# Patient Record
Sex: FEMALE | Race: WHITE | NOT HISPANIC OR LATINO | Employment: FULL TIME | URBAN - METROPOLITAN AREA
[De-identification: names, ages, dates, MRNs, and addresses within clinical notes are randomized per-mention and may not be internally consistent; named-entity substitution may affect disease eponyms.]

---

## 2018-02-01 ENCOUNTER — OFFICE VISIT (OUTPATIENT)
Dept: URGENT CARE | Facility: CLINIC | Age: 33
End: 2018-02-01
Payer: MEDICAID

## 2018-02-01 VITALS
TEMPERATURE: 99 F | HEIGHT: 61 IN | BODY MASS INDEX: 36.82 KG/M2 | WEIGHT: 195 LBS | HEART RATE: 91 BPM | DIASTOLIC BLOOD PRESSURE: 90 MMHG | SYSTOLIC BLOOD PRESSURE: 125 MMHG | OXYGEN SATURATION: 96 % | RESPIRATION RATE: 18 BRPM

## 2018-02-01 DIAGNOSIS — J32.9 RHINOSINUSITIS: Primary | ICD-10-CM

## 2018-02-01 DIAGNOSIS — R05.9 COUGH: ICD-10-CM

## 2018-02-01 LAB
CTP QC/QA: YES
FLUAV AG NPH QL: NEGATIVE
FLUBV AG NPH QL: NEGATIVE

## 2018-02-01 PROCEDURE — 3008F BODY MASS INDEX DOCD: CPT | Mod: S$GLB,,, | Performed by: PHYSICIAN ASSISTANT

## 2018-02-01 PROCEDURE — 99203 OFFICE O/P NEW LOW 30 MIN: CPT | Mod: S$GLB,,, | Performed by: PHYSICIAN ASSISTANT

## 2018-02-01 PROCEDURE — 87804 INFLUENZA ASSAY W/OPTIC: CPT | Mod: 59,QW,S$GLB, | Performed by: PHYSICIAN ASSISTANT

## 2018-02-01 RX ORDER — QUETIAPINE FUMARATE 100 MG/1
100 TABLET, FILM COATED ORAL DAILY
COMMUNITY
End: 2022-12-16 | Stop reason: ALTCHOICE

## 2018-02-01 RX ORDER — BUSPIRONE HYDROCHLORIDE 10 MG/1
10 TABLET ORAL 2 TIMES DAILY
COMMUNITY
End: 2021-05-08

## 2018-02-01 RX ORDER — DULOXETIN HYDROCHLORIDE 60 MG/1
60 CAPSULE, DELAYED RELEASE ORAL DAILY
COMMUNITY
End: 2021-05-08

## 2018-02-01 RX ORDER — PROMETHAZINE HYDROCHLORIDE AND DEXTROMETHORPHAN HYDROBROMIDE 6.25; 15 MG/5ML; MG/5ML
5 SYRUP ORAL EVERY 6 HOURS PRN
Qty: 120 ML | Refills: 0 | Status: SHIPPED | OUTPATIENT
Start: 2018-02-01 | End: 2018-02-11

## 2018-02-01 RX ORDER — FLUTICASONE PROPIONATE 50 MCG
1 SPRAY, SUSPENSION (ML) NASAL DAILY
Qty: 15 G | Refills: 0 | Status: SHIPPED | OUTPATIENT
Start: 2018-02-01 | End: 2018-02-08

## 2018-02-01 NOTE — PATIENT INSTRUCTIONS
- Rest.    - Drink plenty of fluids.    - Tylenol or Ibuprofen as directed as needed for fever/pain.   - Take over-the-counter claritin, zyrtec, allegra, or xyzal as directed.  - Use over the counter Flonase as directed for sinus congestion and postnasal drip.  - use nasal saline prior to Flonase.  - Antibiotics are not needed at this time.  - Usual course of cold symptom is 10-14 days, but longer if patient is a smoker.   - Use salt water gargle for sore throat.   - Follow up with your PCP or specialty clinic as directed in the next 1-2 weeks if not improved or as needed.  You can call [phone] to schedule an appointment with the appropriate provider.    - Go to the ED if your symptoms worsen.    Sinusitis (No Antibiotics)    The sinuses are air-filled spaces within the bones of the face. They connect to the inside of the nose. Sinusitis is an inflammation of the tissue lining the sinus cavity. Sinus inflammation can occur during a cold. It can also be due to allergies to pollens and other particles in the air. It can cause symptoms such as sinus congestion, headache, sore throat, facial swelling and fullness. It may also cause a low-grade fever. No infection is present, and no antibiotic treatment is needed.  Home care  · Drink plenty of water, hot tea, and other liquids. This may help thin mucus. It also may promote sinus drainage.  · Heat may help soothe painful areas of the face. Use a towel soaked in hot water. Or,  the shower and direct the hot spray onto your face. Using a vaporizer along with a menthol rub at night may also help.   · An expectorant containing guaifenesin may help thin the mucus and promote drainage from the sinuses.  · Over-the-counter decongestants may be used unless a similar medicine was prescribed. Nasal sprays work the fastest. Use one that contains phenylephrine or oxymetazoline. First blow the nose gently. Then use the spray. Do not use these medicines more often than  directed on the label or symptoms may get worse. You may also use tablets containing pseudoephedrine. Avoid products that combine ingredients, because side effects may be increased. Read labels. You can also ask the pharmacist for help. (NOTE: Persons with high blood pressure should not use decongestants. They can raise blood pressure.)  · Over-the-counter antihistamines may help if allergies contributed to your sinusitis.    · Use acetaminophen or ibuprofen to control pain, unless another pain medicine was prescribed. (If you have chronic liver or kidney disease or ever had a stomach ulcer, talk with your doctor before using these medicines. Aspirin should never be used in anyone under 18 years of age who is ill with a fever. It may cause severe liver damage.)  · Use nasal rinses or irrigation as instructed by your health care provider.  · Don't smoke. This can worsen symptoms.  Follow-up care  Follow up with your healthcare provider or our staff if you are not improving within the next week.  When to seek medical advice  Call your healthcare provider if any of these occur:  · Green or yellow discharge from the nose or into the throat  · Facial pain or headache becoming more severe  · Stiff neck  · Unusual drowsiness or confusion  · Swelling of the forehead or eyelids  · Vision problems, including blurred or double vision  · Fever of 100.4ºF (38ºC) or higher, or as directed by your healthcare provider  · Seizure  · Breathing problems  · Symptoms not resolving within 10 days  Date Last Reviewed: 4/13/2015  © 2679-8319 [employer]. [address] [place] [address], [place] [address]. All rights reserved. This information is not intended as a substitute for professional medical care. Always follow your healthcare professional's instructions.

## 2018-02-01 NOTE — PROGRESS NOTES
"Subjective:       Patient ID: Jerry Coronado is a 32 y.o. female.    Vitals:  height is 5' 1" (1.549 m) and weight is 88.5 kg (195 lb). Her oral temperature is 99.3 °F (37.4 °C). Her blood pressure is 125/90 (abnormal) and her pulse is 91. Her respiration is 18 and oxygen saturation is 96%.     Chief Complaint: URI    URI    This is a new problem. The current episode started in the past 7 days. The problem has been unchanged. There has been no fever. Associated symptoms include congestion, coughing, ear pain, headaches and a sore throat. Pertinent negatives include no abdominal pain, chest pain, nausea or wheezing. The treatment provided no relief.     Review of Systems   Constitution: Positive for chills and fever. Negative for malaise/fatigue.   HENT: Positive for congestion, ear pain and sore throat. Negative for hoarse voice.    Eyes: Negative for discharge and redness.   Cardiovascular: Negative for chest pain, dyspnea on exertion and leg swelling.   Respiratory: Positive for cough and sputum production. Negative for shortness of breath and wheezing.    Musculoskeletal: Positive for myalgias.   Gastrointestinal: Negative for abdominal pain and nausea.   Neurological: Positive for headaches.       Objective:      Physical Exam   Constitutional: She is oriented to person, place, and time. She appears well-developed and well-nourished.   HENT:   Head: Normocephalic and atraumatic.   Right Ear: Hearing, tympanic membrane, external ear and ear canal normal.   Left Ear: Hearing, tympanic membrane, external ear and ear canal normal.   Nose: Right sinus exhibits frontal sinus tenderness. Right sinus exhibits no maxillary sinus tenderness. Left sinus exhibits frontal sinus tenderness. Left sinus exhibits no maxillary sinus tenderness.   Mouth/Throat: Uvula is midline and oropharynx is clear and moist.   Eyes: Conjunctivae are normal.   Neck: Normal range of motion. Neck supple. No thyromegaly present. "   Cardiovascular: Normal rate and regular rhythm.  Exam reveals no gallop and no friction rub.    No murmur heard.  Pulmonary/Chest: Effort normal and breath sounds normal. She has no wheezes. She has no rales.   Musculoskeletal: Normal range of motion.   Lymphadenopathy:     She has no cervical adenopathy.   Neurological: She is alert and oriented to person, place, and time.   Skin: Skin is warm and dry. No rash noted. No erythema.   Psychiatric: She has a normal mood and affect. Her behavior is normal. Judgment and thought content normal.   Nursing note and vitals reviewed.      Assessment:       1. Rhinosinusitis    2. Cough        Plan:         Rhinosinusitis  -     fluticasone (FLONASE) 50 mcg/actuation nasal spray; 1 spray (50 mcg total) by Each Nare route once daily.  Dispense: 15 g; Refill: 0    Cough  -     POCT Influenza A/B  -     promethazine-dextromethorphan (PROMETHAZINE-DM) 6.25-15 mg/5 mL Syrp; Take 5 mLs by mouth every 6 (six) hours as needed (cough).  Dispense: 120 mL; Refill: 0      Jerry was seen today for uri.    Diagnoses and all orders for this visit:    Rhinosinusitis  -     fluticasone (FLONASE) 50 mcg/actuation nasal spray; 1 spray (50 mcg total) by Each Nare route once daily.    Cough  -     POCT Influenza A/B  -     promethazine-dextromethorphan (PROMETHAZINE-DM) 6.25-15 mg/5 mL Syrp; Take 5 mLs by mouth every 6 (six) hours as needed (cough).      Patient Instructions   - Rest.    - Drink plenty of fluids.    - Tylenol or Ibuprofen as directed as needed for fever/pain.   - Take over-the-counter claritin, zyrtec, allegra, or xyzal as directed.  - Use over the counter Flonase as directed for sinus congestion and postnasal drip.  - use nasal saline prior to Flonase.  - Antibiotics are not needed at this time.  - Usual course of cold symptom is 10-14 days, but longer if patient is a smoker.   - Use salt water gargle for sore throat.   - Follow up with your PCP or specialty clinic as directed  in the next 1-2 weeks if not improved or as needed.  You can call [phone] to schedule an appointment with the appropriate provider.    - Go to the ED if your symptoms worsen.    Sinusitis (No Antibiotics)    The sinuses are air-filled spaces within the bones of the face. They connect to the inside of the nose. Sinusitis is an inflammation of the tissue lining the sinus cavity. Sinus inflammation can occur during a cold. It can also be due to allergies to pollens and other particles in the air. It can cause symptoms such as sinus congestion, headache, sore throat, facial swelling and fullness. It may also cause a low-grade fever. No infection is present, and no antibiotic treatment is needed.  Home care  · Drink plenty of water, hot tea, and other liquids. This may help thin mucus. It also may promote sinus drainage.  · Heat may help soothe painful areas of the face. Use a towel soaked in hot water. Or,  the shower and direct the hot spray onto your face. Using a vaporizer along with a menthol rub at night may also help.   · An expectorant containing guaifenesin may help thin the mucus and promote drainage from the sinuses.  · Over-the-counter decongestants may be used unless a similar medicine was prescribed. Nasal sprays work the fastest. Use one that contains phenylephrine or oxymetazoline. First blow the nose gently. Then use the spray. Do not use these medicines more often than directed on the label or symptoms may get worse. You may also use tablets containing pseudoephedrine. Avoid products that combine ingredients, because side effects may be increased. Read labels. You can also ask the pharmacist for help. (NOTE: Persons with high blood pressure should not use decongestants. They can raise blood pressure.)  · Over-the-counter antihistamines may help if allergies contributed to your sinusitis.    · Use acetaminophen or ibuprofen to control pain, unless another pain medicine was prescribed. (If  you have chronic liver or kidney disease or ever had a stomach ulcer, talk with your doctor before using these medicines. Aspirin should never be used in anyone under 18 years of age who is ill with a fever. It may cause severe liver damage.)  · Use nasal rinses or irrigation as instructed by your health care provider.  · Don't smoke. This can worsen symptoms.  Follow-up care  Follow up with your healthcare provider or our staff if you are not improving within the next week.  When to seek medical advice  Call your healthcare provider if any of these occur:  · Green or yellow discharge from the nose or into the throat  · Facial pain or headache becoming more severe  · Stiff neck  · Unusual drowsiness or confusion  · Swelling of the forehead or eyelids  · Vision problems, including blurred or double vision  · Fever of 100.4ºF (38ºC) or higher, or as directed by your healthcare provider  · Seizure  · Breathing problems  · Symptoms not resolving within 10 days  Date Last Reviewed: 4/13/2015  © 9265-2438 [employer]. [address] [place] [address], [place] [address]. All rights reserved. This information is not intended as a substitute for professional medical care. Always follow your healthcare professional's instructions.

## 2018-12-12 ENCOUNTER — OFFICE VISIT (OUTPATIENT)
Dept: URGENT CARE | Facility: CLINIC | Age: 33
End: 2018-12-12
Payer: MEDICAID

## 2018-12-12 VITALS
TEMPERATURE: 98 F | DIASTOLIC BLOOD PRESSURE: 73 MMHG | WEIGHT: 195 LBS | SYSTOLIC BLOOD PRESSURE: 118 MMHG | OXYGEN SATURATION: 97 % | HEART RATE: 99 BPM | BODY MASS INDEX: 36.82 KG/M2 | HEIGHT: 61 IN | RESPIRATION RATE: 18 BRPM

## 2018-12-12 DIAGNOSIS — J06.9 UPPER RESPIRATORY TRACT INFECTION, UNSPECIFIED TYPE: Primary | ICD-10-CM

## 2018-12-12 PROCEDURE — 99214 OFFICE O/P EST MOD 30 MIN: CPT | Mod: S$GLB,,, | Performed by: PHYSICIAN ASSISTANT

## 2018-12-12 RX ORDER — PREDNISONE 20 MG/1
20 TABLET ORAL DAILY
Qty: 3 TABLET | Refills: 0 | Status: SHIPPED | OUTPATIENT
Start: 2018-12-12 | End: 2018-12-15

## 2018-12-12 RX ORDER — HYDROXYZINE PAMOATE 50 MG/1
CAPSULE ORAL
COMMUNITY
End: 2021-07-20 | Stop reason: ALTCHOICE

## 2018-12-12 RX ORDER — PROMETHAZINE HYDROCHLORIDE AND DEXTROMETHORPHAN HYDROBROMIDE 6.25; 15 MG/5ML; MG/5ML
5 SYRUP ORAL NIGHTLY PRN
Qty: 60 ML | Refills: 0 | Status: SHIPPED | OUTPATIENT
Start: 2018-12-12 | End: 2021-05-08

## 2018-12-12 RX ORDER — BENZONATATE 100 MG/1
100 CAPSULE ORAL 3 TIMES DAILY PRN
Qty: 30 CAPSULE | Refills: 0 | Status: SHIPPED | OUTPATIENT
Start: 2018-12-12 | End: 2019-12-12

## 2018-12-12 NOTE — PATIENT INSTRUCTIONS
-Take oral steroid once daily as prescribed.  -Take tessalon perles during the day and cough syrup at night as needed. Be aware the cough syrup may cause drowsiness.  -Rest and stay hydrated.    Below are suggestions for symptomatic relief:   -Tylenol every 4 hours OR ibuprofen every 6 hours as needed for pain/fever.   -Salt water gargles to soothe throat pain.   -Chloroseptic spray also helps to numb throat pain.   -Nasal saline spray reduces inflammation and dryness.   -Warm face compresses to help with facial sinus pain/pressure.   -Vicks vapor rub at night.   -Flonase OTC or Nasacort OTC for nasal congestion.   -Simple foods like chicken noodle soup.   -Delsym helps with coughing at night   -Zyrtec/Claritin during the day & Benadryl at night may help with allergies.     If you DO NOT have Hypertension or any history of palpitations, it is ok to take over the counter Sudafed or Mucinex D or Allegra-D or Claritin-D or Zyrtec-D.  If you do take one of the above, it is ok to combine that with plain over the counter Mucinex or Allegra or Claritin or Zyrtec. If, for example, you are taking Zyrtec -D, you can combine that with Mucinex, but not Mucinex-D.  If you are taking Mucinex-D, you can combine that with plain Allegra or Claritin or Zyrtec.   If you DO have Hypertension or palpitations, it is safe to take Coricidin HBP for relief of sinus symptoms.    Please follow up with your primary care provider within 2-5 days if your signs and symptoms have not resolved or worsen.     If your condition worsens or fails to improve we recommend that you receive another evaluation at the emergency room immediately or contact your primary medical clinic to discuss your concerns.   You must understand that you have received an Urgent Care treatment only and that you may be released before all of your medical problems are known or treated. You, the patient, will arrange for follow up care as instructed.

## 2018-12-12 NOTE — PROGRESS NOTES
"Subjective:       Patient ID: Jerry Coronado is a 33 y.o. female.    Vitals:  height is 5' 1" (1.549 m) and weight is 88.5 kg (195 lb). Her temperature is 97.5 °F (36.4 °C). Her blood pressure is 118/73 and her pulse is 99. Her respiration is 18 and oxygen saturation is 97%.     Chief Complaint: URI    URI    This is a new problem. Episode onset: Sunday  The problem has been gradually improving. There has been no fever. Associated symptoms include congestion, coughing, headaches, sinus pain and a sore throat. Pertinent negatives include no ear pain, nausea, rash, vomiting or wheezing. She has tried nothing for the symptoms. The treatment provided no relief.       Constitution: Positive for fatigue. Negative for chills, sweating and fever.   HENT: Positive for congestion, sinus pain, sinus pressure and sore throat. Negative for ear pain and voice change.    Neck: Negative for painful lymph nodes.   Eyes: Negative for eye redness.   Respiratory: Positive for cough and sputum production. Negative for chest tightness, bloody sputum, COPD, shortness of breath, stridor, wheezing and asthma.    Gastrointestinal: Negative for nausea and vomiting.   Musculoskeletal: Positive for muscle ache.   Skin: Negative for rash and erythema.   Allergic/Immunologic: Negative for seasonal allergies and asthma.   Neurological: Positive for headaches.   Hematologic/Lymphatic: Negative for swollen lymph nodes.       Objective:      Physical Exam   Constitutional: She is oriented to person, place, and time. Vital signs are normal. She appears well-developed and well-nourished. She does not appear ill. No distress.   HENT:   Head: Normocephalic and atraumatic.   Right Ear: Hearing, tympanic membrane, external ear and ear canal normal.   Left Ear: Hearing, tympanic membrane, external ear and ear canal normal.   Nose: Mucosal edema present. Right sinus exhibits no maxillary sinus tenderness and no frontal sinus tenderness. Left sinus " exhibits no maxillary sinus tenderness and no frontal sinus tenderness.   Mouth/Throat: Uvula is midline and oropharynx is clear and moist. No oropharyngeal exudate, posterior oropharyngeal edema or posterior oropharyngeal erythema.   Eyes: Conjunctivae, EOM and lids are normal. Right eye exhibits no discharge. Left eye exhibits no discharge.   Neck: Normal range of motion. Neck supple.   Cardiovascular: Normal rate, regular rhythm and normal heart sounds. Exam reveals no gallop and no friction rub.   No murmur heard.  Pulmonary/Chest: Effort normal and breath sounds normal. No stridor. No respiratory distress. She has no decreased breath sounds. She has no wheezes. She has no rhonchi. She has no rales.   Musculoskeletal: Normal range of motion.   Lymphadenopathy:        Head (right side): No submandibular and no tonsillar adenopathy present.        Head (left side): No submandibular and no tonsillar adenopathy present.   Neurological: She is alert and oriented to person, place, and time.   Skin: Skin is warm and dry. No rash noted. She is not diaphoretic. No erythema. No pallor.   Psychiatric: She has a normal mood and affect. Her behavior is normal.   Nursing note and vitals reviewed.      Assessment:       1. Upper respiratory tract infection, unspecified type        Plan:       Patient has not tried any OTC medication. Advised pt try OTC medication first.  Upper respiratory tract infection, unspecified type  -     benzonatate (TESSALON PERLES) 100 MG capsule; Take 1 capsule (100 mg total) by mouth 3 (three) times daily as needed for Cough.  Dispense: 30 capsule; Refill: 0  -     promethazine-dextromethorphan (PROMETHAZINE-DM) 6.25-15 mg/5 mL Syrp; Take 5 mLs by mouth nightly as needed.  Dispense: 60 mL; Refill: 0  -     predniSONE (DELTASONE) 20 MG tablet; Take 1 tablet (20 mg total) by mouth once daily. for 3 days  Dispense: 3 tablet; Refill: 0      Patient Instructions   -Take oral steroid once daily as  prescribed.  -Take tessalon perles during the day and cough syrup at night as needed. Be aware the cough syrup may cause drowsiness.  -Rest and stay hydrated.    Below are suggestions for symptomatic relief:   -Tylenol every 4 hours OR ibuprofen every 6 hours as needed for pain/fever.   -Salt water gargles to soothe throat pain.   -Chloroseptic spray also helps to numb throat pain.   -Nasal saline spray reduces inflammation and dryness.   -Warm face compresses to help with facial sinus pain/pressure.   -Vicks vapor rub at night.   -Flonase OTC or Nasacort OTC for nasal congestion.   -Simple foods like chicken noodle soup.   -Delsym helps with coughing at night   -Zyrtec/Claritin during the day & Benadryl at night may help with allergies.     If you DO NOT have Hypertension or any history of palpitations, it is ok to take over the counter Sudafed or Mucinex D or Allegra-D or Claritin-D or Zyrtec-D.  If you do take one of the above, it is ok to combine that with plain over the counter Mucinex or Allegra or Claritin or Zyrtec. If, for example, you are taking Zyrtec -D, you can combine that with Mucinex, but not Mucinex-D.  If you are taking Mucinex-D, you can combine that with plain Allegra or Claritin or Zyrtec.   If you DO have Hypertension or palpitations, it is safe to take Coricidin HBP for relief of sinus symptoms.    Please follow up with your primary care provider within 2-5 days if your signs and symptoms have not resolved or worsen.     If your condition worsens or fails to improve we recommend that you receive another evaluation at the emergency room immediately or contact your primary medical clinic to discuss your concerns.   You must understand that you have received an Urgent Care treatment only and that you may be released before all of your medical problems are known or treated. You, the patient, will arrange for follow up care as instructed.

## 2019-05-24 ENCOUNTER — CLINICAL SUPPORT (OUTPATIENT)
Dept: URGENT CARE | Facility: CLINIC | Age: 34
End: 2019-05-24

## 2019-05-24 DIAGNOSIS — Z11.1 PPD SCREENING TEST: Primary | ICD-10-CM

## 2019-05-24 PROCEDURE — 86580 TB INTRADERMAL TEST: CPT | Mod: S$GLB,,, | Performed by: NURSE PRACTITIONER

## 2019-05-24 PROCEDURE — 86580 POCT TB SKIN TEST: ICD-10-PCS | Mod: S$GLB,,, | Performed by: NURSE PRACTITIONER

## 2019-05-27 LAB
TB INDURATION - 48 HR READ: NORMAL MM
TB INDURATION - 72 HR READ: 0 MM
TB SKIN TEST - 48 HR READ: NORMAL
TB SKIN TEST - 72 HR READ: NEGATIVE

## 2019-10-14 PROBLEM — J34.3 HYPERTROPHY OF NASAL TURBINATES: Status: ACTIVE | Noted: 2019-10-14

## 2019-10-14 PROBLEM — H69.92 EUSTACHIAN TUBE DISORDER, LEFT: Status: ACTIVE | Noted: 2019-10-14

## 2019-10-14 PROBLEM — J34.2 DEVIATED NASAL SEPTUM: Status: ACTIVE | Noted: 2019-10-14

## 2019-10-14 PROBLEM — J32.9 CHRONIC INFECTION OF SINUS: Status: ACTIVE | Noted: 2019-10-14

## 2020-12-04 DIAGNOSIS — Z01.84 ANTIBODY RESPONSE EXAMINATION: ICD-10-CM

## 2020-12-14 ENCOUNTER — IMMUNIZATION (OUTPATIENT)
Dept: INTERNAL MEDICINE | Facility: CLINIC | Age: 35
End: 2020-12-14
Payer: MEDICAID

## 2020-12-14 DIAGNOSIS — Z23 NEED FOR VACCINATION: ICD-10-CM

## 2020-12-14 PROCEDURE — 0001A COVID-19, MRNA, LNP-S, PF, 30 MCG/0.3 ML DOSE VACCINE: ICD-10-PCS | Mod: CV19,,, | Performed by: INTERNAL MEDICINE

## 2020-12-14 PROCEDURE — 91300 COVID-19, MRNA, LNP-S, PF, 30 MCG/0.3 ML DOSE VACCINE: ICD-10-PCS | Mod: ,,, | Performed by: INTERNAL MEDICINE

## 2020-12-14 PROCEDURE — 91300 COVID-19, MRNA, LNP-S, PF, 30 MCG/0.3 ML DOSE VACCINE: CPT | Mod: ,,, | Performed by: INTERNAL MEDICINE

## 2020-12-14 PROCEDURE — 0001A COVID-19, MRNA, LNP-S, PF, 30 MCG/0.3 ML DOSE VACCINE: CPT | Mod: CV19,,, | Performed by: INTERNAL MEDICINE

## 2021-01-05 ENCOUNTER — IMMUNIZATION (OUTPATIENT)
Dept: INTERNAL MEDICINE | Facility: CLINIC | Age: 36
End: 2021-01-05
Payer: MEDICAID

## 2021-01-05 DIAGNOSIS — Z23 NEED FOR VACCINATION: ICD-10-CM

## 2021-01-05 PROCEDURE — 91300 COVID-19, MRNA, LNP-S, PF, 30 MCG/0.3 ML DOSE VACCINE: CPT | Mod: PBBFAC

## 2021-01-05 PROCEDURE — 0002A COVID-19, MRNA, LNP-S, PF, 30 MCG/0.3 ML DOSE VACCINE: CPT | Mod: PBBFAC

## 2021-04-23 ENCOUNTER — OFFICE VISIT (OUTPATIENT)
Dept: OPTOMETRY | Facility: CLINIC | Age: 36
End: 2021-04-23
Payer: COMMERCIAL

## 2021-04-23 ENCOUNTER — OFFICE VISIT (OUTPATIENT)
Dept: OTOLARYNGOLOGY | Facility: CLINIC | Age: 36
End: 2021-04-23
Payer: COMMERCIAL

## 2021-04-23 VITALS — SYSTOLIC BLOOD PRESSURE: 140 MMHG | DIASTOLIC BLOOD PRESSURE: 96 MMHG | HEART RATE: 99 BPM

## 2021-04-23 DIAGNOSIS — H92.02 OTALGIA OF LEFT EAR: ICD-10-CM

## 2021-04-23 DIAGNOSIS — H52.4 PRESBYOPIA: ICD-10-CM

## 2021-04-23 DIAGNOSIS — Z46.0 FITTING AND ADJUSTMENT OF SPECTACLES AND CONTACT LENSES: Primary | ICD-10-CM

## 2021-04-23 DIAGNOSIS — Z83.511 FAMILY HISTORY OF GLAUCOMA: ICD-10-CM

## 2021-04-23 DIAGNOSIS — H52.03 HYPEROPIA, BILATERAL: Primary | ICD-10-CM

## 2021-04-23 DIAGNOSIS — Z98.890 HISTORY OF ENDOSCOPIC SINUS SURGERY: ICD-10-CM

## 2021-04-23 DIAGNOSIS — R04.0 EPISTAXIS: Primary | ICD-10-CM

## 2021-04-23 DIAGNOSIS — Z96.22 HISTORY OF TYMPANOSTOMY TUBE PLACEMENT: ICD-10-CM

## 2021-04-23 DIAGNOSIS — Z87.09 HISTORY OF CHRONIC SINUSITIS: ICD-10-CM

## 2021-04-23 DIAGNOSIS — Z04.9 DISEASE RULED OUT AFTER EXAMINATION: ICD-10-CM

## 2021-04-23 DIAGNOSIS — Z98.890 HISTORY OF NASAL SEPTOPLASTY: ICD-10-CM

## 2021-04-23 PROCEDURE — 92310 PR CONTACT LENS FITTING (NO CHANGE): ICD-10-PCS | Mod: S$GLB,,, | Performed by: OPTOMETRIST

## 2021-04-23 PROCEDURE — 92310 CONTACT LENS FITTING OU: CPT | Mod: S$GLB,,, | Performed by: OPTOMETRIST

## 2021-04-23 PROCEDURE — 99204 PR OFFICE/OUTPT VISIT, NEW, LEVL IV, 45-59 MIN: ICD-10-PCS | Mod: 25,S$GLB,, | Performed by: OTOLARYNGOLOGY

## 2021-04-23 PROCEDURE — 99999 PR PBB SHADOW E&M-EST. PATIENT-LVL I: CPT | Mod: PBBFAC,,, | Performed by: OPTOMETRIST

## 2021-04-23 PROCEDURE — 99999 PR PBB SHADOW E&M-EST. PATIENT-LVL I: ICD-10-PCS | Mod: PBBFAC,,, | Performed by: OPTOMETRIST

## 2021-04-23 PROCEDURE — 99204 OFFICE O/P NEW MOD 45 MIN: CPT | Mod: 25,S$GLB,, | Performed by: OTOLARYNGOLOGY

## 2021-04-23 PROCEDURE — 30901 CONTROL OF NOSEBLEED: CPT | Mod: LT,S$GLB,, | Performed by: OTOLARYNGOLOGY

## 2021-04-23 PROCEDURE — 92004 PR EYE EXAM, NEW PATIENT,COMPREHESV: ICD-10-PCS | Mod: S$GLB,,, | Performed by: OPTOMETRIST

## 2021-04-23 PROCEDURE — 99999 PR PBB SHADOW E&M-EST. PATIENT-LVL III: CPT | Mod: PBBFAC,,, | Performed by: OTOLARYNGOLOGY

## 2021-04-23 PROCEDURE — 30901 PR CTRL 2SEBLEED,ANTER,SIMPLE: ICD-10-PCS | Mod: LT,S$GLB,, | Performed by: OTOLARYNGOLOGY

## 2021-04-23 PROCEDURE — 92004 COMPRE OPH EXAM NEW PT 1/>: CPT | Mod: S$GLB,,, | Performed by: OPTOMETRIST

## 2021-04-23 PROCEDURE — 1125F PR PAIN SEVERITY QUANTIFIED, PAIN PRESENT: ICD-10-PCS | Mod: S$GLB,,, | Performed by: OTOLARYNGOLOGY

## 2021-04-23 PROCEDURE — 1125F AMNT PAIN NOTED PAIN PRSNT: CPT | Mod: S$GLB,,, | Performed by: OTOLARYNGOLOGY

## 2021-04-23 PROCEDURE — 99999 PR PBB SHADOW E&M-EST. PATIENT-LVL III: ICD-10-PCS | Mod: PBBFAC,,, | Performed by: OTOLARYNGOLOGY

## 2021-04-23 PROCEDURE — 92015 DETERMINE REFRACTIVE STATE: CPT | Mod: S$GLB,,, | Performed by: OPTOMETRIST

## 2021-04-23 PROCEDURE — 92015 PR REFRACTION: ICD-10-PCS | Mod: S$GLB,,, | Performed by: OPTOMETRIST

## 2021-04-24 ENCOUNTER — PATIENT MESSAGE (OUTPATIENT)
Dept: OPTOMETRY | Facility: CLINIC | Age: 36
End: 2021-04-24

## 2021-05-04 ENCOUNTER — PATIENT MESSAGE (OUTPATIENT)
Dept: OTOLARYNGOLOGY | Facility: CLINIC | Age: 36
End: 2021-05-04

## 2021-05-17 ENCOUNTER — PATIENT MESSAGE (OUTPATIENT)
Dept: PHARMACY | Facility: CLINIC | Age: 36
End: 2021-05-17

## 2021-05-17 ENCOUNTER — OFFICE VISIT (OUTPATIENT)
Dept: OBSTETRICS AND GYNECOLOGY | Facility: CLINIC | Age: 36
End: 2021-05-17
Payer: COMMERCIAL

## 2021-05-17 VITALS
WEIGHT: 229.81 LBS | SYSTOLIC BLOOD PRESSURE: 118 MMHG | HEIGHT: 61 IN | BODY MASS INDEX: 43.39 KG/M2 | DIASTOLIC BLOOD PRESSURE: 82 MMHG

## 2021-05-17 DIAGNOSIS — Z30.41 ENCOUNTER FOR SURVEILLANCE OF CONTRACEPTIVE PILLS: ICD-10-CM

## 2021-05-17 DIAGNOSIS — Z01.419 ENCOUNTER FOR ANNUAL ROUTINE GYNECOLOGICAL EXAMINATION: Primary | ICD-10-CM

## 2021-05-17 DIAGNOSIS — Z12.39 BREAST CANCER SCREENING OTHER THAN MAMMOGRAM: ICD-10-CM

## 2021-05-17 DIAGNOSIS — Z87.410 PERSONAL HISTORY OF CERVICAL DYSPLASIA: ICD-10-CM

## 2021-05-17 DIAGNOSIS — L70.9 ACNE, UNSPECIFIED ACNE TYPE: ICD-10-CM

## 2021-05-17 PROBLEM — R87.610 ATYPICAL SQUAMOUS CELLS OF UNDETERMINED SIGNIFICANCE (ASCUS) ON PAPANICOLAOU SMEAR OF CERVIX: Status: ACTIVE | Noted: 2017-09-08

## 2021-05-17 PROBLEM — T50.901A OVERDOSE: Status: ACTIVE | Noted: 2020-01-18

## 2021-05-17 PROBLEM — R57.9 SHOCK: Status: ACTIVE | Noted: 2020-01-18

## 2021-05-17 PROBLEM — F33.1 MODERATE RECURRENT MAJOR DEPRESSION: Status: ACTIVE | Noted: 2017-11-08

## 2021-05-17 PROBLEM — T50.902A INTENTIONAL DRUG OVERDOSE: Status: ACTIVE | Noted: 2020-01-18

## 2021-05-17 PROBLEM — Z86.19 PERSONAL HISTORY OF OTHER INFECTIOUS AND PARASITIC DISEASES: Status: ACTIVE | Noted: 2021-05-17

## 2021-05-17 PROBLEM — R87.811 VAGINAL HIGH RISK HUMAN PAPILLOMAVIRUS (HPV) DNA TEST POSITIVE: Status: ACTIVE | Noted: 2017-09-08

## 2021-05-17 PROBLEM — Z72.0 TOBACCO USER: Status: ACTIVE | Noted: 2017-09-08

## 2021-05-17 PROBLEM — F10.10 ALCOHOL ABUSE: Status: ACTIVE | Noted: 2017-11-08

## 2021-05-17 PROBLEM — H69.90 EUSTACHIAN TUBE DYSFUNCTION: Status: ACTIVE | Noted: 2019-10-14

## 2021-05-17 PROBLEM — F10.94: Status: ACTIVE | Noted: 2021-05-17

## 2021-05-17 PROBLEM — B02.29 POSTHERPETIC NEURALGIA: Status: ACTIVE | Noted: 2021-05-17

## 2021-05-17 PROBLEM — G47.33 OBSTRUCTIVE SLEEP APNEA SYNDROME: Status: ACTIVE | Noted: 2021-05-17

## 2021-05-17 LAB
B-HCG UR QL: NEGATIVE
CTP QC/QA: YES

## 2021-05-17 PROCEDURE — 87624 HPV HI-RISK TYP POOLED RSLT: CPT | Performed by: OBSTETRICS & GYNECOLOGY

## 2021-05-17 PROCEDURE — 1126F AMNT PAIN NOTED NONE PRSNT: CPT | Mod: S$GLB,,, | Performed by: OBSTETRICS & GYNECOLOGY

## 2021-05-17 PROCEDURE — 88141 CYTOPATH C/V INTERPRET: CPT | Mod: ,,, | Performed by: PATHOLOGY

## 2021-05-17 PROCEDURE — 88141 PR  CYTOPATH CERV/VAG INTERPRET: ICD-10-PCS | Mod: ,,, | Performed by: PATHOLOGY

## 2021-05-17 PROCEDURE — 99385 PR PREVENTIVE VISIT,NEW,18-39: ICD-10-PCS | Mod: S$GLB,,, | Performed by: OBSTETRICS & GYNECOLOGY

## 2021-05-17 PROCEDURE — 81025 URINE PREGNANCY TEST: CPT | Mod: S$GLB,,, | Performed by: OBSTETRICS & GYNECOLOGY

## 2021-05-17 PROCEDURE — 99385 PREV VISIT NEW AGE 18-39: CPT | Mod: S$GLB,,, | Performed by: OBSTETRICS & GYNECOLOGY

## 2021-05-17 PROCEDURE — 81025 POCT URINE PREGNANCY: ICD-10-PCS | Mod: S$GLB,,, | Performed by: OBSTETRICS & GYNECOLOGY

## 2021-05-17 PROCEDURE — 3008F BODY MASS INDEX DOCD: CPT | Mod: CPTII,S$GLB,, | Performed by: OBSTETRICS & GYNECOLOGY

## 2021-05-17 PROCEDURE — 88175 CYTOPATH C/V AUTO FLUID REDO: CPT | Performed by: PATHOLOGY

## 2021-05-17 PROCEDURE — 3008F PR BODY MASS INDEX (BMI) DOCUMENTED: ICD-10-PCS | Mod: CPTII,S$GLB,, | Performed by: OBSTETRICS & GYNECOLOGY

## 2021-05-17 PROCEDURE — 99999 PR PBB SHADOW E&M-EST. PATIENT-LVL IV: ICD-10-PCS | Mod: PBBFAC,,, | Performed by: OBSTETRICS & GYNECOLOGY

## 2021-05-17 PROCEDURE — 99999 PR PBB SHADOW E&M-EST. PATIENT-LVL IV: CPT | Mod: PBBFAC,,, | Performed by: OBSTETRICS & GYNECOLOGY

## 2021-05-17 PROCEDURE — 1126F PR PAIN SEVERITY QUANTIFIED, NO PAIN PRESENT: ICD-10-PCS | Mod: S$GLB,,, | Performed by: OBSTETRICS & GYNECOLOGY

## 2021-05-17 RX ORDER — CYCLOBENZAPRINE HCL 10 MG
TABLET ORAL
COMMUNITY
End: 2021-07-20 | Stop reason: ALTCHOICE

## 2021-05-17 RX ORDER — BENZOYL PEROXIDE 2.5 G/100G
GEL TOPICAL
Qty: 60 G | Refills: 1 | Status: ON HOLD | OUTPATIENT
Start: 2021-05-17 | End: 2022-06-20 | Stop reason: HOSPADM

## 2021-05-17 RX ORDER — MIRTAZAPINE 7.5 MG/1
TABLET, FILM COATED ORAL
COMMUNITY
End: 2021-07-20 | Stop reason: ALTCHOICE

## 2021-05-17 RX ORDER — OXYCODONE AND ACETAMINOPHEN 5; 325 MG/1; MG/1
TABLET ORAL
COMMUNITY
End: 2021-05-17

## 2021-05-17 RX ORDER — HYDROXYZINE PAMOATE 50 MG/1
CAPSULE ORAL
COMMUNITY
End: 2021-05-17 | Stop reason: SDUPTHER

## 2021-05-17 RX ORDER — BENZOYL PEROXIDE 2.5 G/100G
GEL TOPICAL
COMMUNITY
End: 2021-05-17 | Stop reason: SDUPTHER

## 2021-05-17 RX ORDER — LEVOTHYROXINE SODIUM 50 UG/1
TABLET ORAL
COMMUNITY
End: 2021-05-17 | Stop reason: SDUPTHER

## 2021-05-17 RX ORDER — DROSPIRENONE AND ETHINYL ESTRADIOL 0.03MG-3MG
KIT ORAL
Qty: 84 TABLET | Refills: 4 | Status: SHIPPED | OUTPATIENT
Start: 2021-05-17 | End: 2022-06-20

## 2021-05-17 RX ORDER — GUAIFENESIN 100 MG/5ML
SOLUTION ORAL
COMMUNITY
End: 2021-05-17

## 2021-05-17 RX ORDER — SERTRALINE HYDROCHLORIDE 100 MG/1
TABLET, FILM COATED ORAL
COMMUNITY
End: 2021-05-17 | Stop reason: SDUPTHER

## 2021-05-17 RX ORDER — DOXYCYCLINE 100 MG/1
CAPSULE ORAL
COMMUNITY
End: 2022-09-09 | Stop reason: SDUPTHER

## 2021-05-17 RX ORDER — INFLUENZA A VIRUS A/NEBRASKA/14/2019 (H1N1) ANTIGEN (MDCK CELL DERIVED, PROPIOLACTONE INACTIVATED), INFLUENZA A VIRUS A/DELAWARE/39/2019 (H3N2) ANTIGEN (MDCK CELL DERIVED, PROPIOLACTONE INACTIVATED), INFLUENZA B VIRUS B/SINGAPORE/INFTT-16-0610/2016 ANTIGEN (MDCK CELL DERIVED, PROPIOLACTONE INACTIVATED), INFLUENZA B VIRUS B/DARWIN/7/2019 ANTIGEN (MDCK CELL DERIVED, PROPIOLACTONE INACTIVATED) 15; 15; 15; 15 UG/.5ML; UG/.5ML; UG/.5ML; UG/.5ML
INJECTION, SUSPENSION INTRAMUSCULAR
COMMUNITY
End: 2021-05-17 | Stop reason: ALTCHOICE

## 2021-05-17 RX ORDER — GABAPENTIN 300 MG/1
CAPSULE ORAL
COMMUNITY
Start: 2021-05-11 | End: 2021-07-20 | Stop reason: ALTCHOICE

## 2021-05-17 RX ORDER — DROSPIRENONE AND ETHINYL ESTRADIOL 0.03MG-3MG
KIT ORAL
COMMUNITY
End: 2021-05-17 | Stop reason: SDUPTHER

## 2021-05-17 RX ORDER — FLUTICASONE PROPIONATE 50 MCG
SPRAY, SUSPENSION (ML) NASAL
COMMUNITY
End: 2021-05-17 | Stop reason: SDUPTHER

## 2021-05-17 RX ORDER — DROSPIRENONE AND ETHINYL ESTRADIOL 0.03MG-3MG
KIT ORAL
COMMUNITY
End: 2021-05-17

## 2021-05-17 RX ORDER — PRAZOSIN HYDROCHLORIDE 2 MG/1
CAPSULE ORAL
COMMUNITY
End: 2022-12-16 | Stop reason: ALTCHOICE

## 2021-05-18 ENCOUNTER — IMMUNIZATION (OUTPATIENT)
Dept: PHARMACY | Facility: CLINIC | Age: 36
End: 2021-05-18
Payer: COMMERCIAL

## 2021-05-24 ENCOUNTER — PATIENT MESSAGE (OUTPATIENT)
Dept: OBSTETRICS AND GYNECOLOGY | Facility: CLINIC | Age: 36
End: 2021-05-24

## 2021-05-24 LAB
HPV HR 12 DNA SPEC QL NAA+PROBE: NEGATIVE
HPV16 AG SPEC QL: NEGATIVE
HPV18 DNA SPEC QL NAA+PROBE: NEGATIVE

## 2021-05-26 LAB
COMMENT: ABNORMAL
FINAL PATHOLOGIC DIAGNOSIS: ABNORMAL
Lab: ABNORMAL

## 2021-05-27 ENCOUNTER — PATIENT MESSAGE (OUTPATIENT)
Dept: OBSTETRICS AND GYNECOLOGY | Facility: CLINIC | Age: 36
End: 2021-05-27

## 2021-05-27 ENCOUNTER — TELEPHONE (OUTPATIENT)
Dept: OBSTETRICS AND GYNECOLOGY | Facility: CLINIC | Age: 36
End: 2021-05-27

## 2021-06-01 ENCOUNTER — TELEPHONE (OUTPATIENT)
Dept: OBSTETRICS AND GYNECOLOGY | Facility: CLINIC | Age: 36
End: 2021-06-01

## 2021-06-03 ENCOUNTER — TELEPHONE (OUTPATIENT)
Dept: OBSTETRICS AND GYNECOLOGY | Facility: CLINIC | Age: 36
End: 2021-06-03

## 2021-06-07 ENCOUNTER — PATIENT MESSAGE (OUTPATIENT)
Dept: OPTOMETRY | Facility: CLINIC | Age: 36
End: 2021-06-07

## 2021-06-07 ENCOUNTER — PATIENT MESSAGE (OUTPATIENT)
Dept: OBSTETRICS AND GYNECOLOGY | Facility: CLINIC | Age: 36
End: 2021-06-07

## 2021-06-09 ENCOUNTER — OFFICE VISIT (OUTPATIENT)
Dept: OBSTETRICS AND GYNECOLOGY | Facility: CLINIC | Age: 36
End: 2021-06-09
Payer: COMMERCIAL

## 2021-06-09 VITALS
SYSTOLIC BLOOD PRESSURE: 132 MMHG | DIASTOLIC BLOOD PRESSURE: 88 MMHG | BODY MASS INDEX: 43.73 KG/M2 | WEIGHT: 231.63 LBS | HEIGHT: 61 IN

## 2021-06-09 DIAGNOSIS — Z86.19 HISTORY OF SHINGLES: ICD-10-CM

## 2021-06-09 DIAGNOSIS — R87.619 ATYPICAL GLANDULAR CELLS OF UNDETERMINED SIGNIFICANCE (AGUS) ON CERVICAL PAP SMEAR: Primary | ICD-10-CM

## 2021-06-09 PROCEDURE — 1126F AMNT PAIN NOTED NONE PRSNT: CPT | Mod: S$GLB,,, | Performed by: OBSTETRICS & GYNECOLOGY

## 2021-06-09 PROCEDURE — 3008F BODY MASS INDEX DOCD: CPT | Mod: CPTII,S$GLB,, | Performed by: OBSTETRICS & GYNECOLOGY

## 2021-06-09 PROCEDURE — 1126F PR PAIN SEVERITY QUANTIFIED, NO PAIN PRESENT: ICD-10-PCS | Mod: S$GLB,,, | Performed by: OBSTETRICS & GYNECOLOGY

## 2021-06-09 PROCEDURE — 99999 PR PBB SHADOW E&M-EST. PATIENT-LVL IV: ICD-10-PCS | Mod: PBBFAC,,, | Performed by: OBSTETRICS & GYNECOLOGY

## 2021-06-09 PROCEDURE — 99213 OFFICE O/P EST LOW 20 MIN: CPT | Mod: S$GLB,,, | Performed by: OBSTETRICS & GYNECOLOGY

## 2021-06-09 PROCEDURE — 3008F PR BODY MASS INDEX (BMI) DOCUMENTED: ICD-10-PCS | Mod: CPTII,S$GLB,, | Performed by: OBSTETRICS & GYNECOLOGY

## 2021-06-09 PROCEDURE — 99999 PR PBB SHADOW E&M-EST. PATIENT-LVL IV: CPT | Mod: PBBFAC,,, | Performed by: OBSTETRICS & GYNECOLOGY

## 2021-06-09 PROCEDURE — 99213 PR OFFICE/OUTPT VISIT, EST, LEVL III, 20-29 MIN: ICD-10-PCS | Mod: S$GLB,,, | Performed by: OBSTETRICS & GYNECOLOGY

## 2021-06-09 RX ORDER — NALTREXONE HYDROCHLORIDE 50 MG/1
50 TABLET, FILM COATED ORAL DAILY
COMMUNITY
Start: 2021-01-24 | End: 2021-07-20 | Stop reason: ALTCHOICE

## 2021-06-09 RX ORDER — BUPROPION HYDROCHLORIDE 300 MG/1
TABLET ORAL
COMMUNITY
End: 2021-07-20 | Stop reason: ALTCHOICE

## 2021-06-09 RX ORDER — BUSPIRONE HYDROCHLORIDE 15 MG/1
15 TABLET ORAL 2 TIMES DAILY
COMMUNITY
Start: 2020-12-27 | End: 2021-07-20 | Stop reason: ALTCHOICE

## 2021-06-09 RX ORDER — GABAPENTIN 300 MG/1
CAPSULE ORAL
COMMUNITY
End: 2021-07-20 | Stop reason: ALTCHOICE

## 2021-06-09 RX ORDER — GABAPENTIN 600 MG/1
600 TABLET ORAL 3 TIMES DAILY PRN
COMMUNITY
Start: 2021-05-20

## 2021-06-09 RX ORDER — LIDOCAINE 40 MG/G
CREAM TOPICAL
COMMUNITY
Start: 2021-05-20 | End: 2022-12-16 | Stop reason: ALTCHOICE

## 2021-06-09 RX ORDER — HYDROXYZINE PAMOATE 50 MG/1
CAPSULE ORAL
COMMUNITY
End: 2021-07-20 | Stop reason: ALTCHOICE

## 2021-06-09 RX ORDER — GUAIFENESIN 100 MG/5ML
SOLUTION ORAL
COMMUNITY
End: 2021-07-20 | Stop reason: ALTCHOICE

## 2021-06-10 ENCOUNTER — TELEPHONE (OUTPATIENT)
Dept: PHARMACY | Facility: CLINIC | Age: 36
End: 2021-06-10

## 2021-06-22 ENCOUNTER — PROCEDURE VISIT (OUTPATIENT)
Dept: OBSTETRICS AND GYNECOLOGY | Facility: CLINIC | Age: 36
End: 2021-06-22
Payer: COMMERCIAL

## 2021-06-22 VITALS
WEIGHT: 231 LBS | BODY MASS INDEX: 43.61 KG/M2 | SYSTOLIC BLOOD PRESSURE: 134 MMHG | HEIGHT: 61 IN | DIASTOLIC BLOOD PRESSURE: 63 MMHG | TEMPERATURE: 98 F

## 2021-06-22 DIAGNOSIS — R87.619 ATYPICAL GLANDULAR CELLS OF UNDETERMINED SIGNIFICANCE (AGUS) ON CERVICAL PAP SMEAR: Primary | ICD-10-CM

## 2021-06-22 PROCEDURE — 99499 UNLISTED E&M SERVICE: CPT | Mod: S$GLB,,, | Performed by: OBSTETRICS & GYNECOLOGY

## 2021-06-22 PROCEDURE — 88305 TISSUE EXAM BY PATHOLOGIST: CPT | Mod: 26,,, | Performed by: PATHOLOGY

## 2021-06-22 PROCEDURE — 88305 TISSUE EXAM BY PATHOLOGIST: CPT | Mod: 59 | Performed by: PATHOLOGY

## 2021-06-22 PROCEDURE — 88305 TISSUE EXAM BY PATHOLOGIST: ICD-10-PCS | Mod: 26,,, | Performed by: PATHOLOGY

## 2021-06-22 PROCEDURE — 99499 NO LOS: ICD-10-PCS | Mod: S$GLB,,, | Performed by: OBSTETRICS & GYNECOLOGY

## 2021-06-29 ENCOUNTER — TELEPHONE (OUTPATIENT)
Dept: OBSTETRICS AND GYNECOLOGY | Facility: CLINIC | Age: 36
End: 2021-06-29

## 2021-06-29 DIAGNOSIS — N87.9 CERVICAL DYSPLASIA: Primary | ICD-10-CM

## 2021-06-29 LAB
FINAL PATHOLOGIC DIAGNOSIS: NORMAL
GROSS: NORMAL
Lab: NORMAL

## 2021-06-29 RX ORDER — SODIUM CHLORIDE 9 MG/ML
INJECTION, SOLUTION INTRAVENOUS CONTINUOUS
Status: CANCELLED | OUTPATIENT
Start: 2021-06-29

## 2021-06-30 ENCOUNTER — PATIENT MESSAGE (OUTPATIENT)
Dept: OBSTETRICS AND GYNECOLOGY | Facility: CLINIC | Age: 36
End: 2021-06-30

## 2021-06-30 ENCOUNTER — TELEPHONE (OUTPATIENT)
Dept: OBSTETRICS AND GYNECOLOGY | Facility: CLINIC | Age: 36
End: 2021-06-30

## 2021-07-10 ENCOUNTER — PATIENT MESSAGE (OUTPATIENT)
Dept: OBSTETRICS AND GYNECOLOGY | Facility: CLINIC | Age: 36
End: 2021-07-10

## 2021-07-14 ENCOUNTER — OFFICE VISIT (OUTPATIENT)
Dept: OBSTETRICS AND GYNECOLOGY | Facility: CLINIC | Age: 36
End: 2021-07-14
Payer: COMMERCIAL

## 2021-07-14 VITALS
HEIGHT: 61 IN | DIASTOLIC BLOOD PRESSURE: 82 MMHG | TEMPERATURE: 98 F | BODY MASS INDEX: 42.17 KG/M2 | SYSTOLIC BLOOD PRESSURE: 124 MMHG | WEIGHT: 223.38 LBS

## 2021-07-14 DIAGNOSIS — Z86.19 HISTORY OF SHINGLES: ICD-10-CM

## 2021-07-14 DIAGNOSIS — N93.9 ABNORMAL UTERINE BLEEDING (AUB): Primary | ICD-10-CM

## 2021-07-14 DIAGNOSIS — F41.9 ANXIETY: ICD-10-CM

## 2021-07-14 PROCEDURE — 3008F BODY MASS INDEX DOCD: CPT | Mod: CPTII,S$GLB,, | Performed by: OBSTETRICS & GYNECOLOGY

## 2021-07-14 PROCEDURE — 99999 PR PBB SHADOW E&M-EST. PATIENT-LVL V: CPT | Mod: PBBFAC,,, | Performed by: OBSTETRICS & GYNECOLOGY

## 2021-07-14 PROCEDURE — 1125F AMNT PAIN NOTED PAIN PRSNT: CPT | Mod: CPTII,S$GLB,, | Performed by: OBSTETRICS & GYNECOLOGY

## 2021-07-14 PROCEDURE — 1125F PR PAIN SEVERITY QUANTIFIED, PAIN PRESENT: ICD-10-PCS | Mod: CPTII,S$GLB,, | Performed by: OBSTETRICS & GYNECOLOGY

## 2021-07-14 PROCEDURE — 99214 PR OFFICE/OUTPT VISIT, EST, LEVL IV, 30-39 MIN: ICD-10-PCS | Mod: S$GLB,,, | Performed by: OBSTETRICS & GYNECOLOGY

## 2021-07-14 PROCEDURE — 99214 OFFICE O/P EST MOD 30 MIN: CPT | Mod: S$GLB,,, | Performed by: OBSTETRICS & GYNECOLOGY

## 2021-07-14 PROCEDURE — 3008F PR BODY MASS INDEX (BMI) DOCUMENTED: ICD-10-PCS | Mod: CPTII,S$GLB,, | Performed by: OBSTETRICS & GYNECOLOGY

## 2021-07-14 PROCEDURE — 99999 PR PBB SHADOW E&M-EST. PATIENT-LVL V: ICD-10-PCS | Mod: PBBFAC,,, | Performed by: OBSTETRICS & GYNECOLOGY

## 2021-07-14 RX ORDER — ALPRAZOLAM 1 MG/1
1 TABLET ORAL 3 TIMES DAILY PRN
Qty: 30 TABLET | Refills: 0 | Status: ON HOLD | OUTPATIENT
Start: 2021-07-14 | End: 2021-07-21 | Stop reason: HOSPADM

## 2021-07-14 RX ORDER — DROSPIRENONE AND ETHINYL ESTRADIOL 0.03MG-3MG
KIT ORAL
COMMUNITY
End: 2021-07-20 | Stop reason: ALTCHOICE

## 2021-07-14 RX ORDER — HYDROCODONE BITARTRATE AND ACETAMINOPHEN 7.5; 325 MG/1; MG/1
1 TABLET ORAL EVERY 8 HOURS PRN
COMMUNITY
Start: 2021-05-03 | End: 2021-08-10

## 2021-07-14 RX ORDER — ADAPALENE AND BENZOYL PEROXIDE GEL, 0.1%/2.5% 1; 25 MG/G; MG/G
GEL TOPICAL
COMMUNITY
End: 2021-07-20 | Stop reason: ALTCHOICE

## 2021-07-14 RX ORDER — GABAPENTIN 100 MG/1
100 CAPSULE ORAL 2 TIMES DAILY
COMMUNITY
Start: 2021-05-08 | End: 2021-07-20 | Stop reason: ALTCHOICE

## 2021-07-14 RX ORDER — VALACYCLOVIR HYDROCHLORIDE 1 G/1
1000 TABLET, FILM COATED ORAL 3 TIMES DAILY
COMMUNITY
Start: 2021-05-03 | End: 2021-11-02 | Stop reason: SDUPTHER

## 2021-07-21 PROBLEM — N93.9 ABNORMAL UTERINE BLEEDING (AUB): Status: ACTIVE | Noted: 2021-07-21

## 2021-07-23 ENCOUNTER — PATIENT MESSAGE (OUTPATIENT)
Dept: OBSTETRICS AND GYNECOLOGY | Facility: CLINIC | Age: 36
End: 2021-07-23

## 2021-07-27 ENCOUNTER — PATIENT MESSAGE (OUTPATIENT)
Dept: OBSTETRICS AND GYNECOLOGY | Facility: CLINIC | Age: 36
End: 2021-07-27

## 2021-08-03 ENCOUNTER — OFFICE VISIT (OUTPATIENT)
Dept: OBSTETRICS AND GYNECOLOGY | Facility: CLINIC | Age: 36
End: 2021-08-03
Payer: COMMERCIAL

## 2021-08-03 VITALS
SYSTOLIC BLOOD PRESSURE: 126 MMHG | HEIGHT: 61 IN | DIASTOLIC BLOOD PRESSURE: 88 MMHG | WEIGHT: 228.31 LBS | BODY MASS INDEX: 43.11 KG/M2

## 2021-08-03 DIAGNOSIS — Z09 POSTOPERATIVE EXAMINATION: Primary | ICD-10-CM

## 2021-08-03 DIAGNOSIS — Q51.810 ARCUATE UTERUS: ICD-10-CM

## 2021-08-03 PROCEDURE — 3079F DIAST BP 80-89 MM HG: CPT | Mod: CPTII,S$GLB,, | Performed by: OBSTETRICS & GYNECOLOGY

## 2021-08-03 PROCEDURE — 3074F PR MOST RECENT SYSTOLIC BLOOD PRESSURE < 130 MM HG: ICD-10-PCS | Mod: CPTII,S$GLB,, | Performed by: OBSTETRICS & GYNECOLOGY

## 2021-08-03 PROCEDURE — 3008F PR BODY MASS INDEX (BMI) DOCUMENTED: ICD-10-PCS | Mod: CPTII,S$GLB,, | Performed by: OBSTETRICS & GYNECOLOGY

## 2021-08-03 PROCEDURE — 1160F RVW MEDS BY RX/DR IN RCRD: CPT | Mod: CPTII,S$GLB,, | Performed by: OBSTETRICS & GYNECOLOGY

## 2021-08-03 PROCEDURE — 99024 POSTOP FOLLOW-UP VISIT: CPT | Mod: S$GLB,,, | Performed by: OBSTETRICS & GYNECOLOGY

## 2021-08-03 PROCEDURE — 99024 PR POST-OP FOLLOW-UP VISIT: ICD-10-PCS | Mod: S$GLB,,, | Performed by: OBSTETRICS & GYNECOLOGY

## 2021-08-03 PROCEDURE — 3008F BODY MASS INDEX DOCD: CPT | Mod: CPTII,S$GLB,, | Performed by: OBSTETRICS & GYNECOLOGY

## 2021-08-03 PROCEDURE — 1126F AMNT PAIN NOTED NONE PRSNT: CPT | Mod: CPTII,S$GLB,, | Performed by: OBSTETRICS & GYNECOLOGY

## 2021-08-03 PROCEDURE — 1126F PR PAIN SEVERITY QUANTIFIED, NO PAIN PRESENT: ICD-10-PCS | Mod: CPTII,S$GLB,, | Performed by: OBSTETRICS & GYNECOLOGY

## 2021-08-03 PROCEDURE — 3074F SYST BP LT 130 MM HG: CPT | Mod: CPTII,S$GLB,, | Performed by: OBSTETRICS & GYNECOLOGY

## 2021-08-03 PROCEDURE — 1160F PR REVIEW ALL MEDS BY PRESCRIBER/CLIN PHARMACIST DOCUMENTED: ICD-10-PCS | Mod: CPTII,S$GLB,, | Performed by: OBSTETRICS & GYNECOLOGY

## 2021-08-03 PROCEDURE — 1159F PR MEDICATION LIST DOCUMENTED IN MEDICAL RECORD: ICD-10-PCS | Mod: CPTII,S$GLB,, | Performed by: OBSTETRICS & GYNECOLOGY

## 2021-08-03 PROCEDURE — 3079F PR MOST RECENT DIASTOLIC BLOOD PRESSURE 80-89 MM HG: ICD-10-PCS | Mod: CPTII,S$GLB,, | Performed by: OBSTETRICS & GYNECOLOGY

## 2021-08-03 PROCEDURE — 1159F MED LIST DOCD IN RCRD: CPT | Mod: CPTII,S$GLB,, | Performed by: OBSTETRICS & GYNECOLOGY

## 2021-08-05 ENCOUNTER — IMMUNIZATION (OUTPATIENT)
Dept: PHARMACY | Facility: CLINIC | Age: 36
End: 2021-08-05
Payer: COMMERCIAL

## 2021-08-10 ENCOUNTER — OFFICE VISIT (OUTPATIENT)
Dept: FAMILY MEDICINE | Facility: CLINIC | Age: 36
End: 2021-08-10
Payer: COMMERCIAL

## 2021-08-10 VITALS
BODY MASS INDEX: 43.14 KG/M2 | HEIGHT: 61 IN | SYSTOLIC BLOOD PRESSURE: 124 MMHG | OXYGEN SATURATION: 98 % | WEIGHT: 228.5 LBS | RESPIRATION RATE: 18 BRPM | TEMPERATURE: 98 F | DIASTOLIC BLOOD PRESSURE: 78 MMHG | HEART RATE: 89 BPM

## 2021-08-10 DIAGNOSIS — F33.1 MODERATE RECURRENT MAJOR DEPRESSION: ICD-10-CM

## 2021-08-10 DIAGNOSIS — G47.33 OBSTRUCTIVE SLEEP APNEA SYNDROME: Primary | ICD-10-CM

## 2021-08-10 DIAGNOSIS — F43.9 SITUATIONAL STRESS: ICD-10-CM

## 2021-08-10 PROCEDURE — 3008F BODY MASS INDEX DOCD: CPT | Mod: CPTII,S$GLB,, | Performed by: INTERNAL MEDICINE

## 2021-08-10 PROCEDURE — 3074F SYST BP LT 130 MM HG: CPT | Mod: CPTII,S$GLB,, | Performed by: INTERNAL MEDICINE

## 2021-08-10 PROCEDURE — 1160F PR REVIEW ALL MEDS BY PRESCRIBER/CLIN PHARMACIST DOCUMENTED: ICD-10-PCS | Mod: CPTII,S$GLB,, | Performed by: INTERNAL MEDICINE

## 2021-08-10 PROCEDURE — 3078F PR MOST RECENT DIASTOLIC BLOOD PRESSURE < 80 MM HG: ICD-10-PCS | Mod: CPTII,S$GLB,, | Performed by: INTERNAL MEDICINE

## 2021-08-10 PROCEDURE — 99999 PR PBB SHADOW E&M-EST. PATIENT-LVL V: ICD-10-PCS | Mod: PBBFAC,,, | Performed by: INTERNAL MEDICINE

## 2021-08-10 PROCEDURE — 1126F AMNT PAIN NOTED NONE PRSNT: CPT | Mod: CPTII,S$GLB,, | Performed by: INTERNAL MEDICINE

## 2021-08-10 PROCEDURE — 99999 PR PBB SHADOW E&M-EST. PATIENT-LVL V: CPT | Mod: PBBFAC,,, | Performed by: INTERNAL MEDICINE

## 2021-08-10 PROCEDURE — 99204 PR OFFICE/OUTPT VISIT, NEW, LEVL IV, 45-59 MIN: ICD-10-PCS | Mod: S$GLB,,, | Performed by: INTERNAL MEDICINE

## 2021-08-10 PROCEDURE — 3008F PR BODY MASS INDEX (BMI) DOCUMENTED: ICD-10-PCS | Mod: CPTII,S$GLB,, | Performed by: INTERNAL MEDICINE

## 2021-08-10 PROCEDURE — 3078F DIAST BP <80 MM HG: CPT | Mod: CPTII,S$GLB,, | Performed by: INTERNAL MEDICINE

## 2021-08-10 PROCEDURE — 1159F PR MEDICATION LIST DOCUMENTED IN MEDICAL RECORD: ICD-10-PCS | Mod: CPTII,S$GLB,, | Performed by: INTERNAL MEDICINE

## 2021-08-10 PROCEDURE — 1159F MED LIST DOCD IN RCRD: CPT | Mod: CPTII,S$GLB,, | Performed by: INTERNAL MEDICINE

## 2021-08-10 PROCEDURE — 3074F PR MOST RECENT SYSTOLIC BLOOD PRESSURE < 130 MM HG: ICD-10-PCS | Mod: CPTII,S$GLB,, | Performed by: INTERNAL MEDICINE

## 2021-08-10 PROCEDURE — 1126F PR PAIN SEVERITY QUANTIFIED, NO PAIN PRESENT: ICD-10-PCS | Mod: CPTII,S$GLB,, | Performed by: INTERNAL MEDICINE

## 2021-08-10 PROCEDURE — 99204 OFFICE O/P NEW MOD 45 MIN: CPT | Mod: S$GLB,,, | Performed by: INTERNAL MEDICINE

## 2021-08-10 PROCEDURE — 1160F RVW MEDS BY RX/DR IN RCRD: CPT | Mod: CPTII,S$GLB,, | Performed by: INTERNAL MEDICINE

## 2021-08-10 RX ORDER — TIZANIDINE 4 MG/1
4 TABLET ORAL 3 TIMES DAILY PRN
COMMUNITY
Start: 2021-07-30

## 2021-08-10 RX ORDER — ALPRAZOLAM 1 MG/1
1 TABLET ORAL 2 TIMES DAILY PRN
Qty: 30 TABLET | Refills: 0 | Status: SHIPPED | OUTPATIENT
Start: 2021-08-10 | End: 2021-12-10 | Stop reason: SDUPTHER

## 2021-08-12 ENCOUNTER — TELEPHONE (OUTPATIENT)
Dept: FAMILY MEDICINE | Facility: CLINIC | Age: 36
End: 2021-08-12

## 2021-08-12 ENCOUNTER — PATIENT MESSAGE (OUTPATIENT)
Dept: FAMILY MEDICINE | Facility: CLINIC | Age: 36
End: 2021-08-12

## 2021-08-18 ENCOUNTER — PATIENT MESSAGE (OUTPATIENT)
Dept: FAMILY MEDICINE | Facility: CLINIC | Age: 36
End: 2021-08-18

## 2021-08-26 ENCOUNTER — PATIENT MESSAGE (OUTPATIENT)
Dept: FAMILY MEDICINE | Facility: CLINIC | Age: 36
End: 2021-08-26

## 2021-08-26 DIAGNOSIS — G47.00 INSOMNIA, UNSPECIFIED TYPE: Primary | ICD-10-CM

## 2021-08-26 RX ORDER — MIRTAZAPINE 7.5 MG/1
15 TABLET, FILM COATED ORAL NIGHTLY
Qty: 60 TABLET | Refills: 1 | Status: SHIPPED | OUTPATIENT
Start: 2021-08-26 | End: 2022-05-18 | Stop reason: SDUPTHER

## 2021-08-27 ENCOUNTER — PATIENT MESSAGE (OUTPATIENT)
Dept: FAMILY MEDICINE | Facility: CLINIC | Age: 36
End: 2021-08-27

## 2021-08-30 ENCOUNTER — TELEPHONE (OUTPATIENT)
Dept: SLEEP MEDICINE | Facility: CLINIC | Age: 36
End: 2021-08-30

## 2021-09-15 ENCOUNTER — PATIENT MESSAGE (OUTPATIENT)
Dept: FAMILY MEDICINE | Facility: CLINIC | Age: 36
End: 2021-09-15

## 2021-09-29 ENCOUNTER — PATIENT MESSAGE (OUTPATIENT)
Dept: FAMILY MEDICINE | Facility: CLINIC | Age: 36
End: 2021-09-29

## 2021-09-29 DIAGNOSIS — F43.9 SITUATIONAL STRESS: ICD-10-CM

## 2021-09-30 RX ORDER — ALPRAZOLAM 1 MG/1
1 TABLET ORAL 2 TIMES DAILY PRN
Qty: 30 TABLET | Refills: 0 | Status: CANCELLED | OUTPATIENT
Start: 2021-09-30 | End: 2021-10-30

## 2021-10-08 ENCOUNTER — OFFICE VISIT (OUTPATIENT)
Dept: PSYCHIATRY | Facility: CLINIC | Age: 36
End: 2021-10-08
Payer: MEDICAID

## 2021-10-08 DIAGNOSIS — F43.9 SITUATIONAL STRESS: ICD-10-CM

## 2021-10-08 DIAGNOSIS — F33.1 MODERATE RECURRENT MAJOR DEPRESSION: ICD-10-CM

## 2021-10-08 PROCEDURE — 99499 UNLISTED E&M SERVICE: CPT | Mod: SA,HB,95, | Performed by: NURSE PRACTITIONER

## 2021-10-08 PROCEDURE — 99499 NO LOS: ICD-10-PCS | Mod: SA,HB,95, | Performed by: NURSE PRACTITIONER

## 2021-11-02 ENCOUNTER — PATIENT MESSAGE (OUTPATIENT)
Dept: OBSTETRICS AND GYNECOLOGY | Facility: CLINIC | Age: 36
End: 2021-11-02
Payer: COMMERCIAL

## 2021-11-02 DIAGNOSIS — B00.1 COLD SORE: Primary | ICD-10-CM

## 2021-11-02 RX ORDER — VALACYCLOVIR HYDROCHLORIDE 1 G/1
2000 TABLET, FILM COATED ORAL EVERY 12 HOURS
Qty: 4 TABLET | Refills: 2 | Status: SHIPPED | OUTPATIENT
Start: 2021-11-02 | End: 2022-06-20

## 2021-11-19 ENCOUNTER — TELEPHONE (OUTPATIENT)
Dept: OBSTETRICS AND GYNECOLOGY | Facility: CLINIC | Age: 36
End: 2021-11-19
Payer: COMMERCIAL

## 2021-11-21 ENCOUNTER — PATIENT MESSAGE (OUTPATIENT)
Dept: FAMILY MEDICINE | Facility: CLINIC | Age: 36
End: 2021-11-21
Payer: COMMERCIAL

## 2021-11-22 ENCOUNTER — OFFICE VISIT (OUTPATIENT)
Dept: FAMILY MEDICINE | Facility: CLINIC | Age: 36
End: 2021-11-22
Payer: MEDICAID

## 2021-11-22 VITALS
HEART RATE: 88 BPM | WEIGHT: 228.31 LBS | DIASTOLIC BLOOD PRESSURE: 88 MMHG | TEMPERATURE: 98 F | RESPIRATION RATE: 18 BRPM | BODY MASS INDEX: 43.11 KG/M2 | SYSTOLIC BLOOD PRESSURE: 138 MMHG | OXYGEN SATURATION: 97 % | HEIGHT: 61 IN

## 2021-11-22 DIAGNOSIS — R59.0 SUPRACLAVICULAR LYMPHADENOPATHY: Primary | ICD-10-CM

## 2021-11-22 DIAGNOSIS — R59.0 AXILLARY LYMPHADENOPATHY: ICD-10-CM

## 2021-11-22 PROCEDURE — 99214 PR OFFICE/OUTPT VISIT, EST, LEVL IV, 30-39 MIN: ICD-10-PCS | Mod: S$PBB,,, | Performed by: INTERNAL MEDICINE

## 2021-11-22 PROCEDURE — 99999 PR PBB SHADOW E&M-EST. PATIENT-LVL V: CPT | Mod: PBBFAC,,, | Performed by: INTERNAL MEDICINE

## 2021-11-22 PROCEDURE — 99215 OFFICE O/P EST HI 40 MIN: CPT | Mod: PBBFAC,PN | Performed by: INTERNAL MEDICINE

## 2021-11-22 PROCEDURE — 99999 PR PBB SHADOW E&M-EST. PATIENT-LVL V: ICD-10-PCS | Mod: PBBFAC,,, | Performed by: INTERNAL MEDICINE

## 2021-11-22 PROCEDURE — 99214 OFFICE O/P EST MOD 30 MIN: CPT | Mod: S$PBB,,, | Performed by: INTERNAL MEDICINE

## 2021-11-24 ENCOUNTER — OFFICE VISIT (OUTPATIENT)
Dept: SURGERY | Facility: CLINIC | Age: 36
End: 2021-11-24
Payer: MEDICAID

## 2021-11-24 VITALS
DIASTOLIC BLOOD PRESSURE: 86 MMHG | BODY MASS INDEX: 43.14 KG/M2 | HEIGHT: 61 IN | HEART RATE: 83 BPM | SYSTOLIC BLOOD PRESSURE: 136 MMHG | WEIGHT: 228.5 LBS

## 2021-11-24 DIAGNOSIS — R59.0 AXILLARY LYMPHADENOPATHY: ICD-10-CM

## 2021-11-24 DIAGNOSIS — R59.0 SUPRACLAVICULAR LYMPHADENOPATHY: Primary | ICD-10-CM

## 2021-11-24 PROCEDURE — 99999 PR PBB SHADOW E&M-EST. PATIENT-LVL V: CPT | Mod: PBBFAC,,, | Performed by: SURGERY

## 2021-11-24 PROCEDURE — 99999 PR PBB SHADOW E&M-EST. PATIENT-LVL V: ICD-10-PCS | Mod: PBBFAC,,, | Performed by: SURGERY

## 2021-11-24 PROCEDURE — 99203 OFFICE O/P NEW LOW 30 MIN: CPT | Mod: S$PBB,,, | Performed by: SURGERY

## 2021-11-24 PROCEDURE — 99215 OFFICE O/P EST HI 40 MIN: CPT | Mod: PBBFAC,PO | Performed by: SURGERY

## 2021-11-24 PROCEDURE — 99203 PR OFFICE/OUTPT VISIT, NEW, LEVL III, 30-44 MIN: ICD-10-PCS | Mod: S$PBB,,, | Performed by: SURGERY

## 2021-11-24 RX ORDER — AMOXICILLIN AND CLAVULANATE POTASSIUM 875; 125 MG/1; MG/1
1 TABLET, FILM COATED ORAL 2 TIMES DAILY
COMMUNITY
Start: 2021-11-17 | End: 2022-09-09

## 2021-11-24 RX ORDER — TRAMADOL HYDROCHLORIDE 50 MG/1
50 TABLET ORAL EVERY 6 HOURS PRN
COMMUNITY
Start: 2021-11-17 | End: 2021-11-30

## 2021-11-24 RX ORDER — SODIUM CHLORIDE 9 MG/ML
INJECTION, SOLUTION INTRAVENOUS CONTINUOUS
Status: CANCELLED | OUTPATIENT
Start: 2021-11-24

## 2021-11-28 ENCOUNTER — ANESTHESIA EVENT (OUTPATIENT)
Dept: SURGERY | Facility: HOSPITAL | Age: 36
End: 2021-11-28
Payer: MEDICAID

## 2021-11-29 ENCOUNTER — ANESTHESIA (OUTPATIENT)
Dept: SURGERY | Facility: HOSPITAL | Age: 36
End: 2021-11-29
Payer: MEDICAID

## 2021-11-29 ENCOUNTER — HOSPITAL ENCOUNTER (OUTPATIENT)
Facility: HOSPITAL | Age: 36
Discharge: HOME | End: 2021-11-29
Attending: SURGERY | Admitting: SURGERY
Payer: MEDICAID

## 2021-11-29 VITALS
SYSTOLIC BLOOD PRESSURE: 120 MMHG | HEIGHT: 61 IN | WEIGHT: 220 LBS | HEART RATE: 88 BPM | DIASTOLIC BLOOD PRESSURE: 78 MMHG | OXYGEN SATURATION: 96 % | TEMPERATURE: 98 F | BODY MASS INDEX: 41.54 KG/M2 | RESPIRATION RATE: 20 BRPM

## 2021-11-29 DIAGNOSIS — R59.0 SUPRACLAVICULAR LYMPHADENOPATHY: Primary | ICD-10-CM

## 2021-11-29 LAB
B-HCG UR QL: NEGATIVE
CTP QC/QA: YES

## 2021-11-29 PROCEDURE — 88189 FLOWCYTOMETRY/READ 16 & >: CPT | Mod: ,,, | Performed by: PATHOLOGY

## 2021-11-29 PROCEDURE — 63600175 PHARM REV CODE 636 W HCPCS: Performed by: SURGERY

## 2021-11-29 PROCEDURE — 25000003 PHARM REV CODE 250: Performed by: NURSE ANESTHETIST, CERTIFIED REGISTERED

## 2021-11-29 PROCEDURE — 37000009 HC ANESTHESIA EA ADD 15 MINS: Performed by: SURGERY

## 2021-11-29 PROCEDURE — 38510 PR BIOPSY/REM LYMPH NODES, CERVICAL: ICD-10-PCS | Mod: LT,,, | Performed by: SURGERY

## 2021-11-29 PROCEDURE — 71000015 HC POSTOP RECOV 1ST HR: Performed by: SURGERY

## 2021-11-29 PROCEDURE — 88184 FLOWCYTOMETRY/ TC 1 MARKER: CPT | Performed by: PATHOLOGY

## 2021-11-29 PROCEDURE — 88305 TISSUE EXAM BY PATHOLOGIST: ICD-10-PCS | Mod: 26,,, | Performed by: PATHOLOGY

## 2021-11-29 PROCEDURE — 88185 FLOWCYTOMETRY/TC ADD-ON: CPT | Mod: 59 | Performed by: PATHOLOGY

## 2021-11-29 PROCEDURE — 38510 BIOPSY/REMOVAL LYMPH NODES: CPT | Mod: LT,,, | Performed by: SURGERY

## 2021-11-29 PROCEDURE — 88189 PR  FLOWCYTOMETRY/READ, 16 & > MARKERS: ICD-10-PCS | Mod: ,,, | Performed by: PATHOLOGY

## 2021-11-29 PROCEDURE — 36000707: Performed by: SURGERY

## 2021-11-29 PROCEDURE — 88341 IMHCHEM/IMCYTCHM EA ADD ANTB: CPT | Mod: 26,,, | Performed by: PATHOLOGY

## 2021-11-29 PROCEDURE — 36000706: Performed by: SURGERY

## 2021-11-29 PROCEDURE — 37000008 HC ANESTHESIA 1ST 15 MINUTES: Performed by: SURGERY

## 2021-11-29 PROCEDURE — 71000016 HC POSTOP RECOV ADDL HR: Performed by: SURGERY

## 2021-11-29 PROCEDURE — 00320 ANES ALL PX NECK NOS 1YR/>: CPT | Performed by: SURGERY

## 2021-11-29 PROCEDURE — 27201423 OPTIME MED/SURG SUP & DEVICES STERILE SUPPLY: Performed by: SURGERY

## 2021-11-29 PROCEDURE — 25000003 PHARM REV CODE 250: Performed by: SURGERY

## 2021-11-29 PROCEDURE — 63600175 PHARM REV CODE 636 W HCPCS: Performed by: NURSE ANESTHETIST, CERTIFIED REGISTERED

## 2021-11-29 PROCEDURE — 88342 IMHCHEM/IMCYTCHM 1ST ANTB: CPT | Mod: 26,59,, | Performed by: PATHOLOGY

## 2021-11-29 PROCEDURE — 88341 PR IHC OR ICC EACH ADD'L SINGLE ANTIBODY  STAINPR: ICD-10-PCS | Mod: 26,,, | Performed by: PATHOLOGY

## 2021-11-29 PROCEDURE — 88305 TISSUE EXAM BY PATHOLOGIST: CPT | Performed by: PATHOLOGY

## 2021-11-29 PROCEDURE — 88341 IMHCHEM/IMCYTCHM EA ADD ANTB: CPT | Mod: 59 | Performed by: PATHOLOGY

## 2021-11-29 PROCEDURE — 88342 CHG IMMUNOCYTOCHEMISTRY: ICD-10-PCS | Mod: 26,59,, | Performed by: PATHOLOGY

## 2021-11-29 PROCEDURE — 88305 TISSUE EXAM BY PATHOLOGIST: CPT | Mod: 26,,, | Performed by: PATHOLOGY

## 2021-11-29 PROCEDURE — 88342 IMHCHEM/IMCYTCHM 1ST ANTB: CPT | Mod: 59 | Performed by: PATHOLOGY

## 2021-11-29 RX ORDER — LIDOCAINE HYDROCHLORIDE 20 MG/ML
INJECTION, SOLUTION EPIDURAL; INFILTRATION; INTRACAUDAL; PERINEURAL
Status: DISCONTINUED | OUTPATIENT
Start: 2021-11-29 | End: 2021-11-29

## 2021-11-29 RX ORDER — CEFAZOLIN SODIUM 2 G/50ML
2 SOLUTION INTRAVENOUS
Status: COMPLETED | OUTPATIENT
Start: 2021-11-29 | End: 2021-11-29

## 2021-11-29 RX ORDER — KETOROLAC TROMETHAMINE 10 MG/1
10 TABLET, FILM COATED ORAL EVERY 6 HOURS PRN
Qty: 12 TABLET | Refills: 0 | Status: SHIPPED | OUTPATIENT
Start: 2021-11-29 | End: 2021-12-02

## 2021-11-29 RX ORDER — FENTANYL CITRATE 50 UG/ML
INJECTION, SOLUTION INTRAMUSCULAR; INTRAVENOUS
Status: DISCONTINUED | OUTPATIENT
Start: 2021-11-29 | End: 2021-11-29

## 2021-11-29 RX ORDER — OXYCODONE AND ACETAMINOPHEN 5; 325 MG/1; MG/1
1 TABLET ORAL EVERY 4 HOURS PRN
Status: COMPLETED | OUTPATIENT
Start: 2021-11-29 | End: 2021-11-29

## 2021-11-29 RX ORDER — LIDOCAINE HYDROCHLORIDE 10 MG/ML
INJECTION, SOLUTION EPIDURAL; INFILTRATION; INTRACAUDAL; PERINEURAL
Status: DISCONTINUED | OUTPATIENT
Start: 2021-11-29 | End: 2021-11-29 | Stop reason: HOSPADM

## 2021-11-29 RX ORDER — BUPIVACAINE HYDROCHLORIDE 2.5 MG/ML
INJECTION, SOLUTION EPIDURAL; INFILTRATION; INTRACAUDAL
Status: DISCONTINUED | OUTPATIENT
Start: 2021-11-29 | End: 2021-11-29 | Stop reason: HOSPADM

## 2021-11-29 RX ORDER — PROPOFOL 10 MG/ML
VIAL (ML) INTRAVENOUS
Status: DISCONTINUED | OUTPATIENT
Start: 2021-11-29 | End: 2021-11-29

## 2021-11-29 RX ORDER — SODIUM CHLORIDE 9 MG/ML
INJECTION, SOLUTION INTRAVENOUS CONTINUOUS
Status: DISCONTINUED | OUTPATIENT
Start: 2021-11-29 | End: 2021-11-29 | Stop reason: HOSPADM

## 2021-11-29 RX ORDER — MIDAZOLAM HYDROCHLORIDE 1 MG/ML
INJECTION, SOLUTION INTRAMUSCULAR; INTRAVENOUS
Status: DISCONTINUED | OUTPATIENT
Start: 2021-11-29 | End: 2021-11-29

## 2021-11-29 RX ADMIN — FENTANYL CITRATE 25 MCG: 50 INJECTION, SOLUTION INTRAMUSCULAR; INTRAVENOUS at 02:11

## 2021-11-29 RX ADMIN — LIDOCAINE HYDROCHLORIDE 80 MG: 20 INJECTION, SOLUTION EPIDURAL; INFILTRATION; INTRACAUDAL; PERINEURAL at 01:11

## 2021-11-29 RX ADMIN — PROPOFOL 100 MCG/KG/MIN: 10 INJECTION, EMULSION INTRAVENOUS at 01:11

## 2021-11-29 RX ADMIN — SODIUM CHLORIDE, SODIUM LACTATE, POTASSIUM CHLORIDE, AND CALCIUM CHLORIDE: .6; .31; .03; .02 INJECTION, SOLUTION INTRAVENOUS at 01:11

## 2021-11-29 RX ADMIN — MIDAZOLAM 2 MG: 1 INJECTION INTRAMUSCULAR; INTRAVENOUS at 01:11

## 2021-11-29 RX ADMIN — OXYCODONE HYDROCHLORIDE AND ACETAMINOPHEN 1 TABLET: 5; 325 TABLET ORAL at 03:11

## 2021-11-29 RX ADMIN — CEFAZOLIN SODIUM 2 G: 2 SOLUTION INTRAVENOUS at 01:11

## 2021-11-30 ENCOUNTER — PATIENT MESSAGE (OUTPATIENT)
Dept: FAMILY MEDICINE | Facility: CLINIC | Age: 36
End: 2021-11-30
Payer: COMMERCIAL

## 2021-11-30 ENCOUNTER — PATIENT MESSAGE (OUTPATIENT)
Dept: SURGERY | Facility: CLINIC | Age: 36
End: 2021-11-30
Payer: COMMERCIAL

## 2021-11-30 DIAGNOSIS — R59.0 AXILLARY LYMPHADENOPATHY: Primary | ICD-10-CM

## 2021-11-30 LAB
FLOW CYTOMETRY ANTIBODIES ANALYZED - LYMPH NODE: NORMAL
FLOW CYTOMETRY COMMENT - LYMPH NODE: NORMAL
FLOW CYTOMETRY INTERPRETATION - LYMPH NODE: NORMAL

## 2021-11-30 RX ORDER — HYDROCODONE BITARTRATE AND ACETAMINOPHEN 5; 325 MG/1; MG/1
1 TABLET ORAL EVERY 6 HOURS PRN
Qty: 6 TABLET | Refills: 0 | Status: SHIPPED | OUTPATIENT
Start: 2021-11-30 | End: 2021-12-10 | Stop reason: SDUPTHER

## 2021-12-01 ENCOUNTER — PATIENT MESSAGE (OUTPATIENT)
Dept: SURGERY | Facility: CLINIC | Age: 36
End: 2021-12-01
Payer: COMMERCIAL

## 2021-12-01 ENCOUNTER — PATIENT MESSAGE (OUTPATIENT)
Dept: SURGERY | Facility: HOSPITAL | Age: 36
End: 2021-12-01
Payer: COMMERCIAL

## 2021-12-01 DIAGNOSIS — C83.31 DIFFUSE LARGE B-CELL LYMPHOMA OF LYMPH NODES OF NECK: Primary | ICD-10-CM

## 2021-12-02 ENCOUNTER — PATIENT MESSAGE (OUTPATIENT)
Dept: FAMILY MEDICINE | Facility: CLINIC | Age: 36
End: 2021-12-02
Payer: COMMERCIAL

## 2021-12-06 ENCOUNTER — TELEPHONE (OUTPATIENT)
Dept: HEMATOLOGY/ONCOLOGY | Facility: CLINIC | Age: 36
End: 2021-12-06
Payer: COMMERCIAL

## 2021-12-06 DIAGNOSIS — C82.91 FOLLICULAR LYMPHOMA OF LYMPH NODES OF NECK, UNSPECIFIED FOLLICULAR LYMPHOMA TYPE: Primary | ICD-10-CM

## 2021-12-07 ENCOUNTER — PATIENT MESSAGE (OUTPATIENT)
Dept: SURGERY | Facility: CLINIC | Age: 36
End: 2021-12-07
Payer: COMMERCIAL

## 2021-12-10 ENCOUNTER — OFFICE VISIT (OUTPATIENT)
Dept: HEMATOLOGY/ONCOLOGY | Facility: CLINIC | Age: 36
End: 2021-12-10
Payer: COMMERCIAL

## 2021-12-10 ENCOUNTER — LAB VISIT (OUTPATIENT)
Dept: LAB | Facility: HOSPITAL | Age: 36
End: 2021-12-10
Attending: INTERNAL MEDICINE
Payer: MEDICAID

## 2021-12-10 VITALS
RESPIRATION RATE: 20 BRPM | HEART RATE: 94 BPM | HEIGHT: 61 IN | DIASTOLIC BLOOD PRESSURE: 78 MMHG | SYSTOLIC BLOOD PRESSURE: 112 MMHG | BODY MASS INDEX: 43.54 KG/M2 | WEIGHT: 230.63 LBS | OXYGEN SATURATION: 96 % | TEMPERATURE: 98 F

## 2021-12-10 DIAGNOSIS — C82.91 FOLLICULAR LYMPHOMA OF LYMPH NODES OF NECK, UNSPECIFIED FOLLICULAR LYMPHOMA TYPE: Primary | ICD-10-CM

## 2021-12-10 DIAGNOSIS — G89.3 CHRONIC NEOPLASM-RELATED PAIN: ICD-10-CM

## 2021-12-10 DIAGNOSIS — E66.01 MORBID OBESITY: ICD-10-CM

## 2021-12-10 DIAGNOSIS — R59.0 AXILLARY LYMPHADENOPATHY: ICD-10-CM

## 2021-12-10 DIAGNOSIS — C82.91 FOLLICULAR LYMPHOMA OF LYMPH NODES OF NECK, UNSPECIFIED FOLLICULAR LYMPHOMA TYPE: ICD-10-CM

## 2021-12-10 DIAGNOSIS — Z71.89 ADVANCE CARE PLANNING: ICD-10-CM

## 2021-12-10 DIAGNOSIS — F43.9 SITUATIONAL STRESS: ICD-10-CM

## 2021-12-10 LAB
ALBUMIN SERPL BCP-MCNC: 4 G/DL (ref 3.5–5.2)
ALP SERPL-CCNC: 85 U/L (ref 55–135)
ALT SERPL W/O P-5'-P-CCNC: 22 U/L (ref 10–44)
ANION GAP SERPL CALC-SCNC: 7 MMOL/L (ref 8–16)
AST SERPL-CCNC: 14 U/L (ref 10–40)
B2 MICROGLOB SERPL-MCNC: 1.8 UG/ML (ref 0–2.5)
BASOPHILS # BLD AUTO: 0.11 K/UL (ref 0–0.2)
BASOPHILS NFR BLD: 0.9 % (ref 0–1.9)
BILIRUB SERPL-MCNC: 0.3 MG/DL (ref 0.1–1)
BUN SERPL-MCNC: 18 MG/DL (ref 6–20)
CALCIUM SERPL-MCNC: 8.9 MG/DL (ref 8.7–10.5)
CHLORIDE SERPL-SCNC: 106 MMOL/L (ref 95–110)
CO2 SERPL-SCNC: 26 MMOL/L (ref 23–29)
CREAT SERPL-MCNC: 0.8 MG/DL (ref 0.5–1.4)
DIFFERENTIAL METHOD: ABNORMAL
EOSINOPHIL # BLD AUTO: 0.1 K/UL (ref 0–0.5)
EOSINOPHIL NFR BLD: 1.1 % (ref 0–8)
ERYTHROCYTE [DISTWIDTH] IN BLOOD BY AUTOMATED COUNT: 13.5 % (ref 11.5–14.5)
EST. GFR  (AFRICAN AMERICAN): >60 ML/MIN/1.73 M^2
EST. GFR  (NON AFRICAN AMERICAN): >60 ML/MIN/1.73 M^2
GLUCOSE SERPL-MCNC: 98 MG/DL (ref 70–110)
HCT VFR BLD AUTO: 39.1 % (ref 37–48.5)
HGB BLD-MCNC: 13 G/DL (ref 12–16)
IGA SERPL-MCNC: 86 MG/DL (ref 40–350)
IGG SERPL-MCNC: 471 MG/DL (ref 650–1600)
IGM SERPL-MCNC: 153 MG/DL (ref 50–300)
IMM GRANULOCYTES # BLD AUTO: 0.09 K/UL (ref 0–0.04)
IMM GRANULOCYTES NFR BLD AUTO: 0.7 % (ref 0–0.5)
LDH SERPL L TO P-CCNC: 159 U/L (ref 110–260)
LYMPHOCYTES # BLD AUTO: 2.1 K/UL (ref 1–4.8)
LYMPHOCYTES NFR BLD: 16.8 % (ref 18–48)
MAGNESIUM SERPL-MCNC: 2 MG/DL (ref 1.6–2.6)
MCH RBC QN AUTO: 27.6 PG (ref 27–31)
MCHC RBC AUTO-ENTMCNC: 33.2 G/DL (ref 32–36)
MCV RBC AUTO: 83 FL (ref 82–98)
MONOCYTES # BLD AUTO: 0.8 K/UL (ref 0.3–1)
MONOCYTES NFR BLD: 6.6 % (ref 4–15)
NEUTROPHILS # BLD AUTO: 9.2 K/UL (ref 1.8–7.7)
NEUTROPHILS NFR BLD: 73.9 % (ref 38–73)
NRBC BLD-RTO: 0 /100 WBC
PHOSPHATE SERPL-MCNC: 3 MG/DL (ref 2.7–4.5)
PLATELET # BLD AUTO: 303 K/UL (ref 150–450)
PMV BLD AUTO: 8.8 FL (ref 9.2–12.9)
POTASSIUM SERPL-SCNC: 4.5 MMOL/L (ref 3.5–5.1)
PROT SERPL-MCNC: 6.7 G/DL (ref 6–8.4)
RBC # BLD AUTO: 4.71 M/UL (ref 4–5.4)
SODIUM SERPL-SCNC: 139 MMOL/L (ref 136–145)
URATE SERPL-MCNC: 5.1 MG/DL (ref 2.4–5.7)
WBC # BLD AUTO: 12.49 K/UL (ref 3.9–12.7)

## 2021-12-10 PROCEDURE — 82232 ASSAY OF BETA-2 PROTEIN: CPT | Performed by: INTERNAL MEDICINE

## 2021-12-10 PROCEDURE — 99999 PR PBB SHADOW E&M-EST. PATIENT-LVL IV: CPT | Mod: PBBFAC,,, | Performed by: INTERNAL MEDICINE

## 2021-12-10 PROCEDURE — 85025 COMPLETE CBC W/AUTO DIFF WBC: CPT | Performed by: INTERNAL MEDICINE

## 2021-12-10 PROCEDURE — 80053 COMPREHEN METABOLIC PANEL: CPT | Performed by: INTERNAL MEDICINE

## 2021-12-10 PROCEDURE — 83735 ASSAY OF MAGNESIUM: CPT | Performed by: INTERNAL MEDICINE

## 2021-12-10 PROCEDURE — 99497 PR ADVNCD CARE PLAN 30 MIN: ICD-10-PCS | Mod: S$GLB,,, | Performed by: INTERNAL MEDICINE

## 2021-12-10 PROCEDURE — 84550 ASSAY OF BLOOD/URIC ACID: CPT | Performed by: INTERNAL MEDICINE

## 2021-12-10 PROCEDURE — 84100 ASSAY OF PHOSPHORUS: CPT | Performed by: INTERNAL MEDICINE

## 2021-12-10 PROCEDURE — 99204 PR OFFICE/OUTPT VISIT, NEW, LEVL IV, 45-59 MIN: ICD-10-PCS | Mod: S$PBB,,, | Performed by: INTERNAL MEDICINE

## 2021-12-10 PROCEDURE — 99497 ADVNCD CARE PLAN 30 MIN: CPT | Mod: S$GLB,,, | Performed by: INTERNAL MEDICINE

## 2021-12-10 PROCEDURE — 99204 OFFICE O/P NEW MOD 45 MIN: CPT | Mod: S$PBB,,, | Performed by: INTERNAL MEDICINE

## 2021-12-10 PROCEDURE — 82784 ASSAY IGA/IGD/IGG/IGM EACH: CPT | Performed by: INTERNAL MEDICINE

## 2021-12-10 PROCEDURE — 83615 LACTATE (LD) (LDH) ENZYME: CPT | Performed by: INTERNAL MEDICINE

## 2021-12-10 PROCEDURE — 36415 COLL VENOUS BLD VENIPUNCTURE: CPT | Performed by: INTERNAL MEDICINE

## 2021-12-10 PROCEDURE — 99999 PR PBB SHADOW E&M-EST. PATIENT-LVL IV: ICD-10-PCS | Mod: PBBFAC,,, | Performed by: INTERNAL MEDICINE

## 2021-12-10 RX ORDER — OXYCODONE AND ACETAMINOPHEN 5; 325 MG/1; MG/1
1 TABLET ORAL EVERY 6 HOURS PRN
COMMUNITY
Start: 2021-07-14 | End: 2022-02-04

## 2021-12-10 RX ORDER — PROMETHAZINE HYDROCHLORIDE 6.25 MG/5ML
SYRUP ORAL
COMMUNITY
End: 2022-12-16 | Stop reason: ALTCHOICE

## 2021-12-10 RX ORDER — HYDROCODONE BITARTRATE AND ACETAMINOPHEN 5; 325 MG/1; MG/1
1 TABLET ORAL EVERY 6 HOURS PRN
Qty: 40 TABLET | Refills: 0 | Status: SHIPPED | OUTPATIENT
Start: 2021-12-10 | End: 2022-02-04

## 2021-12-10 RX ORDER — GUAIFENESIN 100 MG/5ML
SOLUTION ORAL
COMMUNITY
End: 2022-12-16

## 2021-12-10 RX ORDER — ALPRAZOLAM 1 MG/1
1 TABLET ORAL 2 TIMES DAILY PRN
Qty: 30 TABLET | Refills: 0 | Status: SHIPPED | OUTPATIENT
Start: 2021-12-10 | End: 2022-02-04 | Stop reason: SDUPTHER

## 2021-12-10 RX ORDER — TRAMADOL HYDROCHLORIDE 50 MG/1
50 TABLET ORAL 2 TIMES DAILY PRN
COMMUNITY
Start: 2021-11-30 | End: 2022-09-26 | Stop reason: SDUPTHER

## 2021-12-11 ENCOUNTER — PATIENT MESSAGE (OUTPATIENT)
Dept: HEMATOLOGY/ONCOLOGY | Facility: CLINIC | Age: 36
End: 2021-12-11
Payer: COMMERCIAL

## 2021-12-12 DIAGNOSIS — J06.9 UPPER RESPIRATORY TRACT INFECTION, UNSPECIFIED TYPE: Primary | ICD-10-CM

## 2021-12-12 RX ORDER — AZITHROMYCIN 250 MG/1
TABLET, FILM COATED ORAL
Qty: 6 TABLET | Refills: 0 | Status: SHIPPED | OUTPATIENT
Start: 2021-12-12 | End: 2021-12-17

## 2021-12-14 ENCOUNTER — PATIENT MESSAGE (OUTPATIENT)
Dept: HEMATOLOGY/ONCOLOGY | Facility: CLINIC | Age: 36
End: 2021-12-14
Payer: COMMERCIAL

## 2021-12-17 ENCOUNTER — PATIENT MESSAGE (OUTPATIENT)
Dept: HEMATOLOGY/ONCOLOGY | Facility: CLINIC | Age: 36
End: 2021-12-17

## 2021-12-17 ENCOUNTER — HOSPITAL ENCOUNTER (OUTPATIENT)
Dept: RADIOLOGY | Facility: HOSPITAL | Age: 36
Discharge: HOME | End: 2021-12-17
Attending: INTERNAL MEDICINE
Payer: MEDICAID

## 2021-12-17 ENCOUNTER — OFFICE VISIT (OUTPATIENT)
Dept: HEMATOLOGY/ONCOLOGY | Facility: CLINIC | Age: 36
End: 2021-12-17
Payer: COMMERCIAL

## 2021-12-17 VITALS
HEIGHT: 61 IN | HEART RATE: 104 BPM | TEMPERATURE: 99 F | WEIGHT: 237.19 LBS | SYSTOLIC BLOOD PRESSURE: 138 MMHG | DIASTOLIC BLOOD PRESSURE: 96 MMHG | BODY MASS INDEX: 44.78 KG/M2

## 2021-12-17 DIAGNOSIS — F06.30 MOOD DISORDER DUE TO A GENERAL MEDICAL CONDITION: ICD-10-CM

## 2021-12-17 DIAGNOSIS — G89.3 CHRONIC NEOPLASM-RELATED PAIN: ICD-10-CM

## 2021-12-17 DIAGNOSIS — E88.3 TUMOR LYSIS SYNDROME: ICD-10-CM

## 2021-12-17 DIAGNOSIS — C83.31 DIFFUSE LARGE B-CELL LYMPHOMA OF LYMPH NODES OF NECK: ICD-10-CM

## 2021-12-17 DIAGNOSIS — C82.91 FOLLICULAR LYMPHOMA OF LYMPH NODES OF NECK, UNSPECIFIED FOLLICULAR LYMPHOMA TYPE: Primary | ICD-10-CM

## 2021-12-17 DIAGNOSIS — C82.91 FOLLICULAR LYMPHOMA OF LYMPH NODES OF NECK, UNSPECIFIED FOLLICULAR LYMPHOMA TYPE: ICD-10-CM

## 2021-12-17 DIAGNOSIS — E66.01 MORBID OBESITY: ICD-10-CM

## 2021-12-17 PROCEDURE — 99214 OFFICE O/P EST MOD 30 MIN: CPT | Mod: S$PBB,,, | Performed by: INTERNAL MEDICINE

## 2021-12-17 PROCEDURE — 78815 PET IMAGE W/CT SKULL-THIGH: CPT | Mod: TC

## 2021-12-17 PROCEDURE — 78815 NM PET CT ROUTINE: ICD-10-PCS | Mod: 26,PI,, | Performed by: RADIOLOGY

## 2021-12-17 PROCEDURE — 99214 PR OFFICE/OUTPT VISIT, EST, LEVL IV, 30-39 MIN: ICD-10-PCS | Mod: S$PBB,,, | Performed by: INTERNAL MEDICINE

## 2021-12-17 PROCEDURE — 99999 PR PBB SHADOW E&M-EST. PATIENT-LVL V: CPT | Mod: PBBFAC,,, | Performed by: INTERNAL MEDICINE

## 2021-12-17 PROCEDURE — 99999 PR PBB SHADOW E&M-EST. PATIENT-LVL V: ICD-10-PCS | Mod: PBBFAC,,, | Performed by: INTERNAL MEDICINE

## 2021-12-17 PROCEDURE — 99215 OFFICE O/P EST HI 40 MIN: CPT | Mod: PBBFAC,25,PO | Performed by: INTERNAL MEDICINE

## 2021-12-17 PROCEDURE — 78815 PET IMAGE W/CT SKULL-THIGH: CPT | Mod: 26,PI,, | Performed by: RADIOLOGY

## 2021-12-17 RX ORDER — ALLOPURINOL 300 MG/1
300 TABLET ORAL DAILY
Qty: 30 TABLET | Refills: 11 | Status: ON HOLD | OUTPATIENT
Start: 2021-12-17 | End: 2022-06-20 | Stop reason: HOSPADM

## 2021-12-17 RX ORDER — PREDNISONE 50 MG/1
50 TABLET ORAL DAILY
Qty: 5 TABLET | Refills: 0 | Status: ON HOLD | OUTPATIENT
Start: 2021-12-17 | End: 2022-06-20 | Stop reason: HOSPADM

## 2021-12-19 ENCOUNTER — PATIENT MESSAGE (OUTPATIENT)
Dept: HEMATOLOGY/ONCOLOGY | Facility: CLINIC | Age: 36
End: 2021-12-19
Payer: COMMERCIAL

## 2021-12-27 ENCOUNTER — PATIENT MESSAGE (OUTPATIENT)
Dept: HEMATOLOGY/ONCOLOGY | Facility: CLINIC | Age: 36
End: 2021-12-27
Payer: COMMERCIAL

## 2021-12-28 ENCOUNTER — PATIENT MESSAGE (OUTPATIENT)
Dept: HEMATOLOGY/ONCOLOGY | Facility: CLINIC | Age: 36
End: 2021-12-28
Payer: COMMERCIAL

## 2022-01-01 ENCOUNTER — PATIENT MESSAGE (OUTPATIENT)
Dept: HEMATOLOGY/ONCOLOGY | Facility: CLINIC | Age: 37
End: 2022-01-01
Payer: COMMERCIAL

## 2022-01-06 ENCOUNTER — PATIENT MESSAGE (OUTPATIENT)
Dept: HEMATOLOGY/ONCOLOGY | Facility: CLINIC | Age: 37
End: 2022-01-06
Payer: COMMERCIAL

## 2022-01-07 ENCOUNTER — PATIENT MESSAGE (OUTPATIENT)
Dept: HEMATOLOGY/ONCOLOGY | Facility: CLINIC | Age: 37
End: 2022-01-07
Payer: COMMERCIAL

## 2022-01-14 DIAGNOSIS — R11.2 CHEMOTHERAPY-INDUCED NAUSEA AND VOMITING: Primary | ICD-10-CM

## 2022-01-14 DIAGNOSIS — T45.1X5A CHEMOTHERAPY-INDUCED NAUSEA AND VOMITING: Primary | ICD-10-CM

## 2022-01-14 RX ORDER — ONDANSETRON 8 MG/1
8 TABLET, ORALLY DISINTEGRATING ORAL EVERY 8 HOURS PRN
Qty: 40 TABLET | Refills: 5 | Status: SHIPPED | OUTPATIENT
Start: 2022-01-14 | End: 2022-04-25 | Stop reason: SDUPTHER

## 2022-01-22 ENCOUNTER — PATIENT MESSAGE (OUTPATIENT)
Dept: HEMATOLOGY/ONCOLOGY | Facility: CLINIC | Age: 37
End: 2022-01-22
Payer: COMMERCIAL

## 2022-01-23 ENCOUNTER — PATIENT MESSAGE (OUTPATIENT)
Dept: HEMATOLOGY/ONCOLOGY | Facility: CLINIC | Age: 37
End: 2022-01-23
Payer: COMMERCIAL

## 2022-01-24 ENCOUNTER — PATIENT MESSAGE (OUTPATIENT)
Dept: OBSTETRICS AND GYNECOLOGY | Facility: CLINIC | Age: 37
End: 2022-01-24
Payer: COMMERCIAL

## 2022-01-25 ENCOUNTER — PATIENT MESSAGE (OUTPATIENT)
Dept: HEMATOLOGY/ONCOLOGY | Facility: CLINIC | Age: 37
End: 2022-01-25
Payer: COMMERCIAL

## 2022-01-25 DIAGNOSIS — C82.91 FOLLICULAR LYMPHOMA OF LYMPH NODES OF NECK, UNSPECIFIED FOLLICULAR LYMPHOMA TYPE: Primary | ICD-10-CM

## 2022-01-25 NOTE — PLAN OF CARE
START ON PATHWAY REGIMEN - Lymphoma and CLL    [id number]        Rituximab-xxxx       Bendamustine (Bendeka)           Additional Orders: Per committee, hepatitis B and C screening   recommended prior to initiation of rituximab.    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

## 2022-01-25 NOTE — Clinical Note
1. Schedule appt with oncologist at [facility] in 1-2 weeks. Let them know she started chemo today in [place], her second cycle should be on 2/22/22 - 2/23/22, so she'll need to establish care with someone in [place] before then.  Thanks!

## 2022-01-27 DIAGNOSIS — C82.91 FOLLICULAR LYMPHOMA OF LYMPH NODES OF NECK, UNSPECIFIED FOLLICULAR LYMPHOMA TYPE: Primary | ICD-10-CM

## 2022-01-28 ENCOUNTER — PATIENT MESSAGE (OUTPATIENT)
Dept: HEMATOLOGY/ONCOLOGY | Facility: CLINIC | Age: 37
End: 2022-01-28
Payer: COMMERCIAL

## 2022-01-30 ENCOUNTER — PATIENT MESSAGE (OUTPATIENT)
Dept: INTERNAL MEDICINE | Facility: CLINIC | Age: 37
End: 2022-01-30
Payer: COMMERCIAL

## 2022-02-01 ENCOUNTER — PATIENT MESSAGE (OUTPATIENT)
Dept: HEMATOLOGY/ONCOLOGY | Facility: CLINIC | Age: 37
End: 2022-02-01
Payer: COMMERCIAL

## 2022-02-01 ENCOUNTER — PATIENT MESSAGE (OUTPATIENT)
Dept: INTERNAL MEDICINE | Facility: CLINIC | Age: 37
End: 2022-02-01
Payer: COMMERCIAL

## 2022-02-01 DIAGNOSIS — U07.1 COVID: Primary | ICD-10-CM

## 2022-02-01 NOTE — TELEPHONE ENCOUNTER
Due to the ongoing demand for Anti-SARS-CoV-2 Monoclonal Antibodies, [facility] is prioritizing use of this medication for those patients at highest risk for complications from the disease.  You have been identified as a low risk individual and therefore do not meet the required criteria at this time.     Please fill out our COVID-19 Self Care Assessment via the COVID-19 activity in the [facility] website or mobile carolyne and our care team will be there to guide you on next steps and treatment options.  If your symptoms worsen please contact [facility], our free 24/7 nurse care line at [phone].

## 2022-02-03 NOTE — PROGRESS NOTES
PATIENT: Jerry Coronado  MRN: [id number]  DATE: 2/4/2022    Diagnosis:   1. 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 Immunodeficiency due to drug therapy    3. COVID-19    4. Chronic neoplasm-related pain    5. Mood disorder due to a general medical condition    6. Morbid obesity    7. Tumor lysis syndrome    8. Chemotherapy-induced nausea and vomiting      Chief Complaint: Lymphoma    Oncologic History:      Oncologic History 1. Follicular lymphoma      Oncologic Treatment 1. Bendamustine/rituximab - start date 1/25/22 - 1/26/22      Pathology 11/29/21:  Lymph node, left supraclavicular region, excision:  --Follicular lymphoma with high Ki-67 proliferation index, see comment  Comment: Concomitantly submitted flow cytometric analysis detects B-cell lymphoma of germinal center  origin expressing CD19, bright CD20, and CD10 with kappa light chain restriction. CD5 is negative in this  population.  Although the majority of the lymph node appears to represent as grade 2 follicular lymphoma, the Ki-67  proliferation index is much higher than expected for typical low-grade follicular lymphoma. Additionally there  is a very small area suggestive of grade 3A follicular lymphoma as well, representing less than 5% of the overall cellularity. This sampling leaves concern for a higher grade process elsewhere and correlation with  clinical and radiologic findings including PET scan findings is highly recommended with additional biopsy of  areas of high SUV if indicated.      Telemedicine visit:  The patient location is: home  The chief complaint leading to consultation is: follicular lymphoma    Visit type: audiovisual    Face to Face time with patient: 10 minutes  15 minutes of total time spent on the encounter, which includes face to face time and non-face to face time preparing to see the patient (eg, review of tests), Obtaining and/or reviewing separately obtained history,  Documenting clinical information in the electronic or other health record, Independently interpreting results (not separately reported) and communicating results to the patient/family/caregiver, or Care coordination (not separately reported).     Each patient to whom he or she provides medical services by telemedicine is:  (1) informed of the relationship between the physician and patient and the respective role of any other health care provider with respect to management of the patient; and (2) notified that he or she may decline to receive medical services by telemedicine and may withdraw from such care at any time.    Notes: see below      Subjective:    History of Present Illness: Ms. Coronado is a 36 y.o. female who presented in December 2021 for evaluation and management of B cell lymphoma. She was referred by Dr. Kerns.    - presenting symptom was enlarged supraclavicular lymph nodes  - she underwent excisional lymph node biopsy on 11/29/21. Pathology revealed a follicular lymphoma with high ki-67 proliferation index.  - she reports having a CT scan in [place] in early November with enlarged lymph nodes noted throughout.  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 This may make treatment decisions challenging logistically.    Interval history:  - she presents for a follow-up appointment for her follicular lymphoma  - she received cycle #1 of bendamustine/rituximab on 1/25/22 - 1/26/22 in [place], [place].  - she was diagnosed with Covid 19 earlier this week. She states she is asymptomatic.  - she tolerated cycle #1 well. She notes lots of fatigue.  - she is leaving [facility] on 2/6/22 for a travel respiratory therapist assignment. The assignment is through May 2022. She states she will establish care with a local oncologist in [place].   - She denies shortness of breath, chest pain, nausea, vomiting, constipation.  - she denies fever, night sweats, unintentional weight  loss.    Past medical, surgical, family, and social histories have been reviewed and updated below.    Past Medical History:   Past Medical History:   Diagnosis Date    Allergic rhinitis     Anxiety     Depression     Hypothyroidism     SVT (supraventricular tachycardia) 2011       Past Surgical History:   Past Surgical History:   Procedure Laterality Date    ABLATION OF DYSRHYTHMIC FOCUS      x2    BIOPSY OF CERVICAL LYMPH NODE Left 11/29/2021    Procedure: BIOPSY, LYMPH NODE, CERVICAL;  Surgeon: Blanco Kerns MD;  Location: [facility] OR;  Service: General;  Laterality: Left;    CERVIX LESION DESTRUCTION      cryo in 2011 (?)    CONIZATION OF CERVIX USING LOOP ELECTROSURGICAL EXCISION PROCEDURE (LEEP) N/A 7/21/2021    Procedure: LEEP CONIZATION, CERVIX;  Surgeon: Donna Castillo MD;  Location: [facility] OR;  Service: OB/GYN;  Laterality: N/A;    EXCISION OF LESION OF NOSE N/A 10/14/2019    Procedure: EXCISION, LESION, NOSE;  Surgeon: Umesh Morales MD;  Location: [facility] OR;  Service: ENT;  Laterality: N/A;    FUNCTIONAL ENDOSCOPIC SINUS SURGERY (FESS) N/A 10/14/2019    Procedure: FESS (FUNCTIONAL ENDOSCOPIC SINUS SURGERY);  Surgeon: Umesh Morales MD;  Location: [facility] OR;  Service: ENT;  Laterality: N/A;    HYSTEROSCOPY WITH DILATION AND CURETTAGE OF UTERUS N/A 7/21/2021    Procedure: HYSTEROSCOPY, WITH DILATION AND CURETTAGE OF UTERUS;  Surgeon: Donna Castillo MD;  Location: [facility] OR;  Service: OB/GYN;  Laterality: N/A;    NASAL SEPTOPLASTY N/A 10/14/2019    Procedure: SEPTOPLASTY, NOSE;  Surgeon: Umesh Morales MD;  Location: [facility] OR;  Service: ENT;  Laterality: N/A;    NASAL SINUS SURGERY  10/14/2019    PILONIDAL CYST DRAINAGE      TONSILLECTOMY      TONSILLECTOMY      TYMPANOPLASTY N/A 10/14/2019    Procedure: TYMPANOPLASTY;  Surgeon: Umesh Morales MD;  Location: [facility] OR;  Service: ENT;  Laterality: N/A;    TYMPANOTOMY Left 10/14/2019    left       Family History:   Family History   Problem  Relation Age of Onset    Hypertension Mother     Heart disease Father 59        CABG    Heart failure Father     Breast cancer Maternal Aunt 55    Colon cancer Neg Hx     Ovarian cancer Neg Hx        Social History:  reports that she quit smoking about 12 years ago. Her smoking use included cigarettes. She started smoking about 21 years ago. She has never used smokeless tobacco. She reports current alcohol use of about 5.0 standard drinks of alcohol per week. She reports that she does not use drugs.    Allergies:  Review of patient's allergies indicates:  No Known Allergies    Medications:  Current Outpatient Medications   Medication Sig Dispense Refill    allopurinoL (ZYLOPRIM) 300 MG tablet Take 1 tablet (300 mg total) by mouth once daily. 30 tablet 11    ALPRAZolam (XANAX) 1 MG tablet Take 1 tablet (1 mg total) by mouth 2 (two) times daily as needed for Anxiety. 30 tablet 0    amoxicillin-clavulanate 875-125mg (AUGMENTIN) 875-125 mg per tablet Take 1 tablet by mouth 2 (two) times daily.      benzoyl peroxide 2.5 % gel 1 application to affected area 60 g 1    buPROPion (WELLBUTRIN XL) 300 MG 24 hr tablet Take 1 tablet (300 mg total) by mouth every morning. (Patient taking differently: Take 300 mg by mouth as needed.) 90 tablet 3    doxycycline (MONODOX) 100 MG capsule 1 capsule      drospirenone-ethinyl estradioL (EMELINA) 3-0.03 mg per tablet Emelina 3 mg-0.03 mg tablet  TAKE 1 TABLET BY MOUTH EVERY DAY SKIP PLACEBO (Patient taking differently: Emelina 3 mg-0.03 mg tablet  TAKE 1 TABLET BY MOUTH EVERY DAY SKIP PLACEBO) 84 tablet 4    fluticasone (FLONASE) 50 mcg/actuation nasal spray INSTILL 2 SPRAYS IN EACH NOSTRIL ONCE DAILY 1 Bottle 3    gabapentin (NEURONTIN) 600 MG tablet Take 600 mg by mouth 3 (three) times daily.       guaiFENesin 100 mg/5 ml (ROBITUSSIN) 100 mg/5 mL syrup as needed.      HYDROcodone-acetaminophen (NORCO) 5-325 mg per tablet Take 1 tablet by mouth every 6 (six) hours as needed  for Pain (chronic neoplasm-related pain). 40 tablet 0    hydrOXYzine pamoate (VISTARIL) 50 MG Cap Take 1 capsule (50 mg total) by mouth 2 (two) times daily as needed. 60 capsule 2    levothyroxine (SYNTHROID) 50 MCG tablet Take 1 tablet by mouth once daily 30 tablet 4    LIDOcaine (LMX) 4 % cream 1 application as needed      mirtazapine (REMERON) 7.5 MG Tab Take 2 tablets (15 mg total) by mouth nightly. 60 tablet 1    multivitamin capsule Take 1 capsule by mouth.      ondansetron (ZOFRAN-ODT) 8 MG TbDL Take 1 tablet (8 mg total) by mouth every 8 (eight) hours as needed (chemotherapy-induced nausea and vomiting). 40 tablet 5    oxyCODONE-acetaminophen (PERCOCET) 5-325 mg per tablet Take 1 tablet by mouth every 6 (six) hours as needed.      prazosin (MINIPRESS) 2 MG Cap 1 capsule at bedtime      predniSONE (DELTASONE) 50 MG Tab Take 1 tablet (50 mg total) by mouth once daily. 5 tablet 0    promethazine (PHENERGAN) 6.25 mg/5 mL syrup as needed.      QUEtiapine (SEROQUEL) 100 MG Tab Take 100 mg by mouth once daily.      sertraline (ZOLOFT) 100 MG tablet Take 1 tablet (100 mg total) by mouth once daily. 30 tablet 1    tiZANidine (ZANAFLEX) 4 MG tablet Take 4 mg by mouth 3 (three) times daily as needed.      traMADoL (ULTRAM) 50 mg tablet Take 50 mg by mouth 2 (two) times daily as needed.      valACYclovir (VALTREX) 1000 MG tablet Take 2 tablets (2,000 mg total) by mouth every 12 (twelve) hours. for 1 day (Patient taking differently: Take 2,000 mg by mouth as needed.) 4 tablet 2     No current facility-administered medications for this visit.       Review of Systems   Constitutional: Positive for fatigue.   HENT: Negative for sore throat.    Eyes: Negative for visual disturbance.   Respiratory: Negative for cough and shortness of breath.    Cardiovascular: Negative for chest pain.   Gastrointestinal: Negative for abdominal pain, constipation, diarrhea, nausea and vomiting.   Genitourinary: Negative for  dysuria.   Musculoskeletal: Positive for neck pain (improved). Negative for back pain.   Skin: Negative for rash.   Neurological: Negative for headaches.   Hematological: Negative for adenopathy.   Psychiatric/Behavioral: The patient is not nervous/anxious.        ECOG Performance Status:   ECOG SCORE    1 - Restricted in strenuous activity-ambulatory and able to carry out work of a light nature         Objective:      Vitals:   There were no vitals filed for this visit.  BMI: There is no height or weight on file to calculate BMI.  Deferred due to telemedicine visit.    Physical Exam  Deferred due to telemedicine visit.    Laboratory Data:  Labs have been reviewed.    Lab Results   Component Value Date    WBC 12.49 12/10/2021    HGB 13.0 12/10/2021    HCT 39.1 12/10/2021    MCV 83 12/10/2021     12/10/2021       Imaging:     PET/CT scan (12/17/21): I have personally reviewed the images    IN THE HEAD AND NECK:     Multiple left-sided cervical lymph nodes, left and right supraclavicular lymph nodes and left axillary lymph nodes.  The index lesions, as follow:     Hypermetabolic lymph node at the left level Va region, measures 1.2 cm (axial series 2, image 68) with SUV max of 14.0.     Hypermetabolic left supraclavicular lymph node, measures 1.9 cm (axial series 2, image 82) with SUV max of 15.0.     Hypermetabolic left axillary lymph node, measures 3.5 cm (axial series 2, image 116) with SUV max of 12.0.     Hypermetabolic right supraclavicular lymph node, measures 1.4 cm (axial series 2, image 79) with SUV max of 13.0.     IN THE CHEST:     Focal radiotracer uptake in the left hilum with no corresponding lesion on CT images (axial fused image 116) with SUV max of 4.4.  No abnormal lesions throughout the pulmonary parenchyma.     IN THE ABDOMEN AND PELVIS:     There is a subcentimeter hypermetabolic lesion in the subcutaneous tissue of the right back at the level of L4, measures 0.9 cm (axial series 2, image  "198) with SUV max of 6.7.  There is physiologic tracer distribution within the abdominal organs and excretion into the genitourinary system.     The spleen has normal uptake and is normal at 11.0 cm in craniocaudal dimension.     IN THE BONES:     There are no hypermetabolic lesions worrisome for malignancy.     In the extremities, there are no hypermetabolic lesions worrisome for malignancy.     Impression:     Hypermetabolic cervical, left axillary, and left hilar lymph nodes consistent with biopsy-proven follicular lymphoma (performed on 11/29/2021), as above.     Hypermetabolic subcutaneous tissue of the right nodule at the level of L4, concerning for additional lymphomatous deposit as described above.      Assessment:       1. 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 Immunodeficiency due to drug therapy    3. COVID-19    4. Chronic neoplasm-related pain    5. Mood disorder due to a general medical condition    6. Morbid obesity    7. Tumor lysis syndrome    8. Chemotherapy-induced nausea and vomiting           Plan:     1. Follicular lymphoma  - I have reviewed her chart  - supraclavicular lymph node biopsy (11/29/21) revealed follicular lymphoma. Importantly, in the pathology report is the following: "Although the majority of the lymph node appears to represent as grade 2 follicular lymphoma, the Ki-67  proliferation index is much higher than expected for typical low-grade follicular lymphoma. Additionally there  is a very small area suggestive of grade 3A follicular lymphoma as well, representing less than 5% of the  overall cellularity."  - she reports having a CT scan in [place] in early November with enlarged lymph nodes noted throughout.  - PET/CT scan (12/17/21) revealed "hypermetabolic cervical, left axillary, and left hilar lymph nodes" as well as "hypermetabolic subcutaneous tissue of the right nodule at the level of L4, concerning for additional " "lymphomatous deposit."  - she received cycle #1 of bendamustine/rituximab on 1/25/22 - 1/26/22 in [place], [place].  - she was diagnosed with Covid 19 earlier this week. She states she is asymptomatic.  - she tolerated cycle #1 well. She notes lots of fatigue.  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 The assignment is through May 2022. She states she will establish care with a local oncologist in [place].   - I asked her to stay in contact with me over the upcoming months.   - follow-up appointment will be based off when she returns to [place].    2. Morbid obesity  - no weight since telemedicine visit.  - continue to monitor    3. Mood disorder due to medical condition  - continue xanax as needed. I sent a refill.    4. Chronic neoplasm-related pain  - stable. I sent a prescription for oxycodone to her pharmacy.    5. Advance Care Planning     Power of   After our discussion (at previous visit), the patient decided to complete a HCPOA and appointed her brother Velasquez Coronado ([phone]) and dad Dirk Coronado ([phone]).        - follow-up appointment will be based off when she returns to [place].    Ki Nova M.D.  Hematology/Oncology  [facility] - [address], [address]  [place] [address]  Phone: [phone]  Fax: [phone]  "

## 2022-02-04 ENCOUNTER — OFFICE VISIT (OUTPATIENT)
Dept: HEMATOLOGY/ONCOLOGY | Facility: CLINIC | Age: 37
End: 2022-02-04
Payer: COMMERCIAL

## 2022-02-04 DIAGNOSIS — F43.9 SITUATIONAL STRESS: ICD-10-CM

## 2022-02-04 DIAGNOSIS — D84.821 IMMUNODEFICIENCY DUE TO DRUG THERAPY: ICD-10-CM

## 2022-02-04 DIAGNOSIS — E66.01 MORBID OBESITY: ICD-10-CM

## 2022-02-04 DIAGNOSIS — E88.3 TUMOR LYSIS SYNDROME: ICD-10-CM

## 2022-02-04 DIAGNOSIS — U07.1 COVID-19: ICD-10-CM

## 2022-02-04 DIAGNOSIS — C82.91 FOLLICULAR LYMPHOMA OF LYMPH NODES OF NECK, UNSPECIFIED FOLLICULAR LYMPHOMA TYPE: Primary | ICD-10-CM

## 2022-02-04 DIAGNOSIS — Z79.899 IMMUNODEFICIENCY DUE TO DRUG THERAPY: ICD-10-CM

## 2022-02-04 DIAGNOSIS — T45.1X5A CHEMOTHERAPY-INDUCED NAUSEA AND VOMITING: ICD-10-CM

## 2022-02-04 DIAGNOSIS — G89.3 CHRONIC NEOPLASM-RELATED PAIN: ICD-10-CM

## 2022-02-04 DIAGNOSIS — R11.2 CHEMOTHERAPY-INDUCED NAUSEA AND VOMITING: ICD-10-CM

## 2022-02-04 DIAGNOSIS — F06.30 MOOD DISORDER DUE TO A GENERAL MEDICAL CONDITION: ICD-10-CM

## 2022-02-04 PROCEDURE — 1160F RVW MEDS BY RX/DR IN RCRD: CPT | Mod: CPTII,95,, | Performed by: INTERNAL MEDICINE

## 2022-02-04 PROCEDURE — 99215 PR OFFICE/OUTPT VISIT, EST, LEVL V, 40-54 MIN: ICD-10-PCS | Mod: 95,,, | Performed by: INTERNAL MEDICINE

## 2022-02-04 PROCEDURE — 1159F MED LIST DOCD IN RCRD: CPT | Mod: CPTII,95,, | Performed by: INTERNAL MEDICINE

## 2022-02-04 PROCEDURE — 1159F PR MEDICATION LIST DOCUMENTED IN MEDICAL RECORD: ICD-10-PCS | Mod: CPTII,95,, | Performed by: INTERNAL MEDICINE

## 2022-02-04 PROCEDURE — 1160F PR REVIEW ALL MEDS BY PRESCRIBER/CLIN PHARMACIST DOCUMENTED: ICD-10-PCS | Mod: CPTII,95,, | Performed by: INTERNAL MEDICINE

## 2022-02-04 PROCEDURE — 99215 OFFICE O/P EST HI 40 MIN: CPT | Mod: 95,,, | Performed by: INTERNAL MEDICINE

## 2022-02-04 RX ORDER — OXYCODONE HYDROCHLORIDE 5 MG/1
5 TABLET ORAL EVERY 6 HOURS PRN
Qty: 40 TABLET | Refills: 0 | Status: SHIPPED | OUTPATIENT
Start: 2022-02-04 | End: 2022-04-07 | Stop reason: SDUPTHER

## 2022-02-04 RX ORDER — ALPRAZOLAM 1 MG/1
1 TABLET ORAL 2 TIMES DAILY PRN
Qty: 30 TABLET | Refills: 0 | Status: SHIPPED | OUTPATIENT
Start: 2022-02-04 | End: 2022-04-07 | Stop reason: SDUPTHER

## 2022-02-04 NOTE — Clinical Note
[Karli Pinedo, and Dr. 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 I didn't learn of this until earlier this week (after we spent that time setting her up for chemo at [place]). So, for now we can cancel her chemo / authorization / etc. She will be in [place] for a few months, so she'll likely get through most of her chemo up there.  Thanks again for all your help. Sorry about this situation.  maris

## 2022-02-08 DIAGNOSIS — R06.02 SHORTNESS OF BREATH: Primary | ICD-10-CM

## 2022-02-08 RX ORDER — LEVALBUTEROL TARTRATE 45 UG/1
1 AEROSOL, METERED ORAL EVERY 4 HOURS PRN
Qty: 15 G | Refills: 1 | Status: SHIPPED | OUTPATIENT
Start: 2022-02-08 | End: 2022-06-20

## 2022-02-14 ENCOUNTER — PATIENT MESSAGE (OUTPATIENT)
Dept: HEMATOLOGY/ONCOLOGY | Facility: CLINIC | Age: 37
End: 2022-02-14
Payer: COMMERCIAL

## 2022-02-22 DIAGNOSIS — D84.9 IMMUNOSUPPRESSED STATUS: ICD-10-CM

## 2022-02-26 ENCOUNTER — PATIENT MESSAGE (OUTPATIENT)
Dept: HEMATOLOGY/ONCOLOGY | Facility: CLINIC | Age: 37
End: 2022-02-26
Payer: COMMERCIAL

## 2022-03-16 ENCOUNTER — PATIENT MESSAGE (OUTPATIENT)
Dept: HEMATOLOGY/ONCOLOGY | Facility: CLINIC | Age: 37
End: 2022-03-16
Payer: COMMERCIAL

## 2022-04-04 ENCOUNTER — PATIENT MESSAGE (OUTPATIENT)
Dept: HEMATOLOGY/ONCOLOGY | Facility: CLINIC | Age: 37
End: 2022-04-04
Payer: COMMERCIAL

## 2022-04-04 DIAGNOSIS — C82.91 FOLLICULAR LYMPHOMA OF LYMPH NODES OF NECK, UNSPECIFIED FOLLICULAR LYMPHOMA TYPE: Primary | ICD-10-CM

## 2022-04-07 ENCOUNTER — PATIENT MESSAGE (OUTPATIENT)
Dept: HEMATOLOGY/ONCOLOGY | Facility: CLINIC | Age: 37
End: 2022-04-07
Payer: COMMERCIAL

## 2022-04-07 DIAGNOSIS — F43.9 SITUATIONAL STRESS: ICD-10-CM

## 2022-04-07 DIAGNOSIS — G89.3 CHRONIC NEOPLASM-RELATED PAIN: ICD-10-CM

## 2022-04-07 RX ORDER — ALPRAZOLAM 1 MG/1
1 TABLET ORAL 2 TIMES DAILY PRN
Qty: 30 TABLET | Refills: 0 | Status: SHIPPED | OUTPATIENT
Start: 2022-04-07 | End: 2022-05-18 | Stop reason: SDUPTHER

## 2022-04-07 RX ORDER — OXYCODONE HYDROCHLORIDE 5 MG/1
5 TABLET ORAL EVERY 6 HOURS PRN
Qty: 40 TABLET | Refills: 0 | Status: SHIPPED | OUTPATIENT
Start: 2022-04-07 | End: 2022-05-18 | Stop reason: SDUPTHER

## 2022-04-12 ENCOUNTER — PATIENT MESSAGE (OUTPATIENT)
Dept: HEMATOLOGY/ONCOLOGY | Facility: CLINIC | Age: 37
End: 2022-04-12
Payer: COMMERCIAL

## 2022-04-13 ENCOUNTER — TELEPHONE (OUTPATIENT)
Dept: HEMATOLOGY/ONCOLOGY | Facility: CLINIC | Age: 37
End: 2022-04-13
Payer: COMMERCIAL

## 2022-04-13 DIAGNOSIS — C82.91 FOLLICULAR LYMPHOMA OF LYMPH NODES OF NECK, UNSPECIFIED FOLLICULAR LYMPHOMA TYPE: Primary | ICD-10-CM

## 2022-04-13 NOTE — TELEPHONE ENCOUNTER
----- Message from Phyllis Castanon sent at 4/13/2022  8:22 AM CDT -----  Regarding: Romero Calling from [phone]  Contact: Romero Calling from [phone]  Who Called: Romero Calling from [phone]    Calling to talk to nurse in regards to patient injectable medication Bendeka Ref code  requesting number of visits and frequency and chart notes.

## 2022-04-13 NOTE — TELEPHONE ENCOUNTER
Spoke to multiple representatives about this message regarding injectable medication for patient. Representative stated was unable to find any information about this matter and will call back at the clinic if she finds out any further information.

## 2022-04-14 ENCOUNTER — HOSPITAL ENCOUNTER (OUTPATIENT)
Dept: RADIOLOGY | Facility: HOSPITAL | Age: 37
Discharge: HOME | End: 2022-04-14
Attending: INTERNAL MEDICINE
Payer: COMMERCIAL

## 2022-04-14 ENCOUNTER — PATIENT MESSAGE (OUTPATIENT)
Dept: HEMATOLOGY/ONCOLOGY | Facility: CLINIC | Age: 37
End: 2022-04-14
Payer: COMMERCIAL

## 2022-04-14 ENCOUNTER — LAB VISIT (OUTPATIENT)
Dept: LAB | Facility: HOSPITAL | Age: 37
End: 2022-04-14
Attending: INTERNAL MEDICINE
Payer: COMMERCIAL

## 2022-04-14 VITALS — HEIGHT: 61 IN | BODY MASS INDEX: 43.43 KG/M2 | WEIGHT: 230 LBS

## 2022-04-14 DIAGNOSIS — C82.91 FOLLICULAR LYMPHOMA OF LYMPH NODES OF NECK, UNSPECIFIED FOLLICULAR LYMPHOMA TYPE: ICD-10-CM

## 2022-04-14 LAB
ALBUMIN SERPL BCP-MCNC: 4 G/DL (ref 3.5–5.2)
ALP SERPL-CCNC: 68 U/L (ref 55–135)
ALT SERPL W/O P-5'-P-CCNC: 49 U/L (ref 10–44)
ANION GAP SERPL CALC-SCNC: 9 MMOL/L (ref 8–16)
AST SERPL-CCNC: 36 U/L (ref 10–40)
BASOPHILS # BLD AUTO: 0.08 K/UL (ref 0–0.2)
BASOPHILS NFR BLD: 1.2 % (ref 0–1.9)
BILIRUB SERPL-MCNC: 0.6 MG/DL (ref 0.1–1)
BUN SERPL-MCNC: 12 MG/DL (ref 6–20)
CALCIUM SERPL-MCNC: 8.8 MG/DL (ref 8.7–10.5)
CHLORIDE SERPL-SCNC: 104 MMOL/L (ref 95–110)
CO2 SERPL-SCNC: 23 MMOL/L (ref 23–29)
CREAT SERPL-MCNC: 0.7 MG/DL (ref 0.5–1.4)
DIFFERENTIAL METHOD: ABNORMAL
EOSINOPHIL # BLD AUTO: 0.2 K/UL (ref 0–0.5)
EOSINOPHIL NFR BLD: 2.2 % (ref 0–8)
ERYTHROCYTE [DISTWIDTH] IN BLOOD BY AUTOMATED COUNT: 14.6 % (ref 11.5–14.5)
EST. GFR  (AFRICAN AMERICAN): >60 ML/MIN/1.73 M^2
EST. GFR  (NON AFRICAN AMERICAN): >60 ML/MIN/1.73 M^2
GLUCOSE SERPL-MCNC: 84 MG/DL (ref 70–110)
GLUCOSE SERPL-MCNC: 97 MG/DL (ref 70–110)
HCT VFR BLD AUTO: 40.6 % (ref 37–48.5)
HGB BLD-MCNC: 13.4 G/DL (ref 12–16)
IMM GRANULOCYTES # BLD AUTO: 0.08 K/UL (ref 0–0.04)
IMM GRANULOCYTES NFR BLD AUTO: 1.2 % (ref 0–0.5)
LDH SERPL L TO P-CCNC: 154 U/L (ref 110–260)
LYMPHOCYTES # BLD AUTO: 0.3 K/UL (ref 1–4.8)
LYMPHOCYTES NFR BLD: 4.5 % (ref 18–48)
MAGNESIUM SERPL-MCNC: 1.8 MG/DL (ref 1.6–2.6)
MCH RBC QN AUTO: 27.8 PG (ref 27–31)
MCHC RBC AUTO-ENTMCNC: 33 G/DL (ref 32–36)
MCV RBC AUTO: 84 FL (ref 82–98)
MONOCYTES # BLD AUTO: 1 K/UL (ref 0.3–1)
MONOCYTES NFR BLD: 14.4 % (ref 4–15)
NEUTROPHILS # BLD AUTO: 5.3 K/UL (ref 1.8–7.7)
NEUTROPHILS NFR BLD: 76.5 % (ref 38–73)
NRBC BLD-RTO: 0 /100 WBC
PHOSPHATE SERPL-MCNC: 3.3 MG/DL (ref 2.7–4.5)
PLATELET # BLD AUTO: 264 K/UL (ref 150–450)
PMV BLD AUTO: 9.7 FL (ref 9.2–12.9)
POTASSIUM SERPL-SCNC: 3.7 MMOL/L (ref 3.5–5.1)
PROT SERPL-MCNC: 6.6 G/DL (ref 6–8.4)
RBC # BLD AUTO: 4.82 M/UL (ref 4–5.4)
SODIUM SERPL-SCNC: 136 MMOL/L (ref 136–145)
URATE SERPL-MCNC: 5.4 MG/DL (ref 2.4–5.7)
WBC # BLD AUTO: 6.86 K/UL (ref 3.9–12.7)

## 2022-04-14 PROCEDURE — 36415 COLL VENOUS BLD VENIPUNCTURE: CPT | Performed by: INTERNAL MEDICINE

## 2022-04-14 PROCEDURE — 85025 COMPLETE CBC W/AUTO DIFF WBC: CPT | Performed by: INTERNAL MEDICINE

## 2022-04-14 PROCEDURE — 83735 ASSAY OF MAGNESIUM: CPT | Performed by: INTERNAL MEDICINE

## 2022-04-14 PROCEDURE — 78815 PET IMAGE W/CT SKULL-THIGH: CPT | Mod: TC,PO

## 2022-04-14 PROCEDURE — 80053 COMPREHEN METABOLIC PANEL: CPT | Performed by: INTERNAL MEDICINE

## 2022-04-14 PROCEDURE — 83615 LACTATE (LD) (LDH) ENZYME: CPT | Performed by: INTERNAL MEDICINE

## 2022-04-14 PROCEDURE — 84100 ASSAY OF PHOSPHORUS: CPT | Performed by: INTERNAL MEDICINE

## 2022-04-14 PROCEDURE — 84550 ASSAY OF BLOOD/URIC ACID: CPT | Performed by: INTERNAL MEDICINE

## 2022-04-14 NOTE — PLAN OF CARE
DISCONTINUE ON PATHWAY REGIMEN - Lymphoma and CLL    [id number]        Rituximab-xxxx       Bendamustine (Bendeka)           Additional Orders: Per committee, hepatitis B and C screening   recommended prior to initiation of rituximab.    **Always confirm dose/schedule in your pharmacy ordering system**    REASON: Other Reason  PRIOR TREATMENT: [id number]  TREATMENT RESPONSE: Partial Response ([place])    START ON PATHWAY REGIMEN - Lymphoma and CLL    [id number]        Rituximab-xxxx       Bendamustine (Bendeka)           Additional Orders: Per committee, hepatitis B and C screening   recommended prior to initiation of rituximab.    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

## 2022-04-17 NOTE — PROGRESS NOTES
PATIENT: Jerry Coronado  MRN: [id number]  DATE: 4/18/2022    Diagnosis:   1. 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 Immunodeficiency due to drug therapy    3. History of COVID-19    4. Chronic neoplasm-related pain    5. Morbid obesity    6. Chemotherapy-induced nausea and vomiting    7. Tumor lysis syndrome    8. Mood disorder due to a general medical condition      Chief Complaint: Lymphoma    Oncologic History:      Oncologic History 1. Follicular lymphoma      Oncologic Treatment 1. Bendamustine/rituximab - start date 1/25/22 - 1/26/22      Pathology 11/29/21:  Lymph node, left supraclavicular region, excision:  --Follicular lymphoma with high Ki-67 proliferation index, see comment  Comment: Concomitantly submitted flow cytometric analysis detects B-cell lymphoma of germinal center  origin expressing CD19, bright CD20, and CD10 with kappa light chain restriction. CD5 is negative in this  population.  Although the majority of the lymph node appears to represent as grade 2 follicular lymphoma, the Ki-67  proliferation index is much higher than expected for typical low-grade follicular lymphoma. Additionally there  is a very small area suggestive of grade 3A follicular lymphoma as well, representing less than 5% of the overall cellularity. This sampling leaves concern for a higher grade process elsewhere and correlation with  clinical and radiologic findings including PET scan findings is highly recommended with additional biopsy of  areas of high SUV if indicated.      Telemedicine visit:  The patient location is: home  The chief complaint leading to consultation is: follicular lymphoma    Visit type: audiovisual    Face to Face time with patient: 10 minutes  15 minutes of total time spent on the encounter, which includes face to face time and non-face to face time preparing to see the patient (eg, review of tests), Obtaining and/or reviewing separately obtained  history, Documenting clinical information in the electronic or other health record, Independently interpreting results (not separately reported) and communicating results to the patient/family/caregiver, or Care coordination (not separately reported).     Each patient to whom he or she provides medical services by telemedicine is:  (1) informed of the relationship between the physician and patient and the respective role of any other health care provider with respect to management of the patient; and (2) notified that he or she may decline to receive medical services by telemedicine and may withdraw from such care at any time.    Notes: see below      Subjective:    History of Present Illness: Ms. Coronado is a 36 y.o. female who presented in December 2021 for evaluation and management of B cell lymphoma. She was referred by Dr. Kerns.    - presenting symptom was enlarged supraclavicular lymph nodes  - she underwent excisional lymph node biopsy on 11/29/21. Pathology revealed a follicular lymphoma with high ki-67 proliferation index.  - she reports having a CT scan in [place] in early November with enlarged lymph nodes noted throughout.  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 This may make treatment decisions challenging logistically.  - she received cycle #1 of bendamustine/rituximab on 1/25/22 - 1/26/22 in [place], [place].    Interval history:  - she presents for a follow-up appointment for her follicular lymphoma  - she is scheduled for cycle #4 of bendamustine/rituximab on 4/21/22 - 4/22/22.  - she underwent PET/CT scan on 4/14/22.  - today, she is doing well. Her port-a-cath feels uncomfortable. She denies shortness of breath, chest pain, vomiting, constipation.  - she denies fever, night sweats, unintentional weight loss.  - with chemotherapy, she notes, fatigue, nausea    Past medical, surgical, family, and social histories have been reviewed and updated  below.    Past Medical History:   Past Medical History:   Diagnosis Date    Allergic rhinitis     Anxiety     Depression     Hypothyroidism     SVT (supraventricular tachycardia) 2011       Past Surgical History:   Past Surgical History:   Procedure Laterality Date    ABLATION OF DYSRHYTHMIC FOCUS      x2    BIOPSY OF CERVICAL LYMPH NODE Left 11/29/2021    Procedure: BIOPSY, LYMPH NODE, CERVICAL;  Surgeon: Blanco Kerns MD;  Location: [facility] OR;  Service: General;  Laterality: Left;    CERVIX LESION DESTRUCTION      cryo in 2011 (?)    CONIZATION OF CERVIX USING LOOP ELECTROSURGICAL EXCISION PROCEDURE (LEEP) N/A 7/21/2021    Procedure: LEEP CONIZATION, CERVIX;  Surgeon: Donna Castillo MD;  Location: [facility] OR;  Service: OB/GYN;  Laterality: N/A;    EXCISION OF LESION OF NOSE N/A 10/14/2019    Procedure: EXCISION, LESION, NOSE;  Surgeon: Umesh Morales MD;  Location: [facility];  Service: ENT;  Laterality: N/A;    FUNCTIONAL ENDOSCOPIC SINUS SURGERY (FESS) N/A 10/14/2019    Procedure: FESS (FUNCTIONAL ENDOSCOPIC SINUS SURGERY);  Surgeon: Umesh Morales MD;  Location: [facility] OR;  Service: ENT;  Laterality: N/A;    HYSTEROSCOPY WITH DILATION AND CURETTAGE OF UTERUS N/A 7/21/2021    Procedure: HYSTEROSCOPY, WITH DILATION AND CURETTAGE OF UTERUS;  Surgeon: Donna Castillo MD;  [facility];  Service: OB/GYN;  Laterality: N/A;    NASAL SEPTOPLASTY N/A 10/14/2019    Procedure: SEPTOPLASTY, NOSE;  Surgeon: Umesh Morales MD;  Location: [facility] OR;  Service: ENT;  Laterality: N/A;    NASAL SINUS SURGERY  10/14/2019    PILONIDAL CYST DRAINAGE      TONSILLECTOMY      TONSILLECTOMY      TYMPANOPLASTY N/A 10/14/2019    Procedure: TYMPANOPLASTY;  Surgeon: Umesh Morales MD;  Location: [facility] OR;  Service: ENT;  Laterality: N/A;    TYMPANOTOMY Left 10/14/2019    left       Family History:   Family History   Problem Relation Age of Onset    Hypertension Mother     Heart disease Father 59        CABG     Heart failure Father     Breast cancer Maternal Aunt 55    Colon cancer Neg Hx     Ovarian cancer Neg Hx        Social History:  reports that she quit smoking about 12 years ago. Her smoking use included cigarettes. She started smoking about 21 years ago. She has never used smokeless tobacco. She reports current alcohol use of about 5.0 standard drinks of alcohol per week. She reports that she does not use drugs.    Allergies:  Review of patient's allergies indicates:  No Known Allergies    Medications:  Current Outpatient Medications   Medication Sig Dispense Refill    allopurinoL (ZYLOPRIM) 300 MG tablet Take 1 tablet (300 mg total) by mouth once daily. 30 tablet 11    ALPRAZolam (XANAX) 1 MG tablet Take 1 tablet (1 mg total) by mouth 2 (two) times daily as needed for Anxiety. 30 tablet 0    amoxicillin-clavulanate 875-125mg (AUGMENTIN) 875-125 mg per tablet Take 1 tablet by mouth 2 (two) times daily.      benzoyl peroxide 2.5 % gel 1 application to affected area 60 g 1    buPROPion (WELLBUTRIN XL) 300 MG 24 hr tablet Take 1 tablet (300 mg total) by mouth every morning. (Patient taking differently: Take 300 mg by mouth as needed.) 90 tablet 3    doxycycline (MONODOX) 100 MG capsule 1 capsule      drospirenone-ethinyl estradioL (EMELINA) 3-0.03 mg per tablet Emelina 3 mg-0.03 mg tablet  TAKE 1 TABLET BY MOUTH EVERY DAY SKIP PLACEBO (Patient taking differently: Emelina 3 mg-0.03 mg tablet  TAKE 1 TABLET BY MOUTH EVERY DAY SKIP PLACEBO) 84 tablet 4    fluticasone (FLONASE) 50 mcg/actuation nasal spray INSTILL 2 SPRAYS IN EACH NOSTRIL ONCE DAILY 1 Bottle 3    gabapentin (NEURONTIN) 600 MG tablet Take 600 mg by mouth 3 (three) times daily.       guaiFENesin 100 mg/5 ml (ROBITUSSIN) 100 mg/5 mL syrup as needed.      hydrOXYzine pamoate (VISTARIL) 50 MG Cap Take 1 capsule (50 mg total) by mouth 2 (two) times daily as needed. 60 capsule 2    levalbuterol (XOPENEX HFA) 45 mcg/actuation inhaler Inhale 1 puff into  the lungs every 4 (four) hours as needed for Wheezing or Shortness of Breath. Rescue 15 g 1    levothyroxine (SYNTHROID) 50 MCG tablet Take 1 tablet by mouth once daily 30 tablet 4    LIDOcaine (LMX) 4 % cream 1 application as needed      mirtazapine (REMERON) 7.5 MG Tab Take 2 tablets (15 mg total) by mouth nightly. 60 tablet 1    multivitamin capsule Take 1 capsule by mouth.      ondansetron (ZOFRAN-ODT) 8 MG TbDL Take 1 tablet (8 mg total) by mouth every 8 (eight) hours as needed (chemotherapy-induced nausea and vomiting). 40 tablet 5    oxyCODONE (ROXICODONE) 5 MG immediate release tablet Take 1 tablet (5 mg total) by mouth every 6 (six) hours as needed for Pain (cancer-related pain). 40 tablet 0    prazosin (MINIPRESS) 2 MG Cap 1 capsule at bedtime      predniSONE (DELTASONE) 50 MG Tab Take 1 tablet (50 mg total) by mouth once daily. 5 tablet 0    promethazine (PHENERGAN) 6.25 mg/5 mL syrup as needed.      QUEtiapine (SEROQUEL) 100 MG Tab Take 100 mg by mouth once daily.      sertraline (ZOLOFT) 100 MG tablet Take 1 tablet (100 mg total) by mouth once daily. 30 tablet 1    tiZANidine (ZANAFLEX) 4 MG tablet Take 4 mg by mouth 3 (three) times daily as needed.      traMADoL (ULTRAM) 50 mg tablet Take 50 mg by mouth 2 (two) times daily as needed.      valACYclovir (VALTREX) 1000 MG tablet Take 2 tablets (2,000 mg total) by mouth every 12 (twelve) hours. for 1 day (Patient taking differently: Take 2,000 mg by mouth as needed.) 4 tablet 2     No current facility-administered medications for this visit.       Review of Systems   Constitutional: Positive for fatigue. Negative for appetite change and unexpected weight change.   HENT: Negative for mouth sores and sore throat.    Eyes: Negative for visual disturbance.   Respiratory: Negative for cough and shortness of breath.    Cardiovascular: Negative for chest pain.   Gastrointestinal: Positive for nausea (from chemotherapy). Negative for abdominal pain,  constipation, diarrhea and vomiting.   Genitourinary: Negative for dysuria and frequency.   Musculoskeletal: Positive for neck pain (improved). Negative for back pain.   Skin: Negative for rash.   Neurological: Negative for headaches.   Hematological: Negative for adenopathy.   Psychiatric/Behavioral: The patient is not nervous/anxious.        ECOG Performance Status:   ECOG SCORE    1 - Restricted in strenuous activity-ambulatory and able to carry out work of a light nature         Objective:      Vitals:   There were no vitals filed for this visit.  BMI: There is no height or weight on file to calculate BMI.  Deferred due to telemedicine visit.    Physical Exam  Deferred due to telemedicine visit.    Laboratory Data:  Labs have been reviewed.    Lab Results   Component Value Date    WBC 6.86 04/14/2022    HGB 13.4 04/14/2022    HCT 40.6 04/14/2022    MCV 84 04/14/2022     04/14/2022       Imaging:     PET/CT scan (4/14/22): I have personally reviewed the images  Differences in instrumentation and technique preclude semiquantitative comparison between the current PET/CT study and the prior.     Background:  - Liver 4.2 SUV max.  - Spleen 3.5 SUV max.  - Spleen 11.9 x 4.8 cm (AP x TR)  - Blood pool 3.3 SUV max     No FDG avid lymphadenopathy or mass. The largest cervical node is a 5 x 5 mm supraclavicular node with SUV max 1.0 (image 86). The largest left axillary node measures 18 x 7 mm with SUV max 0.8 (image 103).     Additional findings:  -Right sided IJ medical infusion port with tip at the level of the SVC.  -Relative diffuse low-attenuation liver in keeping with steatosis.  -3.6 cm simple fluid attenuation on FDG avid cyst in the right ovary favored to represent a physiologic cyst in this age group. The left ovary and uterus are within normal limits.     IMPRESSION:  No evidence of FDG avid malignancy.    PET/CT scan (12/17/21): I have personally reviewed the images    IN THE HEAD AND  NECK:     Multiple left-sided cervical lymph nodes, left and right supraclavicular lymph nodes and left axillary lymph nodes.  The index lesions, as follow:     Hypermetabolic lymph node at the left level Va region, measures 1.2 cm (axial series 2, image 68) with SUV max of 14.0.     Hypermetabolic left supraclavicular lymph node, measures 1.9 cm (axial series 2, image 82) with SUV max of 15.0.     Hypermetabolic left axillary lymph node, measures 3.5 cm (axial series 2, image 116) with SUV max of 12.0.     Hypermetabolic right supraclavicular lymph node, measures 1.4 cm (axial series 2, image 79) with SUV max of 13.0.     IN THE CHEST:     Focal radiotracer uptake in the left hilum with no corresponding lesion on CT images (axial fused image 116) with SUV max of 4.4.  No abnormal lesions throughout the pulmonary parenchyma.     IN THE ABDOMEN AND PELVIS:     There is a subcentimeter hypermetabolic lesion in the subcutaneous tissue of the right back at the level of L4, measures 0.9 cm (axial series 2, image 198) with SUV max of 6.7.  There is physiologic tracer distribution within the abdominal organs and excretion into the genitourinary system.     The spleen has normal uptake and is normal at 11.0 cm in craniocaudal dimension.     IN THE BONES:     There are no hypermetabolic lesions worrisome for malignancy.     In the extremities, there are no hypermetabolic lesions worrisome for malignancy.     Impression:     Hypermetabolic cervical, left axillary, and left hilar lymph nodes consistent with biopsy-proven follicular lymphoma (performed on 11/29/2021), as above.     Hypermetabolic subcutaneous tissue of the right nodule at the level of L4, concerning for additional lymphomatous deposit as described above.      Assessment:       1. 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 Immunodeficiency due to drug therapy    3. History of COVID-19    4. Chronic neoplasm-related  "pain    5. Morbid obesity    6. Chemotherapy-induced nausea and vomiting    7. Tumor lysis syndrome    8. Mood disorder due to a general medical condition           Plan:     1. Follicular lymphoma  - I have reviewed her chart  - supraclavicular lymph node biopsy (11/29/21) revealed follicular lymphoma. Importantly, in the pathology report is the following: "Although the majority of the lymph node appears to represent as grade 2 follicular lymphoma, the Ki-67  proliferation index is much higher than expected for typical low-grade follicular lymphoma. Additionally there  is a very small area suggestive of grade 3A follicular lymphoma as well, representing less than 5% of the  overall cellularity."  - she reports having a CT scan in [place] in early November with enlarged lymph nodes noted throughout.  - PET/CT scan (12/17/21) revealed "hypermetabolic cervical, left axillary, and left hilar lymph nodes" as well as "hypermetabolic subcutaneous tissue of the right nodule at the level of L4, concerning for additional lymphomatous deposit."  - she received cycle #1 of bendamustine/rituximab on 1/25/22 - 1/26/22 in [place], [place].  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 No evidence of hypermetabolic tumor  - The risks and benefits of chemotherapy were discussed, written information was given, and informed consent was obtained.  - Labs have been reviewed. Okay to proceed with cycle #4 of bendamustine/rituximab, scheduled for 4/21 - 4/22/22.  - return to clinic in 4 weeks in preparation for cycle #5 of bendamustine/rituximab.    2. Morbid obesity  - no weight since telemedicine visit.  - continue to monitor    3. Mood disorder due to medical condition  - stable. Continue xanax as needed.     4. Chronic neoplasm-related pain  - stable. continue oxycodone as needed.    5. Chemotherapy-induced nausea/vomiting  - controlled. Continue " anti-emetics as needed.    6. Advance Care Planning     Power of   After our discussion (at previous visit), the patient decided to complete a HCPOA and appointed her brother Velasquez Coronado ([phone]) and dad Dirk Coronado ([phone]).        - return to clinic in 4 weeks in preparation for cycle #5 of bendamustine/rituximab.    Ki Nova M.D.  Hematology/Oncology  [facility] - [address], [address]  [place] [address]  Phone: [phone]  Fax: [phone]

## 2022-04-18 ENCOUNTER — PATIENT MESSAGE (OUTPATIENT)
Dept: HEMATOLOGY/ONCOLOGY | Facility: CLINIC | Age: 37
End: 2022-04-18
Payer: COMMERCIAL

## 2022-04-18 ENCOUNTER — OFFICE VISIT (OUTPATIENT)
Dept: HEMATOLOGY/ONCOLOGY | Facility: CLINIC | Age: 37
End: 2022-04-18
Payer: COMMERCIAL

## 2022-04-18 DIAGNOSIS — F06.30 MOOD DISORDER DUE TO A GENERAL MEDICAL CONDITION: ICD-10-CM

## 2022-04-18 DIAGNOSIS — Z79.899 IMMUNODEFICIENCY DUE TO DRUG THERAPY: ICD-10-CM

## 2022-04-18 DIAGNOSIS — Z86.16 HISTORY OF COVID-19: ICD-10-CM

## 2022-04-18 DIAGNOSIS — E66.01 MORBID OBESITY: ICD-10-CM

## 2022-04-18 DIAGNOSIS — C82.91 FOLLICULAR LYMPHOMA OF LYMPH NODES OF NECK, UNSPECIFIED FOLLICULAR LYMPHOMA TYPE: Primary | ICD-10-CM

## 2022-04-18 DIAGNOSIS — D84.821 IMMUNODEFICIENCY DUE TO DRUG THERAPY: ICD-10-CM

## 2022-04-18 DIAGNOSIS — G89.3 CHRONIC NEOPLASM-RELATED PAIN: ICD-10-CM

## 2022-04-18 DIAGNOSIS — R11.2 CHEMOTHERAPY-INDUCED NAUSEA AND VOMITING: ICD-10-CM

## 2022-04-18 DIAGNOSIS — E88.3 TUMOR LYSIS SYNDROME: ICD-10-CM

## 2022-04-18 DIAGNOSIS — T45.1X5A CHEMOTHERAPY-INDUCED NAUSEA AND VOMITING: ICD-10-CM

## 2022-04-18 PROCEDURE — 1160F PR REVIEW ALL MEDS BY PRESCRIBER/CLIN PHARMACIST DOCUMENTED: ICD-10-PCS | Mod: CPTII,95,ICN, | Performed by: INTERNAL MEDICINE

## 2022-04-18 PROCEDURE — 99215 PR OFFICE/OUTPT VISIT, EST, LEVL V, 40-54 MIN: ICD-10-PCS | Mod: 95,ICN,, | Performed by: INTERNAL MEDICINE

## 2022-04-18 PROCEDURE — 1159F PR MEDICATION LIST DOCUMENTED IN MEDICAL RECORD: ICD-10-PCS | Mod: CPTII,95,ICN, | Performed by: INTERNAL MEDICINE

## 2022-04-18 PROCEDURE — 99215 OFFICE O/P EST HI 40 MIN: CPT | Mod: 95,ICN,, | Performed by: INTERNAL MEDICINE

## 2022-04-18 PROCEDURE — 1159F MED LIST DOCD IN RCRD: CPT | Mod: CPTII,95,ICN, | Performed by: INTERNAL MEDICINE

## 2022-04-18 PROCEDURE — 1160F RVW MEDS BY RX/DR IN RCRD: CPT | Mod: CPTII,95,ICN, | Performed by: INTERNAL MEDICINE

## 2022-04-18 RX ORDER — HEPARIN 100 UNIT/ML
500 SYRINGE INTRAVENOUS
Status: CANCELLED | OUTPATIENT
Start: 2022-04-22

## 2022-04-18 RX ORDER — ACETAMINOPHEN 325 MG/1
650 TABLET ORAL
Status: CANCELLED | OUTPATIENT
Start: 2022-04-21

## 2022-04-18 RX ORDER — EPINEPHRINE 0.3 MG/.3ML
0.3 INJECTION SUBCUTANEOUS ONCE AS NEEDED
Status: CANCELLED | OUTPATIENT
Start: 2022-04-22

## 2022-04-18 RX ORDER — SODIUM CHLORIDE 0.9 % (FLUSH) 0.9 %
10 SYRINGE (ML) INJECTION
Status: CANCELLED | OUTPATIENT
Start: 2022-04-21

## 2022-04-18 RX ORDER — FAMOTIDINE 10 MG/ML
20 INJECTION INTRAVENOUS
Status: CANCELLED | OUTPATIENT
Start: 2022-04-21

## 2022-04-18 RX ORDER — SODIUM CHLORIDE 0.9 % (FLUSH) 0.9 %
10 SYRINGE (ML) INJECTION
Status: CANCELLED | OUTPATIENT
Start: 2022-04-22

## 2022-04-18 RX ORDER — HEPARIN 100 UNIT/ML
500 SYRINGE INTRAVENOUS
Status: CANCELLED | OUTPATIENT
Start: 2022-04-21

## 2022-04-18 RX ORDER — EPINEPHRINE 0.3 MG/.3ML
0.3 INJECTION SUBCUTANEOUS ONCE AS NEEDED
Status: CANCELLED | OUTPATIENT
Start: 2022-04-21

## 2022-04-18 RX ORDER — DIPHENHYDRAMINE HYDROCHLORIDE 50 MG/ML
50 INJECTION INTRAMUSCULAR; INTRAVENOUS ONCE AS NEEDED
Status: CANCELLED | OUTPATIENT
Start: 2022-04-21

## 2022-04-18 RX ORDER — MEPERIDINE HYDROCHLORIDE 50 MG/ML
25 INJECTION INTRAMUSCULAR; INTRAVENOUS; SUBCUTANEOUS
Status: CANCELLED | OUTPATIENT
Start: 2022-04-21

## 2022-04-18 RX ORDER — DIPHENHYDRAMINE HYDROCHLORIDE 50 MG/ML
50 INJECTION INTRAMUSCULAR; INTRAVENOUS ONCE AS NEEDED
Status: CANCELLED | OUTPATIENT
Start: 2022-04-22

## 2022-04-18 NOTE — Clinical Note
1. Schedule labs cbc, cmp, uric acid, LDH at [place] on 5/16/22. 2. Scheduled f/u virtual visit on 5/18/22.  Thanks!

## 2022-04-18 NOTE — Clinical Note
Good morning,  She's good to go for chemotherapy whenever it gets approved. I believe she may need a new covid test before the chemo. Finally, I guess we need one of the [place] oncologists to sign the chemo orders.  Thanks so much! Ki

## 2022-04-19 ENCOUNTER — PATIENT MESSAGE (OUTPATIENT)
Dept: HEMATOLOGY/ONCOLOGY | Facility: CLINIC | Age: 37
End: 2022-04-19
Payer: COMMERCIAL

## 2022-04-20 ENCOUNTER — PATIENT MESSAGE (OUTPATIENT)
Dept: HEMATOLOGY/ONCOLOGY | Facility: CLINIC | Age: 37
End: 2022-04-20
Payer: COMMERCIAL

## 2022-04-21 ENCOUNTER — INFUSION (OUTPATIENT)
Dept: INFUSION THERAPY | Facility: HOSPITAL | Age: 37
End: 2022-04-21
Attending: INTERNAL MEDICINE
Payer: COMMERCIAL

## 2022-04-21 VITALS
RESPIRATION RATE: 16 BRPM | HEIGHT: 61 IN | SYSTOLIC BLOOD PRESSURE: 90 MMHG | TEMPERATURE: 99 F | DIASTOLIC BLOOD PRESSURE: 54 MMHG | WEIGHT: 233.38 LBS | BODY MASS INDEX: 44.06 KG/M2 | HEART RATE: 96 BPM

## 2022-04-21 DIAGNOSIS — C82.91 FOLLICULAR LYMPHOMA OF LYMPH NODES OF NECK, UNSPECIFIED FOLLICULAR LYMPHOMA TYPE: Primary | ICD-10-CM

## 2022-04-21 PROCEDURE — 96415 CHEMO IV INFUSION ADDL HR: CPT

## 2022-04-21 PROCEDURE — 96367 TX/PROPH/DG ADDL SEQ IV INF: CPT

## 2022-04-21 PROCEDURE — 96411 CHEMO IV PUSH ADDL DRUG: CPT

## 2022-04-21 PROCEDURE — 96413 CHEMO IV INFUSION 1 HR: CPT

## 2022-04-21 PROCEDURE — 96375 TX/PRO/DX INJ NEW DRUG ADDON: CPT

## 2022-04-21 PROCEDURE — 63600175 PHARM REV CODE 636 W HCPCS: Mod: TB | Performed by: INTERNAL MEDICINE

## 2022-04-21 PROCEDURE — 25000003 PHARM REV CODE 250: Performed by: INTERNAL MEDICINE

## 2022-04-21 RX ORDER — HEPARIN 100 UNIT/ML
500 SYRINGE INTRAVENOUS
Status: DISCONTINUED | OUTPATIENT
Start: 2022-04-21 | End: 2022-04-21 | Stop reason: HOSPADM

## 2022-04-21 RX ORDER — ACETAMINOPHEN 325 MG/1
650 TABLET ORAL
Status: COMPLETED | OUTPATIENT
Start: 2022-04-21 | End: 2022-04-21

## 2022-04-21 RX ORDER — FAMOTIDINE 10 MG/ML
20 INJECTION INTRAVENOUS
Status: COMPLETED | OUTPATIENT
Start: 2022-04-21 | End: 2022-04-21

## 2022-04-21 RX ADMIN — BENDAMUSTINE HYDROCHLORIDE 192.5 MG: 25 INJECTION, SOLUTION INTRAVENOUS at 02:04

## 2022-04-21 RX ADMIN — ACETAMINOPHEN 650 MG: 325 TABLET ORAL at 10:04

## 2022-04-21 RX ADMIN — DEXAMETHASONE SODIUM PHOSPHATE 0.25 MG: 4 INJECTION, SOLUTION INTRA-ARTICULAR; INTRALESIONAL; INTRAMUSCULAR; INTRAVENOUS; SOFT TISSUE at 01:04

## 2022-04-21 RX ADMIN — DIPHENHYDRAMINE HYDROCHLORIDE 50 MG: 50 INJECTION INTRAMUSCULAR; INTRAVENOUS at 10:04

## 2022-04-21 RX ADMIN — SODIUM CHLORIDE 806 MG: 0.9 INJECTION, SOLUTION INTRAVENOUS at 10:04

## 2022-04-21 RX ADMIN — Medication 500 UNITS: at 02:04

## 2022-04-21 RX ADMIN — FAMOTIDINE 20 MG: 10 INJECTION INTRAVENOUS at 10:04

## 2022-04-21 NOTE — PLAN OF CARE
Problem: Infection  Goal: Absence of Infection Signs and Symptoms  Outcome: Ongoing, Progressing  Intervention: Prevent or Manage Infection  Flowsheets (Taken 4/21/2022 1027)  Infection Management: aseptic technique maintained

## 2022-04-21 NOTE — PLAN OF CARE
Problem: Infection  Goal: Absence of Infection Signs and Symptoms  4/21/2022 1028 by Hilda Santoro RN  Outcome: Ongoing, Progressing  4/21/2022 1027 by Hilda Santoro RN  Outcome: Ongoing, Progressing  Intervention: Prevent or Manage Infection  4/21/2022 1028 by Hilda Santoro RN  Flowsheets (Taken 4/21/2022 1028)  Infection Management: aseptic technique maintained  4/21/2022 1027 by Hilda Santoro RN  Flowsheets (Taken 4/21/2022 1027)  Infection Management: aseptic technique maintained

## 2022-04-22 ENCOUNTER — PATIENT MESSAGE (OUTPATIENT)
Dept: HEMATOLOGY/ONCOLOGY | Facility: CLINIC | Age: 37
End: 2022-04-22
Payer: COMMERCIAL

## 2022-04-22 ENCOUNTER — INFUSION (OUTPATIENT)
Dept: INFUSION THERAPY | Facility: HOSPITAL | Age: 37
End: 2022-04-22
Attending: INTERNAL MEDICINE
Payer: COMMERCIAL

## 2022-04-22 VITALS
TEMPERATURE: 98 F | DIASTOLIC BLOOD PRESSURE: 73 MMHG | SYSTOLIC BLOOD PRESSURE: 107 MMHG | HEIGHT: 61 IN | RESPIRATION RATE: 18 BRPM | WEIGHT: 231.69 LBS | BODY MASS INDEX: 43.74 KG/M2 | OXYGEN SATURATION: 96 % | HEART RATE: 87 BPM

## 2022-04-22 DIAGNOSIS — C82.91 FOLLICULAR LYMPHOMA OF LYMPH NODES OF NECK, UNSPECIFIED FOLLICULAR LYMPHOMA TYPE: Primary | ICD-10-CM

## 2022-04-22 PROCEDURE — A4216 STERILE WATER/SALINE, 10 ML: HCPCS | Performed by: INTERNAL MEDICINE

## 2022-04-22 PROCEDURE — 96409 CHEMO IV PUSH SNGL DRUG: CPT

## 2022-04-22 PROCEDURE — 96367 TX/PROPH/DG ADDL SEQ IV INF: CPT

## 2022-04-22 PROCEDURE — 63600175 PHARM REV CODE 636 W HCPCS: Performed by: INTERNAL MEDICINE

## 2022-04-22 PROCEDURE — 25000003 PHARM REV CODE 250: Performed by: INTERNAL MEDICINE

## 2022-04-22 RX ORDER — EPINEPHRINE 0.3 MG/.3ML
0.3 INJECTION SUBCUTANEOUS ONCE AS NEEDED
Status: DISCONTINUED | OUTPATIENT
Start: 2022-04-22 | End: 2022-04-22 | Stop reason: HOSPADM

## 2022-04-22 RX ORDER — HEPARIN 100 UNIT/ML
500 SYRINGE INTRAVENOUS
Status: DISCONTINUED | OUTPATIENT
Start: 2022-04-22 | End: 2022-04-22 | Stop reason: HOSPADM

## 2022-04-22 RX ORDER — SODIUM CHLORIDE 0.9 % (FLUSH) 0.9 %
10 SYRINGE (ML) INJECTION
Status: DISCONTINUED | OUTPATIENT
Start: 2022-04-22 | End: 2022-04-22 | Stop reason: HOSPADM

## 2022-04-22 RX ADMIN — Medication 500 UNITS: at 10:04

## 2022-04-22 RX ADMIN — BENDAMUSTINE HYDROCHLORIDE 192.5 MG: 25 INJECTION, SOLUTION INTRAVENOUS at 10:04

## 2022-04-22 RX ADMIN — SODIUM CHLORIDE: 0.9 INJECTION, SOLUTION INTRAVENOUS at 09:04

## 2022-04-22 RX ADMIN — DEXAMETHASONE SODIUM PHOSPHATE 12 MG: 4 INJECTION, SOLUTION INTRA-ARTICULAR; INTRALESIONAL; INTRAMUSCULAR; INTRAVENOUS; SOFT TISSUE at 09:04

## 2022-04-22 RX ADMIN — SODIUM CHLORIDE, PRESERVATIVE FREE 10 ML: 5 INJECTION INTRAVENOUS at 10:04

## 2022-04-22 NOTE — PLAN OF CARE
Problem: Infection  Goal: Absence of Infection Signs and Symptoms  Outcome: Ongoing, Progressing  Intervention: Prevent or Manage Infection  Flowsheets (Taken 4/22/2022 1044)  Infection Management: aseptic technique maintained

## 2022-04-24 ENCOUNTER — PATIENT MESSAGE (OUTPATIENT)
Dept: HEMATOLOGY/ONCOLOGY | Facility: CLINIC | Age: 37
End: 2022-04-24
Payer: COMMERCIAL

## 2022-04-25 ENCOUNTER — PATIENT MESSAGE (OUTPATIENT)
Dept: HEMATOLOGY/ONCOLOGY | Facility: CLINIC | Age: 37
End: 2022-04-25
Payer: COMMERCIAL

## 2022-04-25 DIAGNOSIS — T45.1X5A CHEMOTHERAPY-INDUCED NAUSEA AND VOMITING: ICD-10-CM

## 2022-04-25 DIAGNOSIS — R11.2 CHEMOTHERAPY-INDUCED NAUSEA AND VOMITING: ICD-10-CM

## 2022-04-25 RX ORDER — ONDANSETRON 8 MG/1
8 TABLET, ORALLY DISINTEGRATING ORAL EVERY 8 HOURS PRN
Qty: 40 TABLET | Refills: 5 | Status: SHIPPED | OUTPATIENT
Start: 2022-04-25 | End: 2023-04-12

## 2022-05-02 ENCOUNTER — PATIENT MESSAGE (OUTPATIENT)
Dept: HEMATOLOGY/ONCOLOGY | Facility: CLINIC | Age: 37
End: 2022-05-02
Payer: COMMERCIAL

## 2022-05-02 ENCOUNTER — PATIENT MESSAGE (OUTPATIENT)
Dept: INTERNAL MEDICINE | Facility: CLINIC | Age: 37
End: 2022-05-02
Payer: COMMERCIAL

## 2022-05-02 ENCOUNTER — PATIENT MESSAGE (OUTPATIENT)
Dept: SURGERY | Facility: CLINIC | Age: 37
End: 2022-05-02
Payer: COMMERCIAL

## 2022-05-02 DIAGNOSIS — F10.94 ALCOHOL USE WITH ALCOHOL-INDUCED MOOD DISORDER: ICD-10-CM

## 2022-05-02 DIAGNOSIS — G47.33 OBSTRUCTIVE SLEEP APNEA SYNDROME: Primary | ICD-10-CM

## 2022-05-02 DIAGNOSIS — F33.1 MODERATE RECURRENT MAJOR DEPRESSION: ICD-10-CM

## 2022-05-06 ENCOUNTER — TELEPHONE (OUTPATIENT)
Dept: SURGERY | Facility: CLINIC | Age: 37
End: 2022-05-06
Payer: COMMERCIAL

## 2022-05-06 ENCOUNTER — PATIENT MESSAGE (OUTPATIENT)
Dept: HEMATOLOGY/ONCOLOGY | Facility: CLINIC | Age: 37
End: 2022-05-06
Payer: COMMERCIAL

## 2022-05-06 ENCOUNTER — PATIENT MESSAGE (OUTPATIENT)
Dept: SURGERY | Facility: CLINIC | Age: 37
End: 2022-05-06
Payer: COMMERCIAL

## 2022-05-06 DIAGNOSIS — C82.91 FOLLICULAR LYMPHOMA OF LYMPH NODES OF NECK, UNSPECIFIED FOLLICULAR LYMPHOMA TYPE: Primary | ICD-10-CM

## 2022-05-10 ENCOUNTER — OFFICE VISIT (OUTPATIENT)
Dept: PSYCHIATRY | Facility: CLINIC | Age: 37
End: 2022-05-10
Payer: COMMERCIAL

## 2022-05-10 DIAGNOSIS — F43.9 SITUATIONAL STRESS: ICD-10-CM

## 2022-05-10 DIAGNOSIS — F33.1 MODERATE RECURRENT MAJOR DEPRESSION: ICD-10-CM

## 2022-05-10 PROCEDURE — 99999 PR PBB SHADOW E&M-EST. PATIENT-LVL II: CPT | Mod: PBBFAC,,, | Performed by: SOCIAL WORKER

## 2022-05-10 PROCEDURE — 90791 PSYCH DIAGNOSTIC EVALUATION: CPT | Mod: S$GLB,,, | Performed by: SOCIAL WORKER

## 2022-05-10 PROCEDURE — 99999 PR PBB SHADOW E&M-EST. PATIENT-LVL II: ICD-10-PCS | Mod: PBBFAC,,, | Performed by: SOCIAL WORKER

## 2022-05-10 PROCEDURE — 90791 PR PSYCHIATRIC DIAGNOSTIC EVALUATION: ICD-10-PCS | Mod: S$GLB,,, | Performed by: SOCIAL WORKER

## 2022-05-10 RX ORDER — LEVOFLOXACIN 500 MG/1
500 TABLET, FILM COATED ORAL DAILY
COMMUNITY
Start: 2022-01-22 | End: 2022-12-16

## 2022-05-10 RX ORDER — OXYCODONE AND ACETAMINOPHEN 5; 325 MG/1; MG/1
TABLET ORAL
COMMUNITY
End: 2022-09-26

## 2022-05-10 RX ORDER — ACETAMINOPHEN AND CODEINE PHOSPHATE 300; 30 MG/1; MG/1
TABLET ORAL
COMMUNITY
End: 2022-09-26

## 2022-05-10 RX ORDER — AMLODIPINE BESYLATE 2.5 MG/1
2.5 TABLET ORAL DAILY
COMMUNITY
Start: 2022-01-25 | End: 2022-12-16

## 2022-05-10 RX ORDER — IBUPROFEN 800 MG/1
800 TABLET ORAL 4 TIMES DAILY PRN
COMMUNITY
End: 2022-12-16 | Stop reason: ALTCHOICE

## 2022-05-10 RX ORDER — ONDANSETRON HYDROCHLORIDE 8 MG/1
8 TABLET, FILM COATED ORAL 3 TIMES DAILY
COMMUNITY
Start: 2022-01-14 | End: 2022-12-16 | Stop reason: ALTCHOICE

## 2022-05-10 RX ORDER — DRONABINOL 5 MG/1
5 CAPSULE ORAL 3 TIMES DAILY PRN
COMMUNITY
Start: 2022-03-08 | End: 2022-05-25 | Stop reason: SDUPTHER

## 2022-05-10 RX ORDER — BENZONATATE 100 MG/1
CAPSULE ORAL
COMMUNITY
End: 2022-09-09

## 2022-05-10 RX ORDER — FERROUS SULFATE 325(65) MG
TABLET ORAL 2 TIMES DAILY
COMMUNITY
Start: 2022-01-12 | End: 2022-09-09

## 2022-05-10 RX ORDER — METHYLPREDNISOLONE 4 MG/1
TABLET ORAL
COMMUNITY
Start: 2022-01-12 | End: 2022-12-16 | Stop reason: ALTCHOICE

## 2022-05-10 RX ORDER — LORAZEPAM 1 MG/1
TABLET ORAL
COMMUNITY
Start: 2022-01-24 | End: 2022-12-16 | Stop reason: ALTCHOICE

## 2022-05-10 RX ORDER — LIDOCAINE AND PRILOCAINE 25; 25 MG/G; MG/G
CREAM TOPICAL
COMMUNITY
Start: 2022-02-16 | End: 2022-12-16 | Stop reason: ALTCHOICE
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
## 2022-05-11 ENCOUNTER — OFFICE VISIT (OUTPATIENT)
Dept: PULMONOLOGY | Facility: CLINIC | Age: 37
End: 2022-05-11
Payer: COMMERCIAL

## 2022-05-11 VITALS
SYSTOLIC BLOOD PRESSURE: 125 MMHG | DIASTOLIC BLOOD PRESSURE: 85 MMHG | WEIGHT: 225 LBS | BODY MASS INDEX: 42.48 KG/M2 | OXYGEN SATURATION: 98 % | HEIGHT: 61 IN | HEART RATE: 100 BPM

## 2022-05-11 DIAGNOSIS — R06.81 WITNESSED EPISODE OF APNEA: ICD-10-CM

## 2022-05-11 DIAGNOSIS — G47.10 HYPERSOMNIA: ICD-10-CM

## 2022-05-11 DIAGNOSIS — R53.82 CHRONIC FATIGUE: ICD-10-CM

## 2022-05-11 DIAGNOSIS — G47.00 INSOMNIA, UNSPECIFIED TYPE: Primary | ICD-10-CM

## 2022-05-11 PROCEDURE — 3079F PR MOST RECENT DIASTOLIC BLOOD PRESSURE 80-89 MM HG: ICD-10-PCS | Mod: CPTII,S$GLB,, | Performed by: NURSE PRACTITIONER

## 2022-05-11 PROCEDURE — 1159F PR MEDICATION LIST DOCUMENTED IN MEDICAL RECORD: ICD-10-PCS | Mod: CPTII,S$GLB,, | Performed by: NURSE PRACTITIONER

## 2022-05-11 PROCEDURE — 3074F SYST BP LT 130 MM HG: CPT | Mod: CPTII,S$GLB,, | Performed by: NURSE PRACTITIONER

## 2022-05-11 PROCEDURE — 1159F MED LIST DOCD IN RCRD: CPT | Mod: CPTII,S$GLB,, | Performed by: NURSE PRACTITIONER

## 2022-05-11 PROCEDURE — 3074F PR MOST RECENT SYSTOLIC BLOOD PRESSURE < 130 MM HG: ICD-10-PCS | Mod: CPTII,S$GLB,, | Performed by: NURSE PRACTITIONER

## 2022-05-11 PROCEDURE — 3079F DIAST BP 80-89 MM HG: CPT | Mod: CPTII,S$GLB,, | Performed by: NURSE PRACTITIONER

## 2022-05-11 PROCEDURE — 3008F PR BODY MASS INDEX (BMI) DOCUMENTED: ICD-10-PCS | Mod: CPTII,S$GLB,, | Performed by: NURSE PRACTITIONER

## 2022-05-11 PROCEDURE — 3008F BODY MASS INDEX DOCD: CPT | Mod: CPTII,S$GLB,, | Performed by: NURSE PRACTITIONER

## 2022-05-11 PROCEDURE — 99204 OFFICE O/P NEW MOD 45 MIN: CPT | Mod: S$GLB,,, | Performed by: NURSE PRACTITIONER

## 2022-05-11 PROCEDURE — 99204 PR OFFICE/OUTPT VISIT, NEW, LEVL IV, 45-59 MIN: ICD-10-PCS | Mod: S$GLB,,, | Performed by: NURSE PRACTITIONER

## 2022-05-11 NOTE — PROGRESS NOTES
SUBJECTIVE:    Patient ID: Jerry Coronado is a 36 y.o. female.    Chief Complaint: Apnea and Establish Care (Had sleep study in [place] 3 years ago)    HPI     Patient here today to be evaluated for ROBERTO CARLOS.  The patient states she lives chronically fatigued. She has been told that she snores and stops breathing when she sleeps.  She has awakened from sleep gasping for air. She does suffer from significant anxiety for which she follows with a therapist. She has been receiving chemo for lymphoma, states she has two more rounds.  She has history of SVT.  She suffers from insomnia as well. 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  She had a sleep study years ago that she was told was inconclusive, she has gained over 40 pounds since that study.    Past Medical History:   Diagnosis Date    Allergic rhinitis     Anxiety     Depression     Hypothyroidism     SVT (supraventricular tachycardia) 2011     Past Surgical History:   Procedure Laterality Date    ABLATION OF DYSRHYTHMIC FOCUS      x2    BIOPSY OF CERVICAL LYMPH NODE Left 11/29/2021    Procedure: BIOPSY, LYMPH NODE, CERVICAL;  Surgeon: Blanco Kerns MD;  Location: [facility] OR;  Service: General;  Laterality: Left;    CERVIX LESION DESTRUCTION      cryo in 2011 (?)    CONIZATION OF CERVIX USING LOOP ELECTROSURGICAL EXCISION PROCEDURE (LEEP) N/A 7/21/2021    Procedure: LEEP CONIZATION, CERVIX;  Surgeon: Donna Castillo MD;  Location: [facility] OR;  Service: OB/GYN;  Laterality: N/A;    EXCISION OF LESION OF NOSE N/A 10/14/2019    Procedure: EXCISION, LESION, NOSE;  Surgeon: Umesh Morales MD;  [facility] [place];  Service: ENT;  Laterality: N/A;    FUNCTIONAL ENDOSCOPIC SINUS SURGERY (FESS) N/A 10/14/2019    Procedure: FESS (FUNCTIONAL ENDOSCOPIC SINUS SURGERY);  Surgeon: Umesh Morales MD;  [facility] [place];  Service: ENT;  Laterality: N/A;    HYSTEROSCOPY WITH DILATION AND CURETTAGE OF UTERUS N/A 7/21/2021    Procedure: HYSTEROSCOPY, WITH  DILATION AND CURETTAGE OF UTERUS;  Surgeon: Donna Castillo MD;  Location: [facility] OR;  Service: OB/GYN;  Laterality: N/A;    NASAL SEPTOPLASTY N/A 10/14/2019    Procedure: SEPTOPLASTY, NOSE;  Surgeon: Umesh Morales MD;  Location: [facility];  Service: ENT;  Laterality: N/A;    NASAL SINUS SURGERY  10/14/2019    PILONIDAL CYST DRAINAGE      TONSILLECTOMY      TONSILLECTOMY      TYMPANOPLASTY N/A 10/14/2019    Procedure: TYMPANOPLASTY;  Surgeon: Umesh Morales MD;  Location: [facility];  Service: ENT;  Laterality: N/A;    TYMPANOTOMY Left 10/14/2019    left     Family History   Problem Relation Age of Onset    Hypertension Mother     Heart disease Father 59        CABG    Heart failure Father     Breast cancer Maternal Aunt 55    Colon cancer Neg Hx     Ovarian cancer Neg Hx         Social History:   Marital Status: Single  Occupation:Resiratory therapist   Alcohol History:  reports current alcohol use of about 5.0 standard drinks of alcohol per week.  Tobacco History:  reports that she quit smoking about 12 years ago. Her smoking use included cigarettes. She started smoking about 21 years ago. She smoked 0.50 packs per day. She has never used smokeless tobacco.  Drug History:  reports no history of drug use.    Review of patient's allergies indicates:  No Known Allergies    Current Outpatient Medications   Medication Sig Dispense Refill    ALPRAZolam (XANAX) 1 MG tablet Take 1 tablet (1 mg total) by mouth 2 (two) times daily as needed for Anxiety. 30 tablet 0    dronabinoL (MARINOL) 5 MG capsule Take 5 mg by mouth 3 (three) times daily as needed.      fluticasone (FLONASE) 50 mcg/actuation nasal spray INSTILL 2 SPRAYS IN EACH NOSTRIL ONCE DAILY 1 Bottle 3    gabapentin (NEURONTIN) 600 MG tablet Take 600 mg by mouth 3 (three) times daily.       hydrOXYzine pamoate (VISTARIL) 50 MG Cap Take 1 capsule (50 mg total) by mouth 2 (two) times daily as needed. 60 capsule 2    levothyroxine (SYNTHROID) 50 MCG  tablet Take 1 tablet by mouth once daily 30 tablet 4    LIDOcaine (LMX) 4 % cream 1 application as needed      LIDOcaine-prilocaine (EMLA) cream Apply small amount topically to port incision approximately 30 minutes prior to port access as needed      mirtazapine (REMERON) 7.5 MG Tab Take 2 tablets (15 mg total) by mouth nightly. 60 tablet 1    multivitamin capsule Take 1 capsule by mouth.      ondansetron (ZOFRAN-ODT) 8 MG TbDL Take 1 tablet (8 mg total) by mouth every 8 (eight) hours as needed (chemotherapy-induced nausea and vomiting). 40 tablet 5    oxyCODONE (ROXICODONE) 5 MG immediate release tablet Take 1 tablet (5 mg total) by mouth every 6 (six) hours as needed for Pain (cancer-related pain). 40 tablet 0    prazosin (MINIPRESS) 2 MG Cap 1 capsule at bedtime      sertraline (ZOLOFT) 100 MG tablet Take 1 tablet (100 mg total) by mouth once daily. 30 tablet 1    tiZANidine (ZANAFLEX) 4 MG tablet Take 4 mg by mouth 3 (three) times daily as needed.      acetaminophen-codeine 300-30mg (TYLENOL #3) 300-30 mg Tab acetaminophen 300 mg-codeine 30 mg tablet      allopurinoL (ZYLOPRIM) 300 MG tablet Take 1 tablet (300 mg total) by mouth once daily. (Patient not taking: Reported on 5/11/2022) 30 tablet 11    amLODIPine (NORVASC) 2.5 MG tablet Take 2.5 mg by mouth once daily.      amoxicillin-clavulanate 875-125mg (AUGMENTIN) 875-125 mg per tablet Take 1 tablet by mouth 2 (two) times daily.      benzonatate (TESSALON) 100 MG capsule benzonatate 100 mg capsule      benzoyl peroxide 2.5 % gel 1 application to affected area (Patient not taking: Reported on 5/11/2022) 60 g 1    buPROPion (WELLBUTRIN XL) 300 MG 24 hr tablet Take 1 tablet (300 mg total) by mouth every morning. (Patient not taking: Reported on 5/11/2022) 90 tablet 3    doxycycline (MONODOX) 100 MG capsule 1 capsule      drospirenone-ethinyl estradioL (EMELINA) 3-0.03 mg per tablet Emelina 3 mg-0.03 mg tablet  TAKE 1 TABLET BY MOUTH EVERY DAY SKIP  PLACEBO (Patient not taking: Reported on 5/11/2022) 84 tablet 4    ferrous sulfate (FEOSOL) 325 mg (65 mg iron) Tab tablet Take by mouth 2 (two) times daily.      guaiFENesin 100 mg/5 ml (ROBITUSSIN) 100 mg/5 mL syrup as needed.      ibuprofen (ADVIL,MOTRIN) 800 MG tablet ibuprofen 800 mg tablet      levalbuterol (XOPENEX HFA) 45 mcg/actuation inhaler Inhale 1 puff into the lungs every 4 (four) hours as needed for Wheezing or Shortness of Breath. Rescue (Patient not taking: Reported on 5/11/2022) 15 g 1    levoFLOXacin (LEVAQUIN) 500 MG tablet Take 500 mg by mouth once daily.      LORazepam (ATIVAN) 1 MG tablet Take by mouth.      methylPREDNISolone (MEDROL DOSEPACK) 4 mg tablet Take by mouth.      ondansetron (ZOFRAN) 8 MG tablet Take 8 mg by mouth 3 (three) times daily.      oxyCODONE-acetaminophen (PERCOCET) 5-325 mg per tablet oxycodone-acetaminophen 5 mg-325 mg tablet      predniSONE (DELTASONE) 50 MG Tab Take 1 tablet (50 mg total) by mouth once daily. (Patient not taking: Reported on 5/11/2022) 5 tablet 0    promethazine (PHENERGAN) 6.25 mg/5 mL syrup as needed.      QUEtiapine (SEROQUEL) 100 MG Tab Take 100 mg by mouth once daily.      traMADoL (ULTRAM) 50 mg tablet Take 50 mg by mouth 2 (two) times daily as needed.      valACYclovir (VALTREX) 1000 MG tablet Take 2 tablets (2,000 mg total) by mouth every 12 (twelve) hours. for 1 day (Patient taking differently: Take 2,000 mg by mouth as needed.) 4 tablet 2     No current facility-administered medications for this visit.        Last PET 04/2022  IMPRESSION:  No evidence of FDG avid malignancy    Review of Systems  General: Feeling Well. Snores, wakes up gasping, insomnia  Eyes: Vision is good.  ENT:  No sinusitis or pharyngitis.   Heart:: No chest pain or palpitations.  Lungs: No cough, sputum, or wheezing.  GI: occasional heart burn  : No dysuria, hesitancy, or nocturia.  Musculoskeletal: pain at port site   Skin: No lesions or  "rashes.  Neuro: head aches and brain fog, nerve pain from shingles   Lymph: No edema or adenopathy.  Psych: severe anxiety and depression   Endo: weight gain of 40 pounds since previous study     OBJECTIVE:      /85 (BP Location: Left arm, Patient Position: Sitting, BP Method: Medium (Manual))   Pulse 100   Ht 5' 1" (1.549 m)   Wt 102.1 kg (225 lb)   SpO2 98%   BMI 42.51 kg/m²     Physical Exam  GENERAL: middle aged patient in no distress.  HEENT: Pupils equal and reactive. Extraocular movements intact. Nose intact.      Pharynx moist. malampatti 4  NECK: Supple. 16 inches   HEART: Regular rate and rhythm. No murmur or gallop auscultated.  LUNGS: Clear to auscultation and percussion. Lung excursion symmetrical. No change in fremitus. No adventitial noises.  ABDOMEN: Bowel sounds present. Non-tender, no masses palpated.  EXTREMITIES: Normal muscle tone and joint movement, no cyanosis or clubbing.   LYMPHATICS: No adenopathy palpated, no edema.  SKIN: Dry, intact, no lesions.   NEURO: Cranial nerves II-XII intact. Motor strength 5/5 bilaterally, upper and lower extremities.  PSYCH: Appropriate affect.    Assessment:       1. Insomnia, unspecified type    2. Chronic fatigue    3. Witnessed episode of apnea    4. Hypersomnia        epworth score is 17  Plan:       Insomnia, unspecified type  -     Polysomnogram (CPAP will be added if patient meets diagnostic criteria.); Future; Expected date: 05/11/2022    Chronic fatigue  -     Polysomnogram (CPAP will be added if patient meets diagnostic criteria.); Future; Expected date: 05/11/2022    Witnessed episode of apnea  -     Polysomnogram (CPAP will be added if patient meets diagnostic criteria.); Future; Expected date: 05/11/2022    Hypersomnia  -     Polysomnogram (CPAP will be added if patient meets diagnostic criteria.); Future; Expected date: 05/11/2022       split night sleep study     Follow up in about 3 months (around 8/11/2022).          "

## 2022-05-16 ENCOUNTER — PATIENT MESSAGE (OUTPATIENT)
Dept: PULMONOLOGY | Facility: CLINIC | Age: 37
End: 2022-05-16

## 2022-05-16 ENCOUNTER — LAB VISIT (OUTPATIENT)
Dept: LAB | Facility: HOSPITAL | Age: 37
End: 2022-05-16
Attending: INTERNAL MEDICINE
Payer: COMMERCIAL

## 2022-05-16 DIAGNOSIS — C82.91 FOLLICULAR LYMPHOMA OF LYMPH NODES OF NECK, UNSPECIFIED FOLLICULAR LYMPHOMA TYPE: ICD-10-CM

## 2022-05-16 LAB
ALBUMIN SERPL BCP-MCNC: 4.3 G/DL (ref 3.5–5.2)
ALP SERPL-CCNC: 68 U/L (ref 55–135)
ALT SERPL W/O P-5'-P-CCNC: 25 U/L (ref 10–44)
ANION GAP SERPL CALC-SCNC: 10 MMOL/L (ref 8–16)
AST SERPL-CCNC: 18 U/L (ref 10–40)
BASOPHILS # BLD AUTO: 0.07 K/UL (ref 0–0.2)
BASOPHILS NFR BLD: 1.5 % (ref 0–1.9)
BILIRUB SERPL-MCNC: 0.5 MG/DL (ref 0.1–1)
BUN SERPL-MCNC: 18 MG/DL (ref 6–20)
CALCIUM SERPL-MCNC: 9.1 MG/DL (ref 8.7–10.5)
CHLORIDE SERPL-SCNC: 103 MMOL/L (ref 95–110)
CO2 SERPL-SCNC: 24 MMOL/L (ref 23–29)
CREAT SERPL-MCNC: 0.8 MG/DL (ref 0.5–1.4)
DIFFERENTIAL METHOD: ABNORMAL
EOSINOPHIL # BLD AUTO: 0.2 K/UL (ref 0–0.5)
EOSINOPHIL NFR BLD: 3.2 % (ref 0–8)
ERYTHROCYTE [DISTWIDTH] IN BLOOD BY AUTOMATED COUNT: 13.6 % (ref 11.5–14.5)
EST. GFR  (AFRICAN AMERICAN): >60 ML/MIN/1.73 M^2
EST. GFR  (NON AFRICAN AMERICAN): >60 ML/MIN/1.73 M^2
GLUCOSE SERPL-MCNC: 90 MG/DL (ref 70–110)
HCT VFR BLD AUTO: 39 % (ref 37–48.5)
HGB BLD-MCNC: 13.3 G/DL (ref 12–16)
IMM GRANULOCYTES # BLD AUTO: 0.03 K/UL (ref 0–0.04)
IMM GRANULOCYTES NFR BLD AUTO: 0.6 % (ref 0–0.5)
LDH SERPL L TO P-CCNC: 121 U/L (ref 110–260)
LYMPHOCYTES # BLD AUTO: 0.3 K/UL (ref 1–4.8)
LYMPHOCYTES NFR BLD: 5.6 % (ref 18–48)
MCH RBC QN AUTO: 28.4 PG (ref 27–31)
MCHC RBC AUTO-ENTMCNC: 34.1 G/DL (ref 32–36)
MCV RBC AUTO: 83 FL (ref 82–98)
MONOCYTES # BLD AUTO: 1.1 K/UL (ref 0.3–1)
MONOCYTES NFR BLD: 22.4 % (ref 4–15)
NEUTROPHILS # BLD AUTO: 3.1 K/UL (ref 1.8–7.7)
NEUTROPHILS NFR BLD: 66.7 % (ref 38–73)
NRBC BLD-RTO: 0 /100 WBC
PLATELET # BLD AUTO: 245 K/UL (ref 150–450)
PMV BLD AUTO: 8.9 FL (ref 9.2–12.9)
POTASSIUM SERPL-SCNC: 4 MMOL/L (ref 3.5–5.1)
PROT SERPL-MCNC: 6.8 G/DL (ref 6–8.4)
RBC # BLD AUTO: 4.69 M/UL (ref 4–5.4)
SODIUM SERPL-SCNC: 137 MMOL/L (ref 136–145)
URATE SERPL-MCNC: 5.4 MG/DL (ref 2.4–5.7)
WBC # BLD AUTO: 4.68 K/UL (ref 3.9–12.7)

## 2022-05-16 PROCEDURE — 36415 COLL VENOUS BLD VENIPUNCTURE: CPT | Performed by: INTERNAL MEDICINE

## 2022-05-16 PROCEDURE — 84550 ASSAY OF BLOOD/URIC ACID: CPT | Performed by: INTERNAL MEDICINE

## 2022-05-16 PROCEDURE — 83615 LACTATE (LD) (LDH) ENZYME: CPT | Performed by: INTERNAL MEDICINE

## 2022-05-16 PROCEDURE — 80053 COMPREHEN METABOLIC PANEL: CPT | Performed by: INTERNAL MEDICINE

## 2022-05-16 PROCEDURE — 85025 COMPLETE CBC W/AUTO DIFF WBC: CPT | Performed by: INTERNAL MEDICINE

## 2022-05-17 ENCOUNTER — PATIENT MESSAGE (OUTPATIENT)
Dept: SURGERY | Facility: CLINIC | Age: 37
End: 2022-05-17
Payer: COMMERCIAL

## 2022-05-17 NOTE — PROGRESS NOTES
PATIENT: Jerry Coronado  MRN: [id number]  DATE: 5/18/2022    Diagnosis:   1. 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 Immunodeficiency due to drug therapy    3. History of COVID-19    4. Chronic neoplasm-related pain    5. Morbid obesity    6. Chemotherapy-induced nausea and vomiting    7. Tumor lysis syndrome    8. Mood disorder due to a general medical condition    9. Insomnia, unspecified type    10. Situational stress      Chief Complaint: Lymphoma    Oncologic History:      Oncologic History 1. Follicular lymphoma      Oncologic Treatment 1. Bendamustine/rituximab - start date 1/25/22 - 1/26/22      Pathology 11/29/21:  Lymph node, left supraclavicular region, excision:  --Follicular lymphoma with high Ki-67 proliferation index, see comment  Comment: Concomitantly submitted flow cytometric analysis detects B-cell lymphoma of germinal center  origin expressing CD19, bright CD20, and CD10 with kappa light chain restriction. CD5 is negative in this  population.  Although the majority of the lymph node appears to represent as grade 2 follicular lymphoma, the Ki-67  proliferation index is much higher than expected for typical low-grade follicular lymphoma. Additionally there  is a very small area suggestive of grade 3A follicular lymphoma as well, representing less than 5% of the overall cellularity. This sampling leaves concern for a higher grade process elsewhere and correlation with  clinical and radiologic findings including PET scan findings is highly recommended with additional biopsy of  areas of high SUV if indicated.      Telemedicine visit:  The patient location is: home  The chief complaint leading to consultation is: follicular lymphoma    Visit type: audiovisual    Face to Face time with patient: 10 minutes  15 minutes of total time spent on the encounter, which includes face to face time and non-face to face time preparing to see the patient (eg, review  of tests), Obtaining and/or reviewing separately obtained history, Documenting clinical information in the electronic or other health record, Independently interpreting results (not separately reported) and communicating results to the patient/family/caregiver, or Care coordination (not separately reported).     Each patient to whom he or she provides medical services by telemedicine is:  (1) informed of the relationship between the physician and patient and the respective role of any other health care provider with respect to management of the patient; and (2) notified that he or she may decline to receive medical services by telemedicine and may withdraw from such care at any time.    Notes: see below      Subjective:    History of Present Illness: Ms. Coronado is a 36 y.o. female who presented in December 2021 for evaluation and management of B cell lymphoma. She was referred by Dr. Kerns.    - presenting symptom was enlarged supraclavicular lymph nodes  - she underwent excisional lymph node biopsy on 11/29/21. Pathology revealed a follicular lymphoma with high ki-67 proliferation index.  - she reports having a CT scan in [place] in early November with enlarged lymph nodes noted throughout.  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 This may make treatment decisions challenging logistically.  - she received cycle #1 of bendamustine/rituximab on 1/25/22 - 1/26/22 in [place], [place].  - she underwent PET/CT scan on 4/14/22.    Interval history:  - she presents for a follow-up appointment for her follicular lymphoma  - she is scheduled for cycle #5 of bendamustine/rituximab on 5/19/22 - 5/20/22.  - today, her main complaint is port discomfort. She reports having a keloid on the skin at the site of the port placement. She denies shortness of breath, chest pain, vomiting, constipation.  - she denies fever, night sweats, unintentional weight loss.  - she requests a refill  of xanax, mirtazapine, and oxycodone    Past medical, surgical, family, and social histories have been reviewed and updated below.    Past Medical History:   Past Medical History:   Diagnosis Date    Allergic rhinitis     Anxiety     Depression     Hypothyroidism     SVT (supraventricular tachycardia) 2011       Past Surgical History:   Past Surgical History:   Procedure Laterality Date    ABLATION OF DYSRHYTHMIC FOCUS      x2    BIOPSY OF CERVICAL LYMPH NODE Left 11/29/2021    Procedure: BIOPSY, LYMPH NODE, CERVICAL;  Surgeon: Blanco Kerns MD;  Location: [facility] OR;  Service: General;  Laterality: Left;    CERVIX LESION DESTRUCTION      cryo in 2011 (?)    CONIZATION OF CERVIX USING LOOP ELECTROSURGICAL EXCISION PROCEDURE (LEEP) N/A 7/21/2021    Procedure: LEEP CONIZATION, CERVIX;  Surgeon: Donna Castillo MD;  Location: [facility] OR;  Service: OB/GYN;  Laterality: N/A;    EXCISION OF LESION OF NOSE N/A 10/14/2019    Procedure: EXCISION, LESION, NOSE;  Surgeon: Umesh Morales MD;  Location: [facility] OR;  Service: ENT;  Laterality: N/A;    FUNCTIONAL ENDOSCOPIC SINUS SURGERY (FESS) N/A 10/14/2019    Procedure: FESS (FUNCTIONAL ENDOSCOPIC SINUS SURGERY);  Surgeon: Umesh Morales MD;  Location: [facility] OR;  Service: ENT;  Laterality: N/A;    HYSTEROSCOPY WITH DILATION AND CURETTAGE OF UTERUS N/A 7/21/2021    Procedure: HYSTEROSCOPY, WITH DILATION AND CURETTAGE OF UTERUS;  Surgeon: Donna Castillo MD;  Location: [facility] OR;  Service: OB/GYN;  Laterality: N/A;    NASAL SEPTOPLASTY N/A 10/14/2019    Procedure: SEPTOPLASTY, NOSE;  Surgeon: Umesh Morales MD;  Location: [facility] OR;  Service: ENT;  Laterality: N/A;    NASAL SINUS SURGERY  10/14/2019    PILONIDAL CYST DRAINAGE      TONSILLECTOMY      TONSILLECTOMY      TYMPANOPLASTY N/A 10/14/2019    Procedure: TYMPANOPLASTY;  Surgeon: Umesh Morales MD;  Location: [facility] OR;  Service: ENT;  Laterality: N/A;    TYMPANOTOMY Left 10/14/2019    left       Family  History:   Family History   Problem Relation Age of Onset    Hypertension Mother     Heart disease Father 59        CABG    Heart failure Father     Breast cancer Maternal Aunt 55    Colon cancer Neg Hx     Ovarian cancer Neg Hx        Social History:  reports that she quit smoking about 12 years ago. Her smoking use included cigarettes. She started smoking about 21 years ago. She smoked 0.50 packs per day. She has never used smokeless tobacco. She reports current alcohol use of about 5.0 standard drinks of alcohol per week. She reports that she does not use drugs.    Allergies:  Review of patient's allergies indicates:  No Known Allergies    Medications:  Current Outpatient Medications   Medication Sig Dispense Refill    acetaminophen-codeine 300-30mg (TYLENOL #3) 300-30 mg Tab acetaminophen 300 mg-codeine 30 mg tablet      allopurinoL (ZYLOPRIM) 300 MG tablet Take 1 tablet (300 mg total) by mouth once daily. (Patient not taking: Reported on 5/11/2022) 30 tablet 11    ALPRAZolam (XANAX) 1 MG tablet Take 1 tablet (1 mg total) by mouth 2 (two) times daily as needed for Anxiety. 30 tablet 0    amLODIPine (NORVASC) 2.5 MG tablet Take 2.5 mg by mouth once daily.      amoxicillin-clavulanate 875-125mg (AUGMENTIN) 875-125 mg per tablet Take 1 tablet by mouth 2 (two) times daily.      benzonatate (TESSALON) 100 MG capsule benzonatate 100 mg capsule      benzoyl peroxide 2.5 % gel 1 application to affected area (Patient not taking: No sig reported) 60 g 1    buPROPion (WELLBUTRIN XL) 300 MG 24 hr tablet Take 1 tablet (300 mg total) by mouth every morning. (Patient not taking: Reported on 5/11/2022) 90 tablet 3    doxycycline (MONODOX) 100 MG capsule 1 capsule      dronabinoL (MARINOL) 5 MG capsule Take 5 mg by mouth 3 (three) times daily as needed.      drospirenone-ethinyl estradioL (EMELINA) 3-0.03 mg per tablet Emelina 3 mg-0.03 mg tablet  TAKE 1 TABLET BY MOUTH EVERY DAY SKIP PLACEBO (Patient not taking:  Reported on 5/11/2022) 84 tablet 4    ferrous sulfate (FEOSOL) 325 mg (65 mg iron) Tab tablet Take by mouth 2 (two) times daily.      fluticasone (FLONASE) 50 mcg/actuation nasal spray INSTILL 2 SPRAYS IN EACH NOSTRIL ONCE DAILY 1 Bottle 3    gabapentin (NEURONTIN) 600 MG tablet Take 600 mg by mouth 3 (three) times daily.       guaiFENesin 100 mg/5 ml (ROBITUSSIN) 100 mg/5 mL syrup as needed.      hydrOXYzine pamoate (VISTARIL) 50 MG Cap Take 1 capsule (50 mg total) by mouth 2 (two) times daily as needed. 60 capsule 2    ibuprofen (ADVIL,MOTRIN) 800 MG tablet ibuprofen 800 mg tablet      levalbuterol (XOPENEX HFA) 45 mcg/actuation inhaler Inhale 1 puff into the lungs every 4 (four) hours as needed for Wheezing or Shortness of Breath. Rescue (Patient not taking: Reported on 5/11/2022) 15 g 1    levoFLOXacin (LEVAQUIN) 500 MG tablet Take 500 mg by mouth once daily.      levothyroxine (SYNTHROID) 50 MCG tablet Take 1 tablet by mouth once daily 30 tablet 4    LIDOcaine (LMX) 4 % cream 1 application as needed      LIDOcaine-prilocaine (EMLA) cream Apply small amount topically to port incision approximately 30 minutes prior to port access as needed      LORazepam (ATIVAN) 1 MG tablet Take by mouth.      methylPREDNISolone (MEDROL DOSEPACK) 4 mg tablet Take by mouth.      mirtazapine (REMERON) 7.5 MG Tab Take 2 tablets (15 mg total) by mouth nightly. 60 tablet 1    multivitamin capsule Take 1 capsule by mouth.      ondansetron (ZOFRAN) 8 MG tablet Take 8 mg by mouth 3 (three) times daily.      ondansetron (ZOFRAN-ODT) 8 MG TbDL Take 1 tablet (8 mg total) by mouth every 8 (eight) hours as needed (chemotherapy-induced nausea and vomiting). 40 tablet 5    oxyCODONE (ROXICODONE) 5 MG immediate release tablet Take 1 tablet (5 mg total) by mouth every 6 (six) hours as needed for Pain (cancer-related pain). 40 tablet 0    oxyCODONE-acetaminophen (PERCOCET) 5-325 mg per tablet oxycodone-acetaminophen 5 mg-325 mg  tablet      prazosin (MINIPRESS) 2 MG Cap 1 capsule at bedtime      predniSONE (DELTASONE) 50 MG Tab Take 1 tablet (50 mg total) by mouth once daily. (Patient not taking: Reported on 5/11/2022) 5 tablet 0    promethazine (PHENERGAN) 6.25 mg/5 mL syrup as needed.      QUEtiapine (SEROQUEL) 100 MG Tab Take 100 mg by mouth once daily.      sertraline (ZOLOFT) 100 MG tablet Take 1 tablet (100 mg total) by mouth once daily. 30 tablet 1    tiZANidine (ZANAFLEX) 4 MG tablet Take 4 mg by mouth 3 (three) times daily as needed.      traMADoL (ULTRAM) 50 mg tablet Take 50 mg by mouth 2 (two) times daily as needed.      valACYclovir (VALTREX) 1000 MG tablet Take 2 tablets (2,000 mg total) by mouth every 12 (twelve) hours. for 1 day (Patient taking differently: Take 2,000 mg by mouth as needed.) 4 tablet 2     No current facility-administered medications for this visit.       Review of Systems   Constitutional: Positive for fatigue. Negative for appetite change and unexpected weight change.   HENT: Negative for mouth sores and sore throat.    Eyes: Negative for visual disturbance.   Respiratory: Negative for cough and shortness of breath.    Cardiovascular: Negative for chest pain.   Gastrointestinal: Positive for nausea (from chemotherapy). Negative for abdominal pain, constipation, diarrhea and vomiting.   Genitourinary: Negative for dysuria and frequency.   Musculoskeletal: Positive for neck pain (improved). Negative for back pain.   Skin: Negative for rash.   Neurological: Negative for headaches.   Hematological: Negative for adenopathy.   Psychiatric/Behavioral: The patient is not nervous/anxious.        ECOG Performance Status:   ECOG SCORE    1 - Restricted in strenuous activity-ambulatory and able to carry out work of a light nature         Objective:      Vitals:   There were no vitals filed for this visit.  BMI: There is no height or weight on file to calculate BMI.  Deferred due to telemedicine  visit.    Physical Exam  Deferred due to telemedicine visit.    Laboratory Data:  Labs have been reviewed.    Lab Results   Component Value Date    WBC 4.68 05/16/2022    HGB 13.3 05/16/2022    HCT 39.0 05/16/2022    MCV 83 05/16/2022     05/16/2022       Imaging:     PET/CT scan (4/14/22): I have personally reviewed the images  Differences in instrumentation and technique preclude semiquantitative comparison between the current PET/CT study and the prior.     Background:  - Liver 4.2 SUV max.  - Spleen 3.5 SUV max.  - Spleen 11.9 x 4.8 cm (AP x TR)  - Blood pool 3.3 SUV max     No FDG avid lymphadenopathy or mass. The largest cervical node is a 5 x 5 mm supraclavicular node with SUV max 1.0 (image 86). The largest left axillary node measures 18 x 7 mm with SUV max 0.8 (image 103).     Additional findings:  -Right sided IJ medical infusion port with tip at the level of the SVC.  -Relative diffuse low-attenuation liver in keeping with steatosis.  -3.6 cm simple fluid attenuation on FDG avid cyst in the right ovary favored to represent a physiologic cyst in this age group. The left ovary and uterus are within normal limits.     IMPRESSION:  No evidence of FDG avid malignancy.    PET/CT scan (12/17/21): I have personally reviewed the images    IN THE HEAD AND NECK:     Multiple left-sided cervical lymph nodes, left and right supraclavicular lymph nodes and left axillary lymph nodes.  The index lesions, as follow:     Hypermetabolic lymph node at the left level Va region, measures 1.2 cm (axial series 2, image 68) with SUV max of 14.0.     Hypermetabolic left supraclavicular lymph node, measures 1.9 cm (axial series 2, image 82) with SUV max of 15.0.     Hypermetabolic left axillary lymph node, measures 3.5 cm (axial series 2, image 116) with SUV max of 12.0.     Hypermetabolic right supraclavicular lymph node, measures 1.4 cm (axial series 2, image 79) with SUV max of 13.0.     IN THE CHEST:     Focal  "radiotracer uptake in the left hilum with no corresponding lesion on CT images (axial fused image 116) with SUV max of 4.4.  No abnormal lesions throughout the pulmonary parenchyma.     IN THE ABDOMEN AND PELVIS:     There is a subcentimeter hypermetabolic lesion in the subcutaneous tissue of the right back at the level of L4, measures 0.9 cm (axial series 2, image 198) with SUV max of 6.7.  There is physiologic tracer distribution within the abdominal organs and excretion into the genitourinary system.     The spleen has normal uptake and is normal at 11.0 cm in craniocaudal dimension.     IN THE BONES:     There are no hypermetabolic lesions worrisome for malignancy.     In the extremities, there are no hypermetabolic lesions worrisome for malignancy.     Impression:     Hypermetabolic cervical, left axillary, and left hilar lymph nodes consistent with biopsy-proven follicular lymphoma (performed on 11/29/2021), as above.     Hypermetabolic subcutaneous tissue of the right nodule at the level of L4, concerning for additional lymphomatous deposit as described above.      Assessment:       1. 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 Immunodeficiency due to drug therapy    3. History of COVID-19    4. Chronic neoplasm-related pain    5. Morbid obesity    6. Chemotherapy-induced nausea and vomiting    7. Tumor lysis syndrome    8. Mood disorder due to a general medical condition    9. Insomnia, unspecified type    10. Situational stress           Plan:     1. Follicular lymphoma  - I have reviewed her chart  - supraclavicular lymph node biopsy (11/29/21) revealed follicular lymphoma. Importantly, in the pathology report is the following: "Although the majority of the lymph node appears to represent as grade 2 follicular lymphoma, the Ki-67  proliferation index is much higher than expected for typical low-grade follicular lymphoma. Additionally there  is a very small area " "suggestive of grade 3A follicular lymphoma as well, representing less than 5% of the  overall cellularity."  - she reports having a CT scan in [place] in early November with enlarged lymph nodes noted throughout.  - PET/CT scan (12/17/21) revealed "hypermetabolic cervical, left axillary, and left hilar lymph nodes" as well as "hypermetabolic subcutaneous tissue of the right nodule at the level of L4, concerning for additional lymphomatous deposit."  - she received cycle #1 of bendamustine/rituximab on 1/25/22 - 1/26/22 in [place], [place].  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 No evidence of hypermetabolic tumor  - The risks and benefits of chemotherapy were discussed, written information was given, and informed consent was obtained.  - Labs have been reviewed. Okay to proceed with cycle #5 of bendamustine/rituximab, scheduled for 5/19/22 - 5/20/22.  - return to clinic in 4 weeks in preparation for cycle #6 of bendamustine/rituximab.    2. Morbid obesity  - no weight since telemedicine visit.  - continue to monitor    3. Mood disorder due to medical condition  - stable. Continue xanax as needed. I sent a refill    4. Chronic neoplasm-related pain  - stable. continue oxycodone as needed. I sent a refill    5. Chemotherapy-induced nausea/vomiting  - controlled. Continue anti-emetics as needed.    6. Insomnia  - I refilled her mirtazapine    7. Advance Care Planning     Power of   After our discussion (at previous visit), the patient decided to complete a HCPOA and appointed her brother Velasquez Coronado ([phone]) and dad Dirk Cornoado ([phone]).        - return to clinic in 4 weeks in preparation for cycle #6 of bendamustine/rituximab.    Ki Nova M.D.  Hematology/Oncology  [facility] - [address], [address]  [place] [address]  Phone: [phone]  Fax: [phone]  "

## 2022-05-18 ENCOUNTER — OFFICE VISIT (OUTPATIENT)
Dept: HEMATOLOGY/ONCOLOGY | Facility: CLINIC | Age: 37
End: 2022-05-18
Payer: COMMERCIAL

## 2022-05-18 ENCOUNTER — PATIENT MESSAGE (OUTPATIENT)
Dept: HEMATOLOGY/ONCOLOGY | Facility: CLINIC | Age: 37
End: 2022-05-18

## 2022-05-18 DIAGNOSIS — F06.30 MOOD DISORDER DUE TO A GENERAL MEDICAL CONDITION: ICD-10-CM

## 2022-05-18 DIAGNOSIS — E88.3 TUMOR LYSIS SYNDROME: ICD-10-CM

## 2022-05-18 DIAGNOSIS — R11.2 CHEMOTHERAPY-INDUCED NAUSEA AND VOMITING: ICD-10-CM

## 2022-05-18 DIAGNOSIS — G89.3 CHRONIC NEOPLASM-RELATED PAIN: ICD-10-CM

## 2022-05-18 DIAGNOSIS — Z79.899 IMMUNODEFICIENCY DUE TO DRUG THERAPY: ICD-10-CM

## 2022-05-18 DIAGNOSIS — T45.1X5A CHEMOTHERAPY-INDUCED NAUSEA AND VOMITING: ICD-10-CM

## 2022-05-18 DIAGNOSIS — D84.821 IMMUNODEFICIENCY DUE TO DRUG THERAPY: ICD-10-CM

## 2022-05-18 DIAGNOSIS — Z86.16 HISTORY OF COVID-19: ICD-10-CM

## 2022-05-18 DIAGNOSIS — F43.9 SITUATIONAL STRESS: ICD-10-CM

## 2022-05-18 DIAGNOSIS — G47.00 INSOMNIA, UNSPECIFIED TYPE: ICD-10-CM

## 2022-05-18 DIAGNOSIS — E66.01 MORBID OBESITY: ICD-10-CM

## 2022-05-18 DIAGNOSIS — C82.91 FOLLICULAR LYMPHOMA OF LYMPH NODES OF NECK, UNSPECIFIED FOLLICULAR LYMPHOMA TYPE: Primary | ICD-10-CM

## 2022-05-18 PROCEDURE — 1159F MED LIST DOCD IN RCRD: CPT | Mod: CPTII,95,, | Performed by: INTERNAL MEDICINE

## 2022-05-18 PROCEDURE — 1160F RVW MEDS BY RX/DR IN RCRD: CPT | Mod: CPTII,95,, | Performed by: INTERNAL MEDICINE

## 2022-05-18 PROCEDURE — 1160F PR REVIEW ALL MEDS BY PRESCRIBER/CLIN PHARMACIST DOCUMENTED: ICD-10-PCS | Mod: CPTII,95,, | Performed by: INTERNAL MEDICINE

## 2022-05-18 PROCEDURE — 99215 OFFICE O/P EST HI 40 MIN: CPT | Mod: 95,,, | Performed by: INTERNAL MEDICINE

## 2022-05-18 PROCEDURE — 1159F PR MEDICATION LIST DOCUMENTED IN MEDICAL RECORD: ICD-10-PCS | Mod: CPTII,95,, | Performed by: INTERNAL MEDICINE

## 2022-05-18 PROCEDURE — 99215 PR OFFICE/OUTPT VISIT, EST, LEVL V, 40-54 MIN: ICD-10-PCS | Mod: 95,,, | Performed by: INTERNAL MEDICINE

## 2022-05-18 RX ORDER — FAMOTIDINE 10 MG/ML
20 INJECTION INTRAVENOUS
Status: CANCELLED | OUTPATIENT
Start: 2022-05-19

## 2022-05-18 RX ORDER — ALPRAZOLAM 1 MG/1
1 TABLET ORAL 2 TIMES DAILY PRN
Qty: 30 TABLET | Refills: 0 | Status: SHIPPED | OUTPATIENT
Start: 2022-05-18 | End: 2022-06-15 | Stop reason: SDUPTHER

## 2022-05-18 RX ORDER — DIPHENHYDRAMINE HYDROCHLORIDE 50 MG/ML
50 INJECTION INTRAMUSCULAR; INTRAVENOUS ONCE AS NEEDED
Status: CANCELLED | OUTPATIENT
Start: 2022-05-20

## 2022-05-18 RX ORDER — EPINEPHRINE 0.3 MG/.3ML
0.3 INJECTION SUBCUTANEOUS ONCE AS NEEDED
Status: CANCELLED | OUTPATIENT
Start: 2022-05-19

## 2022-05-18 RX ORDER — SODIUM CHLORIDE 0.9 % (FLUSH) 0.9 %
10 SYRINGE (ML) INJECTION
Status: CANCELLED | OUTPATIENT
Start: 2022-05-20

## 2022-05-18 RX ORDER — HEPARIN 100 UNIT/ML
500 SYRINGE INTRAVENOUS
Status: CANCELLED | OUTPATIENT
Start: 2022-05-19

## 2022-05-18 RX ORDER — OXYCODONE HYDROCHLORIDE 5 MG/1
5 TABLET ORAL EVERY 6 HOURS PRN
Qty: 40 TABLET | Refills: 0 | Status: SHIPPED | OUTPATIENT
Start: 2022-05-18 | End: 2022-06-15 | Stop reason: SDUPTHER

## 2022-05-18 RX ORDER — SODIUM CHLORIDE 0.9 % (FLUSH) 0.9 %
10 SYRINGE (ML) INJECTION
Status: CANCELLED | OUTPATIENT
Start: 2022-05-19

## 2022-05-18 RX ORDER — ACETAMINOPHEN 325 MG/1
650 TABLET ORAL
Status: CANCELLED | OUTPATIENT
Start: 2022-05-19

## 2022-05-18 RX ORDER — EPINEPHRINE 0.3 MG/.3ML
0.3 INJECTION SUBCUTANEOUS ONCE AS NEEDED
Status: CANCELLED | OUTPATIENT
Start: 2022-05-20

## 2022-05-18 RX ORDER — MIRTAZAPINE 7.5 MG/1
15 TABLET, FILM COATED ORAL NIGHTLY
Qty: 60 TABLET | Refills: 1 | Status: SHIPPED | OUTPATIENT
Start: 2022-05-18 | End: 2022-06-11

## 2022-05-18 RX ORDER — HEPARIN 100 UNIT/ML
500 SYRINGE INTRAVENOUS
Status: CANCELLED | OUTPATIENT
Start: 2022-05-20

## 2022-05-18 RX ORDER — DIPHENHYDRAMINE HYDROCHLORIDE 50 MG/ML
50 INJECTION INTRAMUSCULAR; INTRAVENOUS ONCE AS NEEDED
Status: CANCELLED | OUTPATIENT
Start: 2022-05-19

## 2022-05-18 RX ORDER — MEPERIDINE HYDROCHLORIDE 50 MG/ML
25 INJECTION INTRAMUSCULAR; INTRAVENOUS; SUBCUTANEOUS
Status: CANCELLED | OUTPATIENT
Start: 2022-05-19

## 2022-05-18 NOTE — Clinical Note
1. Schedule labs cbc, cmp, uric acid, LDH, beta hcg on 6/13/22 in [place]. 2. Schedule f/u virtual appt with me on 6/15/22.  Thanks!

## 2022-05-19 ENCOUNTER — INFUSION (OUTPATIENT)
Dept: INFUSION THERAPY | Facility: HOSPITAL | Age: 37
End: 2022-05-19
Attending: INTERNAL MEDICINE
Payer: COMMERCIAL

## 2022-05-19 VITALS
OXYGEN SATURATION: 98 % | HEART RATE: 85 BPM | DIASTOLIC BLOOD PRESSURE: 82 MMHG | BODY MASS INDEX: 42.83 KG/M2 | HEIGHT: 61 IN | TEMPERATURE: 98 F | SYSTOLIC BLOOD PRESSURE: 126 MMHG | RESPIRATION RATE: 16 BRPM | WEIGHT: 226.88 LBS

## 2022-05-19 DIAGNOSIS — C82.91 FOLLICULAR LYMPHOMA OF LYMPH NODES OF NECK, UNSPECIFIED FOLLICULAR LYMPHOMA TYPE: Primary | ICD-10-CM

## 2022-05-19 DIAGNOSIS — R05.9 COUGH: Primary | ICD-10-CM

## 2022-05-19 PROCEDURE — 96367 TX/PROPH/DG ADDL SEQ IV INF: CPT

## 2022-05-19 PROCEDURE — 25000003 PHARM REV CODE 250: Performed by: INTERNAL MEDICINE

## 2022-05-19 PROCEDURE — 63600175 PHARM REV CODE 636 W HCPCS: Mod: TB | Performed by: INTERNAL MEDICINE

## 2022-05-19 PROCEDURE — 96411 CHEMO IV PUSH ADDL DRUG: CPT

## 2022-05-19 PROCEDURE — A4216 STERILE WATER/SALINE, 10 ML: HCPCS | Performed by: INTERNAL MEDICINE

## 2022-05-19 PROCEDURE — 96375 TX/PRO/DX INJ NEW DRUG ADDON: CPT

## 2022-05-19 PROCEDURE — 96413 CHEMO IV INFUSION 1 HR: CPT

## 2022-05-19 PROCEDURE — 96415 CHEMO IV INFUSION ADDL HR: CPT

## 2022-05-19 RX ORDER — PROMETHAZINE HYDROCHLORIDE AND CODEINE PHOSPHATE 6.25; 1 MG/5ML; MG/5ML
5 SOLUTION ORAL EVERY 4 HOURS PRN
Qty: 473 ML | Refills: 0 | Status: SHIPPED | OUTPATIENT
Start: 2022-05-19 | End: 2022-05-29

## 2022-05-19 RX ORDER — SODIUM CHLORIDE 0.9 % (FLUSH) 0.9 %
10 SYRINGE (ML) INJECTION
Status: DISCONTINUED | OUTPATIENT
Start: 2022-05-19 | End: 2022-05-19 | Stop reason: HOSPADM

## 2022-05-19 RX ORDER — ACETAMINOPHEN 325 MG/1
650 TABLET ORAL
Status: COMPLETED | OUTPATIENT
Start: 2022-05-19 | End: 2022-05-19

## 2022-05-19 RX ORDER — EPINEPHRINE 0.3 MG/.3ML
0.3 INJECTION SUBCUTANEOUS ONCE AS NEEDED
Status: DISCONTINUED | OUTPATIENT
Start: 2022-05-19 | End: 2022-05-19 | Stop reason: HOSPADM

## 2022-05-19 RX ORDER — HEPARIN 100 UNIT/ML
500 SYRINGE INTRAVENOUS
Status: DISCONTINUED | OUTPATIENT
Start: 2022-05-19 | End: 2022-05-19 | Stop reason: HOSPADM

## 2022-05-19 RX ORDER — MEPERIDINE HYDROCHLORIDE 25 MG/ML
25 INJECTION INTRAMUSCULAR; INTRAVENOUS; SUBCUTANEOUS
Status: DISCONTINUED | OUTPATIENT
Start: 2022-05-19 | End: 2022-05-19 | Stop reason: HOSPADM

## 2022-05-19 RX ORDER — DIPHENHYDRAMINE HYDROCHLORIDE 50 MG/ML
50 INJECTION INTRAMUSCULAR; INTRAVENOUS ONCE AS NEEDED
Status: DISCONTINUED | OUTPATIENT
Start: 2022-05-19 | End: 2022-05-19 | Stop reason: HOSPADM

## 2022-05-19 RX ORDER — FAMOTIDINE 10 MG/ML
20 INJECTION INTRAVENOUS
Status: COMPLETED | OUTPATIENT
Start: 2022-05-19 | End: 2022-05-19

## 2022-05-19 RX ADMIN — ACETAMINOPHEN 650 MG: 325 TABLET ORAL at 09:05

## 2022-05-19 RX ADMIN — DIPHENHYDRAMINE HYDROCHLORIDE 50 MG: 50 INJECTION INTRAMUSCULAR; INTRAVENOUS at 09:05

## 2022-05-19 RX ADMIN — BENDAMUSTINE HYDROCHLORIDE 192.5 MG: 25 INJECTION, SOLUTION INTRAVENOUS at 01:05

## 2022-05-19 RX ADMIN — Medication 500 UNITS: at 01:05

## 2022-05-19 RX ADMIN — SODIUM CHLORIDE 806 MG: 0.9 INJECTION, SOLUTION INTRAVENOUS at 09:05

## 2022-05-19 RX ADMIN — SODIUM CHLORIDE, PRESERVATIVE FREE 10 ML: 5 INJECTION INTRAVENOUS at 01:05

## 2022-05-19 RX ADMIN — SODIUM CHLORIDE: 9 INJECTION, SOLUTION INTRAVENOUS at 09:05

## 2022-05-19 RX ADMIN — FAMOTIDINE 20 MG: 10 INJECTION INTRAVENOUS at 09:05

## 2022-05-19 RX ADMIN — DEXAMETHASONE SODIUM PHOSPHATE 0.25 MG: 4 INJECTION, SOLUTION INTRA-ARTICULAR; INTRALESIONAL; INTRAMUSCULAR; INTRAVENOUS; SOFT TISSUE at 12:05

## 2022-05-19 NOTE — PLAN OF CARE
Problem: Fatigue  Goal: Improved Activity Tolerance  Outcome: Ongoing, Progressing  Intervention: Promote Improved Energy  Flowsheets (Taken 5/19/2022 7027)  Fatigue Management: frequent rest breaks encouraged  Sleep/Rest Enhancement:   regular sleep/rest pattern promoted   relaxation techniques promoted  Activity Management: Ambulated -L4

## 2022-05-20 ENCOUNTER — INFUSION (OUTPATIENT)
Dept: INFUSION THERAPY | Facility: HOSPITAL | Age: 37
End: 2022-05-20
Attending: INTERNAL MEDICINE
Payer: COMMERCIAL

## 2022-05-20 VITALS
SYSTOLIC BLOOD PRESSURE: 142 MMHG | TEMPERATURE: 98 F | HEIGHT: 61 IN | OXYGEN SATURATION: 100 % | WEIGHT: 228.81 LBS | RESPIRATION RATE: 16 BRPM | HEART RATE: 100 BPM | BODY MASS INDEX: 43.2 KG/M2 | DIASTOLIC BLOOD PRESSURE: 63 MMHG

## 2022-05-20 DIAGNOSIS — C82.91 FOLLICULAR LYMPHOMA OF LYMPH NODES OF NECK, UNSPECIFIED FOLLICULAR LYMPHOMA TYPE: Primary | ICD-10-CM

## 2022-05-20 PROCEDURE — A4216 STERILE WATER/SALINE, 10 ML: HCPCS | Performed by: INTERNAL MEDICINE

## 2022-05-20 PROCEDURE — 63600175 PHARM REV CODE 636 W HCPCS: Mod: JG | Performed by: INTERNAL MEDICINE

## 2022-05-20 PROCEDURE — 25000003 PHARM REV CODE 250: Performed by: INTERNAL MEDICINE

## 2022-05-20 PROCEDURE — 96367 TX/PROPH/DG ADDL SEQ IV INF: CPT

## 2022-05-20 PROCEDURE — 96409 CHEMO IV PUSH SNGL DRUG: CPT

## 2022-05-20 RX ORDER — EPINEPHRINE 0.3 MG/.3ML
0.3 INJECTION SUBCUTANEOUS ONCE AS NEEDED
Status: DISCONTINUED | OUTPATIENT
Start: 2022-05-20 | End: 2022-05-20 | Stop reason: HOSPADM

## 2022-05-20 RX ORDER — HEPARIN 100 UNIT/ML
500 SYRINGE INTRAVENOUS
Status: DISCONTINUED | OUTPATIENT
Start: 2022-05-20 | End: 2022-05-20 | Stop reason: HOSPADM

## 2022-05-20 RX ORDER — SODIUM CHLORIDE 0.9 % (FLUSH) 0.9 %
10 SYRINGE (ML) INJECTION
Status: DISCONTINUED | OUTPATIENT
Start: 2022-05-20 | End: 2022-05-20 | Stop reason: HOSPADM

## 2022-05-20 RX ADMIN — DEXAMETHASONE SODIUM PHOSPHATE 12 MG: 4 INJECTION, SOLUTION INTRA-ARTICULAR; INTRALESIONAL; INTRAMUSCULAR; INTRAVENOUS; SOFT TISSUE at 09:05

## 2022-05-20 RX ADMIN — BENDAMUSTINE HYDROCHLORIDE 192.5 MG: 25 INJECTION, SOLUTION INTRAVENOUS at 10:05

## 2022-05-20 RX ADMIN — SODIUM CHLORIDE, PRESERVATIVE FREE 10 ML: 5 INJECTION INTRAVENOUS at 10:05

## 2022-05-20 RX ADMIN — Medication 500 UNITS: at 10:05

## 2022-05-20 RX ADMIN — SODIUM CHLORIDE: 9 INJECTION, SOLUTION INTRAVENOUS at 09:05

## 2022-05-20 NOTE — PLAN OF CARE
Problem: Fatigue  Goal: Improved Activity Tolerance  Outcome: Ongoing, Progressing  Intervention: Promote Improved Energy  Flowsheets (Taken 5/20/2022 0916)  Fatigue Management: frequent rest breaks encouraged  Sleep/Rest Enhancement:   regular sleep/rest pattern promoted   relaxation techniques promoted  Activity Management: Ambulated -L4

## 2022-05-25 ENCOUNTER — PATIENT MESSAGE (OUTPATIENT)
Dept: HEMATOLOGY/ONCOLOGY | Facility: CLINIC | Age: 37
End: 2022-05-25
Payer: COMMERCIAL

## 2022-05-25 ENCOUNTER — PATIENT MESSAGE (OUTPATIENT)
Dept: SURGERY | Facility: CLINIC | Age: 37
End: 2022-05-25
Payer: COMMERCIAL

## 2022-05-25 DIAGNOSIS — Z45.2 ENCOUNTER FOR REMOVAL OF TUNNELED CENTRAL VENOUS CATHETER (CVC) WITH PORT: Primary | ICD-10-CM

## 2022-05-25 DIAGNOSIS — R63.0 DECREASED APPETITE: Primary | ICD-10-CM

## 2022-05-25 RX ORDER — DRONABINOL 5 MG/1
5 CAPSULE ORAL 2 TIMES DAILY PRN
Qty: 60 CAPSULE | Refills: 0 | Status: SHIPPED | OUTPATIENT
Start: 2022-05-25 | End: 2022-06-15 | Stop reason: SDUPTHER

## 2022-05-25 RX ORDER — SODIUM CHLORIDE 9 MG/ML
INJECTION, SOLUTION INTRAVENOUS CONTINUOUS
Status: CANCELLED | OUTPATIENT
Start: 2022-05-25

## 2022-05-29 ENCOUNTER — PATIENT MESSAGE (OUTPATIENT)
Dept: HEMATOLOGY/ONCOLOGY | Facility: CLINIC | Age: 37
End: 2022-05-29
Payer: COMMERCIAL

## 2022-05-29 DIAGNOSIS — Z86.39 HISTORY OF IRON DEFICIENCY: Primary | ICD-10-CM

## 2022-06-13 ENCOUNTER — LAB VISIT (OUTPATIENT)
Dept: LAB | Facility: HOSPITAL | Age: 37
End: 2022-06-13
Attending: INTERNAL MEDICINE
Payer: COMMERCIAL

## 2022-06-13 DIAGNOSIS — Z86.39 HISTORY OF IRON DEFICIENCY: ICD-10-CM

## 2022-06-13 DIAGNOSIS — C82.91 FOLLICULAR LYMPHOMA OF LYMPH NODES OF NECK, UNSPECIFIED FOLLICULAR LYMPHOMA TYPE: ICD-10-CM

## 2022-06-13 LAB
ALBUMIN SERPL BCP-MCNC: 4 G/DL (ref 3.5–5.2)
ALP SERPL-CCNC: 62 U/L (ref 55–135)
ALT SERPL W/O P-5'-P-CCNC: 33 U/L (ref 10–44)
ANION GAP SERPL CALC-SCNC: 9 MMOL/L (ref 8–16)
AST SERPL-CCNC: 21 U/L (ref 10–40)
BASOPHILS # BLD AUTO: 0.05 K/UL (ref 0–0.2)
BASOPHILS NFR BLD: 0.9 % (ref 0–1.9)
BILIRUB SERPL-MCNC: 0.8 MG/DL (ref 0.1–1)
BUN SERPL-MCNC: 13 MG/DL (ref 6–20)
CALCIUM SERPL-MCNC: 8.8 MG/DL (ref 8.7–10.5)
CHLORIDE SERPL-SCNC: 103 MMOL/L (ref 95–110)
CO2 SERPL-SCNC: 23 MMOL/L (ref 23–29)
CREAT SERPL-MCNC: 0.7 MG/DL (ref 0.5–1.4)
DIFFERENTIAL METHOD: ABNORMAL
EOSINOPHIL # BLD AUTO: 0.2 K/UL (ref 0–0.5)
EOSINOPHIL NFR BLD: 3.2 % (ref 0–8)
ERYTHROCYTE [DISTWIDTH] IN BLOOD BY AUTOMATED COUNT: 13.2 % (ref 11.5–14.5)
EST. GFR  (AFRICAN AMERICAN): >60 ML/MIN/1.73 M^2
EST. GFR  (NON AFRICAN AMERICAN): >60 ML/MIN/1.73 M^2
FERRITIN SERPL-MCNC: 73 NG/ML (ref 20–300)
GLUCOSE SERPL-MCNC: 116 MG/DL (ref 70–110)
HCG INTACT+B SERPL-ACNC: <0.6 MIU/ML
HCT VFR BLD AUTO: 41.9 % (ref 37–48.5)
HGB BLD-MCNC: 14.8 G/DL (ref 12–16)
IMM GRANULOCYTES # BLD AUTO: 0.03 K/UL (ref 0–0.04)
IMM GRANULOCYTES NFR BLD AUTO: 0.5 % (ref 0–0.5)
IRON SERPL-MCNC: 158 UG/DL (ref 30–160)
LDH SERPL L TO P-CCNC: 122 U/L (ref 110–260)
LYMPHOCYTES # BLD AUTO: 0.2 K/UL (ref 1–4.8)
LYMPHOCYTES NFR BLD: 2.8 % (ref 18–48)
MCH RBC QN AUTO: 29.5 PG (ref 27–31)
MCHC RBC AUTO-ENTMCNC: 35.3 G/DL (ref 32–36)
MCV RBC AUTO: 84 FL (ref 82–98)
MONOCYTES # BLD AUTO: 0.6 K/UL (ref 0.3–1)
MONOCYTES NFR BLD: 10.4 % (ref 4–15)
NEUTROPHILS # BLD AUTO: 4.7 K/UL (ref 1.8–7.7)
NEUTROPHILS NFR BLD: 82.2 % (ref 38–73)
NRBC BLD-RTO: 0 /100 WBC
PLATELET # BLD AUTO: 241 K/UL (ref 150–450)
PMV BLD AUTO: 8.9 FL (ref 9.2–12.9)
POTASSIUM SERPL-SCNC: 3.6 MMOL/L (ref 3.5–5.1)
PROT SERPL-MCNC: 6.6 G/DL (ref 6–8.4)
RBC # BLD AUTO: 5.02 M/UL (ref 4–5.4)
SATURATED IRON: 35 % (ref 20–50)
SODIUM SERPL-SCNC: 135 MMOL/L (ref 136–145)
TOTAL IRON BINDING CAPACITY: 456 UG/DL (ref 250–450)
TRANSFERRIN SERPL-MCNC: 326 MG/DL (ref 200–375)
URATE SERPL-MCNC: 6 MG/DL (ref 2.4–5.7)
WBC # BLD AUTO: 5.69 K/UL (ref 3.9–12.7)

## 2022-06-13 PROCEDURE — 83615 LACTATE (LD) (LDH) ENZYME: CPT | Performed by: INTERNAL MEDICINE

## 2022-06-13 PROCEDURE — 85025 COMPLETE CBC W/AUTO DIFF WBC: CPT | Performed by: INTERNAL MEDICINE

## 2022-06-13 PROCEDURE — 84702 CHORIONIC GONADOTROPIN TEST: CPT | Performed by: INTERNAL MEDICINE

## 2022-06-13 PROCEDURE — 82728 ASSAY OF FERRITIN: CPT | Performed by: INTERNAL MEDICINE

## 2022-06-13 PROCEDURE — 84550 ASSAY OF BLOOD/URIC ACID: CPT | Performed by: INTERNAL MEDICINE

## 2022-06-13 PROCEDURE — 36415 COLL VENOUS BLD VENIPUNCTURE: CPT | Performed by: INTERNAL MEDICINE

## 2022-06-13 PROCEDURE — 80053 COMPREHEN METABOLIC PANEL: CPT | Performed by: INTERNAL MEDICINE

## 2022-06-13 PROCEDURE — 84466 ASSAY OF TRANSFERRIN: CPT | Performed by: INTERNAL MEDICINE

## 2022-06-14 NOTE — PROGRESS NOTES
PATIENT: Jerry Coronado  MRN: [id number]  DATE: 6/15/2022    Diagnosis:   1. 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 Immunodeficiency due to drug therapy    3. History of COVID-19    4. Tumor lysis syndrome    5. Chronic neoplasm-related pain    6. Morbid obesity    7. Chemotherapy-induced nausea and vomiting    8. Mood disorder due to a general medical condition    9. Decreased appetite    10. Situational stress      Chief Complaint: Lymphoma    Oncologic History:      Oncologic History 1. Follicular lymphoma      Oncologic Treatment 1. Bendamustine/rituximab - start date 1/25/22 - 1/26/22      Pathology 11/29/21:  Lymph node, left supraclavicular region, excision:  --Follicular lymphoma with high Ki-67 proliferation index, see comment  Comment: Concomitantly submitted flow cytometric analysis detects B-cell lymphoma of germinal center  origin expressing CD19, bright CD20, and CD10 with kappa light chain restriction. CD5 is negative in this  population.  Although the majority of the lymph node appears to represent as grade 2 follicular lymphoma, the Ki-67  proliferation index is much higher than expected for typical low-grade follicular lymphoma. Additionally there  is a very small area suggestive of grade 3A follicular lymphoma as well, representing less than 5% of the overall cellularity. This sampling leaves concern for a higher grade process elsewhere and correlation with  clinical and radiologic findings including PET scan findings is highly recommended with additional biopsy of  areas of high SUV if indicated.      Telemedicine visit:  The patient location is: home  The chief complaint leading to consultation is: follicular lymphoma    Visit type: audiovisual    Face to Face time with patient: 10 minutes  15 minutes of total time spent on the encounter, which includes face to face time and non-face to face time preparing to see the patient (eg, review of  tests), Obtaining and/or reviewing separately obtained history, Documenting clinical information in the electronic or other health record, Independently interpreting results (not separately reported) and communicating results to the patient/family/caregiver, or Care coordination (not separately reported).     Each patient to whom he or she provides medical services by telemedicine is:  (1) informed of the relationship between the physician and patient and the respective role of any other health care provider with respect to management of the patient; and (2) notified that he or she may decline to receive medical services by telemedicine and may withdraw from such care at any time.    Notes: see below      Subjective:    History of Present Illness: Ms. Coronado is a 36 y.o. female who presented in December 2021 for evaluation and management of B cell lymphoma. She was referred by Dr. Kerns.    - presenting symptom was enlarged supraclavicular lymph nodes  - she underwent excisional lymph node biopsy on 11/29/21. Pathology revealed a follicular lymphoma with high ki-67 proliferation index.  - she reports having a CT scan in [place] in early November with enlarged lymph nodes noted throughout.  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 This may make treatment decisions challenging logistically.  - she received cycle #1 of bendamustine/rituximab on 1/25/22 - 1/26/22 in [place], [place].  - she underwent PET/CT scan on 4/14/22.    Interval history:  - she presents for a follow-up appointment for her follicular lymphoma  - she is scheduled for cycle #6 of bendamustine/rituximab on 6/16/22 - 6/17/22.  - she is scheduled for port removal on 6/20/22.  - her main complaint is discomfort at her port site. She denies shortness of breath, chest pain, vomiting, constipation.  - she denies fever, night sweats, unintentional weight loss.    Past medical, surgical, family, and social  histories have been reviewed and updated below.    Past Medical History:   Past Medical History:   Diagnosis Date    Allergic rhinitis     Anxiety     Depression     Hypothyroidism     SVT (supraventricular tachycardia) 2011       Past Surgical History:   Past Surgical History:   Procedure Laterality Date    ABLATION OF DYSRHYTHMIC FOCUS      x2    BIOPSY OF CERVICAL LYMPH NODE Left 11/29/2021    Procedure: BIOPSY, LYMPH NODE, CERVICAL;  Surgeon: Blanco Kerns MD;  Location: [facility] OR;  Service: General;  Laterality: Left;    CERVIX LESION DESTRUCTION      cryo in 2011 (?)    CONIZATION OF CERVIX USING LOOP ELECTROSURGICAL EXCISION PROCEDURE (LEEP) N/A 7/21/2021    Procedure: LEEP CONIZATION, CERVIX;  Surgeon: Donna Castillo MD;  Location: [facility] OR;  Service: OB/GYN;  Laterality: N/A;    EXCISION OF LESION OF NOSE N/A 10/14/2019    Procedure: EXCISION, LESION, NOSE;  Surgeon: Umesh Morales MD;  Location: [facility] OR;  Service: ENT;  Laterality: N/A;    FUNCTIONAL ENDOSCOPIC SINUS SURGERY (FESS) N/A 10/14/2019    Procedure: FESS (FUNCTIONAL ENDOSCOPIC SINUS SURGERY);  Surgeon: Umesh Morales MD;  Location: [facility] OR;  Service: ENT;  Laterality: N/A;    HYSTEROSCOPY WITH DILATION AND CURETTAGE OF UTERUS N/A 7/21/2021    Procedure: HYSTEROSCOPY, WITH DILATION AND CURETTAGE OF UTERUS;  Surgeon: Donna Castillo MD;  Location: [facility] OR;  Service: OB/GYN;  Laterality: N/A;    NASAL SEPTOPLASTY N/A 10/14/2019    Procedure: SEPTOPLASTY, NOSE;  Surgeon: Umesh Morales MD;  Location: [facility] OR;  Service: ENT;  Laterality: N/A;    NASAL SINUS SURGERY  10/14/2019    PILONIDAL CYST DRAINAGE      TONSILLECTOMY      TONSILLECTOMY      TYMPANOPLASTY N/A 10/14/2019    Procedure: TYMPANOPLASTY;  Surgeon: Umesh Morales MD;  Location: [facility] OR;  Service: ENT;  Laterality: N/A;    TYMPANOTOMY Left 10/14/2019    left       Family History:   Family History   Problem Relation Age of Onset    Hypertension Mother      Heart disease Father 59        CABG    Heart failure Father     Breast cancer Maternal Aunt 55    Colon cancer Neg Hx     Ovarian cancer Neg Hx        Social History:  reports that she quit smoking about 12 years ago. Her smoking use included cigarettes. She started smoking about 21 years ago. She smoked 0.50 packs per day. She has never used smokeless tobacco. She reports current alcohol use of about 5.0 standard drinks of alcohol per week. She reports that she does not use drugs.    Allergies:  Review of patient's allergies indicates:  No Known Allergies    Medications:  Current Outpatient Medications   Medication Sig Dispense Refill    acetaminophen-codeine 300-30mg (TYLENOL #3) 300-30 mg Tab acetaminophen 300 mg-codeine 30 mg tablet      allopurinoL (ZYLOPRIM) 300 MG tablet Take 1 tablet (300 mg total) by mouth once daily. (Patient not taking: Reported on 5/11/2022) 30 tablet 11    ALPRAZolam (XANAX) 1 MG tablet Take 1 tablet (1 mg total) by mouth 2 (two) times daily as needed for Anxiety. 30 tablet 0    amLODIPine (NORVASC) 2.5 MG tablet Take 2.5 mg by mouth once daily.      amoxicillin-clavulanate 875-125mg (AUGMENTIN) 875-125 mg per tablet Take 1 tablet by mouth 2 (two) times daily.      benzonatate (TESSALON) 100 MG capsule benzonatate 100 mg capsule      benzoyl peroxide 2.5 % gel 1 application to affected area (Patient not taking: No sig reported) 60 g 1    buPROPion (WELLBUTRIN XL) 300 MG 24 hr tablet Take 1 tablet (300 mg total) by mouth every morning. (Patient not taking: Reported on 5/11/2022) 90 tablet 3    doxycycline (MONODOX) 100 MG capsule 1 capsule      dronabinoL (MARINOL) 5 MG capsule Take 1 capsule (5 mg total) by mouth 2 (two) times daily as needed (decreased appetite). 60 capsule 0    drospirenone-ethinyl estradioL (EMELINA) 3-0.03 mg per tablet Emelina 3 mg-0.03 mg tablet  TAKE 1 TABLET BY MOUTH EVERY DAY SKIP PLACEBO (Patient not taking: Reported on 5/11/2022) 84 tablet 4     ferrous sulfate (FEOSOL) 325 mg (65 mg iron) Tab tablet Take by mouth 2 (two) times daily.      fluticasone (FLONASE) 50 mcg/actuation nasal spray INSTILL 2 SPRAYS IN EACH NOSTRIL ONCE DAILY 1 Bottle 3    gabapentin (NEURONTIN) 600 MG tablet Take 600 mg by mouth 3 (three) times daily.       guaiFENesin 100 mg/5 ml (ROBITUSSIN) 100 mg/5 mL syrup as needed.      hydrOXYzine pamoate (VISTARIL) 50 MG Cap Take 1 capsule (50 mg total) by mouth 2 (two) times daily as needed. 60 capsule 2    ibuprofen (ADVIL,MOTRIN) 800 MG tablet ibuprofen 800 mg tablet      levalbuterol (XOPENEX HFA) 45 mcg/actuation inhaler Inhale 1 puff into the lungs every 4 (four) hours as needed for Wheezing or Shortness of Breath. Rescue (Patient not taking: Reported on 5/11/2022) 15 g 1    levoFLOXacin (LEVAQUIN) 500 MG tablet Take 500 mg by mouth once daily.      levothyroxine (SYNTHROID) 50 MCG tablet Take 1 tablet by mouth once daily 30 tablet 4    LIDOcaine (LMX) 4 % cream 1 application as needed      LIDOcaine-prilocaine (EMLA) cream Apply small amount topically to port incision approximately 30 minutes prior to port access as needed      LORazepam (ATIVAN) 1 MG tablet Take by mouth.      methylPREDNISolone (MEDROL DOSEPACK) 4 mg tablet Take by mouth.      mirtazapine (REMERON) 7.5 MG Tab TAKE 2 TABLETS (15 MG TOTAL) BY MOUTH NIGHTLY. 60 tablet 1    multivitamin capsule Take 1 capsule by mouth.      ondansetron (ZOFRAN) 8 MG tablet Take 8 mg by mouth 3 (three) times daily.      ondansetron (ZOFRAN-ODT) 8 MG TbDL Take 1 tablet (8 mg total) by mouth every 8 (eight) hours as needed (chemotherapy-induced nausea and vomiting). 40 tablet 5    oxyCODONE (ROXICODONE) 5 MG immediate release tablet Take 1 tablet (5 mg total) by mouth every 6 (six) hours as needed for Pain (cancer-related pain). 40 tablet 0    oxyCODONE-acetaminophen (PERCOCET) 5-325 mg per tablet oxycodone-acetaminophen 5 mg-325 mg tablet      prazosin (MINIPRESS) 2 MG  Cap 1 capsule at bedtime      predniSONE (DELTASONE) 50 MG Tab Take 1 tablet (50 mg total) by mouth once daily. (Patient not taking: Reported on 5/11/2022) 5 tablet 0    promethazine (PHENERGAN) 6.25 mg/5 mL syrup as needed.      QUEtiapine (SEROQUEL) 100 MG Tab Take 100 mg by mouth once daily.      sertraline (ZOLOFT) 100 MG tablet Take 1 tablet (100 mg total) by mouth once daily. 30 tablet 1    tiZANidine (ZANAFLEX) 4 MG tablet Take 4 mg by mouth 3 (three) times daily as needed.      traMADoL (ULTRAM) 50 mg tablet Take 50 mg by mouth 2 (two) times daily as needed.      valACYclovir (VALTREX) 1000 MG tablet Take 2 tablets (2,000 mg total) by mouth every 12 (twelve) hours. for 1 day (Patient taking differently: Take 2,000 mg by mouth as needed.) 4 tablet 2     No current facility-administered medications for this visit.       Review of Systems   Constitutional: Positive for fatigue. Negative for appetite change and unexpected weight change.   HENT: Negative for mouth sores and sore throat.    Eyes: Negative for visual disturbance.   Respiratory: Negative for cough and shortness of breath.    Cardiovascular: Negative for chest pain.   Gastrointestinal: Positive for nausea (from chemotherapy). Negative for abdominal pain, constipation, diarrhea and vomiting.   Genitourinary: Negative for dysuria and frequency.   Musculoskeletal: Positive for neck pain (improved). Negative for back pain.   Skin: Negative for rash.   Neurological: Negative for headaches.   Hematological: Negative for adenopathy.   Psychiatric/Behavioral: The patient is not nervous/anxious.        ECOG Performance Status:   ECOG SCORE 1       Objective:      Vitals:   There were no vitals filed for this visit.  BMI: There is no height or weight on file to calculate BMI.  Deferred due to telemedicine visit.    Physical Exam  Deferred due to telemedicine visit.    Laboratory Data:  Labs have been reviewed.    Lab Results   Component Value Date     WBC 5.69 06/13/2022    HGB 14.8 06/13/2022    HCT 41.9 06/13/2022    MCV 84 06/13/2022     06/13/2022       Imaging:     PET/CT scan (4/14/22): I have personally reviewed the images  Differences in instrumentation and technique preclude semiquantitative comparison between the current PET/CT study and the prior.     Background:  - Liver 4.2 SUV max.  - Spleen 3.5 SUV max.  - Spleen 11.9 x 4.8 cm (AP x TR)  - Blood pool 3.3 SUV max     No FDG avid lymphadenopathy or mass. The largest cervical node is a 5 x 5 mm supraclavicular node with SUV max 1.0 (image 86). The largest left axillary node measures 18 x 7 mm with SUV max 0.8 (image 103).     Additional findings:  -Right sided IJ medical infusion port with tip at the level of the SVC.  -Relative diffuse low-attenuation liver in keeping with steatosis.  -3.6 cm simple fluid attenuation on FDG avid cyst in the right ovary favored to represent a physiologic cyst in this age group. The left ovary and uterus are within normal limits.     IMPRESSION:  No evidence of FDG avid malignancy.    PET/CT scan (12/17/21): I have personally reviewed the images    IN THE HEAD AND NECK:     Multiple left-sided cervical lymph nodes, left and right supraclavicular lymph nodes and left axillary lymph nodes.  The index lesions, as follow:     Hypermetabolic lymph node at the left level Va region, measures 1.2 cm (axial series 2, image 68) with SUV max of 14.0.     Hypermetabolic left supraclavicular lymph node, measures 1.9 cm (axial series 2, image 82) with SUV max of 15.0.     Hypermetabolic left axillary lymph node, measures 3.5 cm (axial series 2, image 116) with SUV max of 12.0.     Hypermetabolic right supraclavicular lymph node, measures 1.4 cm (axial series 2, image 79) with SUV max of 13.0.     IN THE CHEST:     Focal radiotracer uptake in the left hilum with no corresponding lesion on CT images (axial fused image 116) with SUV max of 4.4.  No abnormal lesions throughout  "the pulmonary parenchyma.     IN THE ABDOMEN AND PELVIS:     There is a subcentimeter hypermetabolic lesion in the subcutaneous tissue of the right back at the level of L4, measures 0.9 cm (axial series 2, image 198) with SUV max of 6.7.  There is physiologic tracer distribution within the abdominal organs and excretion into the genitourinary system.     The spleen has normal uptake and is normal at 11.0 cm in craniocaudal dimension.     IN THE BONES:     There are no hypermetabolic lesions worrisome for malignancy.     In the extremities, there are no hypermetabolic lesions worrisome for malignancy.     Impression:     Hypermetabolic cervical, left axillary, and left hilar lymph nodes consistent with biopsy-proven follicular lymphoma (performed on 11/29/2021), as above.     Hypermetabolic subcutaneous tissue of the right nodule at the level of L4, concerning for additional lymphomatous deposit as described above.      Assessment:       1. 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 Immunodeficiency due to drug therapy    3. History of COVID-19    4. Tumor lysis syndrome    5. Chronic neoplasm-related pain    6. Morbid obesity    7. Chemotherapy-induced nausea and vomiting    8. Mood disorder due to a general medical condition    9. Decreased appetite    10. Situational stress           Plan:     1. Follicular lymphoma  - I have reviewed her chart  - supraclavicular lymph node biopsy (11/29/21) revealed follicular lymphoma. Importantly, in the pathology report is the following: "Although the majority of the lymph node appears to represent as grade 2 follicular lymphoma, the Ki-67  proliferation index is much higher than expected for typical low-grade follicular lymphoma. Additionally there  is a very small area suggestive of grade 3A follicular lymphoma as well, representing less than 5% of the  overall cellularity."  - she [place] in [place] " "November with enlarged lymph nodes noted throughout.  - PET/CT scan (12/17/21) revealed "hypermetabolic cervical, left axillary, and left hilar lymph nodes" as well as "hypermetabolic subcutaneous tissue of the right nodule at the level of L4, concerning for additional lymphomatous deposit."  - she received cycle #1 of bendamustine/rituximab on 1/25/22 - 1/26/22 in [place], [place].  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 No evidence of hypermetabolic tumor  - The risks and benefits of chemotherapy were discussed, written information was given, and informed consent was obtained.  - Labs have been reviewed. Okay to proceed with cycle #6 of bendamustine/rituximab, scheduled for 6/16/22 - 6/17/22.  - return to clinic in 3-4 weeks with repeat PET scan.    2. Morbid obesity  - no weight since telemedicine visit.  - continue to monitor    3. Mood disorder due to medical condition  - stable. Continue xanax as needed. I sent refill    4. Chronic neoplasm-related pain  - stable. continue oxycodone as needed. I sent a refill    5. Chemotherapy-induced nausea/vomiting  - controlled. Continue anti-emetics as needed.    6. Insomnia  - stable. Continue mirtazapine    7. Advance Care Planning     Power of   After our discussion (at previous visit), the patient decided to complete a HCPOA and appointed her brother Velasquez Coronado ([phone]) and dad Dirk Coronado ([phone]).        - return to clinic in 3-4 weeks with repeat PET scan.    Ki Nova M.D.  Hematology/Oncology  [facility] - [address], [address]  [place] [address]  Phone: [phone]  Fax: [phone]  "

## 2022-06-15 ENCOUNTER — PATIENT MESSAGE (OUTPATIENT)
Dept: HEMATOLOGY/ONCOLOGY | Facility: CLINIC | Age: 37
End: 2022-06-15

## 2022-06-15 ENCOUNTER — OFFICE VISIT (OUTPATIENT)
Dept: HEMATOLOGY/ONCOLOGY | Facility: CLINIC | Age: 37
End: 2022-06-15
Payer: COMMERCIAL

## 2022-06-15 DIAGNOSIS — E66.01 MORBID OBESITY: ICD-10-CM

## 2022-06-15 DIAGNOSIS — F06.30 MOOD DISORDER DUE TO A GENERAL MEDICAL CONDITION: ICD-10-CM

## 2022-06-15 DIAGNOSIS — G89.3 CHRONIC NEOPLASM-RELATED PAIN: ICD-10-CM

## 2022-06-15 DIAGNOSIS — Z86.16 HISTORY OF COVID-19: ICD-10-CM

## 2022-06-15 DIAGNOSIS — E88.3 TUMOR LYSIS SYNDROME: ICD-10-CM

## 2022-06-15 DIAGNOSIS — D84.821 IMMUNODEFICIENCY DUE TO DRUG THERAPY: ICD-10-CM

## 2022-06-15 DIAGNOSIS — Z79.899 IMMUNODEFICIENCY DUE TO DRUG THERAPY: ICD-10-CM

## 2022-06-15 DIAGNOSIS — C82.91 FOLLICULAR LYMPHOMA OF LYMPH NODES OF NECK, UNSPECIFIED FOLLICULAR LYMPHOMA TYPE: Primary | ICD-10-CM

## 2022-06-15 DIAGNOSIS — F43.9 SITUATIONAL STRESS: ICD-10-CM

## 2022-06-15 DIAGNOSIS — R11.2 CHEMOTHERAPY-INDUCED NAUSEA AND VOMITING: ICD-10-CM

## 2022-06-15 DIAGNOSIS — R63.0 DECREASED APPETITE: ICD-10-CM

## 2022-06-15 DIAGNOSIS — T45.1X5A CHEMOTHERAPY-INDUCED NAUSEA AND VOMITING: ICD-10-CM

## 2022-06-15 PROCEDURE — 1159F MED LIST DOCD IN RCRD: CPT | Mod: CPTII,95,, | Performed by: INTERNAL MEDICINE

## 2022-06-15 PROCEDURE — 1159F PR MEDICATION LIST DOCUMENTED IN MEDICAL RECORD: ICD-10-PCS | Mod: CPTII,95,, | Performed by: INTERNAL MEDICINE

## 2022-06-15 PROCEDURE — 99215 OFFICE O/P EST HI 40 MIN: CPT | Mod: 95,,, | Performed by: INTERNAL MEDICINE

## 2022-06-15 PROCEDURE — 99215 PR OFFICE/OUTPT VISIT, EST, LEVL V, 40-54 MIN: ICD-10-PCS | Mod: 95,,, | Performed by: INTERNAL MEDICINE

## 2022-06-15 PROCEDURE — 1160F PR REVIEW ALL MEDS BY PRESCRIBER/CLIN PHARMACIST DOCUMENTED: ICD-10-PCS | Mod: CPTII,95,, | Performed by: INTERNAL MEDICINE

## 2022-06-15 PROCEDURE — 1160F RVW MEDS BY RX/DR IN RCRD: CPT | Mod: CPTII,95,, | Performed by: INTERNAL MEDICINE

## 2022-06-15 RX ORDER — MEPERIDINE HYDROCHLORIDE 50 MG/ML
25 INJECTION INTRAMUSCULAR; INTRAVENOUS; SUBCUTANEOUS
Status: CANCELLED | OUTPATIENT
Start: 2022-06-16

## 2022-06-15 RX ORDER — SODIUM CHLORIDE 0.9 % (FLUSH) 0.9 %
10 SYRINGE (ML) INJECTION
Status: CANCELLED | OUTPATIENT
Start: 2022-06-17

## 2022-06-15 RX ORDER — ALPRAZOLAM 1 MG/1
1 TABLET ORAL 2 TIMES DAILY PRN
Qty: 30 TABLET | Refills: 0 | Status: SHIPPED | OUTPATIENT
Start: 2022-06-15 | End: 2022-08-17 | Stop reason: SDUPTHER

## 2022-06-15 RX ORDER — OXYCODONE HYDROCHLORIDE 5 MG/1
5 TABLET ORAL EVERY 6 HOURS PRN
Qty: 40 TABLET | Refills: 0 | Status: SHIPPED | OUTPATIENT
Start: 2022-06-15 | End: 2022-09-26

## 2022-06-15 RX ORDER — SODIUM CHLORIDE 0.9 % (FLUSH) 0.9 %
10 SYRINGE (ML) INJECTION
Status: CANCELLED | OUTPATIENT
Start: 2022-06-16

## 2022-06-15 RX ORDER — EPINEPHRINE 0.3 MG/.3ML
0.3 INJECTION SUBCUTANEOUS ONCE AS NEEDED
Status: CANCELLED | OUTPATIENT
Start: 2022-06-16

## 2022-06-15 RX ORDER — FAMOTIDINE 10 MG/ML
20 INJECTION INTRAVENOUS
Status: CANCELLED | OUTPATIENT
Start: 2022-06-16

## 2022-06-15 RX ORDER — DIPHENHYDRAMINE HYDROCHLORIDE 50 MG/ML
50 INJECTION INTRAMUSCULAR; INTRAVENOUS ONCE AS NEEDED
Status: CANCELLED | OUTPATIENT
Start: 2022-06-16

## 2022-06-15 RX ORDER — DRONABINOL 5 MG/1
5 CAPSULE ORAL 2 TIMES DAILY PRN
Qty: 60 CAPSULE | Refills: 0 | Status: SHIPPED | OUTPATIENT
Start: 2022-06-15

## 2022-06-15 RX ORDER — ACETAMINOPHEN 325 MG/1
650 TABLET ORAL
Status: CANCELLED | OUTPATIENT
Start: 2022-06-16

## 2022-06-15 RX ORDER — EPINEPHRINE 0.3 MG/.3ML
0.3 INJECTION SUBCUTANEOUS ONCE AS NEEDED
Status: CANCELLED | OUTPATIENT
Start: 2022-06-17

## 2022-06-15 RX ORDER — DIPHENHYDRAMINE HYDROCHLORIDE 50 MG/ML
50 INJECTION INTRAMUSCULAR; INTRAVENOUS ONCE AS NEEDED
Status: CANCELLED | OUTPATIENT
Start: 2022-06-17

## 2022-06-15 RX ORDER — HEPARIN 100 UNIT/ML
500 SYRINGE INTRAVENOUS
Status: CANCELLED | OUTPATIENT
Start: 2022-06-16

## 2022-06-15 RX ORDER — HEPARIN 100 UNIT/ML
500 SYRINGE INTRAVENOUS
Status: CANCELLED | OUTPATIENT
Start: 2022-06-17

## 2022-06-15 NOTE — Clinical Note
1. Schedule labs cbc, cmp, uric acid, LDH and PET/CT scan on 6/28/22 at [place] if possible. 2. Schedule virtual visit during week of 7/4/22.  Thanks!
No

## 2022-06-16 ENCOUNTER — PATIENT MESSAGE (OUTPATIENT)
Dept: PSYCHIATRY | Facility: CLINIC | Age: 37
End: 2022-06-16
Payer: COMMERCIAL

## 2022-06-16 ENCOUNTER — PATIENT MESSAGE (OUTPATIENT)
Dept: HEMATOLOGY/ONCOLOGY | Facility: CLINIC | Age: 37
End: 2022-06-16
Payer: COMMERCIAL

## 2022-06-16 ENCOUNTER — INFUSION (OUTPATIENT)
Dept: INFUSION THERAPY | Facility: HOSPITAL | Age: 37
End: 2022-06-16
Attending: INTERNAL MEDICINE
Payer: MEDICAID

## 2022-06-16 VITALS
OXYGEN SATURATION: 96 % | SYSTOLIC BLOOD PRESSURE: 115 MMHG | WEIGHT: 225.63 LBS | RESPIRATION RATE: 18 BRPM | TEMPERATURE: 98 F | HEIGHT: 61 IN | BODY MASS INDEX: 42.6 KG/M2 | DIASTOLIC BLOOD PRESSURE: 71 MMHG | HEART RATE: 81 BPM

## 2022-06-16 DIAGNOSIS — C82.91 FOLLICULAR LYMPHOMA OF LYMPH NODES OF NECK, UNSPECIFIED FOLLICULAR LYMPHOMA TYPE: Primary | ICD-10-CM

## 2022-06-16 PROCEDURE — 63600175 PHARM REV CODE 636 W HCPCS: Mod: TB | Performed by: INTERNAL MEDICINE

## 2022-06-16 PROCEDURE — 96367 TX/PROPH/DG ADDL SEQ IV INF: CPT

## 2022-06-16 PROCEDURE — 96413 CHEMO IV INFUSION 1 HR: CPT

## 2022-06-16 PROCEDURE — A4216 STERILE WATER/SALINE, 10 ML: HCPCS | Performed by: INTERNAL MEDICINE

## 2022-06-16 PROCEDURE — 25000003 PHARM REV CODE 250: Performed by: INTERNAL MEDICINE

## 2022-06-16 PROCEDURE — 96415 CHEMO IV INFUSION ADDL HR: CPT

## 2022-06-16 PROCEDURE — 96375 TX/PRO/DX INJ NEW DRUG ADDON: CPT

## 2022-06-16 PROCEDURE — 96411 CHEMO IV PUSH ADDL DRUG: CPT

## 2022-06-16 RX ORDER — FAMOTIDINE 10 MG/ML
20 INJECTION INTRAVENOUS
Status: COMPLETED | OUTPATIENT
Start: 2022-06-16 | End: 2022-06-16

## 2022-06-16 RX ORDER — MEPERIDINE HYDROCHLORIDE 25 MG/ML
25 INJECTION INTRAMUSCULAR; INTRAVENOUS; SUBCUTANEOUS
Status: DISCONTINUED | OUTPATIENT
Start: 2022-06-16 | End: 2022-06-16 | Stop reason: HOSPADM

## 2022-06-16 RX ORDER — ACETAMINOPHEN 325 MG/1
650 TABLET ORAL
Status: COMPLETED | OUTPATIENT
Start: 2022-06-16 | End: 2022-06-16

## 2022-06-16 RX ORDER — SODIUM CHLORIDE 0.9 % (FLUSH) 0.9 %
10 SYRINGE (ML) INJECTION
Status: DISCONTINUED | OUTPATIENT
Start: 2022-06-16 | End: 2022-06-16 | Stop reason: HOSPADM

## 2022-06-16 RX ORDER — EPINEPHRINE 0.3 MG/.3ML
0.3 INJECTION SUBCUTANEOUS ONCE AS NEEDED
Status: DISCONTINUED | OUTPATIENT
Start: 2022-06-16 | End: 2022-06-16 | Stop reason: HOSPADM

## 2022-06-16 RX ORDER — HEPARIN 100 UNIT/ML
500 SYRINGE INTRAVENOUS
Status: DISCONTINUED | OUTPATIENT
Start: 2022-06-16 | End: 2022-06-16 | Stop reason: HOSPADM

## 2022-06-16 RX ADMIN — PALONOSETRON 0.25 MG: 0.25 INJECTION, SOLUTION INTRAVENOUS at 01:06

## 2022-06-16 RX ADMIN — BENDAMUSTINE HYDROCHLORIDE 192.5 MG: 25 INJECTION, SOLUTION INTRAVENOUS at 01:06

## 2022-06-16 RX ADMIN — ACETAMINOPHEN 650 MG: 325 TABLET ORAL at 09:06

## 2022-06-16 RX ADMIN — HEPARIN 500 UNITS: 100 SYRINGE at 01:06

## 2022-06-16 RX ADMIN — SODIUM CHLORIDE, PRESERVATIVE FREE 10 ML: 5 INJECTION INTRAVENOUS at 01:06

## 2022-06-16 RX ADMIN — SODIUM CHLORIDE: 9 INJECTION, SOLUTION INTRAVENOUS at 09:06

## 2022-06-16 RX ADMIN — DIPHENHYDRAMINE HYDROCHLORIDE 50 MG: 50 INJECTION, SOLUTION INTRAMUSCULAR; INTRAVENOUS at 09:06

## 2022-06-16 RX ADMIN — SODIUM CHLORIDE 806 MG: 0.9 INJECTION, SOLUTION INTRAVENOUS at 10:06

## 2022-06-16 RX ADMIN — FAMOTIDINE 20 MG: 10 INJECTION INTRAVENOUS at 09:06

## 2022-06-16 NOTE — PLAN OF CARE
Problem: Fatigue  Goal: Improved Activity Tolerance  Outcome: Ongoing, Progressing  Intervention: Promote Improved Energy  Flowsheets (Taken 6/16/2022 0904)  Fatigue Management: frequent rest breaks encouraged  Sleep/Rest Enhancement:   regular sleep/rest pattern promoted   relaxation techniques promoted  Activity Management: Ambulated -L4

## 2022-06-17 ENCOUNTER — PATIENT MESSAGE (OUTPATIENT)
Dept: HEMATOLOGY/ONCOLOGY | Facility: CLINIC | Age: 37
End: 2022-06-17
Payer: COMMERCIAL

## 2022-06-17 ENCOUNTER — INFUSION (OUTPATIENT)
Dept: INFUSION THERAPY | Facility: HOSPITAL | Age: 37
End: 2022-06-17
Attending: INTERNAL MEDICINE
Payer: COMMERCIAL

## 2022-06-17 ENCOUNTER — PATIENT MESSAGE (OUTPATIENT)
Dept: SURGERY | Facility: HOSPITAL | Age: 37
End: 2022-06-17
Payer: COMMERCIAL

## 2022-06-17 VITALS
HEIGHT: 61 IN | TEMPERATURE: 99 F | OXYGEN SATURATION: 97 % | RESPIRATION RATE: 18 BRPM | DIASTOLIC BLOOD PRESSURE: 75 MMHG | BODY MASS INDEX: 43.43 KG/M2 | WEIGHT: 230 LBS | SYSTOLIC BLOOD PRESSURE: 120 MMHG | HEART RATE: 93 BPM

## 2022-06-17 DIAGNOSIS — C82.91 FOLLICULAR LYMPHOMA OF LYMPH NODES OF NECK, UNSPECIFIED FOLLICULAR LYMPHOMA TYPE: Primary | ICD-10-CM

## 2022-06-17 PROCEDURE — 96367 TX/PROPH/DG ADDL SEQ IV INF: CPT

## 2022-06-17 PROCEDURE — 96409 CHEMO IV PUSH SNGL DRUG: CPT

## 2022-06-17 PROCEDURE — A4216 STERILE WATER/SALINE, 10 ML: HCPCS | Performed by: INTERNAL MEDICINE

## 2022-06-17 PROCEDURE — 63600175 PHARM REV CODE 636 W HCPCS: Mod: JG | Performed by: INTERNAL MEDICINE

## 2022-06-17 PROCEDURE — 25000003 PHARM REV CODE 250: Performed by: INTERNAL MEDICINE

## 2022-06-17 RX ORDER — SODIUM CHLORIDE 0.9 % (FLUSH) 0.9 %
10 SYRINGE (ML) INJECTION
Status: DISCONTINUED | OUTPATIENT
Start: 2022-06-17 | End: 2022-06-17 | Stop reason: HOSPADM

## 2022-06-17 RX ORDER — EPINEPHRINE 0.3 MG/.3ML
0.3 INJECTION SUBCUTANEOUS ONCE AS NEEDED
Status: DISCONTINUED | OUTPATIENT
Start: 2022-06-17 | End: 2022-06-17 | Stop reason: HOSPADM

## 2022-06-17 RX ORDER — HEPARIN 100 UNIT/ML
500 SYRINGE INTRAVENOUS
Status: DISCONTINUED | OUTPATIENT
Start: 2022-06-17 | End: 2022-06-17 | Stop reason: HOSPADM

## 2022-06-17 RX ADMIN — DEXAMETHASONE SODIUM PHOSPHATE 12 MG: 4 INJECTION, SOLUTION INTRA-ARTICULAR; INTRALESIONAL; INTRAMUSCULAR; INTRAVENOUS; SOFT TISSUE at 09:06

## 2022-06-17 RX ADMIN — SODIUM CHLORIDE, PRESERVATIVE FREE 10 ML: 5 INJECTION INTRAVENOUS at 10:06

## 2022-06-17 RX ADMIN — HEPARIN 500 UNITS: 100 SYRINGE at 10:06

## 2022-06-17 RX ADMIN — SODIUM CHLORIDE: 9 INJECTION, SOLUTION INTRAVENOUS at 09:06

## 2022-06-17 RX ADMIN — BENDAMUSTINE HYDROCHLORIDE 192.5 MG: 25 INJECTION, SOLUTION INTRAVENOUS at 09:06

## 2022-06-17 NOTE — NURSING
Port a cath remained accessed upon patient departure from infusion center yesterday. Patient reports previous tegaderm dsg was irritating to her skin. PICC dsg applied per patient request as well as steri strips across Payan needle when port accessed.     925 Patient returns today for day 2 Chemo infusion and states she deaccessed port last night at home because it hurt and dressing was falling off. Also states needle was bent ? Reports she applied painters tape to dressing after doing yard work but dressing did not hold. Port accessed again today.     1000 Last cycle of chemo infusion complete. Patient tolerated well. Not additional appts made. Patient states she has surgery scheduled on Monday, June 20th to have port removed.

## 2022-06-17 NOTE — PLAN OF CARE
Problem: Fatigue  Goal: Improved Activity Tolerance  Outcome: Ongoing, Progressing  Intervention: Promote Improved Energy  Flowsheets (Taken 6/17/2022 0919)  Fatigue Management: frequent rest breaks encouraged  Sleep/Rest Enhancement:   regular sleep/rest pattern promoted   relaxation techniques promoted  Activity Management: Ambulated -L4

## 2022-06-20 ENCOUNTER — ANESTHESIA EVENT (OUTPATIENT)
Dept: SURGERY | Facility: HOSPITAL | Age: 37
End: 2022-06-20
Payer: COMMERCIAL

## 2022-06-20 ENCOUNTER — HOSPITAL ENCOUNTER (OUTPATIENT)
Facility: HOSPITAL | Age: 37
Discharge: HOME | End: 2022-06-20
Attending: SURGERY | Admitting: SURGERY
Payer: MEDICAID

## 2022-06-20 ENCOUNTER — ANESTHESIA (OUTPATIENT)
Dept: SURGERY | Facility: HOSPITAL | Age: 37
End: 2022-06-20
Payer: COMMERCIAL

## 2022-06-20 VITALS
BODY MASS INDEX: 43.05 KG/M2 | HEART RATE: 94 BPM | OXYGEN SATURATION: 97 % | RESPIRATION RATE: 17 BRPM | WEIGHT: 228 LBS | TEMPERATURE: 98 F | DIASTOLIC BLOOD PRESSURE: 84 MMHG | HEIGHT: 61 IN | SYSTOLIC BLOOD PRESSURE: 129 MMHG

## 2022-06-20 DIAGNOSIS — Z45.2 ENCOUNTER FOR REMOVAL OF TUNNELED CENTRAL VENOUS CATHETER (CVC) WITH PORT: Primary | ICD-10-CM

## 2022-06-20 LAB
B-HCG UR QL: NEGATIVE
CTP QC/QA: YES

## 2022-06-20 PROCEDURE — 25000003 PHARM REV CODE 250: Performed by: SURGERY

## 2022-06-20 PROCEDURE — 81025 URINE PREGNANCY TEST: CPT | Performed by: SURGERY

## 2022-06-20 PROCEDURE — 71000015 HC POSTOP RECOV 1ST HR: Performed by: SURGERY

## 2022-06-20 PROCEDURE — 88300 PR  SURG PATH,GROSS,LEVEL I: ICD-10-PCS | Mod: 26,,, | Performed by: PATHOLOGY

## 2022-06-20 PROCEDURE — 36000707: Performed by: SURGERY

## 2022-06-20 PROCEDURE — 63600175 PHARM REV CODE 636 W HCPCS: Performed by: SURGERY

## 2022-06-20 PROCEDURE — 00400 ANES INTEGUMENTARY SYS NOS: CPT | Performed by: SURGERY

## 2022-06-20 PROCEDURE — 63600175 PHARM REV CODE 636 W HCPCS: Performed by: NURSE ANESTHETIST, CERTIFIED REGISTERED

## 2022-06-20 PROCEDURE — 37000009 HC ANESTHESIA EA ADD 15 MINS: Performed by: SURGERY

## 2022-06-20 PROCEDURE — 88300 SURGICAL PATH GROSS: CPT | Performed by: PATHOLOGY

## 2022-06-20 PROCEDURE — 11200 RMVL SKIN TAGS UP TO&INC 15: CPT | Mod: 51,,, | Performed by: SURGERY

## 2022-06-20 PROCEDURE — 11200 PR REMOVAL OF SKIN TAGS, UP TO 15: ICD-10-PCS | Mod: 51,,, | Performed by: SURGERY

## 2022-06-20 PROCEDURE — 36000706: Performed by: SURGERY

## 2022-06-20 PROCEDURE — 37000008 HC ANESTHESIA 1ST 15 MINUTES: Performed by: SURGERY

## 2022-06-20 PROCEDURE — 25000003 PHARM REV CODE 250: Performed by: NURSE ANESTHETIST, CERTIFIED REGISTERED

## 2022-06-20 PROCEDURE — 71000016 HC POSTOP RECOV ADDL HR: Performed by: SURGERY

## 2022-06-20 PROCEDURE — 36590 REMOVAL TUNNELED CV CATH: CPT | Mod: ,,, | Performed by: SURGERY

## 2022-06-20 PROCEDURE — 88300 SURGICAL PATH GROSS: CPT | Mod: 26,,, | Performed by: PATHOLOGY

## 2022-06-20 PROCEDURE — 36590 PR REMOVAL TUNNELED CV CATH W SUBQ PORT OR PUMP: ICD-10-PCS | Mod: ,,, | Performed by: SURGERY

## 2022-06-20 RX ORDER — BUPIVACAINE HYDROCHLORIDE 2.5 MG/ML
INJECTION, SOLUTION INFILTRATION; PERINEURAL
Status: DISCONTINUED | OUTPATIENT
Start: 2022-06-20 | End: 2022-06-20 | Stop reason: HOSPADM

## 2022-06-20 RX ORDER — ONDANSETRON 2 MG/ML
INJECTION INTRAMUSCULAR; INTRAVENOUS
Status: DISCONTINUED | OUTPATIENT
Start: 2022-06-20 | End: 2022-06-20

## 2022-06-20 RX ORDER — SODIUM CHLORIDE 9 MG/ML
INJECTION, SOLUTION INTRAVENOUS CONTINUOUS
Status: DISCONTINUED | OUTPATIENT
Start: 2022-06-20 | End: 2022-06-20 | Stop reason: HOSPADM

## 2022-06-20 RX ORDER — MIDAZOLAM HYDROCHLORIDE 1 MG/ML
INJECTION, SOLUTION INTRAMUSCULAR; INTRAVENOUS
Status: DISCONTINUED | OUTPATIENT
Start: 2022-06-20 | End: 2022-06-20

## 2022-06-20 RX ORDER — CEFAZOLIN SODIUM 2 G/50ML
2 SOLUTION INTRAVENOUS
Status: COMPLETED | OUTPATIENT
Start: 2022-06-20 | End: 2022-06-20

## 2022-06-20 RX ORDER — OXYCODONE HYDROCHLORIDE 5 MG/1
5 TABLET ORAL ONCE
Status: COMPLETED | OUTPATIENT
Start: 2022-06-20 | End: 2022-06-20

## 2022-06-20 RX ORDER — LIDOCAINE HYDROCHLORIDE 20 MG/ML
INJECTION INTRAVENOUS
Status: DISCONTINUED | OUTPATIENT
Start: 2022-06-20 | End: 2022-06-20

## 2022-06-20 RX ORDER — PROPOFOL 10 MG/ML
VIAL (ML) INTRAVENOUS CONTINUOUS PRN
Status: DISCONTINUED | OUTPATIENT
Start: 2022-06-20 | End: 2022-06-20

## 2022-06-20 RX ORDER — LIDOCAINE HYDROCHLORIDE 10 MG/ML
INJECTION INFILTRATION; PERINEURAL
Status: DISCONTINUED | OUTPATIENT
Start: 2022-06-20 | End: 2022-06-20 | Stop reason: HOSPADM

## 2022-06-20 RX ORDER — PROPOFOL 10 MG/ML
VIAL (ML) INTRAVENOUS
Status: DISCONTINUED | OUTPATIENT
Start: 2022-06-20 | End: 2022-06-20

## 2022-06-20 RX ORDER — FENTANYL CITRATE 50 UG/ML
INJECTION, SOLUTION INTRAMUSCULAR; INTRAVENOUS
Status: DISCONTINUED | OUTPATIENT
Start: 2022-06-20 | End: 2022-06-20

## 2022-06-20 RX ADMIN — MIDAZOLAM 2 MG: 1 INJECTION INTRAMUSCULAR; INTRAVENOUS at 06:06

## 2022-06-20 RX ADMIN — ONDANSETRON 8 MG: 2 INJECTION, SOLUTION INTRAMUSCULAR; INTRAVENOUS at 07:06

## 2022-06-20 RX ADMIN — SODIUM CHLORIDE: 0.9 INJECTION, SOLUTION INTRAVENOUS at 06:06

## 2022-06-20 RX ADMIN — FENTANYL CITRATE 50 MCG: 50 INJECTION, SOLUTION INTRAMUSCULAR; INTRAVENOUS at 06:06

## 2022-06-20 RX ADMIN — LIDOCAINE HYDROCHLORIDE 100 MG: 20 INJECTION, SOLUTION INTRAVENOUS at 07:06

## 2022-06-20 RX ADMIN — CEFAZOLIN SODIUM 2 G: 2 SOLUTION INTRAVENOUS at 07:06

## 2022-06-20 RX ADMIN — OXYCODONE 5 MG: 5 TABLET ORAL at 08:06

## 2022-06-20 RX ADMIN — GLYCOPYRROLATE 0.1 MG: 0.2 INJECTION, SOLUTION INTRAMUSCULAR; INTRAVITREAL at 06:06

## 2022-06-20 RX ADMIN — FENTANYL CITRATE 50 MCG: 50 INJECTION, SOLUTION INTRAMUSCULAR; INTRAVENOUS at 07:06

## 2022-06-20 RX ADMIN — SODIUM CHLORIDE: 0.9 INJECTION, SOLUTION INTRAVENOUS at 07:06

## 2022-06-20 RX ADMIN — PROPOFOL 150 MCG/KG/MIN: 10 INJECTION, EMULSION INTRAVENOUS at 07:06

## 2022-06-20 RX ADMIN — PROPOFOL 75 MG: 10 INJECTION, EMULSION INTRAVENOUS at 07:06

## 2022-06-20 NOTE — ANESTHESIA POSTPROCEDURE EVALUATION
Anesthesia Post Evaluation    Patient: Jerry Coronado    Procedure(s) Performed: Procedure(s) (LRB):  REMOVAL, CATHETER, CENTRAL VENOUS, TUNNELED, WITH PORT (Right)  REMOVAL, SKIN TAG (Left)    Final Anesthesia Type: general      Patient location during evaluation: PACU  Patient participation: Yes- Able to Participate  Level of consciousness: awake and alert, oriented and awake  Post-procedure vital signs: reviewed and stable  Pain management: adequate  Airway patency: patent    PONV status at discharge: No PONV  Anesthetic complications: no      Cardiovascular status: blood pressure returned to baseline  Respiratory status: unassisted and room air  Hydration status: euvolemic  Follow-up not needed.          Vitals Value Taken Time   /84 06/20/22 0905   Temp 36.7 °C (98 °F) 06/20/22 0905   Pulse 94 06/20/22 0905   Resp 17 06/20/22 0905   SpO2 97 % 06/20/22 0905         No case tracking events are documented in the log.      Pain/Grisel Score: Pain Rating Prior to Med Admin: 7 (6/20/2022  8:24 AM)  Pain Rating Post Med Admin: 3 (6/20/2022  9:05 AM)  Grisel Score: 10 (6/20/2022  9:05 AM)

## 2022-06-20 NOTE — ANESTHESIA PREPROCEDURE EVALUATION
06/20/2022  Jerry Coronado is a 36 y.o., female.      Pre-op Assessment    I have reviewed the Patient Summary Reports.     I have reviewed the Nursing Notes. I have reviewed the NPO Status.   I have reviewed the Medications.     Review of Systems  Anesthesia Hx:  No problems with previous Anesthesia    Cardiovascular:  Cardiovascular Normal     Pulmonary:   Sleep Apnea    Hepatic/GI:  Hepatic/GI Normal    Neurological:   Neuromuscular Disease,    Endocrine:   Hypothyroidism    Psych:   Psychiatric History          Physical Exam  General: Well nourished, Cooperative, Alert and Oriented    Airway:  Mallampati: II   Mouth Opening: Normal  TM Distance: Normal  Tongue: Normal  Neck ROM: Normal ROM    Chest/Lungs:  Clear to auscultation, Normal Respiratory Rate    Heart:  Rate: Normal  Rhythm: Regular Rhythm  Sounds: Normal        Anesthesia Plan  Type of Anesthesia, risks & benefits discussed:    Anesthesia Type: Gen ETT, MAC  Intra-op Monitoring Plan: Standard ASA Monitors  Post Op Pain Control Plan: multimodal analgesia  Induction:  IV  Airway Plan: Direct  Informed Consent: Informed consent signed with the Patient and all parties understand the risks and agree with anesthesia plan.  All questions answered.   ASA Score: 2    Ready For Surgery From Anesthesia Perspective.     .

## 2022-06-20 NOTE — OP NOTE
PATIENT: Jerry Coronado    MRN: [id number]    DATE OF PROCEDURE: 06/20/2022    PREOPERATIVE DIAGNOSIS:   1. Port-A-Cath no longer needed  2. Request for Port-A-Cath scar revision  3. Left axillary skin tag    POSTOPERATIVE DIAGNOSIS:  same    PROCEDURE:    1.Removal of Port-A-Cath  2. Scar revision of Right Chest port-a-cath site with complex multi-layered closure (7 cm)  3. Excision Left Axillary skin tag    SURGEON: Blanco Kerns M.D.    ASSISTANT: Margoth Hui M.D.    ANESTHESIA:  Local MAC    ESTIMATED BLOOD LOSS:  Minimal    SPECIMEN:  Port for identification    CONDITION:  Stable    COMPLICATIONS: None    INDICATIONS: The patient is a 36 yoWF that no longer requires their port as well as request scar revision and left axillary skin tag removal. The risks, benefits, and alternatives to the procedures were explained to the patient in detail.  The patient stated clear understanding of all the risks and requested the procedure be done.    PROCEDURE IN DETAIL:  After surgical consent was obtained, the chest was prepped and draped in a standard sterile fashion.  Local anesthetic was injected.  An incision was made around the previous scar and the scar was excised.  The incision was carried down through the port capsule.  The complete port and catheter system were removed without incident.  Prior to removing the catheter from the subcutaneous tunnel, a 3-0 vicryl stitch was placed around the tunneled and tied down as the catheter was removed.  The wound was further revised by closing it in multiple layers.  The deep and dermis were closed with 3-0 vicryls and the skin was closed with 4-0 monocryl and dermabond.  The left axilla was then separately prepped and draped in standard sterile fashion.  Local anesthetic was injected below the skin tag.  It was sharply excised with a scalpel.  Hemostasis was obtained with pressure and a Band-Aid was applied.  At the end of the procedure all counts were correct.  The  patient was transported to the recovery area having tolerated the procedure without evidence of complication.  The patient's family was updated and all questions were answered.      Blanco Kerns M.D., F.A.C.S.  [facility] and General Surgery  [facility]

## 2022-06-20 NOTE — DISCHARGE SUMMARY
[facility] (Hospital)  Discharge Note  Short Stay    Procedure(s) (LRB):  REMOVAL, CATHETER, CENTRAL VENOUS, TUNNELED, WITH PORT (Right)  REMOVAL, SKIN TAG (Left)    OUTCOME: Patient tolerated treatment/procedure well without complication and is now ready for discharge.    DISPOSITION: Home or Self Care    FINAL DIAGNOSIS:  Request for PAC removal and scar revision, request for left skin tag excision    FOLLOWUP: In clinic x 2 weeks    DISCHARGE INSTRUCTIONS:  No discharge procedures on file.     TIME SPENT ON DISCHARGE: 10 minutes

## 2022-06-20 NOTE — DISCHARGE INSTRUCTIONS
DRESSING CARE AND ACTIVITY  -KEEP INCISION AND DRESSING CLEAN, DRY AND INTACT FOR 24 HOURS AFTER 24 HOURS YOU CAN SHOWER WITH NO RESTRICTIONS EXCEPT NO SUBMERGING IN WATER UNTIL FULLY HEALED  -ACTIVITY AS TOLERATED, NO HEAVY LIFTING OR STRENUOUS ACTIVITY, FOR ABOUT TWO WEEKS  -TAKE PAIN MEDS AS NEEDED  ANESTHESIA  -For the first 24 hours after surgery:  Do not drive, use heavy equipment, make important decisions, or drink alcohol  -It is normal to feel sleepy for several hours.  Rest until you are more awake.  -Have someone stay with you, if needed.  They can watch for problems and help keep you safe.  -Some possible post anesthesia side effects include: nausea and vomiting, sore throat and hoarseness, sleepiness, and dizziness.    PAIN  -If you have pain after surgery, pain medicine will help you feel better.  Take it as directed, before pain becomes severe.  Most pain relievers taken by mouth need at least 20-30 minutes to start working.  -Do not drive or drink alcohol while taking pain medicine.  -Pain medication can upset your stomach.  Taking them with a little food may help.  -Other ways to help control pain: elevation, ice, and relaxation  -Call your surgeon if still having unmanageable pain an hour after taking pain medicine.  -Pain medicine can cause constipation.  Taking an over-the counter stool softener while on prescription pain medicine and drinking plenty of fluids can prevent this side effect.  -Call your surgeon if you have severe side effects like: breathing problems, trouble waking up, dizziness, confusion, or severe constipation.    NAUSEA  -Some people have nausea after surgery.  This is often because of anesthesia, pain, pain medicine, or the stress of surgery.  -Do not push yourself to eat.  Start off with clear liquids and soup.  Slowly move to solid foods.  Don't eat fatty, rich, spicy foods at first.  Eat smaller amounts.  -If you develop persistent nausea and vomiting please notify your  surgeon immediately.    BLEEDING  -Different types of surgery require different types of care and dressing changes.  It is important to follow all instructions and advice from your surgeon.  Change dressing as directed.  Call your surgeon for any concerns regarding postop bleeding.    SIGNS OF INFECTION  -Signs of infection include: fever, swelling, drainage, and redness  -Notify your surgeon if you have a fever of 100.4 F (38.0 C) or higher.  -Notify your surgeon if you notice redness, swelling, increased pain, pus, or a foul smell at the incision site.

## 2022-06-20 NOTE — TRANSFER OF CARE
"Anesthesia Transfer of Care Note    Patient: Jerry Coronado    Procedure(s) Performed: Procedure(s) (LRB):  REMOVAL, CATHETER, CENTRAL VENOUS, TUNNELED, WITH PORT (Right)  REMOVAL, SKIN TAG (Left)    Patient location: [facility]    Anesthesia Type: MAC    Transport from OR: Transported from OR on room air with adequate spontaneous ventilation    Post pain: adequate analgesia    Post assessment: no apparent anesthetic complications and tolerated procedure well    Post vital signs: stable    Level of consciousness: awake and alert    Nausea/Vomiting: no nausea/vomiting    Complications: none    Transfer of care protocol was followed      Last vitals:   Visit Vitals  /78 (BP Location: Right arm, Patient Position: Lying)   Temp 36.8 °C (98.2 °F) (Skin)   Resp 16   Ht 5' 1" (1.549 m)   Wt 103.4 kg (228 lb)   SpO2 95%   Breastfeeding No   BMI 43.08 kg/m²     "

## 2022-06-20 NOTE — H&P
[facility]  INPATIENT H&P    REASON FOR CONSULT/ADMISSION: port removal    HPI: Jerry Coronado is a 36 y.o. female with port that is no longer needed.  She wants it removed.    I have reviewed the patient's chart including prior progress notes, procedures and testing.     ROS:   Review of Systems    PROBLEM LIST:  Patient Active Problem List   Diagnosis    Insomnia secondary to depression with anxiety    Low back pain    Acne    Situational stress    Breast hypertrophy    Hypothyroidism    Allergic rhinitis    Chronic infection of sinus    Hypertrophy of nasal turbinates    Deviated nasal septum    Eustachian tube dysfunction    Acquired atrophy of thyroid    Alcohol abuse    Alcohol use with alcohol-induced mood disorder    Atypical squamous cells of undetermined significance (ASCUS) on Papanicolaou smear of cervix    Carpal tunnel syndrome    Cervical intraepithelial neoplasia grade 2    Overdose    Jaw pain    Moderate recurrent major depression    Obstructive sleep apnea syndrome    Other depressive episodes    Personal history of other infectious and parasitic diseases    Postherpetic neuralgia    Shock    Supraventricular tachycardia    Tobacco user    Vaginal high risk human papillomavirus (HPV) DNA test positive    Low grade squamous intraepithelial dysplasia    Atypical glandular cells of undetermined significance (LEE) on cervical Pap smear    Cervical dysplasia    Abnormal uterine bleeding (AUB)    Arcuate uterus    Supraclavicular lymphadenopathy    Axillary lymphadenopathy    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 OR;  Service: General;  Laterality: Left;    CERVIX LESION DESTRUCTION      cryo in  (?)    CONIZATION OF CERVIX USING LOOP ELECTROSURGICAL EXCISION PROCEDURE (LEEP) N/A 2021    Procedure: LEEP CONIZATION, CERVIX;  Surgeon: Donan Castillo MD;  [facility];  Service: OB/GYN;  Laterality: N/A;    EXCISION OF LESION OF NOSE N/A 10/14/2019    Procedure: EXCISION, LESION, NOSE;  Surgeon: Umesh Morales MD;  Location: [facility] OR;  Service: ENT;  Laterality: N/A;    FUNCTIONAL ENDOSCOPIC SINUS SURGERY (FESS) N/A 10/14/2019    Procedure: FESS (FUNCTIONAL ENDOSCOPIC SINUS SURGERY);  Surgeon: Umesh Morales MD;  Location: [facility] OR;  Service: ENT;  Laterality: N/A;    HYSTEROSCOPY WITH DILATION AND CURETTAGE OF UTERUS N/A 2021    Procedure: HYSTEROSCOPY, WITH DILATION AND CURETTAGE OF UTERUS;  Surgeon: Donna Castillo MD;  [facility];  Service: OB/GYN;  Laterality: N/A;    NASAL SEPTOPLASTY N/A 10/14/2019    Procedure: SEPTOPLASTY, NOSE;  Surgeon: Umesh Morales MD;  Location: [facility] OR;  Service: ENT;  Laterality: N/A;    NASAL SINUS SURGERY  10/14/2019    PILONIDAL CYST DRAINAGE      TONSILLECTOMY      TONSILLECTOMY      TYMPANOPLASTY N/A 10/14/2019    Procedure: TYMPANOPLASTY;  Surgeon: Umesh Morales MD;  [facility];  Service: ENT;  Laterality: N/A;    TYMPANOTOMY Left 10/14/2019    left       Social History     Tobacco Use    Smoking status: Former Smoker     Packs/day: 0.50     Types: Cigarettes     Start date: 2000     Quit date: 2009     Years since quittin.9    Smokeless tobacco: Never Used   Substance Use Topics    Alcohol use: Yes     Alcohol/week: 5.0 standard drinks     Types: 5 Shots of liquor per week     Comment: "occasionally"    Drug use: No       Family History   Problem Relation Age of Onset    Hypertension Mother     Heart disease Father 59        CABG    Heart " failure Father     Breast cancer Maternal Aunt 55    Colon cancer Neg Hx     Ovarian cancer Neg Hx          MEDS:  No current facility-administered medications on file prior to encounter.     Current Outpatient Medications on File Prior to Encounter   Medication Sig Dispense Refill    amLODIPine (NORVASC) 2.5 MG tablet Take 2.5 mg by mouth once daily.      ferrous sulfate (FEOSOL) 325 mg (65 mg iron) Tab tablet Take by mouth 2 (two) times daily.      gabapentin (NEURONTIN) 600 MG tablet Take 600 mg by mouth 3 (three) times daily.       guaiFENesin 100 mg/5 ml (ROBITUSSIN) 100 mg/5 mL syrup as needed.      hydrOXYzine pamoate (VISTARIL) 50 MG Cap Take 1 capsule (50 mg total) by mouth 2 (two) times daily as needed. 60 capsule 2    acetaminophen-codeine 300-30mg (TYLENOL #3) 300-30 mg Tab acetaminophen 300 mg-codeine 30 mg tablet      allopurinoL (ZYLOPRIM) 300 MG tablet Take 1 tablet (300 mg total) by mouth once daily. (Patient not taking: Reported on 5/11/2022) 30 tablet 11    amoxicillin-clavulanate 875-125mg (AUGMENTIN) 875-125 mg per tablet Take 1 tablet by mouth 2 (two) times daily.      benzonatate (TESSALON) 100 MG capsule benzonatate 100 mg capsule      benzoyl peroxide 2.5 % gel 1 application to affected area (Patient not taking: No sig reported) 60 g 1    buPROPion (WELLBUTRIN XL) 300 MG 24 hr tablet Take 1 tablet (300 mg total) by mouth every morning. (Patient not taking: Reported on 5/11/2022) 90 tablet 3    doxycycline (MONODOX) 100 MG capsule 1 capsule      drospirenone-ethinyl estradioL (EMELINA) 3-0.03 mg per tablet Emelina 3 mg-0.03 mg tablet  TAKE 1 TABLET BY MOUTH EVERY DAY SKIP PLACEBO (Patient not taking: Reported on 5/11/2022) 84 tablet 4    fluticasone (FLONASE) 50 mcg/actuation nasal spray INSTILL 2 SPRAYS IN EACH NOSTRIL ONCE DAILY 1 Bottle 3    ibuprofen (ADVIL,MOTRIN) 800 MG tablet ibuprofen 800 mg tablet      levalbuterol (XOPENEX HFA) 45 mcg/actuation inhaler Inhale 1 puff  into the lungs every 4 (four) hours as needed for Wheezing or Shortness of Breath. Rescue (Patient not taking: Reported on 5/11/2022) 15 g 1    levoFLOXacin (LEVAQUIN) 500 MG tablet Take 500 mg by mouth once daily.      levothyroxine (SYNTHROID) 50 MCG tablet Take 1 tablet by mouth once daily 30 tablet 4    LIDOcaine (LMX) 4 % cream 1 application as needed      LIDOcaine-prilocaine (EMLA) cream Apply small amount topically to port incision approximately 30 minutes prior to port access as needed      LORazepam (ATIVAN) 1 MG tablet Take by mouth.      methylPREDNISolone (MEDROL DOSEPACK) 4 mg tablet Take by mouth.      multivitamin capsule Take 1 capsule by mouth.      ondansetron (ZOFRAN) 8 MG tablet Take 8 mg by mouth 3 (three) times daily.      ondansetron (ZOFRAN-ODT) 8 MG TbDL Take 1 tablet (8 mg total) by mouth every 8 (eight) hours as needed (chemotherapy-induced nausea and vomiting). 40 tablet 5    oxyCODONE-acetaminophen (PERCOCET) 5-325 mg per tablet oxycodone-acetaminophen 5 mg-325 mg tablet      prazosin (MINIPRESS) 2 MG Cap 1 capsule at bedtime      predniSONE (DELTASONE) 50 MG Tab Take 1 tablet (50 mg total) by mouth once daily. (Patient not taking: Reported on 5/11/2022) 5 tablet 0    promethazine (PHENERGAN) 6.25 mg/5 mL syrup as needed.      QUEtiapine (SEROQUEL) 100 MG Tab Take 100 mg by mouth once daily.      sertraline (ZOLOFT) 100 MG tablet Take 1 tablet (100 mg total) by mouth once daily. 30 tablet 1    tiZANidine (ZANAFLEX) 4 MG tablet Take 4 mg by mouth 3 (three) times daily as needed.      traMADoL (ULTRAM) 50 mg tablet Take 50 mg by mouth 2 (two) times daily as needed.      valACYclovir (VALTREX) 1000 MG tablet Take 2 tablets (2,000 mg total) by mouth every 12 (twelve) hours. for 1 day (Patient taking differently: Take 2,000 mg by mouth as needed.) 4 tablet 2    [DISCONTINUED] DULoxetine (CYMBALTA) 60 MG capsule Take 60 mg by mouth once daily.         ALLERGIES:  Review of  patient's allergies indicates:  No Known Allergies      VITALS:  Temp:  [98.2 °F (36.8 °C)] 98.2 °F (36.8 °C)  Resp:  [16] 16  SpO2:  [95 %] 95 %  BP: (126)/(78) 126/78    No intake/output data recorded.      PHYSICAL EXAM:  Physical Exam  SKIN: R chest PAC in place, left axillary skin tag    LABS:  Lab Results   Component Value Date    WBC 5.69 06/13/2022    RBC 5.02 06/13/2022    HGB 14.8 06/13/2022    HCT 41.9 06/13/2022     06/13/2022     Lab Results   Component Value Date     (H) 06/13/2022     (L) 06/13/2022    K 3.6 06/13/2022     06/13/2022    CO2 23 06/13/2022    BUN 13 06/13/2022    CREATININE 0.7 06/13/2022    CALCIUM 8.8 06/13/2022     Lab Results   Component Value Date    ALT 33 06/13/2022    AST 21 06/13/2022    ALKPHOS 62 06/13/2022    BILITOT 0.8 06/13/2022     Lab Results   Component Value Date    MG 1.8 04/14/2022    PHOS 3.3 04/14/2022           ASSESSMENT & PLAN:  36 y.o. female who requests removal of PAC, PAC scar revision, and removal of left axillary skin tag  Risks, benefits, and alternatives to the procedure were explained to the patient in detail.  All questions were answered and the patient has requested the procedure be done.  Informed consent was obtained.        Blanco Kerns M.D., F.A.C.S.  [facility] and General Surgery  [facility]

## 2022-06-22 LAB
FINAL PATHOLOGIC DIAGNOSIS: NORMAL
GROSS: NORMAL
Lab: NORMAL

## 2022-06-24 ENCOUNTER — PATIENT MESSAGE (OUTPATIENT)
Dept: SURGERY | Facility: CLINIC | Age: 37
End: 2022-06-24
Payer: COMMERCIAL

## 2022-06-29 ENCOUNTER — HOSPITAL ENCOUNTER (OUTPATIENT)
Dept: RADIOLOGY | Facility: HOSPITAL | Age: 37
Discharge: HOME | End: 2022-06-29
Attending: INTERNAL MEDICINE
Payer: COMMERCIAL

## 2022-06-29 ENCOUNTER — PATIENT MESSAGE (OUTPATIENT)
Dept: PSYCHIATRY | Facility: CLINIC | Age: 37
End: 2022-06-29
Payer: COMMERCIAL

## 2022-06-29 VITALS — BODY MASS INDEX: 43.43 KG/M2 | HEIGHT: 61 IN | WEIGHT: 230 LBS

## 2022-06-29 DIAGNOSIS — C82.91 FOLLICULAR LYMPHOMA OF LYMPH NODES OF NECK, UNSPECIFIED FOLLICULAR LYMPHOMA TYPE: ICD-10-CM

## 2022-06-29 LAB — GLUCOSE SERPL-MCNC: 95 MG/DL (ref 70–110)

## 2022-06-29 PROCEDURE — 78815 PET IMAGE W/CT SKULL-THIGH: CPT | Mod: TC,PO
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
## 2022-06-30 ENCOUNTER — PATIENT MESSAGE (OUTPATIENT)
Dept: PSYCHIATRY | Facility: CLINIC | Age: 37
End: 2022-06-30
Payer: COMMERCIAL

## 2022-06-30 ENCOUNTER — PATIENT MESSAGE (OUTPATIENT)
Dept: OBSTETRICS AND GYNECOLOGY | Facility: CLINIC | Age: 37
End: 2022-06-30
Payer: COMMERCIAL

## 2022-06-30 NOTE — TELEPHONE ENCOUNTER
Called patient this morning at 8 AM upon my arrival to the clinic. No answer, left voicemail informing patient of the appointment that support staff was able to schedule. In voicemail I asked patient to call the clinic back to check in on her as well as set her up with another appointment as soon as possible.

## 2022-06-30 NOTE — TELEPHONE ENCOUNTER
I rescheduled pt's appointment to 7/18 at 4:00 and added her to the wait list. There are currently no sooner appointments available.

## 2022-06-30 NOTE — TELEPHONE ENCOUNTER
I called the patient again to check on her. No answer, left voicemail. Will reply to my chart message from her.

## 2022-07-01 NOTE — TELEPHONE ENCOUNTER
Called patient again today. Patient did not answer, left voicemail offering sooner appt on 07/07 as well as let her know that she can call the office today if she would like to speak to me.

## 2022-07-05 ENCOUNTER — PATIENT MESSAGE (OUTPATIENT)
Dept: PSYCHIATRY | Facility: CLINIC | Age: 37
End: 2022-07-05
Payer: COMMERCIAL

## 2022-07-05 ENCOUNTER — PATIENT MESSAGE (OUTPATIENT)
Dept: HEMATOLOGY/ONCOLOGY | Facility: CLINIC | Age: 37
End: 2022-07-05
Payer: COMMERCIAL

## 2022-07-05 DIAGNOSIS — J06.9 UPPER RESPIRATORY TRACT INFECTION, UNSPECIFIED TYPE: Primary | ICD-10-CM

## 2022-07-05 RX ORDER — METHYLPREDNISOLONE 4 MG/1
4 TABLET ORAL DAILY
Qty: 21 EACH | Refills: 0 | Status: SHIPPED | OUTPATIENT
Start: 2022-07-05 | End: 2022-12-16 | Stop reason: ALTCHOICE

## 2022-07-05 RX ORDER — AZITHROMYCIN 250 MG/1
TABLET, FILM COATED ORAL
Qty: 6 TABLET | Refills: 0 | Status: SHIPPED | OUTPATIENT
Start: 2022-07-05 | End: 2022-07-10

## 2022-07-06 ENCOUNTER — OFFICE VISIT (OUTPATIENT)
Dept: HEMATOLOGY/ONCOLOGY | Facility: CLINIC | Age: 37
End: 2022-07-06
Payer: MEDICAID

## 2022-07-06 DIAGNOSIS — C82.91 FOLLICULAR LYMPHOMA OF LYMPH NODES OF NECK, UNSPECIFIED FOLLICULAR LYMPHOMA TYPE: Primary | ICD-10-CM

## 2022-07-06 DIAGNOSIS — G89.3 CHRONIC NEOPLASM-RELATED PAIN: ICD-10-CM

## 2022-07-06 DIAGNOSIS — E66.01 MORBID OBESITY: ICD-10-CM

## 2022-07-06 DIAGNOSIS — D84.821 IMMUNODEFICIENCY DUE TO DRUG THERAPY: ICD-10-CM

## 2022-07-06 DIAGNOSIS — Z79.899 IMMUNODEFICIENCY DUE TO DRUG THERAPY: ICD-10-CM

## 2022-07-06 DIAGNOSIS — F06.30 MOOD DISORDER DUE TO A GENERAL MEDICAL CONDITION: ICD-10-CM

## 2022-07-06 DIAGNOSIS — F43.9 SITUATIONAL STRESS: ICD-10-CM

## 2022-07-06 PROCEDURE — 99214 OFFICE O/P EST MOD 30 MIN: CPT | Mod: 95,,, | Performed by: INTERNAL MEDICINE

## 2022-07-06 PROCEDURE — 1159F MED LIST DOCD IN RCRD: CPT | Mod: CPTII,95,, | Performed by: INTERNAL MEDICINE

## 2022-07-06 PROCEDURE — 1160F PR REVIEW ALL MEDS BY PRESCRIBER/CLIN PHARMACIST DOCUMENTED: ICD-10-PCS | Mod: CPTII,95,, | Performed by: INTERNAL MEDICINE

## 2022-07-06 PROCEDURE — 99214 PR OFFICE/OUTPT VISIT, EST, LEVL IV, 30-39 MIN: ICD-10-PCS | Mod: 95,,, | Performed by: INTERNAL MEDICINE

## 2022-07-06 PROCEDURE — 1160F RVW MEDS BY RX/DR IN RCRD: CPT | Mod: CPTII,95,, | Performed by: INTERNAL MEDICINE

## 2022-07-06 PROCEDURE — 1159F PR MEDICATION LIST DOCUMENTED IN MEDICAL RECORD: ICD-10-PCS | Mod: CPTII,95,, | Performed by: INTERNAL MEDICINE

## 2022-07-06 NOTE — PROGRESS NOTES
PATIENT: Jerry Coronado  MRN: [id number]  DATE: 7/6/2022    Diagnosis:   1. 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 Immunodeficiency due to drug therapy    3. Chronic neoplasm-related pain    4. Morbid obesity    5. Mood disorder due to a general medical condition    6. Situational stress      Chief Complaint: Lymphoma    Oncologic History:      Oncologic History 1. Follicular lymphoma      Oncologic Treatment 1. Bendamustine/rituximab - start date 1/25/22 - 1/26/22      Pathology 11/29/21:  Lymph node, left supraclavicular region, excision:  --Follicular lymphoma with high Ki-67 proliferation index, see comment  Comment: Concomitantly submitted flow cytometric analysis detects B-cell lymphoma of germinal center  origin expressing CD19, bright CD20, and CD10 with kappa light chain restriction. CD5 is negative in this  population.  Although the majority of the lymph node appears to represent as grade 2 follicular lymphoma, the Ki-67  proliferation index is much higher than expected for typical low-grade follicular lymphoma. Additionally there  is a very small area suggestive of grade 3A follicular lymphoma as well, representing less than 5% of the overall cellularity. This sampling leaves concern for a higher grade process elsewhere and correlation with  clinical and radiologic findings including PET scan findings is highly recommended with additional biopsy of  areas of high SUV if indicated.      Telemedicine visit:  The patient location is: home  The chief complaint leading to consultation is: follicular lymphoma    Visit type: audiovisual    Face to Face time with patient: 10 minutes  15 minutes of total time spent on the encounter, which includes face to face time and non-face to face time preparing to see the patient (eg, review of tests), Obtaining and/or reviewing separately obtained history, Documenting clinical information in the electronic or other health  record, Independently interpreting results (not separately reported) and communicating results to the patient/family/caregiver, or Care coordination (not separately reported).     Each patient to whom he or she provides medical services by telemedicine is:  (1) informed of the relationship between the physician and patient and the respective role of any other health care provider with respect to management of the patient; and (2) notified that he or she may decline to receive medical services by telemedicine and may withdraw from such care at any time.    Notes: see below      Subjective:    History of Present Illness: Ms. Coronado is a 37 y.o. female who presented in December 2021 for evaluation and management of B cell lymphoma. She was referred by Dr. Kerns.    - presenting symptom was enlarged supraclavicular lymph nodes  - she underwent excisional lymph node biopsy on 11/29/21. Pathology revealed a follicular lymphoma with high ki-67 proliferation index.  - she reports having a CT scan in [place] in early November with enlarged lymph nodes noted throughout.  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 This may make treatment decisions challenging logistically.  - she received cycle #1 of bendamustine/rituximab on 1/25/22 - 1/26/22 in [place], [place].  - she underwent PET/CT scan on 4/14/22.    Interval history:  - she presents for a follow-up appointment for her follicular lymphoma  - she completed 6 cycles of bendamustine/rituximab (last was on 6/16/22 - 6/17/22).  - she underwent port removal on 6/20/22.  - she underwent pet scan on 6/29/22.  - today, she is okay. She had several stressors over the past few weeks. She denies shortness of breath, chest pain, vomiting, constipation. She denies fever, night sweats, unintentional weight loss.    Past medical, surgical, family, and social histories have been reviewed and updated below.    Past Medical History:   Past  Medical History:   Diagnosis Date    Allergic rhinitis     Anxiety     Depression     Hypothyroidism     SVT (supraventricular tachycardia) 2011       Past Surgical History:   Past Surgical History:   Procedure Laterality Date    ABLATION OF DYSRHYTHMIC FOCUS      x2    BIOPSY OF CERVICAL LYMPH NODE Left 11/29/2021    Procedure: BIOPSY, LYMPH NODE, CERVICAL;  Surgeon: Blanco Kerns MD;  Location: [facility] OR;  Service: General;  Laterality: Left;    CERVIX LESION DESTRUCTION      cryo in 2011 (?)    CONIZATION OF CERVIX USING LOOP ELECTROSURGICAL EXCISION PROCEDURE (LEEP) N/A 7/21/2021    Procedure: LEEP CONIZATION, CERVIX;  Surgeon: Donna Castillo MD;  Location: [facility] OR;  Service: OB/GYN;  Laterality: N/A;    EXCISION OF LESION OF NOSE N/A 10/14/2019    Procedure: EXCISION, LESION, NOSE;  Surgeon: Umesh Morales MD;  Location: [facility] OR;  Service: ENT;  Laterality: N/A;    FUNCTIONAL ENDOSCOPIC SINUS SURGERY (FESS) N/A 10/14/2019    Procedure: FESS (FUNCTIONAL ENDOSCOPIC SINUS SURGERY);  Surgeon: Umesh Morales MD;  Location: [facility] OR;  Service: ENT;  Laterality: N/A;    HYSTEROSCOPY WITH DILATION AND CURETTAGE OF UTERUS N/A 7/21/2021    Procedure: HYSTEROSCOPY, WITH DILATION AND CURETTAGE OF UTERUS;  Surgeon: Donna Castillo MD;  Location: [facility];  Service: OB/GYN;  Laterality: N/A;    MEDIPORT REMOVAL Right 6/20/2022    Procedure: REMOVAL, CATHETER, CENTRAL VENOUS, TUNNELED, WITH PORT;  Surgeon: Blanco Kerns MD;  Location: [facility] OR;  Service: General;  Laterality: Right;    NASAL SEPTOPLASTY N/A 10/14/2019    Procedure: SEPTOPLASTY, NOSE;  Surgeon: Umesh Morales MD;  Location: [facility] OR;  Service: ENT;  Laterality: N/A;    NASAL SINUS SURGERY  10/14/2019    PILONIDAL CYST DRAINAGE      REVISION OF SCAR  6/20/2022    Procedure: REVISION, SCAR;  Surgeon: Blanco Kerns MD;  Location: [facility] OR;  Service: General;;    SKIN TAG REMOVAL Left 6/20/2022    Procedure: REMOVAL, SKIN TAG;  Surgeon:  Blanco Kerns MD;  Location: [facility] OR;  Service: General;  Laterality: Left;    TONSILLECTOMY      TONSILLECTOMY      TYMPANOPLASTY N/A 10/14/2019    Procedure: TYMPANOPLASTY;  Surgeon: Umesh Morales MD;  Location: [facility] OR;  Service: ENT;  Laterality: N/A;    TYMPANOTOMY Left 10/14/2019    left       Family History:   Family History   Problem Relation Age of Onset    Hypertension Mother     Heart disease Father 59        CABG    Heart failure Father     Breast cancer Maternal Aunt 55    Colon cancer Neg Hx     Ovarian cancer Neg Hx        Social History:  reports that she quit smoking about 13 years ago. Her smoking use included cigarettes. She started smoking about 22 years ago. She smoked 0.50 packs per day. She has never used smokeless tobacco. She reports current alcohol use of about 5.0 standard drinks of alcohol per week. She reports that she does not use drugs.    Allergies:  Review of patient's allergies indicates:  No Known Allergies    Medications:  Current Outpatient Medications   Medication Sig Dispense Refill    acetaminophen-codeine 300-30mg (TYLENOL #3) 300-30 mg Tab acetaminophen 300 mg-codeine 30 mg tablet      ALPRAZolam (XANAX) 1 MG tablet Take 1 tablet (1 mg total) by mouth 2 (two) times daily as needed for Anxiety. 30 tablet 0    amLODIPine (NORVASC) 2.5 MG tablet Take 2.5 mg by mouth once daily.      amoxicillin-clavulanate 875-125mg (AUGMENTIN) 875-125 mg per tablet Take 1 tablet by mouth 2 (two) times daily.      azithromycin (Z-DAX) 250 MG tablet Take 2 tablets by mouth on day 1; Take 1 tablet by mouth on days 2-5 6 tablet 0    benzonatate (TESSALON) 100 MG capsule benzonatate 100 mg capsule      doxycycline (MONODOX) 100 MG capsule 1 capsule      dronabinoL (MARINOL) 5 MG capsule Take 1 capsule (5 mg total) by mouth 2 (two) times daily as needed (decreased appetite). 60 capsule 0    ferrous sulfate (FEOSOL) 325 mg (65 mg iron) Tab tablet Take by mouth 2 (two) times  daily.      fluticasone (FLONASE) 50 mcg/actuation nasal spray INSTILL 2 SPRAYS IN EACH NOSTRIL ONCE DAILY 1 Bottle 3    gabapentin (NEURONTIN) 600 MG tablet Take 600 mg by mouth 3 (three) times daily.       guaiFENesin 100 mg/5 ml (ROBITUSSIN) 100 mg/5 mL syrup as needed.      hydrOXYzine pamoate (VISTARIL) 50 MG Cap Take 1 capsule (50 mg total) by mouth 2 (two) times daily as needed. 60 capsule 2    ibuprofen (ADVIL,MOTRIN) 800 MG tablet ibuprofen 800 mg tablet      levoFLOXacin (LEVAQUIN) 500 MG tablet Take 500 mg by mouth once daily.      levothyroxine (SYNTHROID) 50 MCG tablet Take 1 tablet by mouth once daily 30 tablet 4    LIDOcaine (LMX) 4 % cream 1 application as needed      LIDOcaine-prilocaine (EMLA) cream Apply small amount topically to port incision approximately 30 minutes prior to port access as needed      LORazepam (ATIVAN) 1 MG tablet Take by mouth.      methylPREDNISolone (MEDROL DOSEPACK) 4 mg tablet Take by mouth.      methylPREDNISolone (MEDROL DOSEPACK) 4 mg tablet Take 1 tablet (4 mg total) by mouth once daily. use as directed 21 each 0    mirtazapine (REMERON) 7.5 MG Tab TAKE 2 TABLETS (15 MG TOTAL) BY MOUTH NIGHTLY. 60 tablet 1    multivitamin capsule Take 1 capsule by mouth.      ondansetron (ZOFRAN) 8 MG tablet Take 8 mg by mouth 3 (three) times daily.      ondansetron (ZOFRAN-ODT) 8 MG TbDL Take 1 tablet (8 mg total) by mouth every 8 (eight) hours as needed (chemotherapy-induced nausea and vomiting). 40 tablet 5    oxyCODONE (ROXICODONE) 5 MG immediate release tablet Take 1 tablet (5 mg total) by mouth every 6 (six) hours as needed for Pain (cancer-related pain). 40 tablet 0    oxyCODONE-acetaminophen (PERCOCET) 5-325 mg per tablet oxycodone-acetaminophen 5 mg-325 mg tablet      prazosin (MINIPRESS) 2 MG Cap 1 capsule at bedtime      promethazine (PHENERGAN) 6.25 mg/5 mL syrup as needed.      QUEtiapine (SEROQUEL) 100 MG Tab Take 100 mg by mouth once daily.       sertraline (ZOLOFT) 100 MG tablet Take 1 tablet (100 mg total) by mouth once daily. 30 tablet 1    tiZANidine (ZANAFLEX) 4 MG tablet Take 4 mg by mouth 3 (three) times daily as needed.      traMADoL (ULTRAM) 50 mg tablet Take 50 mg by mouth 2 (two) times daily as needed.       No current facility-administered medications for this visit.       Review of Systems   Constitutional: Positive for fatigue. Negative for appetite change and unexpected weight change.   HENT: Negative for mouth sores and sore throat.    Eyes: Negative for visual disturbance.   Respiratory: Negative for cough and shortness of breath.    Cardiovascular: Negative for chest pain.   Gastrointestinal: Positive for nausea (from chemotherapy). Negative for abdominal pain, constipation, diarrhea and vomiting.   Genitourinary: Negative for dysuria and frequency.   Musculoskeletal: Positive for neck pain (improved). Negative for back pain.   Skin: Negative for rash.   Neurological: Negative for headaches.   Hematological: Negative for adenopathy.   Psychiatric/Behavioral: The patient is not nervous/anxious.        ECOG Performance Status:   ECOG SCORE 1       Objective:      Vitals:   There were no vitals filed for this visit.  BMI: There is no height or weight on file to calculate BMI.  Deferred due to telemedicine visit.    Physical Exam  Deferred due to telemedicine visit.    Laboratory Data:  Labs have been reviewed.    Lab Results   Component Value Date    WBC 8.75 06/29/2022    HGB 14.9 06/29/2022    HCT 42.6 06/29/2022    MCV 84 06/29/2022     06/29/2022       Imaging:     pet scan (6/29/22): I have personally reviewed the images  No evidence of FDG avid lymphoproliferative disease.    PET/CT scan (4/14/22): I have personally reviewed the images  Differences in instrumentation and technique preclude semiquantitative comparison between the current PET/CT study and the prior.     Background:  - Liver 4.2 SUV max.  - Spleen 3.5 SUV max.  -  Spleen 11.9 x 4.8 cm (AP x TR)  - Blood pool 3.3 SUV max     No FDG avid lymphadenopathy or mass. The largest cervical node is a 5 x 5 mm supraclavicular node with SUV max 1.0 (image 86). The largest left axillary node measures 18 x 7 mm with SUV max 0.8 (image 103).     Additional findings:  -Right sided IJ medical infusion port with tip at the level of the SVC.  -Relative diffuse low-attenuation liver in keeping with steatosis.  -3.6 cm simple fluid attenuation on FDG avid cyst in the right ovary favored to represent a physiologic cyst in this age group. The left ovary and uterus are within normal limits.     IMPRESSION:  No evidence of FDG avid malignancy.    PET/CT scan (12/17/21): I have personally reviewed the images    IN THE HEAD AND NECK:     Multiple left-sided cervical lymph nodes, left and right supraclavicular lymph nodes and left axillary lymph nodes.  The index lesions, as follow:     Hypermetabolic lymph node at the left level Va region, measures 1.2 cm (axial series 2, image 68) with SUV max of 14.0.     Hypermetabolic left supraclavicular lymph node, measures 1.9 cm (axial series 2, image 82) with SUV max of 15.0.     Hypermetabolic left axillary lymph node, measures 3.5 cm (axial series 2, image 116) with SUV max of 12.0.     Hypermetabolic right supraclavicular lymph node, measures 1.4 cm (axial series 2, image 79) with SUV max of 13.0.     IN THE CHEST:     Focal radiotracer uptake in the left hilum with no corresponding lesion on CT images (axial fused image 116) with SUV max of 4.4.  No abnormal lesions throughout the pulmonary parenchyma.     IN THE ABDOMEN AND PELVIS:     There is a subcentimeter hypermetabolic lesion in the subcutaneous tissue of the right back at the level of L4, measures 0.9 cm (axial series 2, image 198) with SUV max of 6.7.  There is physiologic tracer distribution within the abdominal organs and excretion into the genitourinary system.     The spleen has normal uptake  "and is normal at 11.0 cm in craniocaudal dimension.     IN THE BONES:     There are no hypermetabolic lesions worrisome for malignancy.     In the extremities, there are no hypermetabolic lesions worrisome for malignancy.     Impression:     Hypermetabolic cervical, left axillary, and left hilar lymph nodes consistent with biopsy-proven follicular lymphoma (performed on 11/29/2021), as above.     Hypermetabolic subcutaneous tissue of the right nodule at the level of L4, concerning for additional lymphomatous deposit as described above.      Assessment:       1. 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 Immunodeficiency due to drug therapy    3. Chronic neoplasm-related pain    4. Morbid obesity    5. Mood disorder due to a general medical condition    6. Situational stress           Plan:     1. Follicular lymphoma  - I have reviewed her chart  - supraclavicular lymph node biopsy (11/29/21) revealed follicular lymphoma. Importantly, in the pathology report is the following: "Although the majority of the lymph node appears to represent as grade 2 follicular lymphoma, the Ki-67  proliferation index is much higher than expected for typical low-grade follicular lymphoma. Additionally there  is a very small area suggestive of grade 3A follicular lymphoma as well, representing less than 5% of the  overall cellularity."  - she reports having a CT scan in [place] in early November with enlarged lymph nodes noted throughout.  - PET/CT scan (12/17/21) revealed "hypermetabolic cervical, left axillary, and left hilar lymph nodes" as well as "hypermetabolic subcutaneous tissue of the right nodule at the level of L4, concerning for additional lymphomatous deposit."  - she received cycle #1 of bendamustine/rituximab on 1/25/22 - 1/26/22 in [place], [place].  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 No " evidence of hypermetabolic tumor  - The risks and benefits of chemotherapy were discussed, written information was given, and informed consent was obtained.  - she completed 6 cycles of bendamustine/rituximab (last was on 6/16/22 - 6/17/22).  - she underwent port removal on 6/20/22.  - pet scan (6/29/22) revealed no evidence of hypermetabolic tumor.  - return to clinic in 3 months.    2. Morbid obesity  - no weight since telemedicine visit.  - continue to monitor    3. Mood disorder due to medical condition  - stable. Continue xanax as needed.     4. Chronic neoplasm-related pain  - stable. continue oxycodone as needed.     5. Insomnia  - stable. Continue mirtazapine    6. Advance Care Planning     Power of   After our discussion (at previous visit), the patient decided to complete a HCPOA and appointed her brother Velasquez Coronado ([phone]) and dad Dirk Coronado ([phone]).        - return to clinic in 3 months.    Ki Nova M.D.  Hematology/Oncology  [facility] - [address], [address]  [place] [address]  Phone: [phone]  Fax: [phone]

## 2022-07-07 LAB
COMMENT: NORMAL
FINAL PATHOLOGIC DIAGNOSIS: NORMAL
GROSS: NORMAL
Lab: NORMAL
MICROSCOPIC EXAM: NORMAL
SUPPLEMENTAL DIAGNOSIS: NORMAL

## 2022-07-18 ENCOUNTER — PATIENT MESSAGE (OUTPATIENT)
Dept: PSYCHIATRY | Facility: CLINIC | Age: 37
End: 2022-07-18
Payer: COMMERCIAL

## 2022-07-18 ENCOUNTER — TELEPHONE (OUTPATIENT)
Dept: PSYCHIATRY | Facility: CLINIC | Age: 37
End: 2022-07-18
Payer: COMMERCIAL

## 2022-07-18 NOTE — TELEPHONE ENCOUNTER
Appointment canceled   [facility] Message   Sent:   1:47 PM   To: [facility] Clinical Support Staff    Jerry Coronado   MRN: [id number] : 1985   Pt Home: [phone]     Entered: [phone]          Message    Appointment canceled for Jerry Coronado ([id number])   Visit Type: ESTABLISHED PATIENT   Date        Time      Length    Provider                  Department   2022    4:00 PM  60 mins.   CHRISTOPHER Waters [facility]      Reason for Cancellation: Other

## 2022-07-20 ENCOUNTER — PATIENT MESSAGE (OUTPATIENT)
Dept: HEMATOLOGY/ONCOLOGY | Facility: CLINIC | Age: 37
End: 2022-07-20
Payer: COMMERCIAL

## 2022-08-01 ENCOUNTER — PATIENT MESSAGE (OUTPATIENT)
Dept: INTERNAL MEDICINE | Facility: CLINIC | Age: 37
End: 2022-08-01
Payer: COMMERCIAL

## 2022-08-01 ENCOUNTER — PATIENT MESSAGE (OUTPATIENT)
Dept: SURGERY | Facility: CLINIC | Age: 37
End: 2022-08-01
Payer: COMMERCIAL

## 2022-08-01 DIAGNOSIS — F43.9 SITUATIONAL STRESS: ICD-10-CM

## 2022-08-01 RX ORDER — ALPRAZOLAM 1 MG/1
1 TABLET ORAL 2 TIMES DAILY PRN
Qty: 30 TABLET | Refills: 0 | Status: CANCELLED | OUTPATIENT
Start: 2022-08-01 | End: 2022-08-31

## 2022-08-01 NOTE — TELEPHONE ENCOUNTER
No new care gaps identified.  [facility] Embedded Care Gaps. Reference number: [id number]. 8/01/2022   11:27:24 AM CDT

## 2022-08-02 ENCOUNTER — TELEPHONE (OUTPATIENT)
Dept: SURGERY | Facility: CLINIC | Age: 37
End: 2022-08-02
Payer: COMMERCIAL

## 2022-08-02 NOTE — TELEPHONE ENCOUNTER
----- Message from Manuel Amin sent at 8/2/2022  3:56 PM CDT -----  Contact: pt.  .Type:  Needs Medical Advice    Who Called: pt  Would the patient rather a call back or a response via [facility]?call back  Best Call Back Number: [phone]   Additional Information: Pt. Is returning a call back to the office.             08/02/2022                   1632  Spoke to patient  Regarding the above message. Scheduled an appointment for the patient to be seen on 08/04/2022 at 0920. Confirmed appointment date, time, and location. Patient verbalized understanding.

## 2022-08-02 NOTE — TELEPHONE ENCOUNTER
8/2/22  3:47pm  Attempted to reach patient regarding below message. No answer. Message left via voicemail.         ----- Message from Suzanne Zepeda sent at 8/2/2022  2:54 PM CDT -----  Type:  Needs Medical Advice    Who Called:  pt  Symptoms (please be specific):  stitches not dissolving / recommended to come to office By Dr. Kerns , pt said she saw Dr. Islas Selah      Would the patient rather a call back or a response via [facility]?  Call   Best Call Back Number:  [phone]  Additional Information:

## 2022-08-04 ENCOUNTER — TELEPHONE (OUTPATIENT)
Dept: SURGERY | Facility: CLINIC | Age: 37
End: 2022-08-04
Payer: COMMERCIAL

## 2022-08-04 NOTE — TELEPHONE ENCOUNTER
----- Message from Jolly Whatley sent at 8/4/2022  9:42 AM CDT -----  Type:  Needs Medical Advice    Who Called: pt  Symptoms (please be specific): pt needs to reschedule to have her stitches removed     Would the patient rather a call back or a response via [facility]? Please call  Best Call Back Number: [phone]  Additional Information:         08/04/2022                        1623  Attempted to contact patient regarding the above message. No answer. Left voicemail.

## 2022-08-08 ENCOUNTER — PATIENT MESSAGE (OUTPATIENT)
Dept: HEMATOLOGY/ONCOLOGY | Facility: CLINIC | Age: 37
End: 2022-08-08
Payer: COMMERCIAL

## 2022-08-17 ENCOUNTER — PATIENT MESSAGE (OUTPATIENT)
Dept: HEMATOLOGY/ONCOLOGY | Facility: CLINIC | Age: 37
End: 2022-08-17
Payer: COMMERCIAL

## 2022-08-17 DIAGNOSIS — F43.9 SITUATIONAL STRESS: ICD-10-CM

## 2022-08-17 RX ORDER — ALPRAZOLAM 1 MG/1
1 TABLET ORAL 2 TIMES DAILY PRN
Qty: 30 TABLET | Refills: 0 | Status: SHIPPED | OUTPATIENT
Start: 2022-08-17 | End: 2022-08-20 | Stop reason: SDUPTHER

## 2022-08-20 DIAGNOSIS — F43.9 SITUATIONAL STRESS: ICD-10-CM

## 2022-08-20 RX ORDER — ALPRAZOLAM 1 MG/1
1 TABLET ORAL 2 TIMES DAILY PRN
Qty: 30 TABLET | Refills: 0 | Status: SHIPPED | OUTPATIENT
Start: 2022-08-20 | End: 2023-05-15

## 2022-08-26 ENCOUNTER — OFFICE VISIT (OUTPATIENT)
Dept: PSYCHIATRY | Facility: CLINIC | Age: 37
End: 2022-08-26
Payer: MEDICAID

## 2022-08-26 DIAGNOSIS — F41.1 GAD (GENERALIZED ANXIETY DISORDER): ICD-10-CM

## 2022-08-26 DIAGNOSIS — F33.1 MODERATE RECURRENT MAJOR DEPRESSION: ICD-10-CM

## 2022-08-26 DIAGNOSIS — F10.10 MILD ALCOHOL USE DISORDER: ICD-10-CM

## 2022-08-26 PROCEDURE — 90837 PSYTX W PT 60 MINUTES: CPT | Mod: 95,,, | Performed by: SOCIAL WORKER

## 2022-08-26 PROCEDURE — 1159F MED LIST DOCD IN RCRD: CPT | Mod: CPTII,95,, | Performed by: SOCIAL WORKER

## 2022-08-26 PROCEDURE — 1159F PR MEDICATION LIST DOCUMENTED IN MEDICAL RECORD: ICD-10-PCS | Mod: CPTII,95,, | Performed by: SOCIAL WORKER

## 2022-08-26 PROCEDURE — 90837 PR PSYCHOTHERAPY W/PATIENT, 60 MIN: ICD-10-PCS | Mod: 95,,, | Performed by: SOCIAL WORKER

## 2022-08-26 NOTE — PROGRESS NOTES
"Individual Psychotherapy (PhD/LCSW)    8/26/2022    Site:  Telemed     Patient presents from her home in [place], [place] for follow up audiovisual telehealth visit.     Therapeutic Intervention: Met with patient.  Outpatient - Insight oriented psychotherapy 60 min - CPT code 16261, Outpatient - Behavior modifying psychotherapy 60 min - CPT code 38275 and Outpatient - Supportive psychotherapy 60 min - CPT Code 80312    Chief complaint/reason for encounter: addictive disorder, depression and anxiety     Interval history and content of current session: GAD7: 19. Last visit with me: 05/10/22. Jerry has noted that she was "feeling better" in June, and she did schedule appointments in July but needed to cancel. Jerry denies SI/HI. I actively listened and provided empathic support as I gained clinical information about how Jerry has been doing for over 3 months. She shared that work has become more stressful and the people who she works with do not seem to truly care, and she is considering other job options. We also discussed how it is possible that her father's cancer is coming back, and how she becomes stressed with taking care of both of her parents. We also discussed how her brother is diagnosed with Bipolar Disorder and is not seeking help, which upsets her, and she attempts to help him which also causes her stress. Discussed that she feels "overwhelmed" by everything and has trouble completing tasks around the house. Jerry did report that her alcohol use is approx 3-4 times per week, and she has 6-7 drinks each time; discussed her speaking with her psychiatrist (who she has a visit with in the next few weeks) about medication for alcohol cravings. We also discussed becoming engaged in a recovery group such as [facility] and [facility] (sent her a message including this information). We discussed things that she would be doing more of include walking her dog and doing art projects; we also discussed breaking down " goals into smaller tasks. I provided her with psycho education about mindfulness and led her through the mindfulness of the breath exercise, which she found helpful. Jerry would like to learn more coping skills for stress and anxiety.     Treatment plan:  · Target symptoms: alcohol abuse, recurrent depression, anxiety   · Why chosen therapy is appropriate versus another modality: relevant to diagnosis, patient responds to this modality, evidence based practice  · Outcome monitoring methods: self-report, checklist/rating scale  · Therapeutic intervention type: insight oriented psychotherapy, behavior modifying psychotherapy, supportive psychotherapy    Risk parameters:  Patient reports no suicidal ideation  Patient reports no homicidal ideation  Patient reports no self-injurious behavior  Patient reports no violent behavior    Verbal deficits: None    Patient's response to intervention:  The patient's response to intervention is accepting.    Progress toward goals and other mental status changes:  The patient's progress toward goals is fair .    Diagnosis:     ICD-10-CM ICD-9-CM   1. Moderate recurrent major depression  F33.1 296.32   2. TOM (generalized anxiety disorder)  F41.1 300.02   3. Mild alcohol use disorder  F10.10 305.00       Plan:  individual psychotherapy, medication management by physician, AA and abstinence Pt to go to ED or call 911 if symptoms worsen or if she has thoughts of harming self and/or others. Pt verbalized understanding.    Return to clinic: as scheduled    Length of Service (minutes): 57      Each patient to whom he or she provides medical services by telemedicine is: (1) informed of the relationship between the physician and patient and the respective role of any other health care provider with respect to management of the patient; and (2) notified that he or she may decline to receive medical services by telemedicine and may withdraw from such care at any time.

## 2022-09-01 ENCOUNTER — LAB VISIT (OUTPATIENT)
Dept: LAB | Facility: OTHER | Age: 37
End: 2022-09-01
Attending: OBSTETRICS & GYNECOLOGY
Payer: COMMERCIAL

## 2022-09-01 ENCOUNTER — OFFICE VISIT (OUTPATIENT)
Dept: OBSTETRICS AND GYNECOLOGY | Facility: CLINIC | Age: 37
End: 2022-09-01
Payer: COMMERCIAL

## 2022-09-01 VITALS
HEIGHT: 61 IN | DIASTOLIC BLOOD PRESSURE: 80 MMHG | SYSTOLIC BLOOD PRESSURE: 138 MMHG | WEIGHT: 216.25 LBS | BODY MASS INDEX: 40.83 KG/M2

## 2022-09-01 DIAGNOSIS — Z11.3 SCREEN FOR STD (SEXUALLY TRANSMITTED DISEASE): ICD-10-CM

## 2022-09-01 DIAGNOSIS — N93.0 PCB (POST COITAL BLEEDING): ICD-10-CM

## 2022-09-01 DIAGNOSIS — Z80.3 FAMILY HISTORY OF BREAST CANCER: ICD-10-CM

## 2022-09-01 DIAGNOSIS — N87.9 CERVICAL DYSPLASIA: ICD-10-CM

## 2022-09-01 DIAGNOSIS — Z92.21 HISTORY OF CANCER CHEMOTHERAPY: ICD-10-CM

## 2022-09-01 DIAGNOSIS — Z01.419 ENCOUNTER FOR ANNUAL ROUTINE GYNECOLOGICAL EXAMINATION: Primary | ICD-10-CM

## 2022-09-01 DIAGNOSIS — Z30.014 ENCOUNTER FOR INITIAL PRESCRIPTION OF INTRAUTERINE CONTRACEPTIVE DEVICE (IUD): ICD-10-CM

## 2022-09-01 DIAGNOSIS — C82.91 FOLLICULAR LYMPHOMA OF LYMPH NODES OF NECK, UNSPECIFIED FOLLICULAR LYMPHOMA TYPE: ICD-10-CM

## 2022-09-01 DIAGNOSIS — Z12.31 BREAST CANCER SCREENING BY MAMMOGRAM: ICD-10-CM

## 2022-09-01 LAB
ESTRADIOL SERPL-MCNC: 116 PG/ML
FSH SERPL-ACNC: 15.93 MIU/ML
HBV SURFACE AG SERPL QL IA: NORMAL
HCV AB SERPL QL IA: NORMAL
HIV 1+2 AB+HIV1 P24 AG SERPL QL IA: NORMAL
RPR SER QL: NORMAL

## 2022-09-01 PROCEDURE — 3079F PR MOST RECENT DIASTOLIC BLOOD PRESSURE 80-89 MM HG: ICD-10-PCS | Mod: CPTII,S$GLB,, | Performed by: OBSTETRICS & GYNECOLOGY

## 2022-09-01 PROCEDURE — 86592 SYPHILIS TEST NON-TREP QUAL: CPT | Performed by: OBSTETRICS & GYNECOLOGY

## 2022-09-01 PROCEDURE — 99395 PREV VISIT EST AGE 18-39: CPT | Mod: S$GLB,,, | Performed by: OBSTETRICS & GYNECOLOGY

## 2022-09-01 PROCEDURE — 1159F PR MEDICATION LIST DOCUMENTED IN MEDICAL RECORD: ICD-10-PCS | Mod: CPTII,S$GLB,, | Performed by: OBSTETRICS & GYNECOLOGY

## 2022-09-01 PROCEDURE — 87624 HPV HI-RISK TYP POOLED RSLT: CPT | Performed by: OBSTETRICS & GYNECOLOGY

## 2022-09-01 PROCEDURE — 99215 OFFICE O/P EST HI 40 MIN: CPT | Mod: PBBFAC | Performed by: OBSTETRICS & GYNECOLOGY

## 2022-09-01 PROCEDURE — 87481 CANDIDA DNA AMP PROBE: CPT | Mod: 59 | Performed by: OBSTETRICS & GYNECOLOGY

## 2022-09-01 PROCEDURE — 99395 PR PREVENTIVE VISIT,EST,18-39: ICD-10-PCS | Mod: S$GLB,,, | Performed by: OBSTETRICS & GYNECOLOGY

## 2022-09-01 PROCEDURE — 3075F PR MOST RECENT SYSTOLIC BLOOD PRESS GE 130-139MM HG: ICD-10-PCS | Mod: CPTII,S$GLB,, | Performed by: OBSTETRICS & GYNECOLOGY

## 2022-09-01 PROCEDURE — 1159F MED LIST DOCD IN RCRD: CPT | Mod: CPTII,S$GLB,, | Performed by: OBSTETRICS & GYNECOLOGY

## 2022-09-01 PROCEDURE — 3075F SYST BP GE 130 - 139MM HG: CPT | Mod: CPTII,S$GLB,, | Performed by: OBSTETRICS & GYNECOLOGY

## 2022-09-01 PROCEDURE — 86803 HEPATITIS C AB TEST: CPT | Performed by: OBSTETRICS & GYNECOLOGY

## 2022-09-01 PROCEDURE — 36415 COLL VENOUS BLD VENIPUNCTURE: CPT | Performed by: OBSTETRICS & GYNECOLOGY

## 2022-09-01 PROCEDURE — 99999 PR PBB SHADOW E&M-EST. PATIENT-LVL V: ICD-10-PCS | Mod: PBBFAC,,, | Performed by: OBSTETRICS & GYNECOLOGY

## 2022-09-01 PROCEDURE — 87389 HIV-1 AG W/HIV-1&-2 AB AG IA: CPT | Performed by: OBSTETRICS & GYNECOLOGY

## 2022-09-01 PROCEDURE — 83520 IMMUNOASSAY QUANT NOS NONAB: CPT | Performed by: OBSTETRICS & GYNECOLOGY

## 2022-09-01 PROCEDURE — 3008F PR BODY MASS INDEX (BMI) DOCUMENTED: ICD-10-PCS | Mod: CPTII,S$GLB,, | Performed by: OBSTETRICS & GYNECOLOGY

## 2022-09-01 PROCEDURE — 3079F DIAST BP 80-89 MM HG: CPT | Mod: CPTII,S$GLB,, | Performed by: OBSTETRICS & GYNECOLOGY

## 2022-09-01 PROCEDURE — 82670 ASSAY OF TOTAL ESTRADIOL: CPT | Performed by: OBSTETRICS & GYNECOLOGY

## 2022-09-01 PROCEDURE — 3008F BODY MASS INDEX DOCD: CPT | Mod: CPTII,S$GLB,, | Performed by: OBSTETRICS & GYNECOLOGY

## 2022-09-01 PROCEDURE — 87340 HEPATITIS B SURFACE AG IA: CPT | Performed by: OBSTETRICS & GYNECOLOGY

## 2022-09-01 PROCEDURE — 83001 ASSAY OF GONADOTROPIN (FSH): CPT | Performed by: OBSTETRICS & GYNECOLOGY

## 2022-09-01 PROCEDURE — 88175 CYTOPATH C/V AUTO FLUID REDO: CPT | Performed by: OBSTETRICS & GYNECOLOGY

## 2022-09-01 PROCEDURE — 99999 PR PBB SHADOW E&M-EST. PATIENT-LVL V: CPT | Mod: PBBFAC,,, | Performed by: OBSTETRICS & GYNECOLOGY

## 2022-09-01 PROCEDURE — 87591 N.GONORRHOEAE DNA AMP PROB: CPT | Mod: 59 | Performed by: OBSTETRICS & GYNECOLOGY

## 2022-09-01 PROCEDURE — 87491 CHLMYD TRACH DNA AMP PROBE: CPT | Mod: 59 | Performed by: OBSTETRICS & GYNECOLOGY

## 2022-09-01 NOTE — PROGRESS NOTES
Chief Complaint: Well Woman Exam     HPI:      Jerry Coronado is a 37 y.o.  who presents today for well woman exam.  LMP: Patient's last menstrual period was 2022.      Since last annual, patient has completed chemotherapy for lympohoma.  She continues to have a regular monthly cycle and has continued to experience frequent postcoital bleeding.      Ms. Coronado is currently sexually active with a single male partner. She is currently using no method for contraception. She desires a Mirena IUD. She would like STD screening today.    Previous Pap:  glandular cell abnormality (LEE) (2021)  Colpo: CIN1, EMBx: NEM  Leep: NEM    Previous Mammogram:   No results found for this or any previous visit.  Most Recent Dexa: never had  Colonoscopy: never had    COVID vaccine: completed series + booster x 1  Gardasil:Completed     Patient Active Problem List   Diagnosis    Insomnia secondary to depression with anxiety    Low back pain    Acne    Situational stress    Breast hypertrophy    Hypothyroidism    Allergic rhinitis    Chronic infection of sinus    Hypertrophy of nasal turbinates    Deviated nasal septum    Eustachian tube dysfunction    Acquired atrophy of thyroid    Alcohol abuse    Alcohol use with alcohol-induced mood disorder    Atypical squamous cells of undetermined significance (ASCUS) on Papanicolaou smear of cervix    Carpal tunnel syndrome    Cervical intraepithelial neoplasia grade 2    Overdose    Jaw pain    Moderate recurrent major depression    Obstructive sleep apnea syndrome    Other depressive episodes    Personal history of other infectious and parasitic diseases    Postherpetic neuralgia    Shock    Supraventricular tachycardia    Tobacco user    Vaginal high risk human papillomavirus (HPV) DNA test positive    Low grade squamous intraepithelial dysplasia    Atypical glandular cells of undetermined significance (LEE) on cervical Pap smear    Cervical dysplasia    Abnormal  uterine bleeding (AUB)    Arcuate uterus    Supraclavicular lymphadenopathy    Axillary lymphadenopathy    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    Encounter for removal of tunneled central venous catheter (CVC) with port       Past Medical History:   Diagnosis Date    Allergic rhinitis     Anxiety     Depression     Hypothyroidism     SVT (supraventricular tachycardia) 2011       Past Surgical History:   Procedure Laterality Date    ABLATION OF DYSRHYTHMIC FOCUS      x2    BIOPSY OF CERVICAL LYMPH NODE Left 11/29/2021    Procedure: BIOPSY, LYMPH NODE, CERVICAL;  Surgeon: Blanco Kerns MD;  Location: [facility] OR;  Service: General;  Laterality: Left;    CERVIX LESION DESTRUCTION      cryo in 2011 (?)    CONIZATION OF CERVIX USING LOOP ELECTROSURGICAL EXCISION PROCEDURE (LEEP) N/A 7/21/2021    Procedure: LEEP CONIZATION, CERVIX;  Surgeon: Donna Castillo MD;  Location: [facility] OR;  Service: OB/GYN;  Laterality: N/A;    EXCISION OF LESION OF NOSE N/A 10/14/2019    Procedure: EXCISION, LESION, NOSE;  Surgeon: Umesh Morales MD;  Location: [facility] OR;  Service: ENT;  Laterality: N/A;    FUNCTIONAL ENDOSCOPIC SINUS SURGERY (FESS) N/A 10/14/2019    Procedure: FESS (FUNCTIONAL ENDOSCOPIC SINUS SURGERY);  Surgeon: Umesh Morales MD;  Location: [facility] OR;  Service: ENT;  Laterality: N/A;    HYSTEROSCOPY WITH DILATION AND CURETTAGE OF UTERUS N/A 7/21/2021    Procedure: HYSTEROSCOPY, WITH DILATION AND CURETTAGE OF UTERUS;  Surgeon: Donna Castillo MD;  Location: [facility] OR;  Service: OB/GYN;  Laterality: N/A;    MEDIPORT REMOVAL Right 6/20/2022    Procedure: REMOVAL, CATHETER, CENTRAL VENOUS, TUNNELED, WITH PORT;  Surgeon: Blanco Kerns MD;  Location: [facility] OR;  Service: General;  Laterality: Right;    NASAL SEPTOPLASTY N/A 10/14/2019    Procedure: SEPTOPLASTY, NOSE;  Surgeon: Umesh Morales MD;  Location: [facility] OR;  Service: ENT;  Laterality: N/A;    NASAL SINUS SURGERY  10/14/2019    PILONIDAL CYST DRAINAGE      REVISION OF  "SCAR  2022    Procedure: REVISION, SCAR;  Surgeon: Blanco Kerns MD;  Location: [facility] OR;  Service: General;;    SKIN TAG REMOVAL Left 2022    Procedure: REMOVAL, SKIN TAG;  Surgeon: Blanco Kerns MD;  Location: [facility] OR;  Service: General;  Laterality: Left;    TONSILLECTOMY      TONSILLECTOMY      TYMPANOPLASTY N/A 10/14/2019    Procedure: TYMPANOPLASTY;  Surgeon: Umesh Morales MD;  Location: [facility] OR;  Service: ENT;  Laterality: N/A;    TYMPANOTOMY Left 10/14/2019    left       OB History          1    Para   0    Term                AB   1    Living             SAB        IAB   1    Ectopic        Multiple        Live Births               Obstetric Comments   Menarche age 12.   History of abnormal PAP smear: YES  History of sexually transmitted disease:  YES: HPV.                        ROS:     Review of Systems   Constitutional:  Negative for activity change, appetite change and fatigue.   Respiratory:  Negative for shortness of breath.    Cardiovascular:  Negative for chest pain.   Gastrointestinal:  Negative for abdominal pain, constipation and diarrhea.   Endocrine: Negative for cold intolerance and heat intolerance.   Genitourinary:  Positive for vaginal bleeding. Negative for dysuria, frequency, menstrual irregularity, pelvic pain and vaginal discharge.   Integumentary:  Negative for breast mass, breast discharge and breast tenderness.   Psychiatric/Behavioral:  Negative for dysphoric mood. The patient is not nervous/anxious.    Breast: Negative for mass and tenderness    Physical Exam:      PHYSICAL EXAM:  /80   Ht 5' 1" (1.549 m)   Wt 98.1 kg (216 lb 4.3 oz)   LMP 2022   BMI 40.86 kg/m²   Body mass index is 40.86 kg/m².     APPEARANCE: Well nourished, well developed, in no acute distress.  PSYCH: Appropriate mood and affect.  SKIN: No acne or hirsutism  NECK: Neck symmetric without masses or thyromegaly  NODES: No inguinal, axillary, or supraclavicular lymph " node enlargement  ABDOMEN: Soft.  No tenderness or masses.    CARDIOVASCULAR: No edema of peripheral extremities  BREASTS: Symmetrical, no skin changes or visible lesions.  No palpable masses or nipple discharge bilaterally.  PELVIC: Normal external genitalia without lesions.  Normal hair distribution.  Adequate perineal body, normal urethral meatus.  Vagina moist and well rugated without lesions or discharge.  Cervix pink, without lesions, discharge or tenderness.  No significant cystocele or rectocele.  Bimanual exam shows uterus to be normal size, regular, mobile and nontender.  Adnexa without masses or tenderness.      Assessment:     1. Encounter for annual routine gynecological examination        2. Breast cancer screening by mammogram  Mammo Digital Screening Bilat w/ Larry      3. Family history of breast cancer  Mammo Digital Screening Bilat w/ Larry      4. Cervical dysplasia  Liquid-Based Pap Smear, Screening    HPV High Risk Genotypes, PCR      5. 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 Screen for STD (sexually transmitted disease)  C. trachomatis/N. gonorrhoeae by AMP DNA [facility]; Cervicovaginal    Vaginosis Screen by DNA Probe    Hepatitis B Surface Antigen    Hepatitis C Antibody    HIV 1/2 Ag/Ab (4th Gen)    RPR      7. History of cancer chemotherapy  Antimullerian Hormone (AMH)    Follicle Stimulating Hormone    Estradiol      8. Encounter for initial prescription of intrauterine contraceptive device (IUD)  Device Authorization Order      9. PCB (post coital bleeding)  US Pelvis Comp with Transvag NON-OB (xpd            Plan:     Clinical breast exam performed.  Pap collected today.  Baseline mammogram ordered due to family history.  DEXA not indicated.  Colonoscopy not indicated.  Contraception: desires IUD. Requested.  Call with cycle to schedule insertion.  History chemo: answered questions regarding ferility.  AMH ordered to assess ovarian  reserve.   STD screening today  Follow up for IUD insertion.    Counseling:     Patient was counseled today on current ASCCP pap guidelines, the recommendation for yearly pelvic exams, healthy diet and exercise routines, breast self awareness.She is to see her PCP for other health maintenance.     Use of the [facility] Patient Portal discussed and encouraged during today's visit.         Donna Castillo MD  [facility]  09/01/2022

## 2022-09-01 NOTE — PATIENT INSTRUCTIONS
Please check out the [employer] of [employer]st[employer] PATIENT WEBSITE.  The site has education materials, patient stories, expert views, and a portal for you to ask questions.       [email or web address]      As always, please let me know if you have any questions.     Dr. Donna Castillo

## 2022-09-02 ENCOUNTER — HOSPITAL ENCOUNTER (OUTPATIENT)
Dept: RADIOLOGY | Facility: HOSPITAL | Age: 37
Discharge: HOME | End: 2022-09-02
Attending: OBSTETRICS & GYNECOLOGY
Payer: COMMERCIAL

## 2022-09-02 ENCOUNTER — PATIENT MESSAGE (OUTPATIENT)
Dept: OBSTETRICS AND GYNECOLOGY | Facility: CLINIC | Age: 37
End: 2022-09-02
Payer: COMMERCIAL

## 2022-09-02 VITALS — WEIGHT: 216.25 LBS | HEIGHT: 61 IN | BODY MASS INDEX: 40.83 KG/M2

## 2022-09-02 DIAGNOSIS — Z12.31 BREAST CANCER SCREENING BY MAMMOGRAM: ICD-10-CM

## 2022-09-02 DIAGNOSIS — N93.0 PCB (POST COITAL BLEEDING): ICD-10-CM

## 2022-09-02 DIAGNOSIS — Z80.3 FAMILY HISTORY OF BREAST CANCER: ICD-10-CM

## 2022-09-02 LAB
BACTERIAL VAGINOSIS DNA: NEGATIVE
C TRACH DNA SPEC QL NAA+PROBE: NOT DETECTED
CANDIDA GLABRATA DNA: NEGATIVE
CANDIDA KRUSEI DNA: NEGATIVE
CANDIDA RRNA VAG QL PROBE: NEGATIVE
MIS SERPL-MCNC: <0.03 NG/ML (ref 0.15–7.5)
N GONORRHOEA DNA SPEC QL NAA+PROBE: NOT DETECTED
T VAGINALIS RRNA GENITAL QL PROBE: NEGATIVE

## 2022-09-02 PROCEDURE — 77063 BREAST TOMOSYNTHESIS BI: CPT | Mod: TC,PO

## 2022-09-02 PROCEDURE — 76830 TRANSVAGINAL US NON-OB: CPT | Mod: TC

## 2022-09-02 RX ORDER — TIZANIDINE 4 MG/1
4 TABLET ORAL 3 TIMES DAILY PRN
Qty: 90 TABLET | Refills: 0 | Status: CANCELLED | OUTPATIENT
Start: 2022-09-02

## 2022-09-03 ENCOUNTER — PATIENT MESSAGE (OUTPATIENT)
Dept: OBSTETRICS AND GYNECOLOGY | Facility: CLINIC | Age: 37
End: 2022-09-03
Payer: COMMERCIAL

## 2022-09-09 ENCOUNTER — OFFICE VISIT (OUTPATIENT)
Dept: OBSTETRICS AND GYNECOLOGY | Facility: CLINIC | Age: 37
End: 2022-09-09
Payer: MEDICAID

## 2022-09-09 ENCOUNTER — PATIENT MESSAGE (OUTPATIENT)
Dept: OBSTETRICS AND GYNECOLOGY | Facility: CLINIC | Age: 37
End: 2022-09-09

## 2022-09-09 DIAGNOSIS — L70.9 ACNE, UNSPECIFIED ACNE TYPE: ICD-10-CM

## 2022-09-09 DIAGNOSIS — R93.5 ABNORMAL ULTRASOUND OF UTERUS: ICD-10-CM

## 2022-09-09 DIAGNOSIS — N83.8 DIMINISHED OVARIAN RESERVE: ICD-10-CM

## 2022-09-09 DIAGNOSIS — Z92.21 S/P CHEMOTHERAPY, TIME SINCE GREATER THAN 12 WEEKS: Primary | ICD-10-CM

## 2022-09-09 PROCEDURE — 1159F PR MEDICATION LIST DOCUMENTED IN MEDICAL RECORD: ICD-10-PCS | Mod: CPTII,95,, | Performed by: OBSTETRICS & GYNECOLOGY

## 2022-09-09 PROCEDURE — 1159F MED LIST DOCD IN RCRD: CPT | Mod: CPTII,95,, | Performed by: OBSTETRICS & GYNECOLOGY

## 2022-09-09 PROCEDURE — 99213 PR OFFICE/OUTPT VISIT, EST, LEVL III, 20-29 MIN: ICD-10-PCS | Mod: 95,,, | Performed by: OBSTETRICS & GYNECOLOGY

## 2022-09-09 PROCEDURE — 99213 OFFICE O/P EST LOW 20 MIN: CPT | Mod: 95,,, | Performed by: OBSTETRICS & GYNECOLOGY

## 2022-09-09 RX ORDER — SILVER SULFADIAZINE 10 G/1000G
CREAM TOPICAL
COMMUNITY
Start: 2022-08-19 | End: 2022-12-16 | Stop reason: ALTCHOICE

## 2022-09-09 RX ORDER — BUSPIRONE HYDROCHLORIDE 10 MG/1
10 TABLET ORAL 2 TIMES DAILY
COMMUNITY
Start: 2022-09-06 | End: 2024-02-08

## 2022-09-09 RX ORDER — PRAZOSIN HYDROCHLORIDE 1 MG/1
1 CAPSULE ORAL NIGHTLY
COMMUNITY
Start: 2022-09-06 | End: 2024-02-02

## 2022-09-09 RX ORDER — MIRTAZAPINE 15 MG/1
7.5 TABLET, FILM COATED ORAL DAILY
COMMUNITY
Start: 2022-09-06 | End: 2022-12-16 | Stop reason: ALTCHOICE

## 2022-09-09 RX ORDER — LIDOCAINE 50 MG/G
OINTMENT TOPICAL
COMMUNITY
Start: 2022-08-19 | End: 2022-12-16 | Stop reason: ALTCHOICE

## 2022-09-09 RX ORDER — DOXYCYCLINE 100 MG/1
CAPSULE ORAL
Qty: 60 CAPSULE | Refills: 11 | Status: SHIPPED | OUTPATIENT
Start: 2022-09-09 | End: 2024-02-02 | Stop reason: CLARIF

## 2022-09-09 NOTE — PROGRESS NOTES
The patient location is: home  The chief complaint leading to consultation is: results    Visit type: audio only    Face to Face time with patient: 20  22 minutes of total time spent on the encounter, which includes face to face time and non-face to face time preparing to see the patient (eg, review of tests), Obtaining and/or reviewing separately obtained history, Documenting clinical information in the electronic or other health record, Independently interpreting results (not separately reported) and communicating results to the patient/family/caregiver, or Care coordination (not separately reported).         Each patient to whom he or she provides medical services by telemedicine is:  (1) informed of the relationship between the physician and patient and the respective role of any other health care provider with respect to management of the patient; and (2) notified that he or she may decline to receive medical services by telemedicine and may withdraw from such care at any time.    Notes:       Chief Complaint: U/S Results     HPI:      Jerry Coronado is a 37 y.o.  who presents today for follow up of U/S and lab results.  S/p chemotherapy for lymphoma earlier this year.  Continues to report regular menses.  Denies hot flashes.   LMP: Patient's last menstrual period was 2022.  No other issues, problems, or complaints.     Desires future fertility.     OB History          1    Para   0    Term   0            AB   1    Living             SAB        IAB   1    Ectopic        Multiple        Live Births               Obstetric Comments   Menarche age 12.   History of abnormal PAP smear: YES  History of sexually transmitted disease:  YES: HPV.                      Past Medical History:   Diagnosis Date    Allergic rhinitis     Anxiety     Depression     Hypothyroidism     SVT (supraventricular tachycardia)      Past Surgical History:   Procedure Laterality Date    ABLATION OF  DYSRHYTHMIC FOCUS      x2    BIOPSY OF CERVICAL LYMPH NODE Left 11/29/2021    Procedure: BIOPSY, LYMPH NODE, CERVICAL;  Surgeon: Blanco Kerns MD;  Location: [facility] OR;  Service: General;  Laterality: Left;    CERVIX LESION DESTRUCTION      cryo in 2011 (?)    CONIZATION OF CERVIX USING LOOP ELECTROSURGICAL EXCISION PROCEDURE (LEEP) N/A 7/21/2021    Procedure: LEEP CONIZATION, CERVIX;  Surgeon: Donna Castillo MD;  Location: [facility] OR;  Service: OB/GYN;  Laterality: N/A;    EXCISION OF LESION OF NOSE N/A 10/14/2019    Procedure: EXCISION, LESION, NOSE;  Surgeon: Umesh Morales MD;  Location: [facility] OR;  Service: ENT;  Laterality: N/A;    FUNCTIONAL ENDOSCOPIC SINUS SURGERY (FESS) N/A 10/14/2019    Procedure: FESS (FUNCTIONAL ENDOSCOPIC SINUS SURGERY);  Surgeon: Umesh Morales MD;  Location: [facility] OR;  Service: ENT;  Laterality: N/A;    HYSTEROSCOPY WITH DILATION AND CURETTAGE OF UTERUS N/A 7/21/2021    Procedure: HYSTEROSCOPY, WITH DILATION AND CURETTAGE OF UTERUS;  Surgeon: Donna Castillo MD;  Location: [facility] OR;  Service: OB/GYN;  Laterality: N/A;    MEDIPORT REMOVAL Right 6/20/2022    Procedure: REMOVAL, CATHETER, CENTRAL VENOUS, TUNNELED, WITH PORT;  Surgeon: Blanco Kerns MD;  Location: [facility] OR;  Service: General;  Laterality: Right;    NASAL SEPTOPLASTY N/A 10/14/2019    Procedure: SEPTOPLASTY, NOSE;  Surgeon: Umesh Morales MD;  Location: [facility] OR;  Service: ENT;  Laterality: N/A;    NASAL SINUS SURGERY  10/14/2019    PILONIDAL CYST DRAINAGE      REVISION OF SCAR  6/20/2022    Procedure: REVISION, SCAR;  Surgeon: Blanco Kerns MD;  Location: [facility] OR;  Service: General;;    SKIN TAG REMOVAL Left 6/20/2022    Procedure: REMOVAL, SKIN TAG;  Surgeon: Blanco Kerns MD;  Location: [facility] OR;  Service: General;  Laterality: Left;    TONSILLECTOMY      TONSILLECTOMY      TYMPANOPLASTY N/A 10/14/2019    Procedure: TYMPANOPLASTY;  Surgeon: Umesh Morales MD;  Location: [facility] OR;  Service: ENT;  Laterality:  "N/A;    TYMPANOTOMY Left 10/14/2019    left     Social History     Socioeconomic History    Marital status: Single   Occupational History    Occupation:      Comment: [employer]   Tobacco Use    Smoking status: Former     Packs/day: 0.50     Types: Cigarettes     Start date: 2000     Quit date: 2009     Years since quittin.1    Smokeless tobacco: Never   Substance and Sexual Activity    Alcohol use: Yes     Alcohol/week: 5.0 standard drinks     Types: 5 Shots of liquor per week     Comment: "occasionally"    Drug use: No    Sexual activity: Not Currently     Partners: Male     Birth control/protection: OCP   Social History Narrative    The patient does not exercise regularly ().  Rates diet as fair.  She is not satisfied with weight.         Family History   Problem Relation Age of Onset    Hypertension Mother     Heart disease Father 59        CABG    Heart failure Father     Breast cancer Maternal Aunt 55    Colon cancer Neg Hx     Ovarian cancer Neg Hx        Current Outpatient Medications:     acetaminophen-codeine 300-30mg (TYLENOL #3) 300-30 mg Tab, acetaminophen 300 mg-codeine 30 mg tablet, Disp: , Rfl:     ALPRAZolam (XANAX) 1 MG tablet, Take 1 tablet (1 mg total) by mouth 2 (two) times daily as needed for Anxiety., Disp: 30 tablet, Rfl: 0    amLODIPine (NORVASC) 2.5 MG tablet, Take 2.5 mg by mouth once daily., Disp: , Rfl:     busPIRone (BUSPAR) 10 MG tablet, Take 10 mg by mouth 2 (two) times daily., Disp: , Rfl:     dronabinoL (MARINOL) 5 MG capsule, Take 1 capsule (5 mg total) by mouth 2 (two) times daily as needed (decreased appetite)., Disp: 60 capsule, Rfl: 0    fluticasone (FLONASE) 50 mcg/actuation nasal spray, INSTILL 2 SPRAYS IN EACH NOSTRIL ONCE DAILY, Disp: 1 Bottle, Rfl: 3    gabapentin (NEURONTIN) 600 MG tablet, Take 600 mg by mouth 3 (three) times daily. , Disp: , Rfl:     guaiFENesin 100 mg/5 ml (ROBITUSSIN) 100 mg/5 mL syrup, as needed., Disp: , Rfl:     hydrOXYzine pamoate " (VISTARIL) 50 MG Cap, Take 1 capsule (50 mg total) by mouth 2 (two) times daily as needed., Disp: 60 capsule, Rfl: 2    ibuprofen (ADVIL,MOTRIN) 800 MG tablet, ibuprofen 800 mg tablet, Disp: , Rfl:     levoFLOXacin (LEVAQUIN) 500 MG tablet, Take 500 mg by mouth once daily., Disp: , Rfl:     levothyroxine (SYNTHROID) 50 MCG tablet, Take 1 tablet by mouth once daily, Disp: 30 tablet, Rfl: 4    LIDOcaine (LMX) 4 % cream, 1 application as needed, Disp: , Rfl:     LIDOcaine (XYLOCAINE) 5 % Oint ointment, APPLY 1 APPLICATION EXTERNALLY 3 TIMES A DAY AS NEEDED FOR 30 DAYS, Disp: , Rfl:     LIDOcaine-prilocaine (EMLA) cream, Apply small amount topically to port incision approximately 30 minutes prior to port access as needed, Disp: , Rfl:     LORazepam (ATIVAN) 1 MG tablet, Take by mouth., Disp: , Rfl:     methylPREDNISolone (MEDROL DOSEPACK) 4 mg tablet, Take by mouth., Disp: , Rfl:     methylPREDNISolone (MEDROL DOSEPACK) 4 mg tablet, Take 1 tablet (4 mg total) by mouth once daily. use as directed, Disp: 21 each, Rfl: 0    mirtazapine (REMERON) 15 MG tablet, Take 7.5 mg by mouth once daily., Disp: , Rfl:     mirtazapine (REMERON) 7.5 MG Tab, TAKE 2 TABLETS (15 MG TOTAL) BY MOUTH NIGHTLY., Disp: 180 tablet, Rfl: 1    multivitamin capsule, Take 1 capsule by mouth., Disp: , Rfl:     ondansetron (ZOFRAN) 8 MG tablet, Take 8 mg by mouth 3 (three) times daily., Disp: , Rfl:     ondansetron (ZOFRAN-ODT) 8 MG TbDL, Take 1 tablet (8 mg total) by mouth every 8 (eight) hours as needed (chemotherapy-induced nausea and vomiting)., Disp: 40 tablet, Rfl: 5    oxyCODONE (ROXICODONE) 5 MG immediate release tablet, Take 1 tablet (5 mg total) by mouth every 6 (six) hours as needed for Pain (cancer-related pain)., Disp: 40 tablet, Rfl: 0    oxyCODONE-acetaminophen (PERCOCET) 5-325 mg per tablet, oxycodone-acetaminophen 5 mg-325 mg tablet, Disp: , Rfl:     prazosin (MINIPRESS) 1 MG Cap, Take 1 mg by mouth every evening., Disp: , Rfl:      prazosin (MINIPRESS) 2 MG Cap, 1 capsule at bedtime, Disp: , Rfl:     promethazine (PHENERGAN) 6.25 mg/5 mL syrup, as needed., Disp: , Rfl:     QUEtiapine (SEROQUEL) 100 MG Tab, Take 100 mg by mouth once daily., Disp: , Rfl:     SSD 1 % cream, APPLY DAILY TO SKIN TO AFFECTED AREA EVERY DAY FOR 30 DAYS, Disp: , Rfl:     tiZANidine (ZANAFLEX) 4 MG tablet, Take 4 mg by mouth 3 (three) times daily as needed., Disp: , Rfl:     traMADoL (ULTRAM) 50 mg tablet, Take 50 mg by mouth 2 (two) times daily as needed., Disp: , Rfl:     doxycycline (MONODOX) 100 MG capsule, 1 capsule twice daily by mouth, Disp: 60 capsule, Rfl: 11    sertraline (ZOLOFT) 100 MG tablet, Take 1 tablet (100 mg total) by mouth once daily., Disp: 30 tablet, Rfl: 1    ROS:     GENERAL: Denies unintentional weight gain or weight loss. Feeling well overall.   SKIN: Denies rash or lesions.   HEENT: Denies headaches, or vision changes.   ABDOMEN: Denies abdominal pain, constipation, diarrhea, nausea, vomiting, change in appetite.  URINARY: Denies frequency, dysuria, hematuria.  NEUROLOGIC: Denies syncope or weakness.   PSYCHIATRIC: Denies depression, anxiety or mood swings.    Physical Exam:      PHYSICAL EXAM:  [facility] 08/25/2022   There is no height or weight on file to calculate BMI.     PSYCH: Appropriate mood and affect.      Results for orders placed or performed in visit on 09/01/22   Antimullerian Hormone (AMH)   Result Value Ref Range    Anti Mullerian Hormone (AMH) <0.03 (L) 0.15 - 7.5 ng/mL   Follicle Stimulating Hormone   Result Value Ref Range    Follicle Stimulating Hormone 15.93 See Text mIU/mL   Estradiol   Result Value Ref Range    Estradiol 116 See Text pg/mL   Hepatitis B Surface Antigen   Result Value Ref Range    Hepatitis B Surface Ag Non-reactive Non-reactive   Hepatitis C Antibody   Result Value Ref Range    Hepatitis C Ab Non-reactive Non-reactive   HIV 1/2 Ag/Ab (4th Gen)   Result Value Ref Range    HIV 1/2 Ag/Ab Non-reactive  Non-reactive   RPR   Result Value Ref Range    RPR Non-reactive Non-reactive         US Pelvis Comp with Transvag NON-OB (xpd  REASON: Postcoital bleeding.    TECHNIQUE: Grayscale and color Doppler ultrasound of the pelvis.    COMPARISON: None.    FINDINGS:    Uterus is anteverted and measures 6.8 x 2.9 x 3.5 cm.Endometrial stripe is within normal limits measuring 6 mm. Heterogeneous hyperechoic structure identified in the lower uterine segment measuring 1.2 x 0.9 cm. No free fluid in the pelvic cul-de-sac.    Right ovary measures 3.1 x 1.7 x 1.9 cm. Left ovary measures 2.4 x 1.2 x 1.1 cm. Bilateral anechoic simple cysts of the ovaries demonstrated measuring 1.0 x 1.1 x 1.5 cm on the left and 1.5 x 1.3 x 1.3 cm and 1.0 x 1.3 x 1.2 cm on the right. There is normal vascularity of the ovaries.    IMPRESSION:    1.  Heterogeneous hyperechoic structure in the lower uterine segment measures 1.2 x 0.9 cm. Findings could possibly reflect a uterine fibroid. Consider follow-up ultrasound or MRI of the pelvis with contrast four additional evaluation.  2.  Bilateral simple cysts of the ovaries.    Electronically signed by:  George Levy DO  9/2/2022 7:24 PM CDT Workstation: [id number]  Mammo Digital Screening Bilat w/ Mohit  Narrative: EXAMINATION:  MAMMO DIGITAL SCREENING BILAT WITH MOHIT    CLINICAL HISTORY:  Z12.31, Encounter for screening mammogram for malignant neoplasm of breast    TECHNIQUE:  CMS MANDATED QUALITY DATA - MAMMOGRAPHY - 225    [facility] utilizes a reminder system to ensure that   patients receive reminder letters for appointments. This includes   reminders for routine screening mammograms, diagnostic mammograms, or   other breast imaging interventions as appropriate. This patient will be   placed in the appropriate reminder system.    FINDINGS:  No prior studies for comparison.  The breasts are composed of scattered   fibroglandular densities, with no suspicious calcifications or masses    detected.  The interpretation was assisted by Computer Aided Detection   with [employer] publishing.  Impression: Negative mammogram.  Annual screening mammography is recommended beginning   at age 40, per ACR guidelines, or sooner if clinically indicated.    BI-RADS CATEGORY 1: NEGATIVE.    Technologist: NANCY Donahue lifetime risk calculated at 16.13 %.    Women with a greater than 20% lifetime risk for breast cancer may benefit   from supplemental screening including annual breast magnetic resonance   imaging (MRI), in addition to annual mammography.    Electronically signed by: Jim Madera MD  Date:    2022  Time:    15:04      Assessment/Plan:     37 y.o.     S/P chemotherapy, time since greater than 12 weeks    Diminished ovarian reserve    Acne, unspecified acne type  -     doxycycline (MONODOX) 100 MG capsule; 1 capsule twice daily by mouth  Dispense: 60 capsule; Refill: 11    Abnormal ultrasound of uterus        Counselin.  Abnormal finding on uterus:  U/S results reviewed with patient.  Likely due to uterine fibroid.  Can consider further evaluation with MRI to better characterize. Will delay until LANETTE follow up completed.  Patient currently without reliable health insurance.   2. Low AMH: recommend follow up with ROXY or Richard ASAP.  3.  Follow up with PCP for other health maintenance.  4.  RTC for annual exam or sooner if needed.  5. Patient requests refill of Doxy for acne.  Rx sent.     Use of the [facility] Patient Portal discussed and encouraged during today's visit.     I spent a total of 22 minutes on the day of the visit.  This includes face to face time and non-face to face time preparing to see the patient (eg, review of tests), obtaining and/or reviewing separately obtained history, documenting clinical information in the electronic or other health record, independently interpreting results and communicating results to the patient/family/caregiver, or care  coordinator.      Donna Castillo MD  [facility]  09/09/2022

## 2022-09-13 ENCOUNTER — TELEPHONE (OUTPATIENT)
Dept: OBSTETRICS AND GYNECOLOGY | Facility: CLINIC | Age: 37
End: 2022-09-13
Payer: COMMERCIAL

## 2022-09-13 NOTE — TELEPHONE ENCOUNTER
Spoke with pt  Staff advised pt that Dr Toussaint's office will need to submit a records release form to [facility] records which is signed by her as it is a legal document.  Form to be faxed to [facility] records office and then her records can be faxed back to Dr Toussaint's office.  Pt verbalized understanding.

## 2022-09-15 NOTE — PROGRESS NOTES
"Individual Psychotherapy (PhD/LCSW)    9/21/2022    Site:  Telemed     Patient presents at home in [place], [place] for follow up audiovisual telehealth visit.     Therapeutic Intervention: Met with patient.  Outpatient - Supportive psychotherapy 45 min - CPT Code 63828    Chief complaint/reason for encounter: addictive disorder, depression, and anxiety     Interval history and content of current session: GAD7: 8. Jerry shared that overall she feels that she is doing "better" since our last visit approx 1 month ago. She shared that her father does not have cancer which is a huge relief, not only that he won't be sick but also that her care giving duties won't increase and that she'll be reminded of her own cancer diagnosis. Discussed that she is continuing to look for a new job, particularly at [employer] and ideally she would like to work on the [employer]. I engaged in active listening and provided empathic support as Jerry discussed how her chances of becoming pregnant are low due to chemotherapy. We also discussed her relationship with her mother and how her mother reacted to the news; discussed that her mother tends to be triggering to her and we discussed the importance of setting boundaries with her mother. Jerry has seen her psychiatrist at [facility] in the interim; she is now back on Buspirone which she finds helpful. She is not currently taking any medications for alcohol cravings but plans to reach back out to her doctor. Discussed that her alcohol intake has decreased: she is now drinking 3-4 drinks 2 days a week; she attributes this to staying busy as well as spending time with her boyfriend's daughter. Encouraged her to continue to look at [facility] recovery meetings.     Treatment plan:  Target symptoms: alcohol abuse, recurrent depression, anxiety   Why chosen therapy is appropriate versus another modality: relevant to diagnosis, evidence based practice  Outcome monitoring methods: self-report, " checklist/rating scale  Therapeutic intervention type: supportive psychotherapy    Risk parameters:  Patient reports no suicidal ideation  Patient reports no homicidal ideation  Patient reports no self-injurious behavior  Patient reports no violent behavior    Verbal deficits: None    Patient's response to intervention:  The patient's response to intervention is accepting.    Progress toward goals and other mental status changes:  The patient's progress toward goals is fair , good.    Diagnosis:     ICD-10-CM ICD-9-CM   1. Moderate recurrent major depression  F33.1 296.32   2. TOM (generalized anxiety disorder)  F41.1 300.02   3. Mild alcohol use disorder  F10.10 305.00       Plan:  individual psychotherapy and medication management by physician Pt to go to ED or call 911 if symptoms worsen or if she has thoughts of harming self and/or others. Pt verbalized understanding.    Return to clinic: as scheduled    Length of Service (minutes): 45      Each patient to whom he or she provides medical services by telemedicine is: (1) informed of the relationship between the physician and patient and the respective role of any other health care provider with respect to management of the patient; and (2) notified that he or she may decline to receive medical services by telemedicine and may withdraw from such care at any time.

## 2022-09-21 ENCOUNTER — PATIENT MESSAGE (OUTPATIENT)
Dept: PSYCHIATRY | Facility: CLINIC | Age: 37
End: 2022-09-21
Payer: COMMERCIAL

## 2022-09-21 ENCOUNTER — OFFICE VISIT (OUTPATIENT)
Dept: PSYCHIATRY | Facility: CLINIC | Age: 37
End: 2022-09-21
Payer: COMMERCIAL

## 2022-09-21 DIAGNOSIS — F41.1 GAD (GENERALIZED ANXIETY DISORDER): ICD-10-CM

## 2022-09-21 DIAGNOSIS — F33.1 MODERATE RECURRENT MAJOR DEPRESSION: ICD-10-CM

## 2022-09-21 DIAGNOSIS — F10.10 MILD ALCOHOL USE DISORDER: ICD-10-CM

## 2022-09-21 PROCEDURE — 1159F PR MEDICATION LIST DOCUMENTED IN MEDICAL RECORD: ICD-10-PCS | Mod: CPTII,95,, | Performed by: SOCIAL WORKER

## 2022-09-21 PROCEDURE — 90834 PSYTX W PT 45 MINUTES: CPT | Mod: 95,,, | Performed by: SOCIAL WORKER

## 2022-09-21 PROCEDURE — 90834 PR PSYCHOTHERAPY W/PATIENT, 45 MIN: ICD-10-PCS | Mod: 95,,, | Performed by: SOCIAL WORKER

## 2022-09-21 PROCEDURE — 1159F MED LIST DOCD IN RCRD: CPT | Mod: CPTII,95,, | Performed by: SOCIAL WORKER

## 2022-09-22 ENCOUNTER — OFFICE VISIT (OUTPATIENT)
Dept: ORTHOPEDICS | Facility: CLINIC | Age: 37
End: 2022-09-22
Payer: COMMERCIAL

## 2022-09-22 VITALS
WEIGHT: 216 LBS | SYSTOLIC BLOOD PRESSURE: 128 MMHG | BODY MASS INDEX: 40.78 KG/M2 | HEIGHT: 61 IN | DIASTOLIC BLOOD PRESSURE: 88 MMHG

## 2022-09-22 DIAGNOSIS — M79.672 LEFT FOOT PAIN: ICD-10-CM

## 2022-09-22 DIAGNOSIS — S83.241A ACUTE MEDIAL MENISCUS TEAR OF RIGHT KNEE, INITIAL ENCOUNTER: Primary | ICD-10-CM

## 2022-09-22 DIAGNOSIS — M22.42 CHONDROMALACIA OF LEFT PATELLA: ICD-10-CM

## 2022-09-22 DIAGNOSIS — M22.41 CHONDROMALACIA OF RIGHT PATELLA: ICD-10-CM

## 2022-09-22 PROCEDURE — 3008F PR BODY MASS INDEX (BMI) DOCUMENTED: ICD-10-PCS | Mod: CPTII,S$GLB,, | Performed by: ORTHOPAEDIC SURGERY

## 2022-09-22 PROCEDURE — 3079F PR MOST RECENT DIASTOLIC BLOOD PRESSURE 80-89 MM HG: ICD-10-PCS | Mod: CPTII,S$GLB,, | Performed by: ORTHOPAEDIC SURGERY

## 2022-09-22 PROCEDURE — 3074F PR MOST RECENT SYSTOLIC BLOOD PRESSURE < 130 MM HG: ICD-10-PCS | Mod: CPTII,S$GLB,, | Performed by: ORTHOPAEDIC SURGERY

## 2022-09-22 PROCEDURE — 3008F BODY MASS INDEX DOCD: CPT | Mod: CPTII,S$GLB,, | Performed by: ORTHOPAEDIC SURGERY

## 2022-09-22 PROCEDURE — 1159F PR MEDICATION LIST DOCUMENTED IN MEDICAL RECORD: ICD-10-PCS | Mod: CPTII,S$GLB,, | Performed by: ORTHOPAEDIC SURGERY

## 2022-09-22 PROCEDURE — 1159F MED LIST DOCD IN RCRD: CPT | Mod: CPTII,S$GLB,, | Performed by: ORTHOPAEDIC SURGERY

## 2022-09-22 PROCEDURE — 99024 PR POST-OP FOLLOW-UP VISIT: ICD-10-PCS | Mod: S$GLB,,, | Performed by: ORTHOPAEDIC SURGERY

## 2022-09-22 PROCEDURE — 3074F SYST BP LT 130 MM HG: CPT | Mod: CPTII,S$GLB,, | Performed by: ORTHOPAEDIC SURGERY

## 2022-09-22 PROCEDURE — 3079F DIAST BP 80-89 MM HG: CPT | Mod: CPTII,S$GLB,, | Performed by: ORTHOPAEDIC SURGERY

## 2022-09-22 PROCEDURE — 99024 POSTOP FOLLOW-UP VISIT: CPT | Mod: S$GLB,,, | Performed by: ORTHOPAEDIC SURGERY

## 2022-09-22 RX ORDER — METHYLPREDNISOLONE 4 MG/1
TABLET ORAL
Qty: 1 EACH | Refills: 0 | Status: SHIPPED | OUTPATIENT
Start: 2022-09-22 | End: 2022-12-16 | Stop reason: ALTCHOICE

## 2022-09-22 NOTE — PROGRESS NOTES
[facility]    Subjective:     Chief Complaint:   Chief Complaint   Patient presents with    Right Knee - Pain     Right knee pain that started about 4 weeks ago. Complains of pain and swelling. Has had dry needling through her chiropractor which offered relief for about a day. Knee does give out causing her to fall several times       Past Medical History:   Diagnosis Date    Allergic rhinitis     Anxiety     Depression     Hypothyroidism     SVT (supraventricular tachycardia) 2011       Past Surgical History:   Procedure Laterality Date    ABLATION OF DYSRHYTHMIC FOCUS      x2    BIOPSY OF CERVICAL LYMPH NODE Left 11/29/2021    Procedure: BIOPSY, LYMPH NODE, CERVICAL;  Surgeon: Blanco Kerns MD;  Location: [facility] OR;  Service: General;  Laterality: Left;    CERVIX LESION DESTRUCTION      cryo in 2011 (?)    CONIZATION OF CERVIX USING LOOP ELECTROSURGICAL EXCISION PROCEDURE (LEEP) N/A 7/21/2021    Procedure: LEEP CONIZATION, CERVIX;  Surgeon: Donna Castillo MD;  Location: [facility] OR;  Service: OB/GYN;  Laterality: N/A;    EXCISION OF LESION OF NOSE N/A 10/14/2019    Procedure: EXCISION, LESION, NOSE;  Surgeon: Umesh Morales MD;  Location: [facility] OR;  Service: ENT;  Laterality: N/A;    FUNCTIONAL ENDOSCOPIC SINUS SURGERY (FESS) N/A 10/14/2019    Procedure: FESS (FUNCTIONAL ENDOSCOPIC SINUS SURGERY);  Surgeon: Umesh Morales MD;  Location: [facility] OR;  Service: ENT;  Laterality: N/A;    HYSTEROSCOPY WITH DILATION AND CURETTAGE OF UTERUS N/A 7/21/2021    Procedure: HYSTEROSCOPY, WITH DILATION AND CURETTAGE OF UTERUS;  Surgeon: Donna Castillo MD;  Location: [facility] OR;  Service: OB/GYN;  Laterality: N/A;    MEDIPORT REMOVAL Right 6/20/2022    Procedure: REMOVAL, CATHETER, CENTRAL VENOUS, TUNNELED, WITH PORT;  Surgeon: Blanco Kerns MD;  Location: [facility] OR;  Service: General;  Laterality: Right;    NASAL SEPTOPLASTY N/A 10/14/2019    Procedure: SEPTOPLASTY, NOSE;  Surgeon: Umesh Morales MD;  Location: [facility]  OR;  Service: ENT;  Laterality: N/A;    NASAL SINUS SURGERY  10/14/2019    PILONIDAL CYST DRAINAGE      REVISION OF SCAR  6/20/2022    Procedure: REVISION, SCAR;  Surgeon: Blanco Kerns MD;  Location: [facility] OR;  Service: General;;    SKIN TAG REMOVAL Left 6/20/2022    Procedure: REMOVAL, SKIN TAG;  Surgeon: Blanco Kerns MD;  Location: [facility] OR;  Service: General;  Laterality: Left;    TONSILLECTOMY      TONSILLECTOMY      TYMPANOPLASTY N/A 10/14/2019    Procedure: TYMPANOPLASTY;  Surgeon: Umesh Morales MD;  Location: [facility] OR;  Service: ENT;  Laterality: N/A;    TYMPANOTOMY Left 10/14/2019    left       Current Outpatient Medications   Medication Sig    busPIRone (BUSPAR) 10 MG tablet Take 10 mg by mouth 2 (two) times daily.    fluticasone (FLONASE) 50 mcg/actuation nasal spray INSTILL 2 SPRAYS IN EACH NOSTRIL ONCE DAILY    levothyroxine (SYNTHROID) 50 MCG tablet Take 1 tablet by mouth once daily    LIDOcaine (XYLOCAINE) 5 % Oint ointment APPLY 1 APPLICATION EXTERNALLY 3 TIMES A DAY AS NEEDED FOR 30 DAYS    mirtazapine (REMERON) 7.5 MG Tab TAKE 2 TABLETS (15 MG TOTAL) BY MOUTH NIGHTLY.    multivitamin capsule Take 1 capsule by mouth.    oxyCODONE (ROXICODONE) 5 MG immediate release tablet Take 1 tablet (5 mg total) by mouth every 6 (six) hours as needed for Pain (cancer-related pain).    tiZANidine (ZANAFLEX) 4 MG tablet Take 4 mg by mouth 3 (three) times daily as needed.    acetaminophen-codeine 300-30mg (TYLENOL #3) 300-30 mg Tab acetaminophen 300 mg-codeine 30 mg tablet    ALPRAZolam (XANAX) 1 MG tablet Take 1 tablet (1 mg total) by mouth 2 (two) times daily as needed for Anxiety.    amLODIPine (NORVASC) 2.5 MG tablet Take 2.5 mg by mouth once daily.    doxycycline (MONODOX) 100 MG capsule 1 capsule twice daily by mouth (Patient not taking: Reported on 9/22/2022)    dronabinoL (MARINOL) 5 MG capsule Take 1 capsule (5 mg total) by mouth 2 (two) times daily as needed (decreased appetite). (Patient not taking:  Reported on 9/22/2022)    gabapentin (NEURONTIN) 600 MG tablet Take 600 mg by mouth 3 (three) times daily.     guaiFENesin 100 mg/5 ml (ROBITUSSIN) 100 mg/5 mL syrup as needed.    hydrOXYzine pamoate (VISTARIL) 50 MG Cap Take 1 capsule (50 mg total) by mouth 2 (two) times daily as needed. (Patient not taking: Reported on 9/22/2022)    ibuprofen (ADVIL,MOTRIN) 800 MG tablet ibuprofen 800 mg tablet    levoFLOXacin (LEVAQUIN) 500 MG tablet Take 500 mg by mouth once daily.    LIDOcaine (LMX) 4 % cream 1 application as needed    LIDOcaine-prilocaine (EMLA) cream Apply small amount topically to port incision approximately 30 minutes prior to port access as needed    LORazepam (ATIVAN) 1 MG tablet Take by mouth.    methylPREDNISolone (MEDROL DOSEPACK) 4 mg tablet Take by mouth.    methylPREDNISolone (MEDROL DOSEPACK) 4 mg tablet Take 1 tablet (4 mg total) by mouth once daily. use as directed (Patient not taking: Reported on 9/22/2022)    mirtazapine (REMERON) 15 MG tablet Take 7.5 mg by mouth once daily.    ondansetron (ZOFRAN) 8 MG tablet Take 8 mg by mouth 3 (three) times daily.    ondansetron (ZOFRAN-ODT) 8 MG TbDL Take 1 tablet (8 mg total) by mouth every 8 (eight) hours as needed (chemotherapy-induced nausea and vomiting). (Patient not taking: Reported on 9/22/2022)    oxyCODONE-acetaminophen (PERCOCET) 5-325 mg per tablet oxycodone-acetaminophen 5 mg-325 mg tablet    prazosin (MINIPRESS) 1 MG Cap Take 1 mg by mouth every evening.    prazosin (MINIPRESS) 2 MG Cap 1 capsule at bedtime    promethazine (PHENERGAN) 6.25 mg/5 mL syrup as needed.    QUEtiapine (SEROQUEL) 100 MG Tab Take 100 mg by mouth once daily.    sertraline (ZOLOFT) 100 MG tablet Take 1 tablet (100 mg total) by mouth once daily.    SSD 1 % cream APPLY DAILY TO SKIN TO AFFECTED AREA EVERY DAY FOR 30 DAYS    traMADoL (ULTRAM) 50 mg tablet Take 50 mg by mouth 2 (two) times daily as needed.     No current facility-administered medications for this visit.  "      Review of patient's allergies indicates:  No Known Allergies    Family History   Problem Relation Age of Onset    Hypertension Mother     Heart disease Father 59        CABG    Heart failure Father     Breast cancer Maternal Aunt 55    Colon cancer Neg Hx     Ovarian cancer Neg Hx        Social History     Socioeconomic History    Marital status: Single   Occupational History    Occupation:      Comment: [employer]   Tobacco Use    Smoking status: Former     Packs/day: 0.50     Types: Cigarettes     Start date: 2000     Quit date: 2009     Years since quittin.2    Smokeless tobacco: Never   Substance and Sexual Activity    Alcohol use: Yes     Alcohol/week: 5.0 standard drinks     Types: 5 Shots of liquor per week     Comment: "occasionally"    Drug use: No    Sexual activity: Not Currently     Partners: Male     Birth control/protection: OCP   Social History Narrative    The patient does not exercise regularly ().  Rates diet as fair.  She is not satisfied with weight.           History of present illness:  29-year-old female, cancer survivor with lymphoma currently in remission.  Since her last chemo in .  [place] she had a slip and fall a month or so ago.  Complains of bilateral right greater than left knee pain and left foot pain.  Healing abrasions noted to the left knee.  Some mild effusion to the right knee.      Review of Systems:    Constitution: Negative for chills, fever, and sweats.  Negative for unexplained weight loss.    HENT:  Negative for headaches and blurry vision.    Cardiovascular:Negative for chest pain or irregular heart beat. Negative for hypertension.    Respiratory:  Negative for cough and shortness of breath.    Gastrointestinal: Negative for abdominal pain, heartburn, melena, nausea, and vomitting.    Genitourinary:  Negative bladder incontinence and dysuria.    Musculoskeletal:  See HPI for details.     Neurological: Negative " for numbness.    Psychiatric/Behavioral: Negative for depression.  The patient is not nervous/anxious.      Endocrine: Negative for polyuria    Hematologic/Lymphatic: Negative for bleeding problem.  Does not bruise/bleed easily.    Skin: Negative for poor would healing and rash    Objective:      Physical Examination:    Vital Signs:    Vitals:    09/22/22 1148   BP: 128/88       Body mass index is 40.81 kg/m².    This a well-developed, well nourished patient in no acute distress.  They are alert and oriented and cooperative to examination.        Examination of the bilateral knees, the left knee she has healed abrasions over the anterior left knee.  No effusion.  Mild patellofemoral crepitus, normal range of motion 0-130 degrees.  Stable anterior-posterior varus and valgus stress.  Calf soft nontender, straight leg raise negative.  The right knee, she has a +1 effusion, patellofemoral crepitus, tender over the medial joint line, pain with Rosa's testing medially.  Stable anterior-posterior varus and valgus stress, range of motion 0-130 degrees.  Calf soft nontender, straight leg raise negative.    Examination of the left foot, she has pain at the insertion of the plantar fascia at the distal metatarsals.  She has pain with dorsiflexion of the foot.  She does have somewhat of a collapsed arch.  No tenderness over the malleoli, noted pain over the proximal 5th metatarsal.  No palpable neuromas.      Pertinent New Results:    XRAY Report / Interpretation:   AP lateral sunrise views of the right knee taken today in the office do not demonstrate any significant osseous abnormalities.    AP lateral oblique views of the left foot taken today in the office do not demonstrate any significant osseous or soft tissue abnormalities.    Assessment/Plan:      Bilateral knee pain, chondromalacia patella bilateral knees, medial meniscal tear right knee.  Order MRI of the right knee.  She did complain of some left knee pain.  We  "will see if observation will improve this would re-evaluate which she follows up for her MRI results.  In terms of the left foot, x-rays are normal.  She either has a persistent ligamentous injury or sprain from the fall or some plantar fasciitis.  Refer her to Dr. Escalante.    Waqas Elkins, Physician Assistant, served in the capacity as a "scribe" for this patient encounter.  A "face-to-face" encounter occurred with Dr. Matthew Elliott on this date.  The treatment plan and medical decision-making is outlined above. Patient was seen and examined with a chaperone.       This note was created using Dragon voice recognition software that occasionally misinterpreted phrases or words.          "

## 2022-09-23 ENCOUNTER — PATIENT MESSAGE (OUTPATIENT)
Dept: OBSTETRICS AND GYNECOLOGY | Facility: CLINIC | Age: 37
End: 2022-09-23
Payer: COMMERCIAL

## 2022-09-23 NOTE — PROGRESS NOTES
"Individual Psychotherapy (PhD/LCSW)    10/5/2022    Site:  Telemed     Patient presents from her home in [place], [place] for follow up audiovisual telehealth visit.     Therapeutic Intervention: Met with patient.   Outpatient - Supportive psychotherapy 60 min - CPT Code 12806    Chief complaint/reason for encounter: addictive disorder, depression, and anxiety     Interval history and content of current session: GAD7: 7. Jerry shared that she has been coping with the news that she cannot have a child (she would need IVF and would need both a donor sperm and egg, most likely) due to her chemotherapy. I provided empathic support, and we discussed that it is okay for her to express her emotions regarding this. Discussed this as a grief process. Jerry said that she did set boundaries with her mother, which I validated her for. We also continued to discuss work stress and looking for another job; discussed practicing radical acceptance and focusing on what she can change. Jerry did say that she is currently taking "a break" from alcohol and has no alcohol in her home; I continued to encourage her to look into [facility] and [facility]. We will work on more coping skills and a self care plan next session.     Treatment plan:  Target symptoms: alcohol abuse, recurrent depression, anxiety , work stress  Why chosen therapy is appropriate versus another modality: relevant to diagnosis, patient responds to this modality, evidence based practice  Outcome monitoring methods: self-report, checklist/rating scale  Therapeutic intervention type: supportive psychotherapy    Risk parameters:  Patient reports no suicidal ideation  Patient reports no homicidal ideation  Patient reports no self-injurious behavior  Patient reports no violent behavior    Verbal deficits: None    Patient's response to intervention:  The patient's response to intervention is accepting.    Progress toward goals and other mental status changes:  The patient's " progress toward goals is fair .    Diagnosis:     ICD-10-CM ICD-9-CM   1. Moderate recurrent major depression  F33.1 296.32   2. TOM (generalized anxiety disorder)  F41.1 300.02       Plan:  individual psychotherapy and medication management by physician Pt to go to ED or call 911 if symptoms worsen or if she has thoughts of harming self and/or others. Pt verbalized understanding.    Return to clinic: as scheduled    Length of Service (minutes): 54       Each patient to whom he or she provides medical services by telemedicine is: (1) informed of the relationship between the physician and patient and the respective role of any other health care provider with respect to management of the patient; and (2) notified that he or she may decline to receive medical services by telemedicine and may withdraw from such care at any time.

## 2022-09-24 ENCOUNTER — PATIENT MESSAGE (OUTPATIENT)
Dept: PSYCHIATRY | Facility: CLINIC | Age: 37
End: 2022-09-24
Payer: COMMERCIAL

## 2022-09-25 ENCOUNTER — PATIENT MESSAGE (OUTPATIENT)
Dept: ORTHOPEDICS | Facility: CLINIC | Age: 37
End: 2022-09-25

## 2022-09-25 DIAGNOSIS — S83.241A ACUTE MEDIAL MENISCUS TEAR OF RIGHT KNEE, INITIAL ENCOUNTER: Primary | ICD-10-CM

## 2022-09-26 RX ORDER — TRAMADOL HYDROCHLORIDE 50 MG/1
50 TABLET ORAL EVERY 8 HOURS PRN
Qty: 21 EACH | Refills: 0 | Status: SHIPPED | OUTPATIENT
Start: 2022-09-26 | End: 2022-10-03

## 2022-09-26 NOTE — TELEPHONE ENCOUNTER
Waqas, you seen this patient 9/22. Left foot x-rays are normal. Also requesting something for pain. MRI right knee ordered to rule out meniscal tear.

## 2022-09-26 NOTE — TELEPHONE ENCOUNTER
Electronically prescribed a prescription for tramadol 50 mg with instructions to take 1 tablet by mouth every 8 hours as needed for pain.  I prescribed quantity 21.      Awaiting MRI of the knee to evaluate for meniscal pathology.

## 2022-09-27 ENCOUNTER — OFFICE VISIT (OUTPATIENT)
Dept: PODIATRY | Facility: CLINIC | Age: 37
End: 2022-09-27
Payer: COMMERCIAL

## 2022-09-27 VITALS — HEART RATE: 85 BPM | BODY MASS INDEX: 40.59 KG/M2 | OXYGEN SATURATION: 95 % | WEIGHT: 215 LBS | HEIGHT: 61 IN

## 2022-09-27 DIAGNOSIS — M79.672 LEFT FOOT PAIN: ICD-10-CM

## 2022-09-27 DIAGNOSIS — S93.622A LISFRANC'S SPRAIN, LEFT, INITIAL ENCOUNTER: Primary | ICD-10-CM

## 2022-09-27 PROCEDURE — 99203 OFFICE O/P NEW LOW 30 MIN: CPT | Mod: S$GLB,,, | Performed by: PODIATRIST

## 2022-09-27 PROCEDURE — 3008F PR BODY MASS INDEX (BMI) DOCUMENTED: ICD-10-PCS | Mod: CPTII,S$GLB,, | Performed by: PODIATRIST

## 2022-09-27 PROCEDURE — 1160F PR REVIEW ALL MEDS BY PRESCRIBER/CLIN PHARMACIST DOCUMENTED: ICD-10-PCS | Mod: CPTII,S$GLB,, | Performed by: PODIATRIST

## 2022-09-27 PROCEDURE — 1160F RVW MEDS BY RX/DR IN RCRD: CPT | Mod: CPTII,S$GLB,, | Performed by: PODIATRIST

## 2022-09-27 PROCEDURE — 99203 PR OFFICE/OUTPT VISIT, NEW, LEVL III, 30-44 MIN: ICD-10-PCS | Mod: S$GLB,,, | Performed by: PODIATRIST

## 2022-09-27 PROCEDURE — 1159F MED LIST DOCD IN RCRD: CPT | Mod: CPTII,S$GLB,, | Performed by: PODIATRIST

## 2022-09-27 PROCEDURE — 3008F BODY MASS INDEX DOCD: CPT | Mod: CPTII,S$GLB,, | Performed by: PODIATRIST

## 2022-09-27 PROCEDURE — 1159F PR MEDICATION LIST DOCUMENTED IN MEDICAL RECORD: ICD-10-PCS | Mod: CPTII,S$GLB,, | Performed by: PODIATRIST

## 2022-09-27 NOTE — PROGRESS NOTES
"  [address] [facility] [address]  [place] [address]  Phone: [phone]   [phone]    Patient's PCP:Severo Saenz MD  Referring Provider: Aaareferral Self    Subjective:      Chief Complaint:: Foot Pain (Left )    HPI  Jerry Coronado is a 37 y.o. female who presents today with a complaint of left foot pain on the top and under the ball of the foot lasting for about two years but worse in the last month. Onset of symptoms a month ago I was stepping down into the pool and slipped on the ladder and stubbed the foot and reports trauma.  Current symptoms include pain and swelling.  Aggravating factors are walking. Symptoms have progressed. Treatment to date have included seen Dr. Elliott on 9/22/2022 who did x-rays and ordered a medrol dose pack, compression socks, and reflexology foot massager.        Vitals:    09/27/22 0852   Pulse: 85   SpO2: 95%   Weight: 97.5 kg (215 lb)   Height: 5' 1" (1.549 m)   PainSc:   7      Shoe Size: 8.5    Past Surgical History:   Procedure Laterality Date    ABLATION OF DYSRHYTHMIC FOCUS      x2    BIOPSY OF CERVICAL LYMPH NODE Left 11/29/2021    Procedure: BIOPSY, LYMPH NODE, CERVICAL;  Surgeon: Blanco Kerns MD;  Location: [facility] OR;  Service: General;  Laterality: Left;    CERVIX LESION DESTRUCTION      cryo in 2011 (?)    CONIZATION OF CERVIX USING LOOP ELECTROSURGICAL EXCISION PROCEDURE (LEEP) N/A 7/21/2021    Procedure: LEEP CONIZATION, CERVIX;  Surgeon: Donna Castillo MD;  Location: [facility] OR;  Service: OB/GYN;  Laterality: N/A;    EXCISION OF LESION OF NOSE N/A 10/14/2019    Procedure: EXCISION, LESION, NOSE;  Surgeon: Umesh Morales MD;  Location: [facility] OR;  Service: ENT;  Laterality: N/A;    FUNCTIONAL ENDOSCOPIC SINUS SURGERY (FESS) N/A 10/14/2019    Procedure: FESS (FUNCTIONAL ENDOSCOPIC SINUS SURGERY);  Surgeon: Umesh Morales MD;  Location: [facility] OR;  Service: ENT;  Laterality: N/A;    HYSTEROSCOPY WITH DILATION AND CURETTAGE OF UTERUS N/A 7/21/2021    " Procedure: HYSTEROSCOPY, WITH DILATION AND CURETTAGE OF UTERUS;  Surgeon: Donna Castillo MD;  Location: [facility] OR;  Service: OB/GYN;  Laterality: N/A;    MEDIPORT REMOVAL Right 6/20/2022    Procedure: REMOVAL, CATHETER, CENTRAL VENOUS, TUNNELED, WITH PORT;  Surgeon: Blanco Kerns MD;  Location: [facility] OR;  Service: General;  Laterality: Right;    NASAL SEPTOPLASTY N/A 10/14/2019    Procedure: SEPTOPLASTY, NOSE;  Surgeon: Umesh Morales MD;  Location: [facility] OR;  Service: ENT;  Laterality: N/A;    NASAL SINUS SURGERY  10/14/2019    PILONIDAL CYST DRAINAGE      REVISION OF SCAR  6/20/2022    Procedure: REVISION, SCAR;  Surgeon: Blanco Kerns MD;  Location: [facility] OR;  Service: General;;    SKIN TAG REMOVAL Left 6/20/2022    Procedure: REMOVAL, SKIN TAG;  Surgeon: Blanco Kerns MD;  Location: [facility] OR;  Service: General;  Laterality: Left;    TONSILLECTOMY      TONSILLECTOMY      TYMPANOPLASTY N/A 10/14/2019    Procedure: TYMPANOPLASTY;  Surgeon: Umesh Morales MD;  Location: [facility] OR;  Service: ENT;  Laterality: N/A;    TYMPANOTOMY Left 10/14/2019    left     Past Medical History:   Diagnosis Date    Allergic rhinitis     Anxiety     Depression     Hypothyroidism     SVT (supraventricular tachycardia) 2011     Family History   Problem Relation Age of Onset    Hypertension Mother     Heart disease Father 59        CABG    Heart failure Father     Breast cancer Maternal Aunt 55    Colon cancer Neg Hx     Ovarian cancer Neg Hx         Social History:   Marital Status: Single  Alcohol History:  reports current alcohol use of about 5.0 standard drinks per week.  Tobacco History:  reports that she quit smoking about 13 years ago. Her smoking use included cigarettes. She started smoking about 22 years ago. She smoked an average of .5 packs per day. She has never used smokeless tobacco.  Drug History:  reports no history of drug use.    Review of patient's allergies indicates:  No Known Allergies    Current Outpatient  Medications   Medication Sig Dispense Refill    amLODIPine (NORVASC) 2.5 MG tablet Take 2.5 mg by mouth once daily.      busPIRone (BUSPAR) 10 MG tablet Take 10 mg by mouth 2 (two) times daily.      doxycycline (MONODOX) 100 MG capsule 1 capsule twice daily by mouth 60 capsule 11    dronabinoL (MARINOL) 5 MG capsule Take 1 capsule (5 mg total) by mouth 2 (two) times daily as needed (decreased appetite). 60 capsule 0    fluticasone (FLONASE) 50 mcg/actuation nasal spray INSTILL 2 SPRAYS IN EACH NOSTRIL ONCE DAILY 1 Bottle 3    gabapentin (NEURONTIN) 600 MG tablet Take 600 mg by mouth 3 (three) times daily.       guaiFENesin 100 mg/5 ml (ROBITUSSIN) 100 mg/5 mL syrup as needed.      hydrOXYzine pamoate (VISTARIL) 50 MG Cap Take 1 capsule (50 mg total) by mouth 2 (two) times daily as needed. 60 capsule 2    ibuprofen (ADVIL,MOTRIN) 800 MG tablet ibuprofen 800 mg tablet      levoFLOXacin (LEVAQUIN) 500 MG tablet Take 500 mg by mouth once daily.      levothyroxine (SYNTHROID) 50 MCG tablet Take 1 tablet by mouth once daily 30 tablet 4    LIDOcaine (LMX) 4 % cream 1 application as needed      LIDOcaine (XYLOCAINE) 5 % Oint ointment APPLY 1 APPLICATION EXTERNALLY 3 TIMES A DAY AS NEEDED FOR 30 DAYS      LIDOcaine-prilocaine (EMLA) cream Apply small amount topically to port incision approximately 30 minutes prior to port access as needed      LORazepam (ATIVAN) 1 MG tablet Take by mouth.      methylPREDNISolone (MEDROL DOSEPACK) 4 mg tablet Take by mouth.      methylPREDNISolone (MEDROL DOSEPACK) 4 mg tablet use as directed 1 each 0    mirtazapine (REMERON) 15 MG tablet Take 7.5 mg by mouth once daily.      mirtazapine (REMERON) 7.5 MG Tab TAKE 2 TABLETS (15 MG TOTAL) BY MOUTH NIGHTLY. 180 tablet 1    multivitamin capsule Take 1 capsule by mouth.      ondansetron (ZOFRAN) 8 MG tablet Take 8 mg by mouth 3 (three) times daily.      ondansetron (ZOFRAN-ODT) 8 MG TbDL Take 1 tablet (8 mg total) by mouth every 8 (eight) hours as  needed (chemotherapy-induced nausea and vomiting). 40 tablet 5    prazosin (MINIPRESS) 1 MG Cap Take 1 mg by mouth every evening.      prazosin (MINIPRESS) 2 MG Cap 1 capsule at bedtime      promethazine (PHENERGAN) 6.25 mg/5 mL syrup as needed.      QUEtiapine (SEROQUEL) 100 MG Tab Take 100 mg by mouth once daily.      SSD 1 % cream APPLY DAILY TO SKIN TO AFFECTED AREA EVERY DAY FOR 30 DAYS      tiZANidine (ZANAFLEX) 4 MG tablet Take 4 mg by mouth 3 (three) times daily as needed.      traMADoL (ULTRAM) 50 mg tablet Take 1 tablet (50 mg total) by mouth every 8 (eight) hours as needed for Pain. 21 each 0    ALPRAZolam (XANAX) 1 MG tablet Take 1 tablet (1 mg total) by mouth 2 (two) times daily as needed for Anxiety. 30 tablet 0    methylPREDNISolone (MEDROL DOSEPACK) 4 mg tablet Take 1 tablet (4 mg total) by mouth once daily. use as directed (Patient not taking: Reported on 9/27/2022) 21 each 0    sertraline (ZOLOFT) 100 MG tablet Take 1 tablet (100 mg total) by mouth once daily. 30 tablet 1     No current facility-administered medications for this visit.       Review of Systems   Constitutional:  Positive for fatigue. Negative for chills, fever and unexpected weight change.   HENT:  Negative for hearing loss and trouble swallowing.    Eyes:  Negative for photophobia and visual disturbance.   Respiratory:  Negative for cough, shortness of breath and wheezing.    Cardiovascular:  Positive for leg swelling. Negative for chest pain and palpitations.   Gastrointestinal:  Negative for abdominal pain and nausea.   Genitourinary:  Negative for dysuria and frequency.   Musculoskeletal:  Positive for back pain, gait problem and joint swelling. Negative for arthralgias and myalgias.   Skin:  Negative for rash and wound.   Neurological:  Positive for numbness and headaches. Negative for seizures and weakness.   Hematological:  Does not bruise/bleed easily.       Objective:        Physical Exam:   Foot Exam    General  General  Appearance: appears stated age and healthy   Orientation: alert and oriented to person, place, and time   Affect: appropriate   Gait: antalgic       Left Foot/Ankle      Inspection and Palpation  Ecchymosis: none  Tenderness: lisfranc joint   Swelling: lisfranc joint   Arch: normal  Skin Exam: skin intact;   Neurovascular  Dorsalis pedis: 2+  Posterior tibial: 2+  Capillary refill: 2+  Saphenous nerve sensation: normal  Tibial nerve sensation: normal  Superficial peroneal nerve sensation: normal  Deep peroneal nerve sensation: normal  Sural nerve sensation: normal    Muscle Strength  Ankle dorsiflexion: 5  Ankle plantar flexion: 5  Ankle inversion: 5  Ankle eversion: 5  Great toe extension: 5  Great toe flexion: 5    Range of Motion    Normal left ankle ROM  Passive  Ankle eversion: pain  Passive eversion: Pain Lisfranc joint.Ankle inversion: pain  Passive inversion: Pain Lisfranc joint.  Active  Ankle eversion: pain  Left ankle active eversion: Pain Lisfranc joint.Ankle inversion: pain  Left ankle active inversion: Pain Lisfranc joint.    Physical Exam  Cardiovascular:      Pulses:           Dorsalis pedis pulses are 2+ on the left side.        Posterior tibial pulses are 2+ on the left side.   Musculoskeletal:        Feet:              Left Ankle/Foot Exam     Range of Motion   The patient has normal left ankle ROM.     Muscle Strength   The patient has normal left ankle strength.      Muscle Strength   Left Lower Extremity   Ankle Dorsiflexion:  5   Plantar flexion:  5/5     Vascular Exam       Left Pulses  Dorsalis Pedis:      2+  Posterior Tibial:      2+         Imaging:   AP, lateral, lateral oblique non weight bearing x-rays left foot:  No acute fractures, no bone tumors, no soft tissue masses seen, no obvious subluxation seen.         Assessment:       1. Lisfranc's sprain, left, initial encounter    2. Left foot pain      Plan:   Lisfranc's sprain, left, initial encounter  -     MRI Foot (Midfoot) Left  Without Contrast; Future; Expected date: 09/27/2022    Left foot pain    I evaluated patient today she has pain through Lisfranc joint with palpation and range of motion as well as swelling.  She is had injuries to the area in the past and recently is getting recurring injuries in a more frequent basis.  Because of the continued pain and swelling and failure to respond to other conservative treatments performed being ice anti-inflammatories offloading.  I am ordering an MRI of the mid foot left without contrast evaluate Lisfranc ligaments for any damage to the joint.  She return to see me to evaluate and review the MRI once it is approved by the insurance company.  I explained to her that sometimes insurance companies can be a little hard to deal with getting MRIs approved.  We will be in contact with her.  Return to review MRI results    Procedures          Counseling:     I provided patient education verbally regarding:   Patient diagnosis, treatment options, as well as alternatives, risks, and benefits.     This note was created using Dragon voice recognition software that occasionally misinterpreted phrases or words.

## 2022-09-29 ENCOUNTER — OFFICE VISIT (OUTPATIENT)
Dept: OBSTETRICS AND GYNECOLOGY | Facility: CLINIC | Age: 37
End: 2022-09-29
Payer: COMMERCIAL

## 2022-09-29 ENCOUNTER — PATIENT MESSAGE (OUTPATIENT)
Dept: OBSTETRICS AND GYNECOLOGY | Facility: CLINIC | Age: 37
End: 2022-09-29

## 2022-09-29 ENCOUNTER — TELEPHONE (OUTPATIENT)
Dept: OBSTETRICS AND GYNECOLOGY | Facility: CLINIC | Age: 37
End: 2022-09-29
Payer: COMMERCIAL

## 2022-09-29 DIAGNOSIS — N97.9 INFERTILITY, FEMALE: Primary | ICD-10-CM

## 2022-09-29 DIAGNOSIS — Z92.21 S/P CHEMOTHERAPY, TIME SINCE GREATER THAN 12 WEEKS: ICD-10-CM

## 2022-09-29 DIAGNOSIS — Z30.014 ENCOUNTER FOR INITIAL PRESCRIPTION OF INTRAUTERINE CONTRACEPTIVE DEVICE (IUD): ICD-10-CM

## 2022-09-29 PROCEDURE — 99213 OFFICE O/P EST LOW 20 MIN: CPT | Mod: 95,,, | Performed by: OBSTETRICS & GYNECOLOGY

## 2022-09-29 PROCEDURE — 99213 PR OFFICE/OUTPT VISIT, EST, LEVL III, 20-29 MIN: ICD-10-PCS | Mod: 95,,, | Performed by: OBSTETRICS & GYNECOLOGY

## 2022-09-29 NOTE — PROGRESS NOTES
The patient location is: home  The chief complaint leading to consultation is: follow up after LANETTE consultation    Visit type: audiovisual    Face to Face time with patient: 11 min  21 minutes of total time spent on the encounter, which includes face to face time and non-face to face time preparing to see the patient (eg, review of tests), Obtaining and/or reviewing separately obtained history, Documenting clinical information in the electronic or other health record, Independently interpreting results (not separately reported) and communicating results to the patient/family/caregiver, or Care coordination (not separately reported).         Each patient to whom he or she provides medical services by telemedicine is:  (1) informed of the relationship between the physician and patient and the respective role of any other health care provider with respect to management of the patient; and (2) notified that he or she may decline to receive medical services by telemedicine and may withdraw from such care at any time.    Notes:     Subjective:       Patient ID: Jerry Coronado is a 37 y.o. female.    Chief Complaint: Follow up      History of Present Illness  Patient presents for follow up after consultation with Dr. Toussaint at [facility] secondary to low AMH after chemotherapy last year due to lymphoma.      No notes available for review. Patient reports that plan is proceed with SIS after next menses.  Per Dr. Toussaint, IVF with donor egg would be required to pursue fertility.  Spontaneous conception may occur but 80-90% risk for spontaneous .  Patient desires to proceed with SIS with LANETTE and then will plan to delay IVF approximately 12 months.  Desires reliable contraception until ready to proceed with IVF.      Patient Active Problem List   Diagnosis    Insomnia secondary to depression with anxiety    Low back pain    Acne    Situational stress    Breast hypertrophy    Hypothyroidism    Allergic rhinitis    Chronic  infection of sinus    Hypertrophy of nasal turbinates    Deviated nasal septum    Eustachian tube dysfunction    Acquired atrophy of thyroid    Alcohol abuse    Alcohol use with alcohol-induced mood disorder    Atypical squamous cells of undetermined significance (ASCUS) on Papanicolaou smear of cervix    Carpal tunnel syndrome    Cervical intraepithelial neoplasia grade 2    Overdose    Jaw pain    Moderate recurrent major depression    Obstructive sleep apnea syndrome    Other depressive episodes    Personal history of other infectious and parasitic diseases    Postherpetic neuralgia    Shock    Supraventricular tachycardia    Tobacco user    Vaginal high risk human papillomavirus (HPV) DNA test positive    Low grade squamous intraepithelial dysplasia    Atypical glandular cells of undetermined significance (LEE) on cervical Pap smear    Cervical dysplasia    Abnormal uterine bleeding (AUB)    Arcuate uterus    Supraclavicular lymphadenopathy    Axillary lymphadenopathy    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    Encounter for removal of tunneled central venous catheter (CVC) with port       Past Medical History:   Diagnosis Date    Allergic rhinitis     Anxiety     Depression     Hypothyroidism     SVT (supraventricular tachycardia) 2011       Past Surgical History:   Procedure Laterality Date    ABLATION OF DYSRHYTHMIC FOCUS      x2    BIOPSY OF CERVICAL LYMPH NODE Left 11/29/2021    Procedure: BIOPSY, LYMPH NODE, CERVICAL;  Surgeon: Blanco Kerns MD;  Location: [facility] OR;  Service: General;  Laterality: Left;    CERVIX LESION DESTRUCTION      cryo in 2011 (?)    CONIZATION OF CERVIX USING LOOP ELECTROSURGICAL EXCISION PROCEDURE (LEEP) N/A 7/21/2021    Procedure: LEEP CONIZATION, CERVIX;  Surgeon: Donna Castillo MD;  Location: [facility] OR;  Service: OB/GYN;  Laterality: N/A;    EXCISION OF LESION OF NOSE N/A 10/14/2019    Procedure: EXCISION, LESION, NOSE;  Surgeon: Umesh Morales MD;  Location: [facility] OR;   Service: ENT;  Laterality: N/A;    FUNCTIONAL ENDOSCOPIC SINUS SURGERY (FESS) N/A 10/14/2019    Procedure: FESS (FUNCTIONAL ENDOSCOPIC SINUS SURGERY);  Surgeon: Umesh Morales MD;  Location: [facility] OR;  Service: ENT;  Laterality: N/A;    HYSTEROSCOPY WITH DILATION AND CURETTAGE OF UTERUS N/A 2021    Procedure: HYSTEROSCOPY, WITH DILATION AND CURETTAGE OF UTERUS;  Surgeon: Donna Castillo MD;  Location: [facility] OR;  Service: OB/GYN;  Laterality: N/A;    MEDIPORT REMOVAL Right 2022    Procedure: REMOVAL, CATHETER, CENTRAL VENOUS, TUNNELED, WITH PORT;  Surgeon: Blanco Kerns MD;  Location: [facility] OR;  Service: General;  Laterality: Right;    NASAL SEPTOPLASTY N/A 10/14/2019    Procedure: SEPTOPLASTY, NOSE;  Surgeon: Umesh Morales MD;  Location: [facility] OR;  Service: ENT;  Laterality: N/A;    NASAL SINUS SURGERY  10/14/2019    PILONIDAL CYST DRAINAGE      REVISION OF SCAR  2022    Procedure: REVISION, SCAR;  Surgeon: Blanco Kerns MD;  Location: [facility] OR;  Service: General;;    SKIN TAG REMOVAL Left 2022    Procedure: REMOVAL, SKIN TAG;  Surgeon: Blanco Kerns MD;  Location: [facility] OR;  Service: General;  Laterality: Left;    TONSILLECTOMY      TONSILLECTOMY      TYMPANOPLASTY N/A 10/14/2019    Procedure: TYMPANOPLASTY;  Surgeon: Umesh Morales MD;  Location: [facility] OR;  Service: ENT;  Laterality: N/A;    TYMPANOTOMY Left 10/14/2019    left       OB History    Para Term  AB Living   1 0 0   1     SAB IAB Ectopic Multiple Live Births     1            # Outcome Date GA Lbr Matteo/2nd Weight Sex Delivery Anes PTL Lv   1 IAB 2010              Birth Comments: elective AB      Obstetric Comments   Menarche age 12.    History of abnormal PAP smear: YES   History of sexually transmitted disease:  YES: HPV.                Patient's last menstrual period was 2022 (exact date).   Date of Last Pap: 2021       Objective:   LMP 2022 (Exact Date)   There is no height or weight on file to  calculate BMI.    APPEARANCE: Well nourished, well developed, in no acute distress.  PSYCH: Appropriate mood and affect.  SKIN: No acne or hirsutism  NECK: Neck symmetric without masses or thyromegaly        Results for orders placed or performed in visit on 09/01/22   Antimullerian Hormone (AMH)   Result Value Ref Range    Anti Mullerian Hormone (AMH) <0.03 (L) 0.15 - 7.5 ng/mL   Follicle Stimulating Hormone   Result Value Ref Range    Follicle Stimulating Hormone 15.93 See Text mIU/mL   Estradiol   Result Value Ref Range    Estradiol 116 See Text pg/mL   Hepatitis B Surface Antigen   Result Value Ref Range    Hepatitis B Surface Ag Non-reactive Non-reactive   Hepatitis C Antibody   Result Value Ref Range    Hepatitis C Ab Non-reactive Non-reactive   HIV 1/2 Ag/Ab (4th Gen)   Result Value Ref Range    HIV 1/2 Ag/Ab Non-reactive Non-reactive   RPR   Result Value Ref Range    RPR Non-reactive Non-reactive       Assessment/ Plan:     1. Infertility, female        2. S/P chemotherapy, time since greater than 12 weeks        3. Encounter for initial prescription of intrauterine contraceptive device (IUD)  Device Authorization Order        Keep SIS appointment with ROXY.     The risks of, benefits of, and alternatives of various forms of contraception were discussed at this visit. After a discussion of the R/B/A of fertility awareness, barrier contraception, hormonal pills, injections, patches, rings, hormonal and non-hormonal IUDs, and the subdermal implant, all of  questions were answered, and she has opted for Mirena IUD.    RTC after menses and after SIS for placement.           Donna Castillo MD  [facility]  09/29/2022

## 2022-10-05 ENCOUNTER — OFFICE VISIT (OUTPATIENT)
Dept: PSYCHIATRY | Facility: CLINIC | Age: 37
End: 2022-10-05
Payer: COMMERCIAL

## 2022-10-05 DIAGNOSIS — F33.1 MODERATE RECURRENT MAJOR DEPRESSION: ICD-10-CM

## 2022-10-05 DIAGNOSIS — F41.1 GAD (GENERALIZED ANXIETY DISORDER): ICD-10-CM

## 2022-10-05 PROCEDURE — 90837 PR PSYCHOTHERAPY W/PATIENT, 60 MIN: ICD-10-PCS | Mod: 95,,, | Performed by: SOCIAL WORKER

## 2022-10-05 PROCEDURE — 1159F PR MEDICATION LIST DOCUMENTED IN MEDICAL RECORD: ICD-10-PCS | Mod: CPTII,95,, | Performed by: SOCIAL WORKER

## 2022-10-05 PROCEDURE — 99999 PR PBB SHADOW E&M-EST. PATIENT-LVL II: CPT | Mod: PBBFAC,AJ,HB, | Performed by: SOCIAL WORKER

## 2022-10-05 PROCEDURE — 90837 PSYTX W PT 60 MINUTES: CPT | Mod: 95,,, | Performed by: SOCIAL WORKER

## 2022-10-05 PROCEDURE — 99999 PR PBB SHADOW E&M-EST. PATIENT-LVL II: ICD-10-PCS | Mod: PBBFAC,AJ,HB, | Performed by: SOCIAL WORKER

## 2022-10-05 PROCEDURE — 99212 OFFICE O/P EST SF 10 MIN: CPT | Mod: PBBFAC,PN | Performed by: SOCIAL WORKER

## 2022-10-05 PROCEDURE — 1159F MED LIST DOCD IN RCRD: CPT | Mod: CPTII,95,, | Performed by: SOCIAL WORKER

## 2022-10-05 NOTE — PROGRESS NOTES
PATIENT: Jerry Coronado  MRN: [id number]  DATE: 10/6/2022    Diagnosis:   1. 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 Morbid obesity    3. Mood disorder due to a general medical condition      Chief Complaint: Lymphoma    Oncologic History:      Oncologic History 1. Follicular lymphoma      Oncologic Treatment 1. Bendamustine/rituximab - start date 1/25/22 - 1/26/22      Pathology 11/29/21:  Lymph node, left supraclavicular region, excision:  --Follicular lymphoma with high Ki-67 proliferation index, see comment  Comment: Concomitantly submitted flow cytometric analysis detects B-cell lymphoma of germinal center  origin expressing CD19, bright CD20, and CD10 with kappa light chain restriction. CD5 is negative in this  population.  Although the majority of the lymph node appears to represent as grade 2 follicular lymphoma, the Ki-67  proliferation index is much higher than expected for typical low-grade follicular lymphoma. Additionally there  is a very small area suggestive of grade 3A follicular lymphoma as well, representing less than 5% of the overall cellularity. This sampling leaves concern for a higher grade process elsewhere and correlation with  clinical and radiologic findings including PET scan findings is highly recommended with additional biopsy of  areas of high SUV if indicated.      Telemedicine visit:  The patient location is: home  The chief complaint leading to consultation is: follicular lymphoma    Visit type: audiovisual    Face to Face time with patient: 7 minutes  10 minutes of total time spent on the encounter, which includes face to face time and non-face to face time preparing to see the patient (eg, review of tests), Obtaining and/or reviewing separately obtained history, Documenting clinical information in the electronic or other health record, Independently interpreting results (not separately reported) and communicating results to the  patient/family/caregiver, or Care coordination (not separately reported).     Each patient to whom he or she provides medical services by telemedicine is:  (1) informed of the relationship between the physician and patient and the respective role of any other health care provider with respect to management of the patient; and (2) notified that he or she may decline to receive medical services by telemedicine and may withdraw from such care at any time.    Notes: see below      Subjective:    History of Present Illness: Ms. Coronado is a 37 y.o. female who presented in December 2021 for evaluation and management of B cell lymphoma. She was referred by Dr. Kerns.    - presenting symptom was enlarged supraclavicular lymph nodes  - she underwent excisional lymph node biopsy on 11/29/21. Pathology revealed a follicular lymphoma with high ki-67 proliferation index.  - she reports having a CT scan in [place] in early November with enlarged lymph nodes noted throughout.  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 This may make treatment decisions challenging logistically.  - she received cycle #1 of bendamustine/rituximab on 1/25/22 - 1/26/22 in [place], [place].  - she underwent PET/CT scan on 4/14/22.  - she completed 6 cycles of bendamustine/rituximab (last was on 6/16/22 - 6/17/22).  - she underwent port removal on 6/20/22.  - she underwent pet scan on 6/29/22.    Interval history:  - she presents for a follow-up appointment for her follicular lymphoma  - today, she is doing well. She denies fever, night sweats, unintentional weight loss.   - She denies shortness of breath, chest pain, vomiting, constipation. She gets anxious about recurrence of lymphoma.    Past medical, surgical, family, and social histories have been reviewed and updated below.    Past Medical History:   Past Medical History:   Diagnosis Date    Allergic rhinitis     Anxiety     Depression      Hypothyroidism     SVT (supraventricular tachycardia) 2011       Past Surgical History:   Past Surgical History:   Procedure Laterality Date    ABLATION OF DYSRHYTHMIC FOCUS      x2    BIOPSY OF CERVICAL LYMPH NODE Left 11/29/2021    Procedure: BIOPSY, LYMPH NODE, CERVICAL;  Surgeon: Blanco Kerns MD;  Location: [facility] OR;  Service: General;  Laterality: Left;    CERVIX LESION DESTRUCTION      cryo in 2011 (?)    CONIZATION OF CERVIX USING LOOP ELECTROSURGICAL EXCISION PROCEDURE (LEEP) N/A 7/21/2021    Procedure: LEEP CONIZATION, CERVIX;  Surgeon: Donna Castillo MD;  Location: [facility] OR;  Service: OB/GYN;  Laterality: N/A;    EXCISION OF LESION OF NOSE N/A 10/14/2019    Procedure: EXCISION, LESION, NOSE;  Surgeon: Umesh Morales MD;  Location: [facility] OR;  Service: ENT;  Laterality: N/A;    FUNCTIONAL ENDOSCOPIC SINUS SURGERY (FESS) N/A 10/14/2019    Procedure: FESS (FUNCTIONAL ENDOSCOPIC SINUS SURGERY);  Surgeon: Umesh Morales MD;  Location: [facility] OR;  Service: ENT;  Laterality: N/A;    HYSTEROSCOPY WITH DILATION AND CURETTAGE OF UTERUS N/A 7/21/2021    Procedure: HYSTEROSCOPY, WITH DILATION AND CURETTAGE OF UTERUS;  Surgeon: Donna Castillo MD;  Location: [facility] OR;  Service: OB/GYN;  Laterality: N/A;    MEDIPORT REMOVAL Right 6/20/2022    Procedure: REMOVAL, CATHETER, CENTRAL VENOUS, TUNNELED, WITH PORT;  Surgeon: Blanco Kerns MD;  Location: [facility] OR;  Service: General;  Laterality: Right;    NASAL SEPTOPLASTY N/A 10/14/2019    Procedure: SEPTOPLASTY, NOSE;  Surgeon: Umesh Morales MD;  Location: [facility] OR;  Service: ENT;  Laterality: N/A;    NASAL SINUS SURGERY  10/14/2019    PILONIDAL CYST DRAINAGE      REVISION OF SCAR  6/20/2022    Procedure: REVISION, SCAR;  Surgeon: Blanco Kerns MD;  Location: [facility] OR;  Service: General;;    SKIN TAG REMOVAL Left 6/20/2022    Procedure: REMOVAL, SKIN TAG;  Surgeon: Blanco Kerns MD;  Location: [facility] OR;  Service: General;  Laterality: Left;    TONSILLECTOMY       TONSILLECTOMY      TYMPANOPLASTY N/A 10/14/2019    Procedure: TYMPANOPLASTY;  Surgeon: Umesh Morales MD;  Location: [facility];  Service: ENT;  Laterality: N/A;    TYMPANOTOMY Left 10/14/2019    left       Family History:   Family History   Problem Relation Age of Onset    Hypertension Mother     Heart disease Father 59        CABG    Heart failure Father     Breast cancer Maternal Aunt 55    Colon cancer Neg Hx     Ovarian cancer Neg Hx        Social History:  reports that she quit smoking about 13 years ago. Her smoking use included cigarettes. She started smoking about 22 years ago. She smoked an average of .5 packs per day. She has never used smokeless tobacco. She reports current alcohol use of about 5.0 standard drinks per week. She reports that she does not use drugs.    Allergies:  Review of patient's allergies indicates:  No Known Allergies    Medications:  Current Outpatient Medications   Medication Sig Dispense Refill    ALPRAZolam (XANAX) 1 MG tablet Take 1 tablet (1 mg total) by mouth 2 (two) times daily as needed for Anxiety. 30 tablet 0    amLODIPine (NORVASC) 2.5 MG tablet Take 2.5 mg by mouth once daily.      busPIRone (BUSPAR) 10 MG tablet Take 10 mg by mouth 2 (two) times daily.      doxycycline (MONODOX) 100 MG capsule 1 capsule twice daily by mouth 60 capsule 11    dronabinoL (MARINOL) 5 MG capsule Take 1 capsule (5 mg total) by mouth 2 (two) times daily as needed (decreased appetite). 60 capsule 0    fluticasone (FLONASE) 50 mcg/actuation nasal spray INSTILL 2 SPRAYS IN EACH NOSTRIL ONCE DAILY 1 Bottle 3    gabapentin (NEURONTIN) 600 MG tablet Take 600 mg by mouth 3 (three) times daily.       guaiFENesin 100 mg/5 ml (ROBITUSSIN) 100 mg/5 mL syrup as needed.      hydrOXYzine pamoate (VISTARIL) 50 MG Cap Take 1 capsule (50 mg total) by mouth 2 (two) times daily as needed. 60 capsule 2    ibuprofen (ADVIL,MOTRIN) 800 MG tablet ibuprofen 800 mg tablet      levoFLOXacin (LEVAQUIN) 500 MG tablet Take  500 mg by mouth once daily.      levothyroxine (SYNTHROID) 50 MCG tablet Take 1 tablet by mouth once daily 30 tablet 4    LIDOcaine (LMX) 4 % cream 1 application as needed      LIDOcaine (XYLOCAINE) 5 % Oint ointment APPLY 1 APPLICATION EXTERNALLY 3 TIMES A DAY AS NEEDED FOR 30 DAYS      LIDOcaine-prilocaine (EMLA) cream Apply small amount topically to port incision approximately 30 minutes prior to port access as needed      LORazepam (ATIVAN) 1 MG tablet Take by mouth.      methylPREDNISolone (MEDROL DOSEPACK) 4 mg tablet Take by mouth.      methylPREDNISolone (MEDROL DOSEPACK) 4 mg tablet Take 1 tablet (4 mg total) by mouth once daily. use as directed 21 each 0    methylPREDNISolone (MEDROL DOSEPACK) 4 mg tablet use as directed 1 each 0    mirtazapine (REMERON) 15 MG tablet Take 7.5 mg by mouth once daily.      mirtazapine (REMERON) 7.5 MG Tab TAKE 2 TABLETS (15 MG TOTAL) BY MOUTH NIGHTLY. 180 tablet 1    multivitamin capsule Take 1 capsule by mouth.      ondansetron (ZOFRAN) 8 MG tablet Take 8 mg by mouth 3 (three) times daily.      ondansetron (ZOFRAN-ODT) 8 MG TbDL Take 1 tablet (8 mg total) by mouth every 8 (eight) hours as needed (chemotherapy-induced nausea and vomiting). (Patient not taking: Reported on 9/29/2022) 40 tablet 5    prazosin (MINIPRESS) 1 MG Cap Take 1 mg by mouth every evening.      prazosin (MINIPRESS) 2 MG Cap 1 capsule at bedtime      promethazine (PHENERGAN) 6.25 mg/5 mL syrup as needed.      QUEtiapine (SEROQUEL) 100 MG Tab Take 100 mg by mouth once daily.      sertraline (ZOLOFT) 100 MG tablet Take 1 tablet (100 mg total) by mouth once daily. 30 tablet 1    SSD 1 % cream APPLY DAILY TO SKIN TO AFFECTED AREA EVERY DAY FOR 30 DAYS      tiZANidine (ZANAFLEX) 4 MG tablet Take 4 mg by mouth 3 (three) times daily as needed.       No current facility-administered medications for this visit.       Review of Systems   Constitutional:  Positive for fatigue. Negative for appetite change and  unexpected weight change.   HENT:  Negative for mouth sores and sore throat.    Eyes:  Negative for visual disturbance.   Respiratory:  Negative for cough and shortness of breath.    Cardiovascular:  Negative for chest pain.   Gastrointestinal:  Negative for abdominal pain, constipation, diarrhea and vomiting.   Genitourinary:  Negative for dysuria and frequency.   Musculoskeletal:  Negative for back pain.   Skin:  Negative for rash.   Neurological:  Negative for headaches.   Hematological:  Negative for adenopathy.   Psychiatric/Behavioral:  The patient is nervous/anxious.      ECOG Performance Status:   ECOG SCORE 1       Objective:      Vitals:   There were no vitals filed for this visit.  BMI: There is no height or weight on file to calculate BMI.  Deferred due to telemedicine visit.    Physical Exam  Deferred due to telemedicine visit.    Laboratory Data:  Labs have been reviewed.    Lab Results   Component Value Date    WBC 8.75 06/29/2022    HGB 14.9 06/29/2022    HCT 42.6 06/29/2022    MCV 84 06/29/2022     06/29/2022       Imaging:     pet scan (6/29/22): I have personally reviewed the images  No evidence of FDG avid lymphoproliferative disease.    PET/CT scan (4/14/22): I have personally reviewed the images  Differences in instrumentation and technique preclude semiquantitative comparison between the current PET/CT study and the prior.     Background:  - Liver 4.2 SUV max.  - Spleen 3.5 SUV max.  - Spleen 11.9 x 4.8 cm (AP x TR)  - Blood pool 3.3 SUV max     No FDG avid lymphadenopathy or mass. The largest cervical node is a 5 x 5 mm supraclavicular node with SUV max 1.0 (image 86). The largest left axillary node measures 18 x 7 mm with SUV max 0.8 (image 103).     Additional findings:  -Right sided IJ medical infusion port with tip at the level of the SVC.  -Relative diffuse low-attenuation liver in keeping with steatosis.  -3.6 cm simple fluid attenuation on FDG avid cyst in the right ovary  favored to represent a physiologic cyst in this age group. The left ovary and uterus are within normal limits.     IMPRESSION:  No evidence of FDG avid malignancy.    PET/CT scan (12/17/21): I have personally reviewed the images    IN THE HEAD AND NECK:     Multiple left-sided cervical lymph nodes, left and right supraclavicular lymph nodes and left axillary lymph nodes.  The index lesions, as follow:     Hypermetabolic lymph node at the left level Va region, measures 1.2 cm (axial series 2, image 68) with SUV max of 14.0.     Hypermetabolic left supraclavicular lymph node, measures 1.9 cm (axial series 2, image 82) with SUV max of 15.0.     Hypermetabolic left axillary lymph node, measures 3.5 cm (axial series 2, image 116) with SUV max of 12.0.     Hypermetabolic right supraclavicular lymph node, measures 1.4 cm (axial series 2, image 79) with SUV max of 13.0.     IN THE CHEST:     Focal radiotracer uptake in the left hilum with no corresponding lesion on CT images (axial fused image 116) with SUV max of 4.4.  No abnormal lesions throughout the pulmonary parenchyma.     IN THE ABDOMEN AND PELVIS:     There is a subcentimeter hypermetabolic lesion in the subcutaneous tissue of the right back at the level of L4, measures 0.9 cm (axial series 2, image 198) with SUV max of 6.7.  There is physiologic tracer distribution within the abdominal organs and excretion into the genitourinary system.     The spleen has normal uptake and is normal at 11.0 cm in craniocaudal dimension.     IN THE BONES:     There are no hypermetabolic lesions worrisome for malignancy.     In the extremities, there are no hypermetabolic lesions worrisome for malignancy.     Impression:     Hypermetabolic cervical, left axillary, and left hilar lymph nodes consistent with biopsy-proven follicular lymphoma (performed on 11/29/2021), as above.     Hypermetabolic subcutaneous tissue of the right nodule at the level of L4, concerning for additional  "lymphomatous deposit as described above.      Assessment:       1. 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 Morbid obesity    3. Mood disorder due to a general medical condition           Plan:     1. Follicular lymphoma  - I have reviewed her chart  - supraclavicular lymph node biopsy (11/29/21) revealed follicular lymphoma. Importantly, in the pathology report is the following: "Although the majority of the lymph node appears to represent as grade 2 follicular lymphoma, the Ki-67  proliferation index is much higher than expected for typical low-grade follicular lymphoma. Additionally there  is a very small area suggestive of grade 3A follicular lymphoma as well, representing less than 5% of the  overall cellularity."  - she reports having a CT scan in [place] in early November with enlarged lymph nodes noted throughout.  - PET/CT scan (12/17/21) revealed "hypermetabolic cervical, left axillary, and left hilar lymph nodes" as well as "hypermetabolic subcutaneous tissue of the right nodule at the level of L4, concerning for additional lymphomatous deposit."  - she received cycle #1 of bendamustine/rituximab on 1/25/22 - 1/26/22 in [place], [place].  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 No evidence of hypermetabolic tumor  - The risks and benefits of chemotherapy were discussed, written information was given, and informed consent was obtained.  - she completed 6 cycles of bendamustine/rituximab (last was on 6/16/22 - 6/17/22).  - she underwent port removal on 6/20/22.  - pet scan (6/29/22) revealed no evidence of hypermetabolic tumor.  - clinically, she is doing well with clinical symptoms to suggest recurrence of lymphoma.  - return to clinic in 6 months.    2. Morbid obesity  - no weight since telemedicine visit.  - continue to monitor    3. Mood disorder due to medical condition  - " stable. Continue xanax as needed.     4. Insomnia  - stable. Continue mirtazapine    5. Advance Care Planning     Power of   After our discussion (at previous visit), the patient decided to complete a HCPOA and appointed her  brother Velasquez Coronado ([phone]) and dad Dirk Coronado ([phone]) .        - return to clinic in 6 months.    Ki Nova M.D.  Hematology/Oncology  [facility] - [address], [address]  [place] [address]  Phone: [phone]  Fax: [phone]

## 2022-10-06 ENCOUNTER — OFFICE VISIT (OUTPATIENT)
Dept: HEMATOLOGY/ONCOLOGY | Facility: CLINIC | Age: 37
End: 2022-10-06
Payer: MEDICAID

## 2022-10-06 DIAGNOSIS — C82.91 FOLLICULAR LYMPHOMA OF LYMPH NODES OF NECK, UNSPECIFIED FOLLICULAR LYMPHOMA TYPE: Primary | ICD-10-CM

## 2022-10-06 DIAGNOSIS — E66.01 MORBID OBESITY: ICD-10-CM

## 2022-10-06 DIAGNOSIS — F06.30 MOOD DISORDER DUE TO A GENERAL MEDICAL CONDITION: ICD-10-CM

## 2022-10-06 PROCEDURE — 99214 OFFICE O/P EST MOD 30 MIN: CPT | Mod: 95,,, | Performed by: INTERNAL MEDICINE

## 2022-10-06 PROCEDURE — 1160F RVW MEDS BY RX/DR IN RCRD: CPT | Mod: CPTII,95,, | Performed by: INTERNAL MEDICINE

## 2022-10-06 PROCEDURE — 1159F PR MEDICATION LIST DOCUMENTED IN MEDICAL RECORD: ICD-10-PCS | Mod: CPTII,95,, | Performed by: INTERNAL MEDICINE

## 2022-10-06 PROCEDURE — 1159F MED LIST DOCD IN RCRD: CPT | Mod: CPTII,95,, | Performed by: INTERNAL MEDICINE

## 2022-10-06 PROCEDURE — 99214 PR OFFICE/OUTPT VISIT, EST, LEVL IV, 30-39 MIN: ICD-10-PCS | Mod: 95,,, | Performed by: INTERNAL MEDICINE

## 2022-10-06 PROCEDURE — 1160F PR REVIEW ALL MEDS BY PRESCRIBER/CLIN PHARMACIST DOCUMENTED: ICD-10-PCS | Mod: CPTII,95,, | Performed by: INTERNAL MEDICINE

## 2022-10-07 ENCOUNTER — HOSPITAL ENCOUNTER (OUTPATIENT)
Dept: RADIOLOGY | Facility: HOSPITAL | Age: 37
Discharge: HOME | End: 2022-10-07
Attending: PODIATRIST
Payer: COMMERCIAL

## 2022-10-07 ENCOUNTER — HOSPITAL ENCOUNTER (OUTPATIENT)
Dept: RADIOLOGY | Facility: HOSPITAL | Age: 37
Discharge: HOME | End: 2022-10-07
Attending: ORTHOPAEDIC SURGERY
Payer: COMMERCIAL

## 2022-10-07 DIAGNOSIS — S93.622A LISFRANC'S SPRAIN, LEFT, INITIAL ENCOUNTER: ICD-10-CM

## 2022-10-07 DIAGNOSIS — S83.241A ACUTE MEDIAL MENISCUS TEAR OF RIGHT KNEE, INITIAL ENCOUNTER: ICD-10-CM

## 2022-10-07 PROCEDURE — 73718 MRI LOWER EXTREMITY W/O DYE: CPT | Mod: TC,PO,LT

## 2022-10-07 PROCEDURE — 73721 MRI JNT OF LWR EXTRE W/O DYE: CPT | Mod: TC,PO,RT

## 2022-10-08 ENCOUNTER — PATIENT MESSAGE (OUTPATIENT)
Dept: OBSTETRICS AND GYNECOLOGY | Facility: CLINIC | Age: 37
End: 2022-10-08
Payer: COMMERCIAL

## 2022-10-11 ENCOUNTER — PATIENT MESSAGE (OUTPATIENT)
Dept: ORTHOPEDICS | Facility: CLINIC | Age: 37
End: 2022-10-11

## 2022-10-13 ENCOUNTER — OFFICE VISIT (OUTPATIENT)
Dept: ORTHOPEDICS | Facility: CLINIC | Age: 37
End: 2022-10-13
Payer: COMMERCIAL

## 2022-10-13 VITALS
SYSTOLIC BLOOD PRESSURE: 126 MMHG | DIASTOLIC BLOOD PRESSURE: 84 MMHG | BODY MASS INDEX: 40.59 KG/M2 | HEIGHT: 61 IN | WEIGHT: 215 LBS

## 2022-10-13 DIAGNOSIS — M23.321 DERANGEMENT OF MEDIAL MENISCUS, POSTERIOR HORN, RIGHT: ICD-10-CM

## 2022-10-13 DIAGNOSIS — M17.11 PRIMARY OSTEOARTHRITIS OF RIGHT KNEE: Primary | ICD-10-CM

## 2022-10-13 PROCEDURE — 3079F PR MOST RECENT DIASTOLIC BLOOD PRESSURE 80-89 MM HG: ICD-10-PCS | Mod: CPTII,S$GLB,, | Performed by: ORTHOPAEDIC SURGERY

## 2022-10-13 PROCEDURE — 3074F SYST BP LT 130 MM HG: CPT | Mod: CPTII,S$GLB,, | Performed by: ORTHOPAEDIC SURGERY

## 2022-10-13 PROCEDURE — 99213 OFFICE O/P EST LOW 20 MIN: CPT | Mod: 25,S$GLB,, | Performed by: ORTHOPAEDIC SURGERY

## 2022-10-13 PROCEDURE — 3079F DIAST BP 80-89 MM HG: CPT | Mod: CPTII,S$GLB,, | Performed by: ORTHOPAEDIC SURGERY

## 2022-10-13 PROCEDURE — 99213 PR OFFICE/OUTPT VISIT, EST, LEVL III, 20-29 MIN: ICD-10-PCS | Mod: 25,S$GLB,, | Performed by: ORTHOPAEDIC SURGERY

## 2022-10-13 PROCEDURE — 1159F PR MEDICATION LIST DOCUMENTED IN MEDICAL RECORD: ICD-10-PCS | Mod: CPTII,S$GLB,, | Performed by: ORTHOPAEDIC SURGERY

## 2022-10-13 PROCEDURE — 20610 LARGE JOINT ASPIRATION/INJECTION: R KNEE: ICD-10-PCS | Mod: RT,S$GLB,, | Performed by: ORTHOPAEDIC SURGERY

## 2022-10-13 PROCEDURE — 1159F MED LIST DOCD IN RCRD: CPT | Mod: CPTII,S$GLB,, | Performed by: ORTHOPAEDIC SURGERY

## 2022-10-13 PROCEDURE — 3074F PR MOST RECENT SYSTOLIC BLOOD PRESSURE < 130 MM HG: ICD-10-PCS | Mod: CPTII,S$GLB,, | Performed by: ORTHOPAEDIC SURGERY

## 2022-10-13 PROCEDURE — 20610 DRAIN/INJ JOINT/BURSA W/O US: CPT | Mod: RT,S$GLB,, | Performed by: ORTHOPAEDIC SURGERY

## 2022-10-13 RX ORDER — TRIAMCINOLONE ACETONIDE 40 MG/ML
40 INJECTION, SUSPENSION INTRA-ARTICULAR; INTRAMUSCULAR
Status: DISCONTINUED | OUTPATIENT
Start: 2022-10-13 | End: 2022-10-13 | Stop reason: HOSPADM

## 2022-10-13 RX ORDER — TRAMADOL HYDROCHLORIDE 50 MG/1
50 TABLET ORAL EVERY 6 HOURS PRN
Qty: 28 TABLET | Refills: 0 | Status: SHIPPED | OUTPATIENT
Start: 2022-10-13 | End: 2022-10-20

## 2022-10-13 RX ADMIN — TRIAMCINOLONE ACETONIDE 40 MG: 40 INJECTION, SUSPENSION INTRA-ARTICULAR; INTRAMUSCULAR at 01:10

## 2022-10-13 NOTE — PROGRESS NOTES
[facility]    Subjective:     Chief Complaint:   Chief Complaint   Patient presents with    Right Knee - Pain     Here for MRI results RT knee, has feelings of instability       Past Medical History:   Diagnosis Date    Allergic rhinitis     Anxiety     Depression     Hypothyroidism     SVT (supraventricular tachycardia) 2011       Past Surgical History:   Procedure Laterality Date    ABLATION OF DYSRHYTHMIC FOCUS      x2    BIOPSY OF CERVICAL LYMPH NODE Left 11/29/2021    Procedure: BIOPSY, LYMPH NODE, CERVICAL;  Surgeon: Blanco Kerns MD;  Location: [facility] OR;  Service: General;  Laterality: Left;    CERVIX LESION DESTRUCTION      cryo in 2011 (?)    CONIZATION OF CERVIX USING LOOP ELECTROSURGICAL EXCISION PROCEDURE (LEEP) N/A 7/21/2021    Procedure: LEEP CONIZATION, CERVIX;  Surgeon: Donna Castillo MD;  Location: [facility] OR;  Service: OB/GYN;  Laterality: N/A;    EXCISION OF LESION OF NOSE N/A 10/14/2019    Procedure: EXCISION, LESION, NOSE;  Surgeon: Umesh Morales MD;  Location: [facility]note [facility] OR;  Service: ENT;  Laterality: N/A;    FUNCTIONAL ENDOSCOPIC SINUS SURGERY (FESS) N/A 10/14/2019    Procedure: FESS (FUNCTIONAL ENDOSCOPIC SINUS SURGERY);  Surgeon: Umesh Morales MD;  Location: [facility]note 3] OR;  Service: ENT;  Laterality: N/A;    HYSTEROSCOPY WITH DILATION AND CURETTAGE OF UTERUS N/A 7/21/2021    Procedure: HYSTEROSCOPY, WITH DILATION AND CURETTAGE OF UTERUS;  Surgeon: Donna Castillo MD;  Location: [facility] OR;  Service: OB/GYN;  Laterality: N/A;    MEDIPORT REMOVAL Right 6/20/2022    Procedure: REMOVAL, CATHETER, CENTRAL VENOUS, TUNNELED, WITH PORT;  Surgeon: Blanco Kerns MD;  Location: [facility] OR;  Service: General;  Laterality: Right;    NASAL SEPTOPLASTY N/A 10/14/2019    Procedure: SEPTOPLASTY, NOSE;  Surgeon: Umesh Morales MD;  Location: [facility]note 3[facility] OR;  Service: ENT;  Laterality: N/A;    NASAL SINUS SURGERY  10/14/2019    PILONIDAL CYST DRAINAGE      REVISION OF SCAR  6/20/2022    Procedure: REVISION,  SCAR;  Surgeon: Blanco Kerns MD;  Location: [facility] OR;  Service: General;;    SKIN TAG REMOVAL Left 6/20/2022    Procedure: REMOVAL, SKIN TAG;  Surgeon: Blanco Kerns MD;  Location: [facility] OR;  Service: General;  Laterality: Left;    TONSILLECTOMY      TONSILLECTOMY      TYMPANOPLASTY N/A 10/14/2019    Procedure: TYMPANOPLASTY;  Surgeon: Umesh Morales MD;  Location: [facility] OR;  Service: ENT;  Laterality: N/A;    TYMPANOTOMY Left 10/14/2019    left       Current Outpatient Medications   Medication Sig    mirtazapine (REMERON) 15 MG tablet Take 7.5 mg by mouth once daily.    mirtazapine (REMERON) 7.5 MG Tab TAKE 2 TABLETS (15 MG TOTAL) BY MOUTH NIGHTLY.    multivitamin capsule Take 1 capsule by mouth.    prazosin (MINIPRESS) 1 MG Cap Take 1 mg by mouth every evening.    prazosin (MINIPRESS) 2 MG Cap 1 capsule at bedtime    sertraline (ZOLOFT) 100 MG tablet Take 1 tablet (100 mg total) by mouth once daily.    SSD 1 % cream APPLY DAILY TO SKIN TO AFFECTED AREA EVERY DAY FOR 30 DAYS    tiZANidine (ZANAFLEX) 4 MG tablet Take 4 mg by mouth 3 (three) times daily as needed.    ALPRAZolam (XANAX) 1 MG tablet Take 1 tablet (1 mg total) by mouth 2 (two) times daily as needed for Anxiety.    amLODIPine (NORVASC) 2.5 MG tablet Take 2.5 mg by mouth once daily.    busPIRone (BUSPAR) 10 MG tablet Take 10 mg by mouth 2 (two) times daily.    doxycycline (MONODOX) 100 MG capsule 1 capsule twice daily by mouth    dronabinoL (MARINOL) 5 MG capsule Take 1 capsule (5 mg total) by mouth 2 (two) times daily as needed (decreased appetite).    fluticasone (FLONASE) 50 mcg/actuation nasal spray INSTILL 2 SPRAYS IN EACH NOSTRIL ONCE DAILY    gabapentin (NEURONTIN) 600 MG tablet Take 600 mg by mouth 3 (three) times daily.     guaiFENesin 100 mg/5 ml (ROBITUSSIN) 100 mg/5 mL syrup as needed.    hydrOXYzine pamoate (VISTARIL) 50 MG Cap Take 1 capsule (50 mg total) by mouth 2 (two) times daily as needed.    ibuprofen (ADVIL,MOTRIN) 800 MG tablet  ibuprofen 800 mg tablet    levoFLOXacin (LEVAQUIN) 500 MG tablet Take 500 mg by mouth once daily.    levothyroxine (SYNTHROID) 50 MCG tablet Take 1 tablet by mouth once daily    LIDOcaine (LMX) 4 % cream 1 application as needed    LIDOcaine (XYLOCAINE) 5 % Oint ointment APPLY 1 APPLICATION EXTERNALLY 3 TIMES A DAY AS NEEDED FOR 30 DAYS    LIDOcaine-prilocaine (EMLA) cream Apply small amount topically to port incision approximately 30 minutes prior to port access as needed    LORazepam (ATIVAN) 1 MG tablet Take by mouth.    methylPREDNISolone (MEDROL DOSEPACK) 4 mg tablet Take by mouth.    methylPREDNISolone (MEDROL DOSEPACK) 4 mg tablet Take 1 tablet (4 mg total) by mouth once daily. use as directed (Patient not taking: Reported on 10/13/2022)    methylPREDNISolone (MEDROL DOSEPACK) 4 mg tablet use as directed (Patient not taking: Reported on 10/13/2022)    ondansetron (ZOFRAN) 8 MG tablet Take 8 mg by mouth 3 (three) times daily.    ondansetron (ZOFRAN-ODT) 8 MG TbDL Take 1 tablet (8 mg total) by mouth every 8 (eight) hours as needed (chemotherapy-induced nausea and vomiting). (Patient not taking: No sig reported)    promethazine (PHENERGAN) 6.25 mg/5 mL syrup as needed.    QUEtiapine (SEROQUEL) 100 MG Tab Take 100 mg by mouth once daily.     No current facility-administered medications for this visit.       Review of patient's allergies indicates:  No Known Allergies    Family History   Problem Relation Age of Onset    Hypertension Mother     Heart disease Father 59        CABG    Heart failure Father     Breast cancer Maternal Aunt 55    Colon cancer Neg Hx     Ovarian cancer Neg Hx        Social History     Socioeconomic History    Marital status: Single   Occupational History    Occupation: [employer]     Comment: [employer]   Tobacco Use    Smoking status: Former     Packs/day: 0.50     Types: Cigarettes     Start date: 2000     Quit date: 2009     Years since quittin.2    Smokeless tobacco: Never  "  Substance and Sexual Activity    Alcohol use: Yes     Alcohol/week: 5.0 standard drinks     Types: 5 Shots of liquor per week     Comment: "occasionally"    Drug use: No    Sexual activity: Not Currently     Partners: Male     Birth control/protection: OCP   Social History Narrative    The patient does not exercise regularly ().  Rates diet as fair.  She is not satisfied with weight.           History of present illness:  37-year-old female returns to clinic today for the right knee and follow-up of MRI.  Evaluated for meniscal tear.  Not been injected previous.  No physical therapy.      Review of Systems:    Constitution: Negative for chills, fever, and sweats.  Negative for unexplained weight loss.    HENT:  Negative for headaches and blurry vision.    Cardiovascular:Negative for chest pain or irregular heart beat. Negative for hypertension.    Respiratory:  Negative for cough and shortness of breath.    Gastrointestinal: Negative for abdominal pain, heartburn, melena, nausea, and vomitting.    Genitourinary:  Negative bladder incontinence and dysuria.    Musculoskeletal:  See HPI for details.     Neurological: Negative for numbness.    Psychiatric/Behavioral: Negative for depression.  The patient is not nervous/anxious.      Endocrine: Negative for polyuria    Hematologic/Lymphatic: Negative for bleeding problem.  Does not bruise/bleed easily.    Skin: Negative for poor would healing and rash    Objective:      Physical Examination:    Vital Signs:    Vitals:    10/13/22 1301   BP: 126/84       Body mass index is 40.62 kg/m².    This a well-developed, well nourished patient in no acute distress.  They are alert and oriented and cooperative to examination.        Examination of the bilateral knees, skin is dry and intact, no erythema or ecchymosis, no signs symptoms of infection.  No effusion.  Mild patellofemoral crepitus, normal range of motion 0-130 degrees.  Stable anterior-posterior varus and valgus " stress.  Calf soft nontender, straight leg raise negative.  The right knee, she has a +1 effusion, patellofemoral crepitus, tender over the medial joint line, pain with Rosa's testing medially.  Stable anterior-posterior varus and valgus stress, range of motion 0-130 degrees.  Calf soft nontender, straight leg raise negative.    Pertinent New Results:  Narrative & Impression  MRI of the right knee without contrast     HISTORY: Anterior right knee pain, knee strain, history of lymphoma.     Multiplanar noncontrast imaging is performed.     There is a small knee joint effusion. No loose body is identified.     Focal full-thickness cartilage loss is observed along the anterior articular surface of the lateral femoral condyle with subjacent degenerative marrow signal changes. There is mild cartilage thinning within the medial compartment. There is fissuring of the articular cartilage overlying the patellar apex.     There is mild degenerative signal alteration within the medial meniscus. This extends to the tibial surface at the level of the junction of the body and posterior horn. The possibility of a small nondisplaced meniscal tear is not excluded. The lateral meniscus is intact.     The anterior and posterior cruciate ligaments, medial and lateral collateral ligamentous complexes and patellar and quadriceps tendons appear unremarkable.     There is mild scattered subcutaneous edema.     IMPRESSION:     Mild osteoarthritic changes. Of note, there is focal full-thickness cartilage loss along the anterior aspect of the lateral femoral condyle with adjacent marrow signal changes.     Focal signal alteration near the junction of body and posterior horn of the medial meniscus which appears to extend to the tibial surface on 2 adjacent coronal sequences and could reflect a tiny nondisplaced tear.     Small amount of joint fluid.     Electronically signed by:  Sonali Lozano MD  10/7/2022 1:31 PM CDT Workstation:  "[id number]           Specimen Collected: 10/07/22 10:43 Last Resulted: 10/07/22 13:31              XRAY Report / Interpretation:       Assessment/Plan:      Right knee shows mild arthritic changes, she does have a questionable meniscal tear posterior horn medial meniscus.  She did have a focal area of arthritis on the lateral femoral condyle.  Patient respond to a steroid injection.  We did lidocaine and triamcinolone, anterior lateral approach, sterile technique, patient tolerated well.  We will check her back in 2 months with some physical therapy and strengthening.  I did give her some tramadol for pain, , however she should continue over-the-counter medications I will transition her taper her quickly.    Waqas Elkins, Physician Assistant, served in the capacity as a "scribe" for this patient encounter.  A "face-to-face" encounter occurred with Dr. Matthew Elliott on this date.  The treatment plan and medical decision-making is outlined above. Patient was seen and examined with a chaperone.       This note was created using Dragon voice recognition software that occasionally misinterpreted phrases or words.            "

## 2022-10-13 NOTE — PROCEDURES
Large Joint Aspiration/Injection: R knee    Date/Time: 10/13/2022 1:00 PM  Performed by: Matthew Elliott MD  Authorized by: Matthew Elliott MD     Consent Done?:  Yes (Verbal)  Indications:  Arthritis and pain  Site marked: the procedure site was marked    Timeout: prior to procedure the correct patient, procedure, and site was verified    Prep: patient was prepped and draped in usual sterile fashion      Local anesthesia used?: Yes    Local anesthetic:  Lidocaine 1% without epinephrine  Ultrasonic Guidance for needle placement?: No    Location:  Knee  Site:  R knee  Medications:  40 mg triamcinolone acetonide 40 mg/mL  Patient tolerance:  Patient tolerated the procedure well with no immediate complications

## 2022-10-17 ENCOUNTER — PATIENT MESSAGE (OUTPATIENT)
Dept: PODIATRY | Facility: CLINIC | Age: 37
End: 2022-10-17

## 2022-10-17 ENCOUNTER — OFFICE VISIT (OUTPATIENT)
Dept: PODIATRY | Facility: CLINIC | Age: 37
End: 2022-10-17
Payer: COMMERCIAL

## 2022-10-17 VITALS — RESPIRATION RATE: 16 BRPM | BODY MASS INDEX: 40.59 KG/M2 | WEIGHT: 215 LBS | HEIGHT: 61 IN

## 2022-10-17 DIAGNOSIS — S93.622D LISFRANC'S SPRAIN, LEFT, SUBSEQUENT ENCOUNTER: ICD-10-CM

## 2022-10-17 DIAGNOSIS — M79.672 LEFT FOOT PAIN: Primary | ICD-10-CM

## 2022-10-17 PROCEDURE — 1159F PR MEDICATION LIST DOCUMENTED IN MEDICAL RECORD: ICD-10-PCS | Mod: CPTII,S$GLB,, | Performed by: PODIATRIST

## 2022-10-17 PROCEDURE — 1160F PR REVIEW ALL MEDS BY PRESCRIBER/CLIN PHARMACIST DOCUMENTED: ICD-10-PCS | Mod: CPTII,S$GLB,, | Performed by: PODIATRIST

## 2022-10-17 PROCEDURE — 99213 PR OFFICE/OUTPT VISIT, EST, LEVL III, 20-29 MIN: ICD-10-PCS | Mod: S$GLB,,, | Performed by: PODIATRIST

## 2022-10-17 PROCEDURE — 99213 OFFICE O/P EST LOW 20 MIN: CPT | Mod: S$GLB,,, | Performed by: PODIATRIST

## 2022-10-17 PROCEDURE — 1159F MED LIST DOCD IN RCRD: CPT | Mod: CPTII,S$GLB,, | Performed by: PODIATRIST

## 2022-10-17 PROCEDURE — 1160F RVW MEDS BY RX/DR IN RCRD: CPT | Mod: CPTII,S$GLB,, | Performed by: PODIATRIST

## 2022-10-17 NOTE — PROGRESS NOTES
"  [address] [place] [address]  Phone: [phone]   [phone]    Patient's PCP:Severo Saenz MD  Referring Provider: No ref. provider found    Subjective:      Chief Complaint:: Results    HPI  Jerry Coronado is a 37 y.o. female who presents to clinic to review test results.  Patient states she has iced the foot part of the night last night and still having a lot of pain.      Vitals:    10/17/22 0959   Resp: 16   Weight: 97.5 kg (215 lb)   Height: 5' 1" (1.549 m)   PainSc:   8      Shoe Size: 8.5    Past Surgical History:   Procedure Laterality Date    ABLATION OF DYSRHYTHMIC FOCUS      x2    BIOPSY OF CERVICAL LYMPH NODE Left 11/29/2021    Procedure: BIOPSY, LYMPH NODE, CERVICAL;  Surgeon: Blanco Kerns MD;  Location: [facility] OR;  Service: General;  Laterality: Left;    CERVIX LESION DESTRUCTION      cryo in 2011 (?)    CONIZATION OF CERVIX USING LOOP ELECTROSURGICAL EXCISION PROCEDURE (LEEP) N/A 7/21/2021    Procedure: LEEP CONIZATION, CERVIX;  Surgeon: Donna Castillo MD;  Location: [facility] OR;  Service: OB/GYN;  Laterality: N/A;    EXCISION OF LESION OF NOSE N/A 10/14/2019    Procedure: EXCISION, LESION, NOSE;  Surgeon: Umesh Morales MD;  Location: [facility]note 3[facility] OR;  Service: ENT;  Laterality: N/A;    FUNCTIONAL ENDOSCOPIC SINUS SURGERY (FESS) N/A 10/14/2019    Procedure: FESS (FUNCTIONAL ENDOSCOPIC SINUS SURGERY);  Surgeon: Umesh Morales MD;  Location: [facility]note 3] OR;  Service: ENT;  Laterality: N/A;    HYSTEROSCOPY WITH DILATION AND CURETTAGE OF UTERUS N/A 7/21/2021    Procedure: HYSTEROSCOPY, WITH DILATION AND CURETTAGE OF UTERUS;  Surgeon: Donna Castillo MD;  Location: [facility] OR;  Service: OB/GYN;  Laterality: N/A;    MEDIPORT REMOVAL Right 6/20/2022    Procedure: REMOVAL, CATHETER, CENTRAL VENOUS, TUNNELED, WITH PORT;  Surgeon: Blanco Kerns MD;  Location: [facility] OR;  Service: General;  Laterality: Right;    NASAL SEPTOPLASTY N/A 10/14/2019    Procedure: SEPTOPLASTY, NOSE;  " Surgeon: Umesh Morales MD;  Location: [facility] OR;  Service: ENT;  Laterality: N/A;    NASAL SINUS SURGERY  10/14/2019    PILONIDAL CYST DRAINAGE      REVISION OF SCAR  6/20/2022    Procedure: REVISION, SCAR;  Surgeon: Blanco Kerns MD;  Location: [facility] OR;  Service: General;;    SKIN TAG REMOVAL Left 6/20/2022    Procedure: REMOVAL, SKIN TAG;  Surgeon: Blanco Kerns MD;  Location: [facility] OR;  Service: General;  Laterality: Left;    TONSILLECTOMY      TONSILLECTOMY      TYMPANOPLASTY N/A 10/14/2019    Procedure: TYMPANOPLASTY;  Surgeon: Umesh Morales MD;  Location: [facility] OR;  Service: ENT;  Laterality: N/A;    TYMPANOTOMY Left 10/14/2019    left     Past Medical History:   Diagnosis Date    Allergic rhinitis     Anxiety     Depression     Hypothyroidism     SVT (supraventricular tachycardia) 2011     Family History   Problem Relation Age of Onset    Hypertension Mother     Heart disease Father 59        CABG    Heart failure Father     Breast cancer Maternal Aunt 55    Colon cancer Neg Hx     Ovarian cancer Neg Hx         Social History:   Marital Status: Single  Alcohol History:  reports current alcohol use of about 5.0 standard drinks per week.  Tobacco History:  reports that she quit smoking about 13 years ago. Her smoking use included cigarettes. She started smoking about 22 years ago. She smoked an average of .5 packs per day. She has never used smokeless tobacco.  Drug History:  reports no history of drug use.    Review of patient's allergies indicates:  No Known Allergies    Current Outpatient Medications   Medication Sig Dispense Refill    amLODIPine (NORVASC) 2.5 MG tablet Take 2.5 mg by mouth once daily.      busPIRone (BUSPAR) 10 MG tablet Take 10 mg by mouth 2 (two) times daily.      doxycycline (MONODOX) 100 MG capsule 1 capsule twice daily by mouth 60 capsule 11    dronabinoL (MARINOL) 5 MG capsule Take 1 capsule (5 mg total) by mouth 2 (two) times daily as needed (decreased appetite). 60 capsule 0     fluticasone (FLONASE) 50 mcg/actuation nasal spray INSTILL 2 SPRAYS IN EACH NOSTRIL ONCE DAILY 1 Bottle 3    gabapentin (NEURONTIN) 600 MG tablet Take 600 mg by mouth 3 (three) times daily.       guaiFENesin 100 mg/5 ml (ROBITUSSIN) 100 mg/5 mL syrup as needed.      hydrOXYzine pamoate (VISTARIL) 50 MG Cap Take 1 capsule (50 mg total) by mouth 2 (two) times daily as needed. 60 capsule 2    ibuprofen (ADVIL,MOTRIN) 800 MG tablet ibuprofen 800 mg tablet      levoFLOXacin (LEVAQUIN) 500 MG tablet Take 500 mg by mouth once daily.      levothyroxine (SYNTHROID) 50 MCG tablet Take 1 tablet by mouth once daily 30 tablet 4    LIDOcaine (LMX) 4 % cream 1 application as needed      LIDOcaine (XYLOCAINE) 5 % Oint ointment APPLY 1 APPLICATION EXTERNALLY 3 TIMES A DAY AS NEEDED FOR 30 DAYS      LIDOcaine-prilocaine (EMLA) cream Apply small amount topically to port incision approximately 30 minutes prior to port access as needed      LORazepam (ATIVAN) 1 MG tablet Take by mouth.      methylPREDNISolone (MEDROL DOSEPACK) 4 mg tablet Take by mouth.      methylPREDNISolone (MEDROL DOSEPACK) 4 mg tablet Take 1 tablet (4 mg total) by mouth once daily. use as directed 21 each 0    methylPREDNISolone (MEDROL DOSEPACK) 4 mg tablet use as directed 1 each 0    mirtazapine (REMERON) 15 MG tablet Take 7.5 mg by mouth once daily.      mirtazapine (REMERON) 7.5 MG Tab TAKE 2 TABLETS (15 MG TOTAL) BY MOUTH NIGHTLY. 180 tablet 1    multivitamin capsule Take 1 capsule by mouth.      ondansetron (ZOFRAN) 8 MG tablet Take 8 mg by mouth 3 (three) times daily.      ondansetron (ZOFRAN-ODT) 8 MG TbDL Take 1 tablet (8 mg total) by mouth every 8 (eight) hours as needed (chemotherapy-induced nausea and vomiting). 40 tablet 5    prazosin (MINIPRESS) 1 MG Cap Take 1 mg by mouth every evening.      prazosin (MINIPRESS) 2 MG Cap 1 capsule at bedtime      promethazine (PHENERGAN) 6.25 mg/5 mL syrup as needed.      QUEtiapine (SEROQUEL) 100 MG Tab Take 100  mg by mouth once daily.      SSD 1 % cream APPLY DAILY TO SKIN TO AFFECTED AREA EVERY DAY FOR 30 DAYS      tiZANidine (ZANAFLEX) 4 MG tablet Take 4 mg by mouth 3 (three) times daily as needed.      traMADoL (ULTRAM) 50 mg tablet Take 1 tablet (50 mg total) by mouth every 6 (six) hours as needed for Pain. 28 tablet 0    ALPRAZolam (XANAX) 1 MG tablet Take 1 tablet (1 mg total) by mouth 2 (two) times daily as needed for Anxiety. 30 tablet 0    sertraline (ZOLOFT) 100 MG tablet Take 1 tablet (100 mg total) by mouth once daily. 30 tablet 1     No current facility-administered medications for this visit.       Review of Systems   Constitutional:  Negative for chills, fatigue, fever and unexpected weight change.   HENT:  Negative for hearing loss and trouble swallowing.    Eyes:  Negative for photophobia and visual disturbance.   Respiratory:  Negative for cough, shortness of breath and wheezing.    Cardiovascular:  Negative for chest pain, palpitations and leg swelling.   Gastrointestinal:  Negative for abdominal pain and nausea.   Genitourinary:  Negative for dysuria and frequency.   Musculoskeletal:  Negative for arthralgias, back pain, gait problem, joint swelling and myalgias.   Skin:  Negative for rash and wound.   Neurological:  Negative for tremors, seizures, weakness, numbness and headaches.   Hematological:  Does not bruise/bleed easily.       Objective:        Physical Exam:   Foot Exam    General  General Appearance: appears stated age and healthy   Orientation: alert and oriented to person, place, and time   Affect: appropriate   Gait: antalgic       Left Foot/Ankle      Inspection and Palpation  Ecchymosis: none  Tenderness: lesser metatarsophalangeal joints (Metatarsal head and 2nd interspace)  Swelling: lesser metatarsophalangeal joints (Plantar 2nd metatarsal head)  Hammertoes: second toe (2nd toe with slight dorsal medial deviation at 2nd MPJ)  Skin Exam: skin intact;   Neurovascular  Dorsalis pedis:  2+  Posterior tibial: 2+  Capillary refill: 2+  Saphenous nerve sensation: normal  Tibial nerve sensation: normal  Superficial peroneal nerve sensation: normal  Deep peroneal nerve sensation: normal  Sural nerve sensation: normal    Muscle Strength  Ankle dorsiflexion: 5  Ankle plantar flexion: 5  Ankle inversion: 5  Ankle eversion: 5  Great toe extension: 5  Great toe flexion: 5    Range of Motion    Normal left ankle ROM    Tests  Lani's sign: positive(2nd intermetatarsal space)  Paresthesia: positive(2nd 3rd toes)    Physical Exam  Cardiovascular:      Pulses:           Dorsalis pedis pulses are 2+ on the left side.        Posterior tibial pulses are 2+ on the left side.   Musculoskeletal:        Feet:              Left Ankle/Foot Exam     Swelling   The patient is swollen on the lesser metatarsophalangeal joints.    Tenderness   The patient is tender to palpation of the lesser metatarsophalangeal joints.    Range of Motion   The patient has normal left ankle ROM.     Muscle Strength   The patient has normal left ankle strength.      Muscle Strength   Left Lower Extremity   Ankle Dorsiflexion:  5   Plantar flexion:  5/5     Reflexes     Left Side  Lani Sign: present  Paresthesia: present    Vascular Exam       Left Pulses  Dorsalis Pedis:      2+  Posterior Tibial:      2+         Imaging:   I reviewed the MRI that she had of the left foot and redness Radiology as being neuroma 2nd intermetatarsal space.  No other major pathology seen.       Assessment:       1. Left foot pain    2. Lisfranc's sprain, left, subsequent encounter      Plan:   Left foot pain    Lisfranc's sprain, left, subsequent encounter    Evaluated patient today the long discussion with her about the clinical findings of pain and swelling plantar 2nd metatarsal head and positive Lani sign 2nd intermetatarsal space.  I explained her I do not know which 1 is giving her the most pain the MRI shows changes of neuroma but no other changes of  the plantar plate or damage to the joint.  Today I am going to do a injection of cortisone local anesthesia 2nd interspace.  She is to journal how much relief she gets while the area is anesthetic to let me know if she had majority for pain relief by the shot while it was numb.  That reinforces the fact that the majority the pain is from the neuroma and not the 2nd metatarsal head.  If the cortisone gives her prolonged relief then that would be the benefit that were looking for.  If there is no relief after the anesthesia wears off but there was good relief with the anesthesia that we may consider excision of the neuroma the 2nd intermetatarsal space.  If she gets partial relief but still has pressure pain on the 2nd metatarsal head but some good relief of the burning and shooting pain in the foot it may require me excising neuroma and doing a shortening osteotomy of the 2nd metatarsal.  She will let my office know how she does with the shot and then will proceed from there as to whether she returns for a surgical consult.      Procedures Informed consent was obtained.  Time-out was called.  The area was prepped with alcohol, and a steroid injection was performed at 2nd intermetatarsal space left foot using 1.5 cc of 0.5% Marcaine w/out epi, 1 cc Dexamethasone, 0.5 cc Methylprednisolone. Patient tolerated the procedure well.            Counseling:     I provided patient education verbally regarding:   Patient diagnosis, treatment options, as well as alternatives, risks, and benefits.     I counseled patient on causes and treatments for neuromas. Wearing tight or high-heeled shoes can cause a neuroma. Shoes that are too narrow or too pointed squeeze the bones in the ball of the foot. Shoes with high heels put extra pressure on the ends of the bones. When the bones are squeezed together, they pinch the nerve that runs between them. Taking off your shoes and rubbing the ball of your foot may decrease or relieve the  pain. Recommend shoes with more room in the toe box. Sometimes steroid injection is necessary to alleviate symptoms. Discussed alcohol sclerosing injections as another option for conservative treatment.      I explained what joint inflammation is and  conservative treatment of off loading the joint with OTC inserts and pads vs custom made orthotics.  The use of ice, heat, oral antiinflammatory and topical antiinflammatory and shoe modification as well as rest of the affected area with decrease walking, standing and non-impact exercising.   This note was created using Dragon voice recognition software that occasionally misinterpreted phrases or words.

## 2022-10-18 ENCOUNTER — PATIENT MESSAGE (OUTPATIENT)
Dept: PODIATRY | Facility: CLINIC | Age: 37
End: 2022-10-18
Payer: COMMERCIAL

## 2022-10-18 DIAGNOSIS — S93.622D LISFRANC'S SPRAIN, LEFT, SUBSEQUENT ENCOUNTER: Primary | ICD-10-CM

## 2022-10-18 DIAGNOSIS — M79.672 LEFT FOOT PAIN: ICD-10-CM

## 2022-10-18 NOTE — TELEPHONE ENCOUNTER
Patient is requesting pain medication. I explained that you do not send in Hydrocodone. I offered to send in Naprosyn. Patient verbalized understanding.

## 2022-10-18 NOTE — TELEPHONE ENCOUNTER
----- Message from Poornima Lima sent at 10/18/2022  3:53 PM CDT -----  Regarding: needing meds  Patient called in and states she was seen yesterday and needs something sent in for pain ---patient uses [facility] [address] [facility]---please call patient at [phone].

## 2022-10-19 RX ORDER — NAPROXEN 500 MG/1
500 TABLET ORAL 2 TIMES DAILY
Qty: 60 TABLET | Refills: 1 | Status: SHIPPED | OUTPATIENT
Start: 2022-10-19 | End: 2022-12-14

## 2022-10-24 ENCOUNTER — OFFICE VISIT (OUTPATIENT)
Dept: PODIATRY | Facility: CLINIC | Age: 37
End: 2022-10-24
Payer: COMMERCIAL

## 2022-10-24 VITALS
WEIGHT: 215 LBS | HEIGHT: 61 IN | BODY MASS INDEX: 40.59 KG/M2 | HEART RATE: 97 BPM | RESPIRATION RATE: 16 BRPM | OXYGEN SATURATION: 98 %

## 2022-10-24 DIAGNOSIS — S93.622D LISFRANC'S SPRAIN, LEFT, SUBSEQUENT ENCOUNTER: Primary | ICD-10-CM

## 2022-10-24 PROCEDURE — 1160F RVW MEDS BY RX/DR IN RCRD: CPT | Mod: CPTII,S$GLB,, | Performed by: PODIATRIST

## 2022-10-24 PROCEDURE — 1159F MED LIST DOCD IN RCRD: CPT | Mod: CPTII,S$GLB,, | Performed by: PODIATRIST

## 2022-10-24 PROCEDURE — 1160F PR REVIEW ALL MEDS BY PRESCRIBER/CLIN PHARMACIST DOCUMENTED: ICD-10-PCS | Mod: CPTII,S$GLB,, | Performed by: PODIATRIST

## 2022-10-24 PROCEDURE — 1159F PR MEDICATION LIST DOCUMENTED IN MEDICAL RECORD: ICD-10-PCS | Mod: CPTII,S$GLB,, | Performed by: PODIATRIST

## 2022-10-24 PROCEDURE — 99214 OFFICE O/P EST MOD 30 MIN: CPT | Mod: S$GLB,,, | Performed by: PODIATRIST

## 2022-10-24 PROCEDURE — 99214 PR OFFICE/OUTPT VISIT, EST, LEVL IV, 30-39 MIN: ICD-10-PCS | Mod: S$GLB,,, | Performed by: PODIATRIST

## 2022-10-24 RX ORDER — ACETAMINOPHEN AND CODEINE PHOSPHATE 300; 30 MG/1; MG/1
1 TABLET ORAL EVERY 8 HOURS PRN
Qty: 20 TABLET | Refills: 0 | Status: SHIPPED | OUTPATIENT
Start: 2022-10-24 | End: 2022-10-31

## 2022-10-24 NOTE — PROGRESS NOTES
"  [address] [facility] [address] [place] [address]  Phone: [phone]   [phone]    Patient's PCP:Severo Saenz MD  Referring Provider: No ref. provider found    Subjective:      Chief Complaint:: Foot Pain    Foot Pain  Associated symptoms include fatigue, headaches, joint swelling and numbness. Pertinent negatives include no abdominal pain, arthralgias, chest pain, chills, coughing, fever, myalgias, nausea, rash or weakness.   Jerry Coronado is a 37 y.o. female who presents  today with a complaint of left foot pain on the top and under the ball of the foot lasting for about two years but worse in the last month. Onset of symptoms a month ago I was stepping down into the pool and slipped on the ladder and stubbed the foot and reports trauma.  Current symptoms include pain and swelling.  Aggravating factors are walking. Symptoms have progressed. Treatment to date have included seen Dr. Elliott on 9/22/2022 who did x-rays and ordered a medrol dose pack, compression socks, reflexology foot massager and steroid shot. Patient reports pain has worsened since the steroid shot.     Vitals:    10/24/22 1155   Pulse: 97   Resp: 16   SpO2: 98%   Weight: 97.5 kg (215 lb)   Height: 5' 1" (1.549 m)   PainSc:   7      Shoe Size: 8.5    Past Surgical History:   Procedure Laterality Date    ABLATION OF DYSRHYTHMIC FOCUS      x2    BIOPSY OF CERVICAL LYMPH NODE Left 11/29/2021    Procedure: BIOPSY, LYMPH NODE, CERVICAL;  Surgeon: Blanco Kerns MD;  Location: [facility] OR;  Service: General;  Laterality: Left;    CERVIX LESION DESTRUCTION      cryo in 2011 (?)    CONIZATION OF CERVIX USING LOOP ELECTROSURGICAL EXCISION PROCEDURE (LEEP) N/A 7/21/2021    Procedure: LEEP CONIZATION, CERVIX;  Surgeon: Donna Castillo MD;  Location: [facility] OR;  Service: OB/GYN;  Laterality: N/A;    EXCISION OF LESION OF NOSE N/A 10/14/2019    Procedure: EXCISION, LESION, NOSE;  Surgeon: Umesh Morales MD;  Location: [facility] OR;  Service: " ENT;  Laterality: N/A;    FUNCTIONAL ENDOSCOPIC SINUS SURGERY (FESS) N/A 10/14/2019    Procedure: FESS (FUNCTIONAL ENDOSCOPIC SINUS SURGERY);  Surgeon: Umesh Morales MD;  Location: [facility] OR;  Service: ENT;  Laterality: N/A;    HYSTEROSCOPY WITH DILATION AND CURETTAGE OF UTERUS N/A 7/21/2021    Procedure: HYSTEROSCOPY, WITH DILATION AND CURETTAGE OF UTERUS;  Surgeon: Donna Castillo MD;  [facility];  Service: OB/GYN;  Laterality: N/A;    MEDIPORT REMOVAL Right 6/20/2022    Procedure: REMOVAL, CATHETER, CENTRAL VENOUS, TUNNELED, WITH PORT;  Surgeon: Blanco Kerns MD;  Location: [facility] OR;  Service: General;  Laterality: Right;    NASAL SEPTOPLASTY N/A 10/14/2019    Procedure: SEPTOPLASTY, NOSE;  Surgeon: Umesh Morales MD;  Location: [facility] OR;  Service: ENT;  Laterality: N/A;    NASAL SINUS SURGERY  10/14/2019    PILONIDAL CYST DRAINAGE      REVISION OF SCAR  6/20/2022    Procedure: REVISION, SCAR;  Surgeon: Blanco Kerns MD;  Location: [facility] OR;  Service: General;;    SKIN TAG REMOVAL Left 6/20/2022    Procedure: REMOVAL, SKIN TAG;  Surgeon: Blanco Kerns MD;  Location: [facility] OR;  Service: General;  Laterality: Left;    TONSILLECTOMY      TONSILLECTOMY      TYMPANOPLASTY N/A 10/14/2019    Procedure: TYMPANOPLASTY;  Surgeon: Umesh Morales MD;  Location: [facility] OR;  Service: ENT;  Laterality: N/A;    TYMPANOTOMY Left 10/14/2019    left     Past Medical History:   Diagnosis Date    Allergic rhinitis     Anxiety     Depression     Hypothyroidism     SVT (supraventricular tachycardia) 2011     Family History   Problem Relation Age of Onset    Hypertension Mother     Heart disease Father 59        CABG    Heart failure Father     Breast cancer Maternal Aunt 55    Colon cancer Neg Hx     Ovarian cancer Neg Hx         Social History:   Marital Status: Single  Alcohol History:  reports current alcohol use of about 5.0 standard drinks per week.  Tobacco History:  reports that she quit smoking about 13 years ago.  Her smoking use included cigarettes. She started smoking about 22 years ago. She smoked an average of .5 packs per day. She has never used smokeless tobacco.  Drug History:  reports no history of drug use.    Review of patient's allergies indicates:  No Known Allergies    Current Outpatient Medications   Medication Sig Dispense Refill    ALPRAZolam (XANAX) 1 MG tablet Take 1 tablet (1 mg total) by mouth 2 (two) times daily as needed for Anxiety. 30 tablet 0    amLODIPine (NORVASC) 2.5 MG tablet Take 2.5 mg by mouth once daily.      busPIRone (BUSPAR) 10 MG tablet Take 10 mg by mouth 2 (two) times daily.      doxycycline (MONODOX) 100 MG capsule 1 capsule twice daily by mouth 60 capsule 11    dronabinoL (MARINOL) 5 MG capsule Take 1 capsule (5 mg total) by mouth 2 (two) times daily as needed (decreased appetite). 60 capsule 0    fluticasone (FLONASE) 50 mcg/actuation nasal spray INSTILL 2 SPRAYS IN EACH NOSTRIL ONCE DAILY 1 Bottle 3    gabapentin (NEURONTIN) 600 MG tablet Take 600 mg by mouth 3 (three) times daily.       guaiFENesin 100 mg/5 ml (ROBITUSSIN) 100 mg/5 mL syrup as needed.      hydrOXYzine pamoate (VISTARIL) 50 MG Cap Take 1 capsule (50 mg total) by mouth 2 (two) times daily as needed. 60 capsule 2    ibuprofen (ADVIL,MOTRIN) 800 MG tablet ibuprofen 800 mg tablet      levoFLOXacin (LEVAQUIN) 500 MG tablet Take 500 mg by mouth once daily.      levothyroxine (SYNTHROID) 50 MCG tablet Take 1 tablet by mouth once daily 30 tablet 4    LIDOcaine (LMX) 4 % cream 1 application as needed      LIDOcaine (XYLOCAINE) 5 % Oint ointment APPLY 1 APPLICATION EXTERNALLY 3 TIMES A DAY AS NEEDED FOR 30 DAYS      LIDOcaine-prilocaine (EMLA) cream Apply small amount topically to port incision approximately 30 minutes prior to port access as needed      LORazepam (ATIVAN) 1 MG tablet Take by mouth.      methylPREDNISolone (MEDROL DOSEPACK) 4 mg tablet Take by mouth.      methylPREDNISolone (MEDROL DOSEPACK) 4 mg tablet Take 1  tablet (4 mg total) by mouth once daily. use as directed 21 each 0    methylPREDNISolone (MEDROL DOSEPACK) 4 mg tablet use as directed 1 each 0    mirtazapine (REMERON) 15 MG tablet Take 7.5 mg by mouth once daily.      mirtazapine (REMERON) 7.5 MG Tab TAKE 2 TABLETS (15 MG TOTAL) BY MOUTH NIGHTLY. 180 tablet 1    multivitamin capsule Take 1 capsule by mouth.      naproxen (NAPROSYN) 500 MG tablet Take 1 tablet (500 mg total) by mouth 2 (two) times daily. 60 tablet 1    ondansetron (ZOFRAN) 8 MG tablet Take 8 mg by mouth 3 (three) times daily.      ondansetron (ZOFRAN-ODT) 8 MG TbDL Take 1 tablet (8 mg total) by mouth every 8 (eight) hours as needed (chemotherapy-induced nausea and vomiting). 40 tablet 5    prazosin (MINIPRESS) 1 MG Cap Take 1 mg by mouth every evening.      prazosin (MINIPRESS) 2 MG Cap 1 capsule at bedtime      promethazine (PHENERGAN) 6.25 mg/5 mL syrup as needed.      QUEtiapine (SEROQUEL) 100 MG Tab Take 100 mg by mouth once daily.      sertraline (ZOLOFT) 100 MG tablet Take 1 tablet (100 mg total) by mouth once daily. 30 tablet 1    SSD 1 % cream APPLY DAILY TO SKIN TO AFFECTED AREA EVERY DAY FOR 30 DAYS      tiZANidine (ZANAFLEX) 4 MG tablet Take 4 mg by mouth 3 (three) times daily as needed.       No current facility-administered medications for this visit.       Review of Systems   Constitutional:  Positive for fatigue. Negative for chills, fever and unexpected weight change.   HENT:  Negative for hearing loss and trouble swallowing.    Eyes:  Negative for photophobia and visual disturbance.   Respiratory:  Negative for cough, shortness of breath and wheezing.    Cardiovascular:  Positive for leg swelling. Negative for chest pain and palpitations.   Gastrointestinal:  Negative for abdominal pain and nausea.   Genitourinary:  Negative for dysuria and frequency.   Musculoskeletal:  Positive for back pain, gait problem and joint swelling. Negative for arthralgias and myalgias.   Skin:   Negative for rash and wound.   Neurological:  Positive for numbness and headaches. Negative for seizures and weakness.   Hematological:  Does not bruise/bleed easily.       Objective:        Physical Exam:   Foot Exam    General  General Appearance: appears stated age and healthy   Orientation: alert and oriented to person, place, and time   Affect: appropriate   Gait: antalgic       Left Foot/Ankle      Inspection and Palpation  Ecchymosis: none  Tenderness: lesser metatarsophalangeal joints (Pain 2nd metatarsophalangeal joint and plantar surface 2nd head and 2nd interspace)  Swelling: lesser metatarsophalangeal joints (Mild swelling 2nd MPJ)  Arch: pes planus  Skin Exam: skin intact;   Neurovascular  Dorsalis pedis: 2+  Posterior tibial: 2+  Capillary refill: 2+  Saphenous nerve sensation: normal  Tibial nerve sensation: normal  Superficial peroneal nerve sensation: normal  Deep peroneal nerve sensation: normal  Sural nerve sensation: normal    Muscle Strength  Ankle dorsiflexion: 5  Ankle plantar flexion: 5  Ankle inversion: 5  Ankle eversion: 5  Great toe extension: 5  Great toe flexion: 5    Range of Motion    Normal left ankle ROM    Tests  Lani's sign: positive(Pain 2nd interspace with no actual Lani sign felt.  Negative Lani sign 3rd intermetatarsal space.)  Paresthesia: positive(Burning and tingling into toes 2 and 3)    Physical Exam  Cardiovascular:      Pulses:           Dorsalis pedis pulses are 2+ on the left side.        Posterior tibial pulses are 2+ on the left side.   Musculoskeletal:        Feet:              Left Ankle/Foot Exam     Swelling   The patient is swollen on the lesser metatarsophalangeal joints.    Tenderness   The patient is tender to palpation of the lesser metatarsophalangeal joints.    Range of Motion   The patient has normal left ankle ROM.     Muscle Strength   The patient has normal left ankle strength.      Muscle Strength   Left Lower Extremity   Ankle Dorsiflexion:  5    Plantar flexion:  5/5     Reflexes     Left Side  Lani Sign: present  Paresthesia: present    Vascular Exam       Left Pulses  Dorsalis Pedis:      2+  Posterior Tibial:      2+         Imaging:            Assessment:       1. Lisfranc's sprain, left, subsequent encounter      Plan:   Lisfranc's sprain, left, subsequent encounter      Evaluated patient had discussion that the injection I gave her into the interspace not give her much relief even while anesthetic which can reinforces the fact majority her pain is from the 2nd metatarsal head.  She does have paresthesias and some burning into the toes which I told her may be secondary to the close proximity of the 2nd 3rd heads that may be pinching on the nerve causing the nerve type pain.  We discussed treatment of the left foot since is not responding well conservative care she is interested in pursuing surgery.  Surgery would probably be done in December so this point time since she will be going to physical therapy for her knee I am going to order physical therapy to the left 2nd metatarsophalangeal joint to see there is any way they can decrease symptoms.  If the physical therapy helps her then we will not do any surgery.  If it does not help her just reinforced as the fact that apparently conservative treatments not going to work.  Today I am doing a surgical consult she will let us know how she proceeds with physical therapy.  She is requesting something for pain to be taken at night I told her I can not be giving her continuously pain medication I will give her some today but I will not been continue to treat this with pain medication.  She has received some tramadol from Dr. Elliott she states it does not give her adequate relief.  I am giving her some Tylenol 3 today but will not be refilling this routinely.    Patient was educated about the entire pre, dean and post-operative time period.  They  were educated about the pro's and con's of surgery.  All  conservative measures have been exhausted. Patient understands the particular risks involved which may occur in connection with the procedure proposed including pain, swelling, infection, stiffness, decreased ROM, recurrence, rejection, numbness, delayed healing, scar formation.    Patient was educated that their diagnosis may be surgically treated.  The patient's problem will probably advance and usually will not get better without surgery.  All of the pre-operative treatment plans have been exhausted or will no longer be successful at this point in time.  Patient was told of the possible outcomes and expectations of the surgical procedure.  They  will need to be followed-up post-operatively.  Today, pictures were drawn, questions answered.      We discussed the following surgical procedures:  1. Shortening osteotomy 2nd metatarsal left foot with screw fixation 2. Release of entrapped nerve 2nd intermetatarsal space left foot.  Patient would like to do December and will discuss date with my nurse get appropriate lab tests done.        Procedures          Counseling:     I provided patient education verbally regarding:   Patient diagnosis, treatment options, as well as alternatives, risks, and benefits.     I explained to the pt that nerve entrapments may respond to rest, steroid shots, antinflammatory medications but is some cases surgical release may be needed.     I explained what joint inflammation is and  conservative treatment of off loading the joint with OTC inserts and pads vs custom made orthotics.  The use of ice, heat, oral antiinflammatory and topical antiinflammatory and shoe modification as well as rest of the affected area with decrease walking, standing and non-impact exercising.   This note was created using Dragon voice recognition software that occasionally misinterpreted phrases or words.

## 2022-10-25 ENCOUNTER — PATIENT MESSAGE (OUTPATIENT)
Dept: PODIATRY | Facility: CLINIC | Age: 37
End: 2022-10-25

## 2022-10-26 ENCOUNTER — TELEPHONE (OUTPATIENT)
Dept: OBSTETRICS AND GYNECOLOGY | Facility: CLINIC | Age: 37
End: 2022-10-26
Payer: COMMERCIAL

## 2022-10-26 NOTE — TELEPHONE ENCOUNTER
Patient is having a saline sonogram and the provider would like to push back the insertion until after to be sure the IUD does not mess up imaging.   Patient expressed understanding.   Patient offered appt day of imaging and declined.   Offered appt the week after and declined.   Patient offered appt in one month and declined  Patient states she will call at the start of her next period for scheduling.

## 2022-10-31 ENCOUNTER — PATIENT MESSAGE (OUTPATIENT)
Dept: OBSTETRICS AND GYNECOLOGY | Facility: CLINIC | Age: 37
End: 2022-10-31
Payer: COMMERCIAL

## 2022-10-31 ENCOUNTER — PATIENT MESSAGE (OUTPATIENT)
Dept: INTERNAL MEDICINE | Facility: CLINIC | Age: 37
End: 2022-10-31
Payer: COMMERCIAL

## 2022-10-31 RX ORDER — LEVOTHYROXINE SODIUM 50 UG/1
TABLET ORAL
Qty: 30 TABLET | Refills: 5 | Status: SHIPPED | OUTPATIENT
Start: 2022-10-31 | End: 2023-08-31 | Stop reason: SDUPTHER

## 2022-10-31 RX ORDER — FLUTICASONE PROPIONATE 50 MCG
2 SPRAY, SUSPENSION (ML) NASAL DAILY
Qty: 11.1 ML | Refills: 5 | Status: SHIPPED | OUTPATIENT
Start: 2022-10-31 | End: 2023-10-18 | Stop reason: SDUPTHER

## 2022-10-31 NOTE — TELEPHONE ENCOUNTER
No new care gaps identified.  [facility] Embedded Care Gaps. Reference number: [id number]. 10/31/2022   4:27:31 PM CDT

## 2022-11-10 ENCOUNTER — PATIENT MESSAGE (OUTPATIENT)
Dept: PSYCHIATRY | Facility: CLINIC | Age: 37
End: 2022-11-10
Payer: COMMERCIAL

## 2022-11-10 ENCOUNTER — PATIENT MESSAGE (OUTPATIENT)
Dept: PODIATRY | Facility: CLINIC | Age: 37
End: 2022-11-10
Payer: COMMERCIAL

## 2022-11-10 DIAGNOSIS — M79.672 LEFT FOOT PAIN: ICD-10-CM

## 2022-11-10 DIAGNOSIS — S93.622D LISFRANC'S SPRAIN, LEFT, SUBSEQUENT ENCOUNTER: Primary | ICD-10-CM

## 2022-11-14 ENCOUNTER — PATIENT MESSAGE (OUTPATIENT)
Dept: PODIATRY | Facility: CLINIC | Age: 37
End: 2022-11-14
Payer: COMMERCIAL

## 2022-11-14 DIAGNOSIS — S93.622D SPRAIN OF TARSOMETATARSAL LIGAMENT OF LEFT FOOT, SUBSEQUENT ENCOUNTER: ICD-10-CM

## 2022-11-14 DIAGNOSIS — M79.672 LEFT FOOT PAIN: ICD-10-CM

## 2022-11-14 DIAGNOSIS — S93.622D LISFRANC'S SPRAIN, LEFT, SUBSEQUENT ENCOUNTER: Primary | ICD-10-CM

## 2022-11-15 NOTE — PROGRESS NOTES
"Individual Psychotherapy (PhD/LCSW)    11/23/2022    Site:  Telemed     Patient presents at home in [place], [place] for follow up audiovisual telehealth visit.     Therapeutic Intervention: Met with patient.  Outpatient - Supportive psychotherapy 45 min - CPT Code 19321    Chief complaint/reason for encounter: addictive disorder, depression, and anxiety     Interval history and content of current session: GAD7: 9. Last visit with me: 10/05/22. Jerry shared that overall she is doing "okay," although she noted that she is feeling anxious regarding going over to her parents's house for Thanksgiving and potentially seeing her brother, as they do not have a good relationship currently. I provided empathic support. We discussed a plan for Jerry regarding going to Thanksgiving, such as practicing grounding and containment skills when starting to feel anxious and upset, leaving when she feels that she needs to, going over earlier in the day when her family may not be as intoxicated, and seeing if she can go over there when her brother is not there. She does have work tomorrow at night which she is grateful for as a way to leave the situation. We discussed several skills to help with anxiety, including mindful breathing, box breathing, naming objects, describing objects, and a few other grounding techniques, that she can use while at her parents'. Jerry did say that overall she has cut in her drinking "in half" (3-4 drinks 3-4 times per week); she has not looked into any other support groups for drinking which I did encourage. Will provide her with the name of an carolyne that she can use to cut back on her drinking.     Treatment plan:  Target symptoms: alcohol abuse, recurrent depression, anxiety , work stress  Why chosen therapy is appropriate versus another modality: relevant to diagnosis, evidence based practice  Outcome monitoring methods: self-report, checklist/rating scale  Therapeutic intervention type: supportive " psychotherapy    Risk parameters:  Patient reports no suicidal ideation  Patient reports no homicidal ideation  Patient reports no self-injurious behavior  Patient reports no violent behavior    Verbal deficits: None    Patient's response to intervention:  The patient's response to intervention is accepting.    Progress toward goals and other mental status changes:  The patient's progress toward goals is fair .    Diagnosis:     ICD-10-CM ICD-9-CM   1. Moderate recurrent major depression  F33.1 296.32   2. TOM (generalized anxiety disorder)  F41.1 300.02   3. Mild alcohol use disorder  F10.10 305.00       Plan:  individual psychotherapy and medication management by physician Pt to go to ED or call 911 if symptoms worsen or if she has thoughts of harming self and/or others. Pt verbalized understanding.    Return to clinic: as scheduled    Length of Service (minutes): 45      Each patient to whom he or she provides medical services by telemedicine is: (1) informed of the relationship between the physician and patient and the respective role of any other health care provider with respect to management of the patient; and (2) notified that he or she may decline to receive medical services by telemedicine and may withdraw from such care at any time.

## 2022-11-17 ENCOUNTER — PATIENT MESSAGE (OUTPATIENT)
Dept: ORTHOPEDICS | Facility: CLINIC | Age: 37
End: 2022-11-17

## 2022-11-23 ENCOUNTER — OFFICE VISIT (OUTPATIENT)
Dept: PSYCHIATRY | Facility: CLINIC | Age: 37
End: 2022-11-23
Payer: COMMERCIAL

## 2022-11-23 DIAGNOSIS — F33.1 MODERATE RECURRENT MAJOR DEPRESSION: ICD-10-CM

## 2022-11-23 DIAGNOSIS — F41.1 GAD (GENERALIZED ANXIETY DISORDER): ICD-10-CM

## 2022-11-23 DIAGNOSIS — F10.10 MILD ALCOHOL USE DISORDER: ICD-10-CM

## 2022-11-23 PROCEDURE — 1159F PR MEDICATION LIST DOCUMENTED IN MEDICAL RECORD: ICD-10-PCS | Mod: CPTII,95,, | Performed by: SOCIAL WORKER

## 2022-11-23 PROCEDURE — 99999 PR PBB SHADOW E&M-EST. PATIENT-LVL II: ICD-10-PCS | Mod: PBBFAC,,, | Performed by: SOCIAL WORKER

## 2022-11-23 PROCEDURE — 90834 PR PSYCHOTHERAPY W/PATIENT, 45 MIN: ICD-10-PCS | Mod: 95,,, | Performed by: SOCIAL WORKER

## 2022-11-23 PROCEDURE — 99999 PR PBB SHADOW E&M-EST. PATIENT-LVL II: CPT | Mod: PBBFAC,,, | Performed by: SOCIAL WORKER

## 2022-11-23 PROCEDURE — 90834 PSYTX W PT 45 MINUTES: CPT | Mod: 95,,, | Performed by: SOCIAL WORKER

## 2022-11-23 PROCEDURE — 1159F MED LIST DOCD IN RCRD: CPT | Mod: CPTII,95,, | Performed by: SOCIAL WORKER

## 2022-11-28 NOTE — PROGRESS NOTES
"Individual Psychotherapy (PhD/LCSW)    12/5/2022    Site:  Telemed    Patient presents at home in [place], [place] for follow up audiovisual telehealth visit.      Therapeutic Intervention: Met with patient.  Outpatient - Behavior modifying psychotherapy 45 min - CPT code 77176, and Outpatient - Supportive psychotherapy 45 min - CPT Code 49252    Chief complaint/reason for encounter: addictive disorder, depression, and anxiety     Interval history and content of current session: GAD7: 7. Jerry denied suicidal ideation and denied homicidal ideation. She shared that she has been "okay" since our last visit; she noted that her Thanksgiving ended up being "short and sweet" with her family which was positive. She practiced the naming objects skill while there and she found this helpful; I validated her for this. We discussed that she has been drinking 1-2 drinks (shots) after work in order to wind down; we discussed creating a pause between the urge and the action as well as finding other ways to unwind and planning an activity after work, such as cooking and/or cleaning. We discussed breathing techniques (4-7-8 breath and box breathing). Jerry said that she is looking for more coping skills for her anxiety; we will touch on her wanting to "think before speaking" as well. We did engage in Leaves on a Stream exercise which she did not find as helpful; reminded her to practice mindfulness of the breath instead as well as review grounding techniques.     Treatment plan:  Target symptoms: alcohol abuse, recurrent depression, anxiety   Why chosen therapy is appropriate versus another modality: relevant to diagnosis, patient responds to this modality, evidence based practice  Outcome monitoring methods: self-report, checklist/rating scale  Therapeutic intervention type: insight oriented psychotherapy, behavior modifying psychotherapy, supportive psychotherapy    Risk parameters:  Patient reports no suicidal ideation  Patient reports " no homicidal ideation  Patient reports no self-injurious behavior  Patient reports no violent behavior    Verbal deficits: None    Patient's response to intervention:  The patient's response to intervention is accepting.    Progress toward goals and other mental status changes:  The patient's progress toward goals is fair , good.    Diagnosis:     ICD-10-CM ICD-9-CM   1. Moderate recurrent major depression  F33.1 296.32   2. TOM (generalized anxiety disorder)  F41.1 300.02   3. Mild alcohol use disorder  F10.10 305.00       Plan:  individual psychotherapy, medication management by physician, AA, and abstinence Pt to go to ED or call 911 if symptoms worsen or if she has thoughts of harming self and/or others. Pt verbalized understanding.    Return to clinic: as scheduled    Length of Service (minutes): 45      Each patient to whom he or she provides medical services by telemedicine is: (1) informed of the relationship between the physician and patient and the respective role of any other health care provider with respect to management of the patient; and (2) notified that he or she may decline to receive medical services by telemedicine and may withdraw from such care at any time.

## 2022-11-29 ENCOUNTER — OFFICE VISIT (OUTPATIENT)
Dept: ORTHOPEDICS | Facility: CLINIC | Age: 37
End: 2022-11-29
Payer: COMMERCIAL

## 2022-11-29 VITALS — WEIGHT: 215 LBS | HEIGHT: 61 IN | BODY MASS INDEX: 40.59 KG/M2

## 2022-11-29 DIAGNOSIS — M17.11 PRIMARY OSTEOARTHRITIS OF RIGHT KNEE: Primary | ICD-10-CM

## 2022-11-29 PROCEDURE — 1160F RVW MEDS BY RX/DR IN RCRD: CPT | Mod: CPTII,S$GLB,, | Performed by: ORTHOPAEDIC SURGERY

## 2022-11-29 PROCEDURE — 99213 OFFICE O/P EST LOW 20 MIN: CPT | Mod: S$GLB,,, | Performed by: ORTHOPAEDIC SURGERY

## 2022-11-29 PROCEDURE — 1159F PR MEDICATION LIST DOCUMENTED IN MEDICAL RECORD: ICD-10-PCS | Mod: CPTII,S$GLB,, | Performed by: ORTHOPAEDIC SURGERY

## 2022-11-29 PROCEDURE — 99213 PR OFFICE/OUTPT VISIT, EST, LEVL III, 20-29 MIN: ICD-10-PCS | Mod: S$GLB,,, | Performed by: ORTHOPAEDIC SURGERY

## 2022-11-29 PROCEDURE — 3008F BODY MASS INDEX DOCD: CPT | Mod: CPTII,S$GLB,, | Performed by: ORTHOPAEDIC SURGERY

## 2022-11-29 PROCEDURE — 3008F PR BODY MASS INDEX (BMI) DOCUMENTED: ICD-10-PCS | Mod: CPTII,S$GLB,, | Performed by: ORTHOPAEDIC SURGERY

## 2022-11-29 PROCEDURE — 1159F MED LIST DOCD IN RCRD: CPT | Mod: CPTII,S$GLB,, | Performed by: ORTHOPAEDIC SURGERY

## 2022-11-29 PROCEDURE — 1160F PR REVIEW ALL MEDS BY PRESCRIBER/CLIN PHARMACIST DOCUMENTED: ICD-10-PCS | Mod: CPTII,S$GLB,, | Performed by: ORTHOPAEDIC SURGERY

## 2022-11-29 NOTE — PROGRESS NOTES
[facility]    Subjective:     Chief Complaint:   Chief Complaint   Patient presents with    Right Knee - Pain     Right knee pain follow up. Received inj 10/13/22. Has been unable to start PT yet due to scheduling, but she does have appointment. Requesting inj today       Past Medical History:   Diagnosis Date    Allergic rhinitis     Anxiety     Depression     Hypothyroidism     SVT (supraventricular tachycardia) 2011       Past Surgical History:   Procedure Laterality Date    ABLATION OF DYSRHYTHMIC FOCUS      x2    BIOPSY OF CERVICAL LYMPH NODE Left 11/29/2021    Procedure: BIOPSY, LYMPH NODE, CERVICAL;  Surgeon: Blanco Kerns MD;  Location: [facility] OR;  Service: General;  Laterality: Left;    CERVIX LESION DESTRUCTION      cryo in 2011 (?)    CONIZATION OF CERVIX USING LOOP ELECTROSURGICAL EXCISION PROCEDURE (LEEP) N/A 7/21/2021    Procedure: LEEP CONIZATION, CERVIX;  Surgeon: Donna Castillo MD;  Location: [facility] OR;  Service: OB/GYN;  Laterality: N/A;    EXCISION OF LESION OF NOSE N/A 10/14/2019    Procedure: EXCISION, LESION, NOSE;  Surgeon: Umesh Morales MD;  Location: [facility] OR;  Service: ENT;  Laterality: N/A;    FUNCTIONAL ENDOSCOPIC SINUS SURGERY (FESS) N/A 10/14/2019    Procedure: FESS (FUNCTIONAL ENDOSCOPIC SINUS SURGERY);  Surgeon: Umesh Morales MD;  Location: [facility] OR;  Service: ENT;  Laterality: N/A;    HYSTEROSCOPY WITH DILATION AND CURETTAGE OF UTERUS N/A 7/21/2021    Procedure: HYSTEROSCOPY, WITH DILATION AND CURETTAGE OF UTERUS;  Surgeon: Donna Castillo MD;  Location: [facility] OR;  Service: OB/GYN;  Laterality: N/A;    MEDIPORT REMOVAL Right 6/20/2022    Procedure: REMOVAL, CATHETER, CENTRAL VENOUS, TUNNELED, WITH PORT;  Surgeon: Blanco Kerns MD;  Location: [facility] OR;  Service: General;  Laterality: Right;    NASAL SEPTOPLASTY N/A 10/14/2019    Procedure: SEPTOPLASTY, NOSE;  Surgeon: Umesh Morales MD;  Location: [facility] OR;  Service: ENT;  Laterality: N/A;    NASAL SINUS SURGERY   10/14/2019    PILONIDAL CYST DRAINAGE      REVISION OF SCAR  6/20/2022    Procedure: REVISION, SCAR;  Surgeon: Blanco Kerns MD;  Location: [facility] OR;  Service: General;;    SKIN TAG REMOVAL Left 6/20/2022    Procedure: REMOVAL, SKIN TAG;  Surgeon: Blanco Kerns MD;  Location: [facility] OR;  Service: General;  Laterality: Left;    TONSILLECTOMY      TONSILLECTOMY      TYMPANOPLASTY N/A 10/14/2019    Procedure: TYMPANOPLASTY;  Surgeon: Umesh Morales MD;  Location: [facility] OR;  Service: ENT;  Laterality: N/A;    TYMPANOTOMY Left 10/14/2019    left       Current Outpatient Medications   Medication Sig    amLODIPine (NORVASC) 2.5 MG tablet Take 2.5 mg by mouth once daily.    busPIRone (BUSPAR) 10 MG tablet Take 10 mg by mouth 2 (two) times daily.    doxycycline (MONODOX) 100 MG capsule 1 capsule twice daily by mouth    dronabinoL (MARINOL) 5 MG capsule Take 1 capsule (5 mg total) by mouth 2 (two) times daily as needed (decreased appetite).    fluticasone propionate (FLONASE) 50 mcg/actuation nasal spray 2 sprays (100 mcg total) by Each Nostril route once daily.    gabapentin (NEURONTIN) 600 MG tablet Take 600 mg by mouth 3 (three) times daily.     hydrOXYzine pamoate (VISTARIL) 50 MG Cap Take 1 capsule (50 mg total) by mouth 2 (two) times daily as needed.    ibuprofen (ADVIL,MOTRIN) 800 MG tablet ibuprofen 800 mg tablet    levoFLOXacin (LEVAQUIN) 500 MG tablet Take 500 mg by mouth once daily.    levothyroxine (SYNTHROID) 50 MCG tablet Take 1 tablet by mouth once daily    LIDOcaine (LMX) 4 % cream 1 application as needed    LIDOcaine (XYLOCAINE) 5 % Oint ointment APPLY 1 APPLICATION EXTERNALLY 3 TIMES A DAY AS NEEDED FOR 30 DAYS    LIDOcaine-prilocaine (EMLA) cream Apply small amount topically to port incision approximately 30 minutes prior to port access as needed    LORazepam (ATIVAN) 1 MG tablet Take by mouth.    methylPREDNISolone (MEDROL DOSEPACK) 4 mg tablet Take by mouth.    methylPREDNISolone (MEDROL DOSEPACK) 4 mg  tablet Take 1 tablet (4 mg total) by mouth once daily. use as directed    methylPREDNISolone (MEDROL DOSEPACK) 4 mg tablet use as directed    mirtazapine (REMERON) 15 MG tablet Take 7.5 mg by mouth once daily.    mirtazapine (REMERON) 7.5 MG Tab TAKE 2 TABLETS (15 MG TOTAL) BY MOUTH NIGHTLY.    multivitamin capsule Take 1 capsule by mouth.    naproxen (NAPROSYN) 500 MG tablet Take 1 tablet (500 mg total) by mouth 2 (two) times daily.    ondansetron (ZOFRAN) 8 MG tablet Take 8 mg by mouth 3 (three) times daily.    ondansetron (ZOFRAN-ODT) 8 MG TbDL Take 1 tablet (8 mg total) by mouth every 8 (eight) hours as needed (chemotherapy-induced nausea and vomiting).    prazosin (MINIPRESS) 1 MG Cap Take 1 mg by mouth every evening.    prazosin (MINIPRESS) 2 MG Cap 1 capsule at bedtime    promethazine (PHENERGAN) 6.25 mg/5 mL syrup as needed.    QUEtiapine (SEROQUEL) 100 MG Tab Take 100 mg by mouth once daily.    SSD 1 % cream APPLY DAILY TO SKIN TO AFFECTED AREA EVERY DAY FOR 30 DAYS    tiZANidine (ZANAFLEX) 4 MG tablet Take 4 mg by mouth 3 (three) times daily as needed.    ALPRAZolam (XANAX) 1 MG tablet Take 1 tablet (1 mg total) by mouth 2 (two) times daily as needed for Anxiety.    guaiFENesin 100 mg/5 ml (ROBITUSSIN) 100 mg/5 mL syrup as needed.    sertraline (ZOLOFT) 100 MG tablet Take 1 tablet (100 mg total) by mouth once daily.     No current facility-administered medications for this visit.       Review of patient's allergies indicates:  No Known Allergies    Family History   Problem Relation Age of Onset    Hypertension Mother     Heart disease Father 59        CABG    Heart failure Father     Breast cancer Maternal Aunt 55    Colon cancer Neg Hx     Ovarian cancer Neg Hx        Social History     Socioeconomic History    Marital status: Single   Occupational History    Occupation: [employer]     Comment: [employer]   Tobacco Use    Smoking status: Former     Packs/day: 0.50     Types: Cigarettes     Start date: 7/2/2000  "    Quit date: 2009     Years since quittin.4    Smokeless tobacco: Never   Substance and Sexual Activity    Alcohol use: Yes     Alcohol/week: 5.0 standard drinks     Types: 5 Shots of liquor per week     Comment: "occasionally"    Drug use: No    Sexual activity: Not Currently     Partners: Male     Birth control/protection: OCP   Social History Narrative    The patient does not exercise regularly ().  Rates diet as fair.  She is not satisfied with weight.           History of present illness:  Patient comes in today for the right knee.  She continues complain of achy pain in the right knee primarily over the lateral aspect.  The Kenalog injection did help      Review of Systems:    Constitution: Negative for chills, fever, and sweats.  Negative for unexplained weight loss.    HENT:  Negative for headaches and blurry vision.    Cardiovascular:Negative for chest pain or irregular heart beat. Negative for hypertension.    Respiratory:  Negative for cough and shortness of breath.    Gastrointestinal: Negative for abdominal pain, heartburn, melena, nausea, and vomitting.    Genitourinary:  Negative bladder incontinence and dysuria.    Musculoskeletal:  See HPI for details.     Neurological: Negative for numbness.    Psychiatric/Behavioral: Negative for depression.  The patient is not nervous/anxious.      Endocrine: Negative for polyuria    Hematologic/Lymphatic: Negative for bleeding problem.  Does not bruise/bleed easily.    Skin: Negative for poor would healing and rash    Objective:      Physical Examination:    Vital Signs:  There were no vitals filed for this visit.    Body mass index is 40.62 kg/m².    This a well-developed, well nourished patient in no acute distress.  They are alert and oriented and cooperative to examination.        Patient has range of motion 0-130 degrees bilaterally she has 1+ effusion on the left she has bilateral anterior crepitus the knees are stable to varus valgus anterior " posterior stresses  Pertinent New Results:    XRAY Report / Interpretation:       Assessment/Plan:      Osteoarthritis left knee with documented cartilaginous loss on MRI.  She is an excellent candidate for viscosupplementation.  I have ordered Synvisc for her.  I will see her back with that it injection      This note was created using Dragon voice recognition software that occasionally misinterpreted phrases or words.

## 2022-12-05 ENCOUNTER — PATIENT MESSAGE (OUTPATIENT)
Dept: PSYCHIATRY | Facility: CLINIC | Age: 37
End: 2022-12-05
Payer: COMMERCIAL

## 2022-12-05 ENCOUNTER — OFFICE VISIT (OUTPATIENT)
Dept: PSYCHIATRY | Facility: CLINIC | Age: 37
End: 2022-12-05
Payer: COMMERCIAL

## 2022-12-05 DIAGNOSIS — F10.10 MILD ALCOHOL USE DISORDER: ICD-10-CM

## 2022-12-05 DIAGNOSIS — F41.1 GAD (GENERALIZED ANXIETY DISORDER): ICD-10-CM

## 2022-12-05 DIAGNOSIS — F33.1 MODERATE RECURRENT MAJOR DEPRESSION: ICD-10-CM

## 2022-12-05 PROCEDURE — 1159F PR MEDICATION LIST DOCUMENTED IN MEDICAL RECORD: ICD-10-PCS | Mod: CPTII,95,, | Performed by: SOCIAL WORKER

## 2022-12-05 PROCEDURE — 1159F MED LIST DOCD IN RCRD: CPT | Mod: CPTII,95,, | Performed by: SOCIAL WORKER

## 2022-12-05 PROCEDURE — 90834 PR PSYCHOTHERAPY W/PATIENT, 45 MIN: ICD-10-PCS | Mod: 95,,, | Performed by: SOCIAL WORKER

## 2022-12-05 PROCEDURE — 90834 PSYTX W PT 45 MINUTES: CPT | Mod: 95,,, | Performed by: SOCIAL WORKER

## 2022-12-06 ENCOUNTER — PATIENT MESSAGE (OUTPATIENT)
Dept: OBSTETRICS AND GYNECOLOGY | Facility: CLINIC | Age: 37
End: 2022-12-06
Payer: COMMERCIAL

## 2022-12-08 ENCOUNTER — CLINICAL SUPPORT (OUTPATIENT)
Dept: REHABILITATION | Facility: HOSPITAL | Age: 37
End: 2022-12-08
Attending: ORTHOPAEDIC SURGERY
Payer: COMMERCIAL

## 2022-12-08 DIAGNOSIS — M17.11 PRIMARY OSTEOARTHRITIS OF RIGHT KNEE: ICD-10-CM

## 2022-12-08 DIAGNOSIS — M25.661 DECREASED RANGE OF MOTION (ROM) OF RIGHT KNEE: Primary | ICD-10-CM

## 2022-12-08 DIAGNOSIS — S93.622D LISFRANC'S SPRAIN, LEFT, SUBSEQUENT ENCOUNTER: ICD-10-CM

## 2022-12-08 PROCEDURE — 97110 THERAPEUTIC EXERCISES: CPT | Mod: PN

## 2022-12-08 PROCEDURE — 97161 PT EVAL LOW COMPLEX 20 MIN: CPT | Mod: PN

## 2022-12-08 NOTE — PLAN OF CARE
[facility]  Physical Therapy Initial Evaluation    Date: 12/8/2022   Name: Jerry Coronado  Clinic Number: [id number]    Therapy Diagnosis:   Encounter Diagnoses   Name Primary?    Primary osteoarthritis of right knee     Lisfranc's sprain, left, subsequent encounter     Decreased range of motion (ROM) of right knee Yes     Physician: Matthew Elliott MD    Physician Orders: PT Eval and Treat   Medical Diagnosis from Referral: M17.11 (ICD-10-CM) - Primary osteoarthritis of right knee  Evaluation Date: 12/8/2022  Authorization Period Expiration: 12/31/2022  Plan of Care Expiration: 2/8/2022  Visit # / Visits authorized: 1/ 1    Time In: 815a  Time Out: 855a  Total Appointment Time (timed & untimed codes): 40 minutes    Precautions: Standard    Subjective   Date of onset: this past summer  History of current condition - Jerry reports: right knee pain started this past summer when she was twirling her daughter in a pool and felt a pop. She then states she went to concert that night and the pain began to worsen. Aggravating factors include walking, stairs. Easing factors include ice and medication; steroid shot. Patient denies any radiating pain. She states she is having L foot surgery in January.      Medical History:   Past Medical History:   Diagnosis Date    Allergic rhinitis     Anxiety     Depression     Hypothyroidism     SVT (supraventricular tachycardia) 2011       Surgical History:   Jerry Coronado  has a past surgical history that includes Ablation of dysrhythmic focus; Cervix lesion destruction; Tonsillectomy; Pilonidal cyst drainage; Nasal sinus surgery (10/14/2019); Tympanotomy (Left, 10/14/2019); Functional endoscopic sinus surgery (FESS) (N/A, 10/14/2019); Excision of lesion of nose (N/A, 10/14/2019); Nasal septoplasty (N/A, 10/14/2019); Tympanoplasty (N/A, 10/14/2019); Tonsillectomy; Conization of cervix using loop electrosurgical excision procedure (LEEP) (N/A, 7/21/2021);  Hysteroscopy with dilation and curettage of uterus (N/A, 7/21/2021); Biopsy of cervical lymph node (Left, 11/29/2021); Mediport removal (Right, 6/20/2022); Skin tag removal (Left, 6/20/2022); and Revision of scar (6/20/2022).    Medications:   Jerry has a current medication list which includes the following prescription(s): alprazolam, amlodipine, buspirone, doxycycline, dronabinol, fluticasone propionate, gabapentin, guaifenesin 100 mg/5 ml, hydroxyzine pamoate, ibuprofen, levofloxacin, levothyroxine, lidocaine, lidocaine, lidocaine-prilocaine, lorazepam, methylprednisolone, methylprednisolone, methylprednisolone, mirtazapine, mirtazapine, multivitamin, naproxen, ondansetron, ondansetron, prazosin, prazosin, promethazine, quetiapine, sertraline, ssd, tizanidine, [DISCONTINUED] bupropion, [DISCONTINUED] drospirenone-ethinyl estradiol, [DISCONTINUED] duloxetine, [DISCONTINUED] levalbuterol, and [DISCONTINUED] valacyclovir.    Allergies:   Review of patient's allergies indicates:  No Known Allergies     Imaging, MRI studies (9/22/22): IMPRESSION:     Mild osteoarthritic changes. Of note, there is focal full-thickness cartilage loss along the anterior aspect of the lateral femoral condyle with adjacent marrow signal changes.     Focal signal alteration near the junction of body and posterior horn of the medial meniscus which appears to extend to the tibial surface on 2 adjacent coronal sequences and could reflect a tiny nondisplaced tear.     Small amount of joint fluid.       Prior Therapy: N  Social History:  lives with their family  Occupation: respiratory therapist   Prior Level of Function: I  Current Level of Function: I; increased knee pain     Pain:  Current 3/10, worst 7/10, best 1/10   Location: right knee  Description: Aching  Aggravating Factors: Bending, Walking, and stairs  Easing Factors: ice    Patients goals: decrease pain    Objective   Gait:   No assistive device    Decreased step length    Narrow  WESLEY    Observation:   (B) slight knee valgus   (B) femoral IR      Range of Motion:   Knee Left active Left Passive Right Active R passive   Flexion 125  NT   Extension 0; painful +3; painful  0 +1       Joint Mobility:  (R) patellar mobility is WNL   Tibiofemoral: R: WNL    Lower Extremity Strength    Quad Set: good   SLR: able without lag     Right LE  Left LE    Knee extension: 4-/5 Knee extension: 4-/5   Knee flexion: 4-/5 Knee flexion: 4-/5   Hip extension:  3+/5 Hip extension: 4-/5   Hip abduction: 3+/5 Hip abduction: 4-/5       Special Tests:  - Valgus stress test: (+ / - / NT)  - Varus stress test: (+ / - / NT)  - Lachman: (+ / - / NT)  - Anterior Drawer: (+ / - / NT)    Functional Testing:     Step Ups 6inch: painful descent     Balance Assessment:       Evaluation   Single Limb Stance R LE 10s; painful  (<10 sec = HIGH FALL RISK)   Single Limb Stance L LE 10s  (<10 sec = HIGH FALL RISK)         Palpation: tender medial joint line    Sensation: WNL        Limitation/Restriction for FOTO Knee Survey    Therapist reviewed FOTO scores for Jerry Coronado on 12/8/2022.   FOTO documents entered into [employer] - see Media section.    Limitation Score: will assess at f/u         TREATMENT   Treatment Time In: 845p  Treatment Time Out: 855p  Total Treatment time (time-based codes) separate from Evaluation: 10 minutes    Jerry received therapeutic exercises to develop strength, endurance, ROM, and flexibility for 10 minutes including:      SL Hip ER x20; 5s holds    Supine SLR 2 x 10    Supine Bridge x10    SL Clams 10x10s holds   Education - HEP / POC       Home Exercises and Patient Education Provided    Education provided:   - HEP  - POC/prognosis    Written Home Exercises Provided: yes.  Exercises were reviewed and Jerry was able to demonstrate them prior to the end of the session.  Jerry demonstrated good  understanding of the education provided.     See EMR under Patient Instructions for exercises provided  12/8/2022.    Assessment   Jerry is a 37 y.o. female referred to outpatient Physical Therapy with a medical diagnosis of Primary osteoarthritis of right knee. Pt presents with limited knee AROM; BLE weakness; tenderness of medial joint line, and functional limitations of difficulty walking and with stairs. Patient would benefit from skilled PT to further improve these impairments and facilitate a return to PLOF.     Pt to be seen 2x/week for 8x weeks     Patient prognosis is Good.   Patientt will benefit from skilled outpatient Physical Therapy to address the deficits stated above and in the chart below, provide patient /family education, and to maximize patientt's level of independence.     Plan of care discussed with patient: Yes  Patient's spiritual, cultural and educational needs considered and patient is agreeable to the plan of care and goals as stated below:     Anticipated Barriers for therapy: none    Medical Necessity is demonstrated by the following  History  Co-morbidities and personal factors that may impact the plan of care Co-morbidities:   anxiety, depression, and high BMI    Personal Factors:   no deficits     moderate   Examination  Body Structures and Functions, activity limitations and participation restrictions that may impact the plan of care Body Regions:   lower extremities    Body Systems:    gross symmetry  ROM  strength  gross coordinated movement  balance  gait  transfers  motor control    Participation Restrictions:   Family activities     Activity limitations:   no deficits    General Tasks and Commands  no deficits    Communication  no deficits    Mobility  lifting and carrying objects  walking  stairs    Self care  no deficits    Domestic Life  no deficits    Interactions/Relationships  no deficits    Life Areas  no deficits    Community and Social Life  community life  recreation and leisure         moderate   Clinical Presentation stable and uncomplicated low   Decision Making/  Complexity Score: low     Goals:  Short Term Goals: (4 weeks)  1. Pt will be independent with HEP in order to supplement patient in improving functional mobility. - progressing, not met  2. Pt will improve (R) knee AROM to at least 0-130 pain-free in order to improve gait and ability to perform ADLs. - progressing, not met  3. Pt will report <5/10 pain at work to demonstrate improve tolerance to prolonged standing- progressing, not met     Long Term Goals: (8 weeks)  1. Pt will be independent with updated HEP supplement PT in improving functional mobility. - progressing, not met  2. Pt will improve FOTO knee survey score to </= predicted % limited in order to demo improved functional mobility. - progressing, not met  3. Pt will report <2/10 pain with work related activities - progressing, not met  4. Pt will improve hip abduction strength from 3+/5 to 4/5 to improve functional gait deviation. - progressing, not met    Plan   Plan of care Certification: 12/8/2022 to 2/8/2022.    Outpatient Physical Therapy 2 times weekly for 8 weeks to include the following interventions: Gait Training, Manual Therapy, Moist Heat/ Ice, Neuromuscular Re-ed, Patient Education, Self Care, Therapeutic Activities, and Therapeutic Exercise.     Maged Alas, PT

## 2022-12-16 ENCOUNTER — HOSPITAL ENCOUNTER (OUTPATIENT)
Dept: PREADMISSION TESTING | Facility: HOSPITAL | Age: 37
Discharge: HOME | End: 2022-12-16
Attending: PODIATRIST
Payer: COMMERCIAL

## 2022-12-16 VITALS
SYSTOLIC BLOOD PRESSURE: 145 MMHG | HEIGHT: 61 IN | HEART RATE: 82 BPM | TEMPERATURE: 98 F | BODY MASS INDEX: 40.59 KG/M2 | DIASTOLIC BLOOD PRESSURE: 91 MMHG | WEIGHT: 215 LBS | OXYGEN SATURATION: 95 % | RESPIRATION RATE: 18 BRPM

## 2022-12-16 DIAGNOSIS — M79.672 LEFT FOOT PAIN: ICD-10-CM

## 2022-12-16 DIAGNOSIS — S93.622D LISFRANC'S SPRAIN, LEFT, SUBSEQUENT ENCOUNTER: ICD-10-CM

## 2022-12-16 LAB
ALBUMIN SERPL BCP-MCNC: 3.9 G/DL (ref 3.5–5.2)
ALP SERPL-CCNC: 71 U/L (ref 55–135)
ALT SERPL W/O P-5'-P-CCNC: 24 U/L (ref 10–44)
ANION GAP SERPL CALC-SCNC: 10 MMOL/L (ref 8–16)
AST SERPL-CCNC: 16 U/L (ref 10–40)
BASOPHILS # BLD AUTO: 0.1 K/UL (ref 0–0.2)
BASOPHILS NFR BLD: 1 % (ref 0–1.9)
BILIRUB SERPL-MCNC: 0.8 MG/DL (ref 0.1–1)
BUN SERPL-MCNC: 20 MG/DL (ref 6–20)
CALCIUM SERPL-MCNC: 8.9 MG/DL (ref 8.7–10.5)
CHLORIDE SERPL-SCNC: 102 MMOL/L (ref 95–110)
CO2 SERPL-SCNC: 23 MMOL/L (ref 23–29)
CREAT SERPL-MCNC: 0.9 MG/DL (ref 0.5–1.4)
DIFFERENTIAL METHOD: ABNORMAL
EOSINOPHIL # BLD AUTO: 0.2 K/UL (ref 0–0.5)
EOSINOPHIL NFR BLD: 2 % (ref 0–8)
ERYTHROCYTE [DISTWIDTH] IN BLOOD BY AUTOMATED COUNT: 12.5 % (ref 11.5–14.5)
EST. GFR  (NO RACE VARIABLE): >60 ML/MIN/1.73 M^2
GLUCOSE SERPL-MCNC: 82 MG/DL (ref 70–110)
HCT VFR BLD AUTO: 41.1 % (ref 37–48.5)
HGB BLD-MCNC: 14.2 G/DL (ref 12–16)
IMM GRANULOCYTES # BLD AUTO: 0.1 K/UL (ref 0–0.04)
IMM GRANULOCYTES NFR BLD AUTO: 1 % (ref 0–0.5)
LYMPHOCYTES # BLD AUTO: 0.8 K/UL (ref 1–4.8)
LYMPHOCYTES NFR BLD: 7.4 % (ref 18–48)
MCH RBC QN AUTO: 30.1 PG (ref 27–31)
MCHC RBC AUTO-ENTMCNC: 34.5 G/DL (ref 32–36)
MCV RBC AUTO: 87 FL (ref 82–98)
MONOCYTES # BLD AUTO: 0.8 K/UL (ref 0.3–1)
MONOCYTES NFR BLD: 7.3 % (ref 4–15)
NEUTROPHILS # BLD AUTO: 8.3 K/UL (ref 1.8–7.7)
NEUTROPHILS NFR BLD: 81.3 % (ref 38–73)
NRBC BLD-RTO: 0 /100 WBC
PLATELET # BLD AUTO: 314 K/UL (ref 150–450)
PMV BLD AUTO: 9.4 FL (ref 9.2–12.9)
POTASSIUM SERPL-SCNC: 3.8 MMOL/L (ref 3.5–5.1)
PROT SERPL-MCNC: 6.6 G/DL (ref 6–8.4)
RBC # BLD AUTO: 4.71 M/UL (ref 4–5.4)
SODIUM SERPL-SCNC: 135 MMOL/L (ref 136–145)
WBC # BLD AUTO: 10.23 K/UL (ref 3.9–12.7)

## 2022-12-16 PROCEDURE — 85025 COMPLETE CBC W/AUTO DIFF WBC: CPT | Performed by: PODIATRIST

## 2022-12-16 PROCEDURE — 80053 COMPREHEN METABOLIC PANEL: CPT | Performed by: PODIATRIST

## 2022-12-16 PROCEDURE — 36415 COLL VENOUS BLD VENIPUNCTURE: CPT | Performed by: PODIATRIST

## 2022-12-16 NOTE — DISCHARGE INSTRUCTIONS
To confirm, Your doctor has instructed you that surgery is scheduled for:   Wednesday, December 21, 2022    Pre-Op will call the afternoon prior to surgery between 4:00 and 6:00 PM with the final arrival time.    Tuesday, December 20, 2022    Please report to Outpatient [place] via [facility] entrance. Check in at registration desk.    Do not eat or drink anything after midnight the night before your surgery - THIS INCLUDES  WATER, GUM, MINTS AND CANDY.  YOU MAY BRUSH YOUR TEETH BUT DO NOT SWALLOW     TAKE ONLY THESE MEDICATIONS WITH A SMALL SIP OF WATER THE MORNING OF YOUR PROCEDURE:  XANAX/LEVOTHYROXINE/NASAL SPRAYS      PLEASE NOTE:  The surgery schedule has many variables which may affect the time of your surgery case.  Family members should be available if your surgery time changes.  Plan to be here the day of your procedure between 4-6 hours.      DO NOT TAKE THESE MEDICATIONS 5-7 DAYS PRIOR to your procedure or per your surgeon's request: ASPIRIN, ALEVE, ADVIL, IBUPROFEN,  ALLYN SELTZER, BC , FISH OIL , VITAMIN E, HERBALS  (May take Tylenol)                                                        IMPORTANT INSTRUCTIONS    Do not smoke, vape or drink alcoholic beverages 24 hours prior to your procedure.  Shower the night before AND the morning of your procedure with a Chlorhexidine wash such as Hibiclens or Dial antibacterial soap from the neck down. Do not apply any deodorants, lotions or powders after each shower.  Do not get it on your face or in your eyes.  You may use your own shampoo and face wash. This helps your skin to be as bacteria free as possible.   Sleep in a bed with clean sheets.  Do not sleep with a pet in the bed.     Please leave all jewelry, piercing's and valuables at home.     Make arrangements in advance for transportation home by a responsible adult.    You must make arrangements for transportation, TAXI'S, UBER'S OR LYFTS ARE NOT ALLOWED.      If you have any questions about these  instructions, call Pre-Op Admit  Nursing at [phone] or the Pre-Op Day Surgery Unit at [phone].

## 2022-12-19 ENCOUNTER — PATIENT MESSAGE (OUTPATIENT)
Dept: ADMINISTRATIVE | Facility: OTHER | Age: 37
End: 2022-12-19
Payer: COMMERCIAL

## 2022-12-21 ENCOUNTER — ANESTHESIA (OUTPATIENT)
Dept: SURGERY | Facility: HOSPITAL | Age: 37
End: 2022-12-21
Payer: COMMERCIAL

## 2022-12-21 ENCOUNTER — ANESTHESIA EVENT (OUTPATIENT)
Dept: SURGERY | Facility: HOSPITAL | Age: 37
End: 2022-12-21
Payer: COMMERCIAL

## 2022-12-21 ENCOUNTER — HOSPITAL ENCOUNTER (OUTPATIENT)
Facility: HOSPITAL | Age: 37
Discharge: HOME | End: 2022-12-21
Attending: PODIATRIST | Admitting: PODIATRIST
Payer: COMMERCIAL

## 2022-12-21 VITALS
SYSTOLIC BLOOD PRESSURE: 128 MMHG | TEMPERATURE: 98 F | DIASTOLIC BLOOD PRESSURE: 90 MMHG | HEART RATE: 98 BPM | OXYGEN SATURATION: 97 % | RESPIRATION RATE: 16 BRPM

## 2022-12-21 DIAGNOSIS — M79.672 LEFT FOOT PAIN: ICD-10-CM

## 2022-12-21 DIAGNOSIS — S93.622D SPRAIN OF TARSOMETATARSAL LIGAMENT OF LEFT FOOT, SUBSEQUENT ENCOUNTER: ICD-10-CM

## 2022-12-21 DIAGNOSIS — S93.622D LISFRANC'S SPRAIN, LEFT, SUBSEQUENT ENCOUNTER: ICD-10-CM

## 2022-12-21 DIAGNOSIS — G57.92 NERVE ENTRAPMENT SYNDROME OF FOOT, LEFT: Primary | ICD-10-CM

## 2022-12-21 PROCEDURE — 37000009 HC ANESTHESIA EA ADD 15 MINS: Performed by: PODIATRIST

## 2022-12-21 PROCEDURE — 71000039 HC RECOVERY, EACH ADD'L HOUR: Performed by: PODIATRIST

## 2022-12-21 PROCEDURE — C1713 ANCHOR/SCREW BN/BN,TIS/BN: HCPCS | Performed by: PODIATRIST

## 2022-12-21 PROCEDURE — D9220A PRA ANESTHESIA: Mod: ANES,,, | Performed by: ANESTHESIOLOGY

## 2022-12-21 PROCEDURE — 36000708 HC OR TIME LEV III 1ST 15 MIN: Performed by: PODIATRIST

## 2022-12-21 PROCEDURE — 36000709 HC OR TIME LEV III EA ADD 15 MIN: Performed by: PODIATRIST

## 2022-12-21 PROCEDURE — 27201423 OPTIME MED/SURG SUP & DEVICES STERILE SUPPLY: Performed by: PODIATRIST

## 2022-12-21 PROCEDURE — 25000003 PHARM REV CODE 250: Performed by: ANESTHESIOLOGY

## 2022-12-21 PROCEDURE — 64704 PR REVISE/REPAIR HAND/FOOT NERVE: ICD-10-PCS | Mod: 51,LT,, | Performed by: PODIATRIST

## 2022-12-21 PROCEDURE — 27000080 OPTIME MED/SURG SUP & DEVICES GENERAL CLASSIFICATION: Performed by: PODIATRIST

## 2022-12-21 PROCEDURE — 28010 PR INCISION SUBCUT TOE TENDON: ICD-10-PCS | Mod: 51,T1,, | Performed by: PODIATRIST

## 2022-12-21 PROCEDURE — 37000008 HC ANESTHESIA 1ST 15 MINUTES: Performed by: PODIATRIST

## 2022-12-21 PROCEDURE — 25000003 PHARM REV CODE 250: Performed by: NURSE ANESTHETIST, CERTIFIED REGISTERED

## 2022-12-21 PROCEDURE — 71000015 HC POSTOP RECOV 1ST HR: Performed by: PODIATRIST

## 2022-12-21 PROCEDURE — 64704 REVISE HAND/FOOT NERVE: CPT | Mod: 51,LT,, | Performed by: PODIATRIST

## 2022-12-21 PROCEDURE — 28270 PR CAPSULOTOMY MT-P JT,FOOT,EACH: ICD-10-PCS | Mod: 51,T1,, | Performed by: PODIATRIST

## 2022-12-21 PROCEDURE — 63600175 PHARM REV CODE 636 W HCPCS: Performed by: ANESTHESIOLOGY

## 2022-12-21 PROCEDURE — D9220A PRA ANESTHESIA: ICD-10-PCS | Mod: ANES,,, | Performed by: ANESTHESIOLOGY

## 2022-12-21 PROCEDURE — 28270 RELEASE OF FOOT CONTRACTURE: CPT | Mod: 51,T1,, | Performed by: PODIATRIST

## 2022-12-21 PROCEDURE — 28308 PR OSTEOTOMY METATARSAL (NOT 1ST): ICD-10-PCS | Mod: T1,,, | Performed by: PODIATRIST

## 2022-12-21 PROCEDURE — 25000003 PHARM REV CODE 250: Performed by: PODIATRIST

## 2022-12-21 PROCEDURE — C9290 INJ, BUPIVACAINE LIPOSOME: HCPCS | Performed by: PODIATRIST

## 2022-12-21 PROCEDURE — 63600175 PHARM REV CODE 636 W HCPCS: Performed by: PODIATRIST

## 2022-12-21 PROCEDURE — 28308 INCISION OF METATARSAL: CPT | Mod: T1,,, | Performed by: PODIATRIST

## 2022-12-21 PROCEDURE — 63600175 PHARM REV CODE 636 W HCPCS: Performed by: NURSE ANESTHETIST, CERTIFIED REGISTERED

## 2022-12-21 PROCEDURE — 28010 INCISION OF TOE TENDON: CPT | Mod: 51,T1,, | Performed by: PODIATRIST

## 2022-12-21 PROCEDURE — 71000033 HC RECOVERY, INTIAL HOUR: Performed by: PODIATRIST

## 2022-12-21 PROCEDURE — D9220A PRA ANESTHESIA: ICD-10-PCS | Mod: CRNA,,, | Performed by: NURSE ANESTHETIST, CERTIFIED REGISTERED

## 2022-12-21 PROCEDURE — D9220A PRA ANESTHESIA: Mod: CRNA,,, | Performed by: NURSE ANESTHETIST, CERTIFIED REGISTERED

## 2022-12-21 RX ORDER — CEFAZOLIN SODIUM 1 G/3ML
INJECTION, POWDER, FOR SOLUTION INTRAMUSCULAR; INTRAVENOUS
Status: DISCONTINUED | OUTPATIENT
Start: 2022-12-21 | End: 2022-12-21

## 2022-12-21 RX ORDER — ONDANSETRON 2 MG/ML
INJECTION INTRAMUSCULAR; INTRAVENOUS
Status: DISCONTINUED | OUTPATIENT
Start: 2022-12-21 | End: 2022-12-21

## 2022-12-21 RX ORDER — MIRTAZAPINE 7.5 MG/1
7.5 TABLET, FILM COATED ORAL NIGHTLY
COMMUNITY
End: 2023-01-24

## 2022-12-21 RX ORDER — BUPIVACAINE HYDROCHLORIDE 5 MG/ML
INJECTION, SOLUTION EPIDURAL; INTRACAUDAL
Status: DISCONTINUED | OUTPATIENT
Start: 2022-12-21 | End: 2022-12-21 | Stop reason: HOSPADM

## 2022-12-21 RX ORDER — HYDROCODONE BITARTRATE AND ACETAMINOPHEN 7.5; 325 MG/1; MG/1
1 TABLET ORAL EVERY 4 HOURS PRN
Qty: 25 TABLET | Refills: 0
Start: 2022-12-21 | End: 2022-12-27 | Stop reason: SDUPTHER

## 2022-12-21 RX ORDER — FENTANYL CITRATE 50 UG/ML
25 INJECTION, SOLUTION INTRAMUSCULAR; INTRAVENOUS EVERY 5 MIN PRN
Status: DISCONTINUED | OUTPATIENT
Start: 2022-12-21 | End: 2022-12-21 | Stop reason: HOSPADM

## 2022-12-21 RX ORDER — LIDOCAINE HYDROCHLORIDE 20 MG/ML
INJECTION, SOLUTION EPIDURAL; INFILTRATION; INTRACAUDAL; PERINEURAL
Status: DISCONTINUED | OUTPATIENT
Start: 2022-12-21 | End: 2022-12-21

## 2022-12-21 RX ORDER — CEPHALEXIN 500 MG/1
500 CAPSULE ORAL EVERY 12 HOURS
Qty: 14 CAPSULE | Refills: 0
Start: 2022-12-21 | End: 2023-04-12

## 2022-12-21 RX ORDER — OXYCODONE HYDROCHLORIDE 5 MG/1
5 TABLET ORAL
Status: DISCONTINUED | OUTPATIENT
Start: 2022-12-21 | End: 2022-12-21 | Stop reason: HOSPADM

## 2022-12-21 RX ORDER — ONDANSETRON 2 MG/ML
4 INJECTION INTRAMUSCULAR; INTRAVENOUS DAILY PRN
Status: DISCONTINUED | OUTPATIENT
Start: 2022-12-21 | End: 2022-12-21 | Stop reason: HOSPADM

## 2022-12-21 RX ORDER — PROMETHAZINE HYDROCHLORIDE 25 MG/1
25 TABLET ORAL EVERY 8 HOURS PRN
Qty: 15 TABLET | Refills: 0
Start: 2022-12-21 | End: 2023-04-12

## 2022-12-21 RX ORDER — DEXAMETHASONE SODIUM PHOSPHATE 4 MG/ML
INJECTION, SOLUTION INTRA-ARTICULAR; INTRALESIONAL; INTRAMUSCULAR; INTRAVENOUS; SOFT TISSUE
Status: DISCONTINUED | OUTPATIENT
Start: 2022-12-21 | End: 2022-12-21 | Stop reason: HOSPADM

## 2022-12-21 RX ORDER — MIDAZOLAM HYDROCHLORIDE 1 MG/ML
INJECTION INTRAMUSCULAR; INTRAVENOUS
Status: DISCONTINUED | OUTPATIENT
Start: 2022-12-21 | End: 2022-12-21

## 2022-12-21 RX ORDER — FENTANYL CITRATE 50 UG/ML
INJECTION, SOLUTION INTRAMUSCULAR; INTRAVENOUS
Status: DISCONTINUED | OUTPATIENT
Start: 2022-12-21 | End: 2022-12-21

## 2022-12-21 RX ORDER — DEXAMETHASONE SODIUM PHOSPHATE 4 MG/ML
INJECTION, SOLUTION INTRA-ARTICULAR; INTRALESIONAL; INTRAMUSCULAR; INTRAVENOUS; SOFT TISSUE
Status: DISCONTINUED | OUTPATIENT
Start: 2022-12-21 | End: 2022-12-21

## 2022-12-21 RX ORDER — ACETAMINOPHEN 10 MG/ML
INJECTION, SOLUTION INTRAVENOUS
Status: DISCONTINUED | OUTPATIENT
Start: 2022-12-21 | End: 2022-12-21

## 2022-12-21 RX ORDER — PROPOFOL 10 MG/ML
VIAL (ML) INTRAVENOUS
Status: DISCONTINUED | OUTPATIENT
Start: 2022-12-21 | End: 2022-12-21

## 2022-12-21 RX ORDER — DIPHENHYDRAMINE HYDROCHLORIDE 50 MG/ML
12.5 INJECTION INTRAMUSCULAR; INTRAVENOUS
Status: DISCONTINUED | OUTPATIENT
Start: 2022-12-21 | End: 2022-12-21 | Stop reason: HOSPADM

## 2022-12-21 RX ORDER — FAMOTIDINE 10 MG/ML
INJECTION INTRAVENOUS
Status: DISCONTINUED | OUTPATIENT
Start: 2022-12-21 | End: 2022-12-21

## 2022-12-21 RX ADMIN — ONDANSETRON 4 MG: 2 INJECTION INTRAMUSCULAR; INTRAVENOUS at 07:12

## 2022-12-21 RX ADMIN — MIDAZOLAM HYDROCHLORIDE 2 MG: 1 INJECTION, SOLUTION INTRAMUSCULAR; INTRAVENOUS at 06:12

## 2022-12-21 RX ADMIN — FENTANYL CITRATE 25 MCG: 50 INJECTION INTRAMUSCULAR; INTRAVENOUS at 08:12

## 2022-12-21 RX ADMIN — SODIUM CHLORIDE, SODIUM LACTATE, POTASSIUM CHLORIDE, AND CALCIUM CHLORIDE: .6; .31; .03; .02 INJECTION, SOLUTION INTRAVENOUS at 06:12

## 2022-12-21 RX ADMIN — DEXAMETHASONE SODIUM PHOSPHATE 8 MG: 4 INJECTION, SOLUTION INTRA-ARTICULAR; INTRALESIONAL; INTRAMUSCULAR; INTRAVENOUS; SOFT TISSUE at 07:12

## 2022-12-21 RX ADMIN — MEPERIDINE HYDROCHLORIDE 12.5 MG: 50 INJECTION, SOLUTION INTRAMUSCULAR; INTRAVENOUS; SUBCUTANEOUS at 08:12

## 2022-12-21 RX ADMIN — PROPOFOL 200 MG: 10 INJECTION, EMULSION INTRAVENOUS at 07:12

## 2022-12-21 RX ADMIN — OXYCODONE HYDROCHLORIDE 5 MG: 5 TABLET ORAL at 08:12

## 2022-12-21 RX ADMIN — CEFAZOLIN 2 G: 330 INJECTION, POWDER, FOR SOLUTION INTRAMUSCULAR; INTRAVENOUS at 07:12

## 2022-12-21 RX ADMIN — ACETAMINOPHEN 1000 MG: 10 INJECTION, SOLUTION INTRAVENOUS at 07:12

## 2022-12-21 RX ADMIN — FENTANYL CITRATE 50 MCG: 50 INJECTION INTRAMUSCULAR; INTRAVENOUS at 07:12

## 2022-12-21 RX ADMIN — FAMOTIDINE 20 MG: 10 INJECTION INTRAVENOUS at 07:12

## 2022-12-21 RX ADMIN — FENTANYL CITRATE 25 MCG: 50 INJECTION INTRAMUSCULAR; INTRAVENOUS at 07:12

## 2022-12-21 RX ADMIN — LIDOCAINE HYDROCHLORIDE 80 MG: 20 INJECTION, SOLUTION EPIDURAL; INFILTRATION; INTRACAUDAL; PERINEURAL at 07:12

## 2022-12-21 NOTE — TRANSFER OF CARE
Anesthesia Transfer of Care Note    Patient: Jerry Coronado    Procedure(s) Performed: Procedure(s) (LRB):  OSTEOTOMY, METATARSAL BONE, RELEASE ENTRAPPED NERVE 2ND INTERMETATARSAL SPACE (Left)    Patient location: PACU    Anesthesia Type: general    Transport from OR: Transported from OR on 2-3 L/min O2 by NC with adequate spontaneous ventilation    Post pain: adequate analgesia    Post assessment: no apparent anesthetic complications and tolerated procedure well    Post vital signs: stable    Level of consciousness: awake and responds to stimulation    Nausea/Vomiting: no nausea/vomiting    Complications: none    Transfer of care protocol was followed      Last vitals:   Visit Vitals  /78   Pulse 102   Temp 36.8 °C (98.2 °F) (Oral)   Resp 16   LMP 12/09/2022 (Approximate)   SpO2 97%   Breastfeeding No

## 2022-12-21 NOTE — ANESTHESIA PROCEDURE NOTES
Intubation    Date/Time: 12/21/2022 7:09 AM  Performed by: Zena Mascorro CRNA  Authorized by: Sage Quevedo MD     Intubation:     Induction:  Intravenous    Intubated:  Postinduction    Mask Ventilation:  Easy mask    Attempts:  1    Attempted By:  CRNA    Method of Intubation:  Direct    Difficult Airway Encountered?: No      Complications:  None    Airway Device:  Supraglottic airway/LMA    Airway Device Size:  4.0    Placement Verified By:  Capnometry    Complicating Factors:  None    Findings Post-Intubation:  BS equal bilateral and atraumatic/condition of teeth unchanged  Notes:      Easily placed

## 2022-12-21 NOTE — H&P
Patient is a 37-year-old female who has a chief complaint of left foot pain.    Social:  Former Smoker History of alcohol abuse   Hematology/Oncology:         -- Cancer in past history ( non-Hodgkin's lymphoma, in remission. PET scan negative in June 2022):  chemotherapy    EENT/Dental:   chronic allergic rhinitis   Cardiovascular:   Dysrhythmias (History of SVT, resolved since ablation 2016)    Pulmonary:   Sleep Apnea (High risk per stop Bang score but sleep study was inconclusive. Patient has not gotten around to repeat sleep study.)    Education provided regarding risk of obstructive sleep apnea     Musculoskeletal:   Patient has TMJ but noncompliant with treatment. She has frequent popping and occasional painful flare ups of her left TMJ, last time recently.   Neurological:   Patient gets occasional flare-ups of post herpetic neuralgia along her left waistline.  Chronic Pain Syndrome ( patient takes occasional oxycodone for knee pain and foot pain.)  Peripheral Neuropathy:  carpal tunnel syndrome.    Endocrine:   Hypothyroidism  Obesity / BMI > 30  Psych:   anxiety depression             Physical Exam  General: Well nourished, Cooperative, Alert and Oriented   Airway:  Mallampati: III   Mouth Opening: Normal  TM Distance: > 6 cm  Tongue: Normal  Neck ROM: Normal ROM   Dental:  Intact   Chest/Lungs:  Clear to auscultation, Normal Respiratory Rate   Heart:  Rate: Normal  Rhythm: Regular Rhythm  Sounds: Normal    Assessment:  Left Lisfranc's ankle sprain with entrapped nerve in 2nd intermetatarsal space.    Plan:  Metatarsal osteotomy with release entrapped nerve.

## 2022-12-21 NOTE — OP NOTE
Operative Report     Patient name: Jerry Coronado   MRN: [id number]  Date of surgery: 12/21/2022    Surgeon: Hola Escalante DPM   Assistant:  Estefany Yen DPM, PGY 1    Preoperative diagnosis:  1.  Plantar flexed 2nd metatarsal with chronic capsulitis 2nd MPJ left foot  2. Nerve entrapment 2nd intermetatarsal space left foot   Postoperative diagnosis:  Same as the above  Procedure:  1. Elevating osteotomy 2nd metatarsal head left foot with 2.0 mm screw fixation tendon lengthening extensor digitorum longus capsulotomy 2nd MPJ left foot  2. Release of entrapped nerve 2nd intermetatarsal space left foot  3. Percutaneous flexor tenotomy PIPJ 2nd toe left foot  Anesthesia:  General anesthesia supplemented with a postoperative local anesthetic block of the left foot with 0.5% plain Marcaine and Exparel plain  Hemostasis:  Ankle tourniquet 250 mmHg  Estimated blood loss:  1 cc   Specimen:  None  Complications: None  Condition upon discharge: Stable    Procedure in detail:  Patient brought the operating room placed on the operating table supine position time-out was called.  General anesthesia obtained.  Left foot prepped and draped usual aseptic manner.  Left foot exsanguinated with Esmarch bandage ankle tourniquet inflated 250 mmHg.      1. Incision was made in the 2nd interspace and then carried over on top of the distal 2nd metatarsal with a 15 blade deepened by sharp blunt dissection superficial vessels encountered clamped ligated via electrocautery.  Blunt dissection carried down in the interspace the deep transverse intermetatarsal ligament was identified hemostat placed under the ligament was incised and released to relief the compression of the 2nd 3rd metatarsal heads in the interspace pressing on the nerve.  I explored the area for neuroma none was noted.    2. I made a longitudinal incision through the periosteum and capsule of the 2nd MPJ freed from the head of the 2nd metatarsal.  Remodeled the dorsal  head and then I did a 2 mm elevating osteotomy of the 2nd metatarsal.  I palpated the plantar aspect of the foot felt like the 2nd was on the same plane with the 1st and 3rd.  Area was irrigated the osteotomy fixated with 2.0 mm screw from dorsal plantar using the lag technique.  Osteotomy was well fixated.  I performed a Z-lengthening of the extensor digitorum longus tendon I did a medial vertical capsulotomy and a transverse dorsal capsulotomy 2nd MPJ.  The toe was now sitting in a more rectus position at the 2nd MPJ but I had some concerns about the flexion at the PIPJ so I used an 18 gauge needle did a percutaneous plantar flexor tenotomy at the PIPJ.  Toe was easily maintain into a rectus position.    The capsule was closed with 3-0 Vicryl suture 2nd MPJ the extensor digitorum longus tendon reattached in a lengthened position with 4-0 Vicryl suture.  Subcu closed with 3-0 and 4-0 Vicryl skin closed with running subcuticular 4-0 Vicryl suture.  Interspace injected with cc Decadron phosphate foot injected with Exparel and 0.5% plain Marcaine.  Incision prepped with Mastisol Steri-Strips applied.  4x4s Cherie applied with the toe in a rectus position.  Tourniquet deflated vascular status of digits were intact.  Ace wrap applied x-rays taken on the table.  Cam Walker boot cast applied.  Anesthesia reversed patient left the operating room with stable vitals.      1. Keep dressings, clean, dry, and intact to surgical extremity.  2. Rest, ice, and elevate the surgical extremity.  3. Cam Walker boot cast to surgical extremity in operating room  4. Take all medication as discussed at discharge.  Keflex 500, Norco 7.5, Phenergan 25  5. Contact the clinic with any postoperative concerns or complications.  6. Follow up in one week for 1st post op.

## 2022-12-21 NOTE — ANESTHESIA POSTPROCEDURE EVALUATION
Anesthesia Post Evaluation    Patient: Jerry Coronado    Procedure(s) Performed: Procedure(s) (LRB):  OSTEOTOMY, METATARSAL BONE, RELEASE ENTRAPPED NERVE 2ND INTERMETATARSAL SPACE (Left)    Final Anesthesia Type: general      Patient location during evaluation: PACU  Patient participation: Yes- Able to Participate  Level of consciousness: awake and alert  Post-procedure vital signs: reviewed and stable  Pain management: adequate  Airway patency: patent    PONV status at discharge: No PONV  Anesthetic complications: no      Cardiovascular status: blood pressure returned to baseline and stable  Respiratory status: unassisted and room air  Hydration status: euvolemic  Follow-up not needed.          Vitals Value Taken Time   /98 12/21/22 0901   Temp 36.7 °C (98 °F) 12/21/22 0830   Pulse 86 12/21/22 0912   Resp 16 12/21/22 0912   SpO2 99 % 12/21/22 0912   Vitals shown include unvalidated device data.      No case tracking events are documented in the log.      Pain/Grisel Score: Pain Rating Prior to Med Admin: 5 (12/21/2022  8:54 AM)  Grisel Score: 10 (12/21/2022  8:45 AM)

## 2022-12-21 NOTE — ANESTHESIA PREPROCEDURE EVALUATION
12/21/2022  Jerry Coronado is a 37 y.o., female.      Pre-op Assessment    I have reviewed the Patient Summary Reports.     I have reviewed the Nursing Notes. I have reviewed the NPO Status.   I have reviewed the Medications.     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  However, upon questioning, she describes severe throat irritation after intubation and says she was coughing up chunks of her trachea.   She also reports that she was told by anesthesia that they had trouble seating her LMA recently. Denies Family Hx of Anesthesia complications.  Personal Hx of Anesthesia complications  Severe Sore Throat after Anesthesia   Social:  Former Smoker History of alcohol abuse   Hematology/Oncology:         -- Cancer in past history ( non-Hodgkin's lymphoma, in remission. PET scan negative in June 2022):  chemotherapy    EENT/Dental:   chronic allergic rhinitis   Cardiovascular:   Dysrhythmias (History of SVT, resolved since ablation 2016)    Pulmonary:   Sleep Apnea (High risk per stop Bang score but sleep study was inconclusive. Patient has not gotten around to repeat sleep study.)    Education provided regarding risk of obstructive sleep apnea     Musculoskeletal:   Patient has TMJ but noncompliant with treatment. She has frequent popping and occasional painful flare ups of her left TMJ, last time recently.   Neurological:   Patient gets occasional flare-ups of post herpetic neuralgia along her left waistline.  Chronic Pain Syndrome ( patient takes occasional oxycodone for knee pain and foot pain.)  Peripheral Neuropathy:  carpal tunnel syndrome.    Endocrine:   Hypothyroidism  Obesity / BMI > 30  Psych:   anxiety depression          Physical Exam  General: Well nourished, Cooperative, Alert and Oriented    Airway:  Mallampati: III    Mouth Opening: Normal  TM Distance: > 6 cm  Tongue: Normal  Neck ROM: Normal ROM    Dental:  Intact    Chest/Lungs:  Clear to auscultation, Normal Respiratory Rate    Heart:  Rate: Normal  Rhythm: Regular Rhythm  Sounds: Normal        Anesthesia Plan  Type of Anesthesia, risks & benefits discussed:    Anesthesia Type: Gen Supraglottic Airway  Intra-op Monitoring Plan: Standard ASA Monitors  Post Op Pain Control Plan: multimodal analgesia  Induction:  IV  Airway Plan: , Post-Induction  Informed Consent: Informed consent signed with the Patient and all parties understand the risks and agree with anesthesia plan.  All questions answered.   ASA Score: 3  Anesthesia Plan Notes: LMA.  If intubation required, use small endotracheal tube, 7.0 or smaller, per patient request.  Sleep apnea precautions: Minimize Versed and opioids, extubate awake and sitting up  Multimodal analgesia:  IV acetaminophen, Decadron 8 mg, local per surgeon  Antiemetics:  Zofran, Pepcid  Sugammadex if intubated and paralyzed.        Ready For Surgery From Anesthesia Perspective.     .

## 2022-12-22 ENCOUNTER — PATIENT MESSAGE (OUTPATIENT)
Dept: PODIATRY | Facility: CLINIC | Age: 37
End: 2022-12-22
Payer: COMMERCIAL

## 2022-12-27 ENCOUNTER — OFFICE VISIT (OUTPATIENT)
Dept: PODIATRY | Facility: CLINIC | Age: 37
End: 2022-12-27
Payer: COMMERCIAL

## 2022-12-27 ENCOUNTER — CLINICAL SUPPORT (OUTPATIENT)
Dept: REHABILITATION | Facility: HOSPITAL | Age: 37
End: 2022-12-27
Payer: COMMERCIAL

## 2022-12-27 VITALS — WEIGHT: 215 LBS | BODY MASS INDEX: 40.59 KG/M2 | HEIGHT: 61 IN

## 2022-12-27 DIAGNOSIS — D36.10 NEUROMA: ICD-10-CM

## 2022-12-27 DIAGNOSIS — G57.80 ENTRAPMENT, ILIOINGUINAL NERVE, UNSPECIFIED LATERALITY: ICD-10-CM

## 2022-12-27 DIAGNOSIS — S93.622D SPRAIN OF TARSOMETATARSAL LIGAMENT OF LEFT FOOT, SUBSEQUENT ENCOUNTER: Primary | ICD-10-CM

## 2022-12-27 DIAGNOSIS — M25.661 DECREASED RANGE OF MOTION (ROM) OF RIGHT KNEE: Primary | ICD-10-CM

## 2022-12-27 PROCEDURE — 1160F PR REVIEW ALL MEDS BY PRESCRIBER/CLIN PHARMACIST DOCUMENTED: ICD-10-PCS | Mod: CPTII,S$GLB,, | Performed by: PODIATRIST

## 2022-12-27 PROCEDURE — 3008F BODY MASS INDEX DOCD: CPT | Mod: CPTII,S$GLB,, | Performed by: PODIATRIST

## 2022-12-27 PROCEDURE — 1160F RVW MEDS BY RX/DR IN RCRD: CPT | Mod: CPTII,S$GLB,, | Performed by: PODIATRIST

## 2022-12-27 PROCEDURE — 1159F MED LIST DOCD IN RCRD: CPT | Mod: CPTII,S$GLB,, | Performed by: PODIATRIST

## 2022-12-27 PROCEDURE — 99024 PR POST-OP FOLLOW-UP VISIT: ICD-10-PCS | Mod: S$GLB,,, | Performed by: PODIATRIST

## 2022-12-27 PROCEDURE — 97110 THERAPEUTIC EXERCISES: CPT | Mod: PN

## 2022-12-27 PROCEDURE — 99024 POSTOP FOLLOW-UP VISIT: CPT | Mod: S$GLB,,, | Performed by: PODIATRIST

## 2022-12-27 PROCEDURE — 3008F PR BODY MASS INDEX (BMI) DOCUMENTED: ICD-10-PCS | Mod: CPTII,S$GLB,, | Performed by: PODIATRIST

## 2022-12-27 PROCEDURE — 1159F PR MEDICATION LIST DOCUMENTED IN MEDICAL RECORD: ICD-10-PCS | Mod: CPTII,S$GLB,, | Performed by: PODIATRIST

## 2022-12-27 RX ORDER — HYDROCODONE BITARTRATE AND ACETAMINOPHEN 7.5; 325 MG/1; MG/1
1 TABLET ORAL EVERY 6 HOURS PRN
Qty: 25 TABLET | Refills: 0 | Status: SHIPPED | OUTPATIENT
Start: 2022-12-27 | End: 2023-01-03

## 2022-12-27 RX ORDER — ONDANSETRON HYDROCHLORIDE 8 MG/1
8 TABLET, FILM COATED ORAL
COMMUNITY
Start: 2022-12-24 | End: 2023-04-12

## 2022-12-27 NOTE — PROGRESS NOTES
"  [address] [place] [address]  Phone: [phone]   [phone]    Patient's PCP:Severo Saenz MD  Referring Provider:No ref. provider found    Subjective:      Chief Complaint: Post-op Evaluation    Date of Surgery: 12/21/2022  Procedure:  1. Elevating osteotomy 2nd metatarsal head left foot with 2.0 mm screw fixation tendon lengthening extensor digitorum longus capsulotomy 2nd MPJ left foot  2. Release of entrapped nerve 2nd intermetatarsal space left foot  3. Percutaneous flexor tenotomy PIPJ 2nd toe left foot    HPI:   Jerry Coronado is a 37 y.o. female who returns to the clinic today for her post-operative visit. Jerry Coronado rates pain a 4/10 on a pain scale. Compliance of Care:  in boot cast with dressing intact and on knee scooter.    Vitals:    12/27/22 1653   Weight: 97.5 kg (215 lb)   Height: 5' 1" (1.549 m)   PainSc:   4        Past Surgical History:   Procedure Laterality Date    ABLATION OF DYSRHYTHMIC FOCUS  2016    x2    BIOPSY OF CERVICAL LYMPH NODE Left 11/29/2021    Procedure: BIOPSY, LYMPH NODE, CERVICAL;  Surgeon: Blanco Kerns MD;  Location: [facility] OR;  Service: General;  Laterality: Left;    CERVIX LESION DESTRUCTION  11/2021    CONIZATION OF CERVIX USING LOOP ELECTROSURGICAL EXCISION PROCEDURE (LEEP) N/A 07/21/2021    Procedure: LEEP CONIZATION, CERVIX;  Surgeon: Donna Castillo MD;  Location: [facility] OR;  Service: OB/GYN;  Laterality: N/A;    EXCISION OF LESION OF NOSE N/A 10/14/2019    Procedure: EXCISION, LESION, NOSE;  Surgeon: Umesh Morales MD;  Location: [facility] OR;  Service: ENT;  Laterality: N/A;    FUNCTIONAL ENDOSCOPIC SINUS SURGERY (FESS) N/A 10/14/2019    Procedure: FESS (FUNCTIONAL ENDOSCOPIC SINUS SURGERY);  Surgeon: Umesh Morales MD;  Location: [facility] OR;  Service: ENT;  Laterality: N/A;    HYSTEROSCOPY WITH DILATION AND CURETTAGE OF UTERUS N/A 07/21/2021    Procedure: HYSTEROSCOPY, WITH DILATION AND CURETTAGE OF UTERUS;  Surgeon: " Donna Castillo MD;  Location: [facility];  Service: OB/GYN;  Laterality: N/A;    MEDIPORT REMOVAL Right 06/20/2022    Procedure: REMOVAL, CATHETER, CENTRAL VENOUS, TUNNELED, WITH PORT;  Surgeon: Blanco Kerns MD;  Location: [facility] OR;  Service: General;  Laterality: Right;    NASAL SEPTOPLASTY N/A 10/14/2019    Procedure: SEPTOPLASTY, NOSE;  Surgeon: Umesh Morales MD;  Location: [facility]  Service: ENT;  Laterality: N/A;    NASAL SINUS SURGERY  10/14/2019    OSTEOTOMY OF METATARSAL BONE Left 12/21/2022    Procedure: OSTEOTOMY, METATARSAL BONE;  Surgeon: Hola Escalante DPM;  Location: [facility] OR;  Service: Podiatry;  Laterality: Left;  synthes janes notified 12/19 TW  , RELEASE ENTRAPPED NERVE 2ND INTERMETATARSAL SPACE    PILONIDAL CYST DRAINAGE      REVISION OF SCAR  06/20/2022    Procedure: REVISION, SCAR;  Surgeon: Blanco Kerns MD;  Location: [facility] OR;  Service: General;;    SKIN TAG REMOVAL Left 06/20/2022    Procedure: REMOVAL, SKIN TAG;  Surgeon: Blanco Kerns MD;  Location: [facility] OR;  Service: General;  Laterality: Left;    TONSILLECTOMY      TYMPANOPLASTY N/A 10/14/2019    Procedure: TYMPANOPLASTY;  Surgeon: Umesh Morales MD;  Location: [facility]  Service: ENT;  Laterality: N/A;    TYMPANOTOMY Left 10/14/2019    left     Past Medical History:   Diagnosis Date    Allergic rhinitis     Anxiety     Cancer 12/01/2021    B-cell folicular lymphoma    Depression     Hypothyroidism     SVT (supraventricular tachycardia) 2011     Family History   Problem Relation Age of Onset    Hypertension Mother     Heart disease Father 59        CABG    Heart failure Father     Breast cancer Maternal Aunt 55    Colon cancer Neg Hx     Ovarian cancer Neg Hx         Social History:   Marital Status: Single  Alcohol History:  reports current alcohol use of about 5.0 standard drinks per week.  Tobacco History:  reports that she quit smoking about 13 years ago. Her smoking use included cigarettes. She started smoking about  22 years ago. She has a 4.50 pack-year smoking history. She has never used smokeless tobacco.  Drug History:  reports no history of drug use.    Review of patient's allergies indicates:  No Known Allergies    Current Outpatient Medications   Medication Sig Dispense Refill    busPIRone (BUSPAR) 10 MG tablet Take 10 mg by mouth 2 (two) times daily.      cephALEXin (KEFLEX) 500 MG capsule Take 1 capsule (500 mg total) by mouth every 12 (twelve) hours. 14 capsule 0    doxycycline (MONODOX) 100 MG capsule 1 capsule twice daily by mouth (Patient taking differently: Take 100 mg by mouth every 12 (twelve) hours. 1 capsule twice daily by mouth) 60 capsule 11    dronabinoL (MARINOL) 5 MG capsule Take 1 capsule (5 mg total) by mouth 2 (two) times daily as needed (decreased appetite). 60 capsule 0    fluticasone propionate (FLONASE) 50 mcg/actuation nasal spray 2 sprays (100 mcg total) by Each Nostril route once daily. (Patient taking differently: 2 sprays by Each Nostril route 2 (two) times a day.) 11.1 mL 5    gabapentin (NEURONTIN) 600 MG tablet Take 600 mg by mouth 3 (three) times daily as needed.      hydrOXYzine pamoate (VISTARIL) 50 MG Cap Take 1 capsule (50 mg total) by mouth 2 (two) times daily as needed. 60 capsule 2    levothyroxine (SYNTHROID) 50 MCG tablet Take 1 tablet by mouth once daily (Patient taking differently: Take 50 mcg by mouth before breakfast.) 30 tablet 5    mirtazapine (REMERON) 7.5 MG Tab Take 7.5 mg by mouth every evening.      multivitamin capsule Take 1 capsule by mouth.      naproxen (NAPROSYN) 500 MG tablet TAKE 1 TABLET BY MOUTH TWICE A DAY 60 tablet 1    ondansetron (ZOFRAN) 8 MG tablet Take 8 mg by mouth.      ondansetron (ZOFRAN-ODT) 8 MG TbDL Take 1 tablet (8 mg total) by mouth every 8 (eight) hours as needed (chemotherapy-induced nausea and vomiting). 40 tablet 5    prazosin (MINIPRESS) 1 MG Cap Take 1 mg by mouth every evening.      promethazine (PHENERGAN) 25 MG tablet Take 1 tablet (25  mg total) by mouth every 8 (eight) hours as needed for Nausea. 15 tablet 0    tiZANidine (ZANAFLEX) 4 MG tablet Take 4 mg by mouth 3 (three) times daily as needed.      ALPRAZolam (XANAX) 1 MG tablet Take 1 tablet (1 mg total) by mouth 2 (two) times daily as needed for Anxiety. 30 tablet 0    HYDROcodone-acetaminophen (NORCO) 7.5-325 mg per tablet Take 1 tablet by mouth every 6 (six) hours as needed for Pain. 25 tablet 0    sertraline (ZOLOFT) 100 MG tablet Take 1 tablet (100 mg total) by mouth once daily. 30 tablet 1     No current facility-administered medications for this visit.       Review of Systems   Constitutional:  Negative for chills, fatigue, fever and unexpected weight change.   HENT:  Negative for hearing loss and trouble swallowing.    Eyes:  Negative for photophobia and visual disturbance.   Respiratory:  Negative for cough, shortness of breath and wheezing.    Cardiovascular:  Negative for chest pain, palpitations and leg swelling.   Gastrointestinal:  Negative for abdominal pain and nausea.   Genitourinary:  Negative for dysuria and frequency.   Musculoskeletal:  Positive for gait problem. Negative for arthralgias, back pain, joint swelling and myalgias.   Skin:  Negative for rash and wound.   Neurological:  Negative for tremors, seizures, weakness, numbness and headaches.   Hematological:  Does not bruise/bleed easily.       Objective:        Post-op surgery of the 1.  Elevating osteotomy 2nd metatarsal left with 2.0 screw fixation 2.  Release of entrapped nerve 2nd interspace left foot with normal healing, no signs of infection or dehiscence of wound. Hardware in place. Normal post op exam today. No redness, no drainage, no increase in local temperature, no significant swelling, sutures.steri-strips are intact.     Imaging:     Physical Exam:   Foot Exam    General  General Appearance: appears stated age and healthy   Orientation: alert and oriented to person, place, and time   Affect: appropriate    Gait: antalgic   Assistance: (CAM walker boot cast left foot)        Physical Exam       Assessment:       1. Sprain of tarsometatarsal ligament of left foot, subsequent encounter - Left Foot    2. Neuroma    3. Entrapment, ilioinguinal nerve, unspecified laterality      Plan:   Sprain of tarsometatarsal ligament of left foot, subsequent encounter - Left Foot    Neuroma    Entrapment, ilioinguinal nerve, unspecified laterality  -     HYDROcodone-acetaminophen (NORCO) 7.5-325 mg per tablet; Take 1 tablet by mouth every 6 (six) hours as needed for Pain.  Dispense: 25 tablet; Refill: 0    Pt healing normally and will continue partial to non weight bearing on the left ffot with CAM walker  Return one week for steri strip removal        Procedures - None    The surgical dressing was removed showing no signs of infection, excess edema or malalignment. A new dry dressing was applied and patient was instructed to leave dressing intact until next visit or until instructed to remove.     This note was created using Dragon voice recognition software that occasionally misinterpreted phrases or words.

## 2022-12-27 NOTE — PROGRESS NOTES
Physical Therapy Treatment Note     Name: Jerry Coronado  Clinic Number: [id number]    Therapy Diagnosis:   Encounter Diagnosis   Name Primary?    Decreased range of motion (ROM) of right knee Yes     Physician: Matthew Elliott MD    Visit Date: 12/27/2022    Physician Orders: PT Eval and Treat   Medical Diagnosis from Referral: M17.11 (ICD-10-CM) - Primary osteoarthritis of right knee  Evaluation Date: 12/8/2022  Authorization Period Expiration: 12/31/2023  Plan of Care Expiration: 2/8/2022  Visit # / Visits authorized: 1/20     Time In:  300p  Time Out: 330p  Total Appointment Time (timed & untimed codes): 30 minutes     Precautions: Standard    Initial FOTO:   FOTO 1st F/U:   FOTO 2nd F/U:     Procedure:  1. Elevating osteotomy 2nd metatarsal head left foot with 2.0 mm screw fixation tendon lengthening extensor digitorum longus capsulotomy 2nd MPJ left foot  2. Release of entrapped nerve 2nd intermetatarsal space left foot  3. Percutaneous flexor tenotomy PIPJ 2nd toe left foot    Subjective     Pt reports: she is doing okay. States she had surgery last week and has been in her boot. States she has a follow up with her doctor later today.   She was compliant with home exercise program.  Response to previous treatment: felt fine  Functional change: ongoing    Pain: 0/10  Location: right knee      Objective     Jerry received therapeutic exercises to develop strength, endurance, ROM, and flexibility for 30 minutes including:                           Supine SLR 3 x 10    SL Hip Abd 3 x 10    Prone Hip Ext 3 x 10    Seated LAQ 3 x 15 5#    Seated HS curls 2 x 10 GTB    Seated Hip Abd machine 70#    Seated Hip Add machine 100# 3 x 10               Education - HEP / POC         Home Exercises Provided and Patient Education Provided     Education provided:   - HEP    Written Home Exercises Provided: yes.  Exercises were reviewed and Jerry was able to demonstrate them prior to the end of the session.  Jerry demonstrated  good  understanding of the education provided.     See EMR under Patient Instructions for exercises provided 12/27/2022.    Assessment   We are limited what we can do at this point with her current NWB status on her LLE. She sees her podiatrist today and that will help us have a better idea of how we will progress this POC moving forward.     Jerry Is progressing well towards her goals.   Pt prognosis is Good.     Pt will continue to benefit from skilled outpatient physical therapy to address the deficits listed in the problem list box on initial evaluation, provide pt/family education and to maximize pt's level of independence in the home and community environment.     Pt's spiritual, cultural and educational needs considered and pt agreeable to plan of care and goals.     Anticipated barriers to physical therapy: none    Goals:  Short Term Goals: (4 weeks)  1. Pt will be independent with HEP in order to supplement patient in improving functional mobility. - progressing, not met  2. Pt will improve (R) knee AROM to at least 0-130 pain-free in order to improve gait and ability to perform ADLs. - progressing, not met  3. Pt will report <5/10 pain at work to demonstrate improve tolerance to prolonged standing- progressing, not met     Long Term Goals: (8 weeks)  1. Pt will be independent with updated HEP supplement PT in improving functional mobility. - progressing, not met  2. Pt will improve FOTO knee survey score to </= predicted % limited in order to demo improved functional mobility. - progressing, not met  3. Pt will report <2/10 pain with work related activities - progressing, not met  4. Pt will improve hip abduction strength from 3+/5 to 4/5 to improve functional gait deviation. - progressing, not met     Plan   Plan of care Certification: 12/8/2022 to 2/8/2022.    Maged Alas, PT

## 2023-01-05 ENCOUNTER — OFFICE VISIT (OUTPATIENT)
Dept: PODIATRY | Facility: CLINIC | Age: 38
End: 2023-01-05
Payer: COMMERCIAL

## 2023-01-05 VITALS
HEART RATE: 86 BPM | HEIGHT: 61 IN | RESPIRATION RATE: 16 BRPM | OXYGEN SATURATION: 91 % | WEIGHT: 215 LBS | BODY MASS INDEX: 40.59 KG/M2

## 2023-01-05 DIAGNOSIS — S93.622D LISFRANC'S SPRAIN, LEFT, SUBSEQUENT ENCOUNTER: ICD-10-CM

## 2023-01-05 DIAGNOSIS — S93.622D SPRAIN OF TARSOMETATARSAL LIGAMENT OF LEFT FOOT, SUBSEQUENT ENCOUNTER: Primary | ICD-10-CM

## 2023-01-05 PROCEDURE — 99024 PR POST-OP FOLLOW-UP VISIT: ICD-10-PCS | Mod: S$GLB,,, | Performed by: PODIATRIST

## 2023-01-05 PROCEDURE — 3008F BODY MASS INDEX DOCD: CPT | Mod: CPTII,S$GLB,, | Performed by: PODIATRIST

## 2023-01-05 PROCEDURE — 1160F RVW MEDS BY RX/DR IN RCRD: CPT | Mod: CPTII,S$GLB,, | Performed by: PODIATRIST

## 2023-01-05 PROCEDURE — 99024 POSTOP FOLLOW-UP VISIT: CPT | Mod: S$GLB,,, | Performed by: PODIATRIST

## 2023-01-05 PROCEDURE — 1160F PR REVIEW ALL MEDS BY PRESCRIBER/CLIN PHARMACIST DOCUMENTED: ICD-10-PCS | Mod: CPTII,S$GLB,, | Performed by: PODIATRIST

## 2023-01-05 PROCEDURE — 3008F PR BODY MASS INDEX (BMI) DOCUMENTED: ICD-10-PCS | Mod: CPTII,S$GLB,, | Performed by: PODIATRIST

## 2023-01-05 PROCEDURE — 1159F MED LIST DOCD IN RCRD: CPT | Mod: CPTII,S$GLB,, | Performed by: PODIATRIST

## 2023-01-05 PROCEDURE — 1159F PR MEDICATION LIST DOCUMENTED IN MEDICAL RECORD: ICD-10-PCS | Mod: CPTII,S$GLB,, | Performed by: PODIATRIST

## 2023-01-05 RX ORDER — HYDROCODONE BITARTRATE AND ACETAMINOPHEN 7.5; 325 MG/1; MG/1
1 TABLET ORAL EVERY 6 HOURS PRN
Qty: 25 TABLET | Refills: 0 | Status: SHIPPED | OUTPATIENT
Start: 2023-01-05 | End: 2023-01-12

## 2023-01-05 NOTE — PROGRESS NOTES
"  [address] [facility] [address] [place] [address]  Phone: [phone]   [phone]    Patient's PCP:Severo Saenz MD  Referring Provider:No ref. provider found    Subjective:      Chief Complaint: Follow-up (12/21/2022, Shortening osteotomy 2nd metatarsal left, release entrapped nerve 2nd IS left)      Systemic Doctor:   Date Last Seen:   Blood Sugar:   Hemoglobin A1c:     Date of Surgery: 12/21/2022  Procedure: shortening osteotomy 2nd metatarsal left, release entrapped nerve 2nd IS left    HPI:   Jerry Coronado is a 37 y.o. female who returns to the clinic today for her post-operative visit. Jerry Coronado rates pain a 6/10 on a pain scale. Compliance of Care:  patient comes in boot cast on scooter, ace warp and bandages.     Vitals:    01/05/23 1404   Pulse: 86   Resp: 16   SpO2: (!) 91%   Weight: 97.5 kg (215 lb)   Height: 5' 1" (1.549 m)   PainSc:   6        Past Surgical History:   Procedure Laterality Date    ABLATION OF DYSRHYTHMIC FOCUS  2016    x2    BIOPSY OF CERVICAL LYMPH NODE Left 11/29/2021    Procedure: BIOPSY, LYMPH NODE, CERVICAL;  Surgeon: Blanco Kerns MD;  Location: [facility] OR;  Service: General;  Laterality: Left;    CERVIX LESION DESTRUCTION  11/2021    CONIZATION OF CERVIX USING LOOP ELECTROSURGICAL EXCISION PROCEDURE (LEEP) N/A 07/21/2021    Procedure: LEEP CONIZATION, CERVIX;  Surgeon: Donna Castillo MD;  [facility];  Service: OB/GYN;  Laterality: N/A;    EXCISION OF LESION OF NOSE N/A 10/14/2019    Procedure: EXCISION, LESION, NOSE;  Surgeon: Umesh Morales MD;  Location: [facility] OR;  Service: ENT;  Laterality: N/A;    FUNCTIONAL ENDOSCOPIC SINUS SURGERY (FESS) N/A 10/14/2019    Procedure: FESS (FUNCTIONAL ENDOSCOPIC SINUS SURGERY);  Surgeon: Umesh Morales MD;  Location: [facility] OR;  Service: ENT;  Laterality: N/A;    HYSTEROSCOPY WITH DILATION AND CURETTAGE OF UTERUS N/A 07/21/2021    Procedure: HYSTEROSCOPY, WITH DILATION AND CURETTAGE OF UTERUS;  Surgeon: " Donna Castillo MD;  Location: [facility] OR;  Service: OB/GYN;  Laterality: N/A;    MEDIPORT REMOVAL Right 06/20/2022    Procedure: REMOVAL, CATHETER, CENTRAL VENOUS, TUNNELED, WITH PORT;  Surgeon: Blanco Kerns MD;  Location: [facility] OR;  Service: General;  Laterality: Right;    NASAL SEPTOPLASTY N/A 10/14/2019    Procedure: SEPTOPLASTY, NOSE;  Surgeon: Umesh Morales MD;  Location: [facility];  Service: ENT;  Laterality: N/A;    NASAL SINUS SURGERY  10/14/2019    OSTEOTOMY OF METATARSAL BONE Left 12/21/2022    Procedure: OSTEOTOMY, METATARSAL BONE;  Surgeon: Hola Escalante DPM;  Location: [facility] OR;  Service: Podiatry;  Laterality: Left;  synthes janes notified 12/19 TW  , RELEASE ENTRAPPED NERVE 2ND INTERMETATARSAL SPACE    PILONIDAL CYST DRAINAGE      REVISION OF SCAR  06/20/2022    Procedure: REVISION, SCAR;  Surgeon: Blanco Kerns MD;  Location: [facility] OR;  Service: General;;    SKIN TAG REMOVAL Left 06/20/2022    Procedure: REMOVAL, SKIN TAG;  Surgeon: Blanco Kerns MD;  Location: [facility] OR;  Service: General;  Laterality: Left;    TONSILLECTOMY      TYMPANOPLASTY N/A 10/14/2019    Procedure: TYMPANOPLASTY;  Surgeon: Umesh Morales MD;  Location: [facility];  Service: ENT;  Laterality: N/A;    TYMPANOTOMY Left 10/14/2019    left     Past Medical History:   Diagnosis Date    Allergic rhinitis     Anxiety     Cancer 12/01/2021    B-cell folicular lymphoma    Depression     Hypothyroidism     SVT (supraventricular tachycardia) 2011     Family History   Problem Relation Age of Onset    Hypertension Mother     Heart disease Father 59        CABG    Heart failure Father     Breast cancer Maternal Aunt 55    Colon cancer Neg Hx     Ovarian cancer Neg Hx         Social History:   Marital Status: Single  Alcohol History:  reports current alcohol use of about 5.0 standard drinks per week.  Tobacco History:  reports that she quit smoking about 13 years ago. Her smoking use included cigarettes. She started smoking about  22 years ago. She has a 4.50 pack-year smoking history. She has never used smokeless tobacco.  Drug History:  reports no history of drug use.    Review of patient's allergies indicates:  No Known Allergies    Current Outpatient Medications   Medication Sig Dispense Refill    ALPRAZolam (XANAX) 1 MG tablet Take 1 tablet (1 mg total) by mouth 2 (two) times daily as needed for Anxiety. 30 tablet 0    busPIRone (BUSPAR) 10 MG tablet Take 10 mg by mouth 2 (two) times daily.      cephALEXin (KEFLEX) 500 MG capsule Take 1 capsule (500 mg total) by mouth every 12 (twelve) hours. 14 capsule 0    doxycycline (MONODOX) 100 MG capsule 1 capsule twice daily by mouth (Patient taking differently: Take 100 mg by mouth every 12 (twelve) hours. 1 capsule twice daily by mouth) 60 capsule 11    dronabinoL (MARINOL) 5 MG capsule Take 1 capsule (5 mg total) by mouth 2 (two) times daily as needed (decreased appetite). 60 capsule 0    fluticasone propionate (FLONASE) 50 mcg/actuation nasal spray 2 sprays (100 mcg total) by Each Nostril route once daily. (Patient taking differently: 2 sprays by Each Nostril route 2 (two) times a day.) 11.1 mL 5    gabapentin (NEURONTIN) 600 MG tablet Take 600 mg by mouth 3 (three) times daily as needed.      hydrOXYzine pamoate (VISTARIL) 50 MG Cap Take 1 capsule (50 mg total) by mouth 2 (two) times daily as needed. 60 capsule 2    levothyroxine (SYNTHROID) 50 MCG tablet Take 1 tablet by mouth once daily (Patient taking differently: Take 50 mcg by mouth before breakfast.) 30 tablet 5    mirtazapine (REMERON) 7.5 MG Tab Take 7.5 mg by mouth every evening.      multivitamin capsule Take 1 capsule by mouth.      naproxen (NAPROSYN) 500 MG tablet TAKE 1 TABLET BY MOUTH TWICE A DAY 60 tablet 1    ondansetron (ZOFRAN) 8 MG tablet Take 8 mg by mouth.      ondansetron (ZOFRAN-ODT) 8 MG TbDL Take 1 tablet (8 mg total) by mouth every 8 (eight) hours as needed (chemotherapy-induced nausea and vomiting). 40 tablet 5     prazosin (MINIPRESS) 1 MG Cap Take 1 mg by mouth every evening.      promethazine (PHENERGAN) 25 MG tablet Take 1 tablet (25 mg total) by mouth every 8 (eight) hours as needed for Nausea. 15 tablet 0    sertraline (ZOLOFT) 100 MG tablet Take 1 tablet (100 mg total) by mouth once daily. 30 tablet 1    tiZANidine (ZANAFLEX) 4 MG tablet Take 4 mg by mouth 3 (three) times daily as needed.       No current facility-administered medications for this visit.       Review of Systems   Constitutional:  Negative for chills, fatigue, fever and unexpected weight change.   HENT:  Negative for hearing loss and trouble swallowing.    Eyes:  Negative for photophobia and visual disturbance.   Respiratory:  Negative for cough, shortness of breath and wheezing.    Cardiovascular:  Negative for chest pain, palpitations and leg swelling.   Gastrointestinal:  Negative for abdominal pain and nausea.   Genitourinary:  Negative for dysuria and frequency.   Musculoskeletal:  Positive for gait problem. Negative for arthralgias, back pain, joint swelling and myalgias.   Skin:  Positive for color change and wound. Negative for rash.   Neurological:  Negative for tremors, seizures, weakness, numbness and headaches.   Hematological:  Does not bruise/bleed easily.       Objective:        Post-op surgery of the 1.  Elevating osteotomy distal 2nd metatarsal with screw fixation 2. Release of entrapped nerve 2nd intermetatarsal space left foot with normal healing, no signs of infection or dehiscence of wound. Hardware in place. Normal post op exam today. No redness, no drainage, no increase in local temperature, no significant swelling, sutures.steri-strips are intact.     Imaging:     Physical Exam:   Foot Exam    General  General Appearance: appears stated age and healthy   Orientation: alert and oriented to person, place, and time   Affect: appropriate   Gait: antalgic   Assistance: (Cam Walker boot cast left lower extremity)        Physical Exam        Assessment:       1. Sprain of tarsometatarsal ligament of left foot, subsequent encounter - Left Foot    2. Lisfranc's sprain, left, subsequent encounter      Plan:   Sprain of tarsometatarsal ligament of left foot, subsequent encounter - Left Foot    Lisfranc's sprain, left, subsequent encounter    Evaluated patient she is healing normally I removed the Steri-Strips today she will use a pad between the 1st and 2nd toe to keep the 2nd toe in a rectus position at the MPJ.  Continue Cam Walker boot cast with a partial weight-bearing on heel to nonweightbearing.  Return in 4 weeks for x-rays.  May get foot wet but no barefoot.  Patient requesting refill of narcotics [place] 7.5.      Procedures - None      This note was created using Dragon voice recognition software that occasionally misinterpreted phrases or words.

## 2023-01-19 ENCOUNTER — OFFICE VISIT (OUTPATIENT)
Dept: PODIATRY | Facility: CLINIC | Age: 38
End: 2023-01-19
Payer: COMMERCIAL

## 2023-01-19 VITALS — WEIGHT: 215 LBS | HEART RATE: 95 BPM | BODY MASS INDEX: 40.59 KG/M2 | OXYGEN SATURATION: 98 % | HEIGHT: 61 IN

## 2023-01-19 DIAGNOSIS — G57.80 ENTRAPMENT, ILIOINGUINAL NERVE, UNSPECIFIED LATERALITY: ICD-10-CM

## 2023-01-19 DIAGNOSIS — M79.672 LEFT FOOT PAIN: ICD-10-CM

## 2023-01-19 DIAGNOSIS — S93.622D LISFRANC'S SPRAIN, LEFT, SUBSEQUENT ENCOUNTER: ICD-10-CM

## 2023-01-19 DIAGNOSIS — S93.622D SPRAIN OF TARSOMETATARSAL LIGAMENT OF LEFT FOOT, SUBSEQUENT ENCOUNTER: Primary | ICD-10-CM

## 2023-01-19 DIAGNOSIS — D36.10 NEUROMA: ICD-10-CM

## 2023-01-19 PROCEDURE — 3008F BODY MASS INDEX DOCD: CPT | Mod: CPTII,S$GLB,, | Performed by: PODIATRIST

## 2023-01-19 PROCEDURE — 1160F PR REVIEW ALL MEDS BY PRESCRIBER/CLIN PHARMACIST DOCUMENTED: ICD-10-PCS | Mod: CPTII,S$GLB,, | Performed by: PODIATRIST

## 2023-01-19 PROCEDURE — 1159F MED LIST DOCD IN RCRD: CPT | Mod: CPTII,S$GLB,, | Performed by: PODIATRIST

## 2023-01-19 PROCEDURE — 1160F RVW MEDS BY RX/DR IN RCRD: CPT | Mod: CPTII,S$GLB,, | Performed by: PODIATRIST

## 2023-01-19 PROCEDURE — 3008F PR BODY MASS INDEX (BMI) DOCUMENTED: ICD-10-PCS | Mod: CPTII,S$GLB,, | Performed by: PODIATRIST

## 2023-01-19 PROCEDURE — 1159F PR MEDICATION LIST DOCUMENTED IN MEDICAL RECORD: ICD-10-PCS | Mod: CPTII,S$GLB,, | Performed by: PODIATRIST

## 2023-01-19 PROCEDURE — 99024 POSTOP FOLLOW-UP VISIT: CPT | Mod: S$GLB,,, | Performed by: PODIATRIST

## 2023-01-19 PROCEDURE — 99024 PR POST-OP FOLLOW-UP VISIT: ICD-10-PCS | Mod: S$GLB,,, | Performed by: PODIATRIST

## 2023-01-19 NOTE — PROGRESS NOTES
"  [address] [facility] [address] [place] [address]  Phone: [phone]   [phone]    Patient's PCP:Severo Saenz MD  Referring Provider:No ref. provider found    Subjective:      Chief Complaint: Post-op Evaluation ( 1. Elevating osteotomy 2nd metatarsal head left foot with 2.0 mm screw fixation tendon lengthening extensor digitorum longus capsulotomy 2nd MPJ left foot/2. Release of entrapped nerve 2nd intermetatarsal space left foot/3. Percutaneous flexor tenotomy PIPJ 2nd toe left foot/ )        Date of Surgery: 12/21/2022  Procedure:  1. Elevating osteotomy 2nd metatarsal head left foot with 2.0 mm screw fixation tendon lengthening extensor digitorum longus capsulotomy 2nd MPJ left foot  2. Release of entrapped nerve 2nd intermetatarsal space left foot  3. Percutaneous flexor tenotomy PIPJ 2nd toe left foot      HPI:   Jerry Coronado is a 37 y.o. female who returns to the clinic today for her post-operative visit. Jerry Coronado rates pain a 3/10 on a pain scale. Compliance of Care:  Patient presents in ace wrap, boot cast, and knee scooter. Paiient states she feels like the 2nd toe on the left foot has dropped and wants to make sure it is still in place.       Vitals:    01/19/23 1148   Pulse: 95   SpO2: 98%   Weight: 97.5 kg (215 lb)   Height: 5' 1" (1.549 m)   PainSc:   3        Past Surgical History:   Procedure Laterality Date    ABLATION OF DYSRHYTHMIC FOCUS  2016    x2    BIOPSY OF CERVICAL LYMPH NODE Left 11/29/2021    Procedure: BIOPSY, LYMPH NODE, CERVICAL;  Surgeon: Blanco Kerns MD;  Location: [facility] OR;  Service: General;  Laterality: Left;    CERVIX LESION DESTRUCTION  11/2021    CONIZATION OF CERVIX USING LOOP ELECTROSURGICAL EXCISION PROCEDURE (LEEP) N/A 07/21/2021    Procedure: LEEP CONIZATION, CERVIX;  Surgeon: Donna Castillo MD;  Location: [facility] OR;  Service: OB/GYN;  Laterality: N/A;    EXCISION OF LESION OF NOSE N/A 10/14/2019    Procedure: EXCISION, LESION, NOSE; "  Surgeon: Umesh Morales MD;  Location: [facility] OR;  Service: ENT;  Laterality: N/A;    FUNCTIONAL ENDOSCOPIC SINUS SURGERY (FESS) N/A 10/14/2019    Procedure: FESS (FUNCTIONAL ENDOSCOPIC SINUS SURGERY);  Surgeon: Umesh Morales MD;  Location: [facility] OR;  Service: ENT;  Laterality: N/A;    HYSTEROSCOPY WITH DILATION AND CURETTAGE OF UTERUS N/A 07/21/2021    Procedure: HYSTEROSCOPY, WITH DILATION AND CURETTAGE OF UTERUS;  Surgeon: Donna Castillo MD;  Location: [facility] OR;  Service: OB/GYN;  Laterality: N/A;    MEDIPORT REMOVAL Right 06/20/2022    Procedure: REMOVAL, CATHETER, CENTRAL VENOUS, TUNNELED, WITH PORT;  Surgeon: Blanco Kerns MD;  Location: [facility] OR;  Service: General;  Laterality: Right;    NASAL SEPTOPLASTY N/A 10/14/2019    Procedure: SEPTOPLASTY, NOSE;  Surgeon: Umesh Morales MD;  Location: [facility] OR;  Service: ENT;  Laterality: N/A;    NASAL SINUS SURGERY  10/14/2019    OSTEOTOMY OF METATARSAL BONE Left 12/21/2022    Procedure: OSTEOTOMY, METATARSAL BONE;  Surgeon: Hola Escalante DPM;  Location: [facility] OR;  Service: Podiatry;  Laterality: Left;  synthes janes notified 12/19 TW  , RELEASE ENTRAPPED NERVE 2ND INTERMETATARSAL SPACE    PILONIDAL CYST DRAINAGE      REVISION OF SCAR  06/20/2022    Procedure: REVISION, SCAR;  Surgeon: Blanco Kerns MD;  Location: [facility] OR;  Service: General;;    SKIN TAG REMOVAL Left 06/20/2022    Procedure: REMOVAL, SKIN TAG;  Surgeon: Blanco Kerns MD;  Location: [facility] OR;  Service: General;  Laterality: Left;    TONSILLECTOMY      TYMPANOPLASTY N/A 10/14/2019    Procedure: TYMPANOPLASTY;  Surgeon: Umesh Morales MD;  Location: [facility];  Service: ENT;  Laterality: N/A;    TYMPANOTOMY Left 10/14/2019    left     Past Medical History:   Diagnosis Date    Allergic rhinitis     Anxiety     Cancer 12/01/2021    B-cell folicular lymphoma    Depression     Hypothyroidism     SVT (supraventricular tachycardia) 2011     Family History   Problem Relation Age of Onset     Hypertension Mother     Heart disease Father 59        CABG    Heart failure Father     Breast cancer Maternal Aunt 55    Colon cancer Neg Hx     Ovarian cancer Neg Hx         Social History:   Marital Status: Single  Alcohol History:  reports current alcohol use of about 5.0 standard drinks per week.  Tobacco History:  reports that she quit smoking about 13 years ago. Her smoking use included cigarettes. She started smoking about 22 years ago. She has a 4.50 pack-year smoking history. She has never used smokeless tobacco.  Drug History:  reports no history of drug use.    Review of patient's allergies indicates:  No Known Allergies    Current Outpatient Medications   Medication Sig Dispense Refill    busPIRone (BUSPAR) 10 MG tablet Take 10 mg by mouth 2 (two) times daily.      cephALEXin (KEFLEX) 500 MG capsule Take 1 capsule (500 mg total) by mouth every 12 (twelve) hours. 14 capsule 0    doxycycline (MONODOX) 100 MG capsule 1 capsule twice daily by mouth (Patient taking differently: Take 100 mg by mouth every 12 (twelve) hours. 1 capsule twice daily by mouth) 60 capsule 11    dronabinoL (MARINOL) 5 MG capsule Take 1 capsule (5 mg total) by mouth 2 (two) times daily as needed (decreased appetite). 60 capsule 0    fluticasone propionate (FLONASE) 50 mcg/actuation nasal spray 2 sprays (100 mcg total) by Each Nostril route once daily. (Patient taking differently: 2 sprays by Each Nostril route 2 (two) times a day.) 11.1 mL 5    gabapentin (NEURONTIN) 600 MG tablet Take 600 mg by mouth 3 (three) times daily as needed.      hydrOXYzine pamoate (VISTARIL) 50 MG Cap Take 1 capsule (50 mg total) by mouth 2 (two) times daily as needed. 60 capsule 2    levothyroxine (SYNTHROID) 50 MCG tablet Take 1 tablet by mouth once daily (Patient taking differently: Take 50 mcg by mouth before breakfast.) 30 tablet 5    mirtazapine (REMERON) 7.5 MG Tab Take 7.5 mg by mouth every evening.      multivitamin capsule Take 1 capsule by  mouth.      naproxen (NAPROSYN) 500 MG tablet TAKE 1 TABLET BY MOUTH TWICE A DAY 60 tablet 1    ondansetron (ZOFRAN) 8 MG tablet Take 8 mg by mouth.      ondansetron (ZOFRAN-ODT) 8 MG TbDL Take 1 tablet (8 mg total) by mouth every 8 (eight) hours as needed (chemotherapy-induced nausea and vomiting). 40 tablet 5    prazosin (MINIPRESS) 1 MG Cap Take 1 mg by mouth every evening.      promethazine (PHENERGAN) 25 MG tablet Take 1 tablet (25 mg total) by mouth every 8 (eight) hours as needed for Nausea. 15 tablet 0    tiZANidine (ZANAFLEX) 4 MG tablet Take 4 mg by mouth 3 (three) times daily as needed.      ALPRAZolam (XANAX) 1 MG tablet Take 1 tablet (1 mg total) by mouth 2 (two) times daily as needed for Anxiety. 30 tablet 0    sertraline (ZOLOFT) 100 MG tablet Take 1 tablet (100 mg total) by mouth once daily. 30 tablet 1     No current facility-administered medications for this visit.       Review of Systems   Constitutional:  Negative for chills, fatigue, fever and unexpected weight change.   HENT:  Negative for hearing loss and trouble swallowing.    Eyes:  Negative for photophobia and visual disturbance.   Respiratory:  Negative for cough, shortness of breath and wheezing.    Cardiovascular:  Negative for chest pain, palpitations and leg swelling.   Gastrointestinal:  Negative for abdominal pain and nausea.   Genitourinary:  Negative for dysuria and frequency.   Musculoskeletal:  Negative for arthralgias, back pain, gait problem, joint swelling and myalgias.   Skin:  Positive for color change. Negative for rash and wound.   Neurological:  Negative for tremors, seizures, weakness, numbness and headaches.   Hematological:  Does not bruise/bleed easily.       Objective:        Post-op surgery of the 1. Weil osteotomy 2nd metatarsal with screw fixation and tendon lengthening 2nd MPJ 2. Release of entrapped nerve 2nd intermetatarsal space left foot with normal healing, no signs of infection or dehiscence of wound.  Hardware in place. Normal post op exam today. No redness, no drainage, no increase in local temperature, no significant swelling, sutures.steri-strips are intact.     Imaging:     Physical Exam:   Foot Exam    General  General Appearance: appears stated age and healthy   Orientation: alert and oriented to person, place, and time   Affect: appropriate   Gait: antalgic   Assistance: (Cam Walker boot cast left lower extremity and knee roller)        Physical Exam       Assessment:       1. Sprain of tarsometatarsal ligament of left foot, subsequent encounter - Left Foot    2. Lisfranc's sprain, left, subsequent encounter    3. Neuroma    4. Entrapment, ilioinguinal nerve, unspecified laterality    5. Left foot pain      Plan:   Sprain of tarsometatarsal ligament of left foot, subsequent encounter - Left Foot    Lisfranc's sprain, left, subsequent encounter    Neuroma    Entrapment, ilioinguinal nerve, unspecified laterality    Left foot pain    Evaluate patient discussed with her that her foot is healing normally the 2nd toe is in good alignment with weight-bearing may slightly hangs down below the 1st and 2nd toes when she is nonweightbearing but is in normal position with weight-bearing which is most important for decreasing stress on the metatarsal head.  I told her she is to continue splint the 1st and 2nd toes with [place] with a pad between keep him in rectus position while it is healing.  She has an appointment to see me in about 2-2 and half weeks for x-rays.  Continue Cam Walker boot cast nonweightbearing.      Procedures - None      This note was created using Dragon voice recognition software that occasionally misinterpreted phrases or words.

## 2023-01-20 ENCOUNTER — PATIENT MESSAGE (OUTPATIENT)
Dept: HEMATOLOGY/ONCOLOGY | Facility: CLINIC | Age: 38
End: 2023-01-20
Payer: COMMERCIAL

## 2023-02-02 ENCOUNTER — OFFICE VISIT (OUTPATIENT)
Dept: PODIATRY | Facility: CLINIC | Age: 38
End: 2023-02-02
Payer: COMMERCIAL

## 2023-02-02 ENCOUNTER — HOSPITAL ENCOUNTER (OUTPATIENT)
Dept: RADIOLOGY | Facility: CLINIC | Age: 38
Discharge: HOME | End: 2023-02-02
Attending: PODIATRIST
Payer: COMMERCIAL

## 2023-02-02 VITALS — HEART RATE: 87 BPM | OXYGEN SATURATION: 98 % | BODY MASS INDEX: 40.59 KG/M2 | HEIGHT: 61 IN | WEIGHT: 215 LBS

## 2023-02-02 DIAGNOSIS — S93.622D LISFRANC'S SPRAIN, LEFT, SUBSEQUENT ENCOUNTER: ICD-10-CM

## 2023-02-02 DIAGNOSIS — S93.622D SPRAIN OF TARSOMETATARSAL LIGAMENT OF LEFT FOOT, SUBSEQUENT ENCOUNTER: Primary | ICD-10-CM

## 2023-02-02 PROCEDURE — 1159F PR MEDICATION LIST DOCUMENTED IN MEDICAL RECORD: ICD-10-PCS | Mod: CPTII,S$GLB,, | Performed by: PODIATRIST

## 2023-02-02 PROCEDURE — 1159F MED LIST DOCD IN RCRD: CPT | Mod: CPTII,S$GLB,, | Performed by: PODIATRIST

## 2023-02-02 PROCEDURE — 99024 PR POST-OP FOLLOW-UP VISIT: ICD-10-PCS | Mod: S$GLB,,, | Performed by: PODIATRIST

## 2023-02-02 PROCEDURE — 73630 XR FOOT COMPLETE 3 VIEW LEFT: ICD-10-PCS | Mod: LT,S$GLB,, | Performed by: RADIOLOGY

## 2023-02-02 PROCEDURE — 3008F BODY MASS INDEX DOCD: CPT | Mod: CPTII,S$GLB,, | Performed by: PODIATRIST

## 2023-02-02 PROCEDURE — 3008F PR BODY MASS INDEX (BMI) DOCUMENTED: ICD-10-PCS | Mod: CPTII,S$GLB,, | Performed by: PODIATRIST

## 2023-02-02 PROCEDURE — 1160F RVW MEDS BY RX/DR IN RCRD: CPT | Mod: CPTII,S$GLB,, | Performed by: PODIATRIST

## 2023-02-02 PROCEDURE — 73630 X-RAY EXAM OF FOOT: CPT | Mod: LT,S$GLB,, | Performed by: RADIOLOGY

## 2023-02-02 PROCEDURE — 1160F PR REVIEW ALL MEDS BY PRESCRIBER/CLIN PHARMACIST DOCUMENTED: ICD-10-PCS | Mod: CPTII,S$GLB,, | Performed by: PODIATRIST

## 2023-02-02 PROCEDURE — 99024 POSTOP FOLLOW-UP VISIT: CPT | Mod: S$GLB,,, | Performed by: PODIATRIST

## 2023-02-02 NOTE — PROGRESS NOTES
"  [address] [facility] [address] [place] [address]  Phone: [phone]   [phone]    Patient's PCP:Severo Saenz MD  Referring Provider:No ref. provider found    Subjective:      Chief Complaint: postop, 12/21/2022, Shortening osteotomy 2nd metatarsal left, release entrapped nerve 2nd IS left , Foot / Ankle Injury or Pain          Date of Surgery: 12/21/2022  Procedure: Shortening osteotomy 2nd metatarsal left, release entrapped nerve 2nd IS left , Foot / Ankle Injury or Pain        HPI:   Jerry Coronado is a 37 y.o. female who returns to the clinic today for her post-operative visit. Jerry Coronado rates pain a 2/10 on a pain scale. Compliance of Care:  Patient presents today with foot wrapped and in boot.     Vitals:    02/02/23 1405   Pulse: 87   SpO2: 98%   Weight: 97.5 kg (215 lb)   Height: 5' 1" (1.549 m)   PainSc:   2        Past Surgical History:   Procedure Laterality Date    ABLATION OF DYSRHYTHMIC FOCUS  2016    x2    BIOPSY OF CERVICAL LYMPH NODE Left 11/29/2021    Procedure: BIOPSY, LYMPH NODE, CERVICAL;  Surgeon: Blanco Kerns MD;  Location: [facility] OR;  Service: General;  Laterality: Left;    CERVIX LESION DESTRUCTION  11/2021    CONIZATION OF CERVIX USING LOOP ELECTROSURGICAL EXCISION PROCEDURE (LEEP) N/A 07/21/2021    Procedure: LEEP CONIZATION, CERVIX;  Surgeon: Donna Castillo MD;  Location: [facility] OR;  Service: OB/GYN;  Laterality: N/A;    EXCISION OF LESION OF NOSE N/A 10/14/2019    Procedure: EXCISION, LESION, NOSE;  Surgeon: Umesh Morales MD;  Location: [facility] OR;  Service: ENT;  Laterality: N/A;    FUNCTIONAL ENDOSCOPIC SINUS SURGERY (FESS) N/A 10/14/2019    Procedure: FESS (FUNCTIONAL ENDOSCOPIC SINUS SURGERY);  Surgeon: Umesh Morales MD;  Location: [facility] OR;  Service: ENT;  Laterality: N/A;    HYSTEROSCOPY WITH DILATION AND CURETTAGE OF UTERUS N/A 07/21/2021    Procedure: HYSTEROSCOPY, WITH DILATION AND CURETTAGE OF UTERUS;  Surgeon: Donna Castillo MD;  " Location: [facility] OR;  Service: OB/GYN;  Laterality: N/A;    MEDIPORT REMOVAL Right 06/20/2022    Procedure: REMOVAL, CATHETER, CENTRAL VENOUS, TUNNELED, WITH PORT;  Surgeon: Blanco Kerns MD;  Location: [facility] OR;  Service: General;  Laterality: Right;    NASAL SEPTOPLASTY N/A 10/14/2019    Procedure: SEPTOPLASTY, NOSE;  Surgeon: Umesh Morales MD;  Location: [facility] OR;  Service: ENT;  Laterality: N/A;    NASAL SINUS SURGERY  10/14/2019    OSTEOTOMY OF METATARSAL BONE Left 12/21/2022    Procedure: OSTEOTOMY, METATARSAL BONE;  Surgeon: Hola Escalante DPM;  Location: [facility] OR;  Service: Podiatry;  Laterality: Left;  synthes janes notified 12/19 TW  , RELEASE ENTRAPPED NERVE 2ND INTERMETATARSAL SPACE    PILONIDAL CYST DRAINAGE      REVISION OF SCAR  06/20/2022    Procedure: REVISION, SCAR;  Surgeon: Blanco Kerns MD;  Location: [facility] OR;  Service: General;;    SKIN TAG REMOVAL Left 06/20/2022    Procedure: REMOVAL, SKIN TAG;  Surgeon: Blanco Kerns MD;  Location: [facility] OR;  Service: General;  Laterality: Left;    TONSILLECTOMY      TYMPANOPLASTY N/A 10/14/2019    Procedure: TYMPANOPLASTY;  Surgeon: Umesh Morales MD;  Location: [facility];  Service: ENT;  Laterality: N/A;    TYMPANOTOMY Left 10/14/2019    left     Past Medical History:   Diagnosis Date    Allergic rhinitis     Anxiety     Cancer 12/01/2021    B-cell folicular lymphoma    Depression     Hypothyroidism     SVT (supraventricular tachycardia) 2011     Family History   Problem Relation Age of Onset    Hypertension Mother     Heart disease Father 59        CABG    Heart failure Father     Breast cancer Maternal Aunt 55    Colon cancer Neg Hx     Ovarian cancer Neg Hx         Social History:   Marital Status: Single  Alcohol History:  reports current alcohol use of about 5.0 standard drinks per week.  Tobacco History:  reports that she quit smoking about 13 years ago. Her smoking use included cigarettes. She started smoking about 22 years ago. She has a  4.50 pack-year smoking history. She has never used smokeless tobacco.  Drug History:  reports no history of drug use.    Review of patient's allergies indicates:  No Known Allergies    Current Outpatient Medications   Medication Sig Dispense Refill    busPIRone (BUSPAR) 10 MG tablet Take 10 mg by mouth 2 (two) times daily.      cephALEXin (KEFLEX) 500 MG capsule Take 1 capsule (500 mg total) by mouth every 12 (twelve) hours. 14 capsule 0    doxycycline (MONODOX) 100 MG capsule 1 capsule twice daily by mouth (Patient taking differently: Take 100 mg by mouth every 12 (twelve) hours. 1 capsule twice daily by mouth) 60 capsule 11    dronabinoL (MARINOL) 5 MG capsule Take 1 capsule (5 mg total) by mouth 2 (two) times daily as needed (decreased appetite). 60 capsule 0    fluticasone propionate (FLONASE) 50 mcg/actuation nasal spray 2 sprays (100 mcg total) by Each Nostril route once daily. (Patient taking differently: 2 sprays by Each Nostril route 2 (two) times a day.) 11.1 mL 5    gabapentin (NEURONTIN) 600 MG tablet Take 600 mg by mouth 3 (three) times daily as needed.      hydrOXYzine pamoate (VISTARIL) 50 MG Cap Take 1 capsule (50 mg total) by mouth 2 (two) times daily as needed. 60 capsule 2    levothyroxine (SYNTHROID) 50 MCG tablet Take 1 tablet by mouth once daily (Patient taking differently: Take 50 mcg by mouth before breakfast.) 30 tablet 5    mirtazapine (REMERON) 7.5 MG Tab TAKE 2 TABLETS (15 MG TOTAL) BY MOUTH NIGHTLY. 180 tablet 1    multivitamin capsule Take 1 capsule by mouth.      naproxen (NAPROSYN) 500 MG tablet TAKE 1 TABLET BY MOUTH TWICE A DAY 60 tablet 1    ondansetron (ZOFRAN) 8 MG tablet Take 8 mg by mouth.      ondansetron (ZOFRAN-ODT) 8 MG TbDL Take 1 tablet (8 mg total) by mouth every 8 (eight) hours as needed (chemotherapy-induced nausea and vomiting). 40 tablet 5    prazosin (MINIPRESS) 1 MG Cap Take 1 mg by mouth every evening.      promethazine (PHENERGAN) 25 MG tablet Take 1 tablet (25  mg total) by mouth every 8 (eight) hours as needed for Nausea. 15 tablet 0    tiZANidine (ZANAFLEX) 4 MG tablet Take 4 mg by mouth 3 (three) times daily as needed.      ALPRAZolam (XANAX) 1 MG tablet Take 1 tablet (1 mg total) by mouth 2 (two) times daily as needed for Anxiety. 30 tablet 0    sertraline (ZOLOFT) 100 MG tablet Take 1 tablet (100 mg total) by mouth once daily. 30 tablet 1     No current facility-administered medications for this visit.       Review of Systems   Constitutional:  Negative for chills, fatigue, fever and unexpected weight change.   HENT:  Negative for hearing loss and trouble swallowing.    Eyes:  Negative for photophobia and visual disturbance.   Respiratory:  Negative for cough, shortness of breath and wheezing.    Cardiovascular:  Negative for chest pain, palpitations and leg swelling.   Gastrointestinal:  Negative for abdominal pain and nausea.   Genitourinary:  Negative for dysuria and frequency.   Musculoskeletal:  Negative for arthralgias, back pain, gait problem, joint swelling and myalgias.   Skin:  Negative for rash and wound.   Neurological:  Negative for tremors, seizures, weakness, numbness and headaches.   Hematological:  Does not bruise/bleed easily.       Objective:        Post-op surgery of the 1. Shortening elevating osteotomy 2nd metatarsal left foot with 2.0 mm screw fixation 2. Release of entrapped nerve 2nd interspace left foot with normal healing, no signs of infection or dehiscence of wound. Hardware in place. Normal post op exam today. No redness, no drainage, no increase in local temperature, no significant swelling, sutures.steri-strips are intact.     Imaging:   AP, lateral, lateral oblique x-rays left foot:  The osteotomy 2nd metatarsal healing normally with no complication of hardware.    Physical Exam:   Foot Exam    General  General Appearance: appears stated age and healthy   Orientation: alert and oriented to person, place, and time   Affect: appropriate    Gait: antalgic   Assistance: (Cam Walker boot cast left lower extremity)        Physical Exam       Assessment:       1. Sprain of tarsometatarsal ligament of left foot, subsequent encounter    2. Lisfranc's sprain, left, subsequent encounter      Plan:   Sprain of tarsometatarsal ligament of left foot, subsequent encounter  -     X-Ray Foot Complete Left    Lisfranc's sprain, left, subsequent encounter  -     X-Ray Foot Complete Left    Evaluated patient she is healing normally I recommend she started weight-bearing with Cam Walker boot cast with a Spenco insert.  Work on range of motion ice elevation return in 3 weeks for x-rays for possible transition running shoe.      Procedures - None    The surgical dressing was removed showing no signs of infection, excess edema or malalignment. A new dry dressing was applied and patient was instructed to leave dressing intact until next visit or until instructed to remove.     This note was created using Dragon voice recognition software that occasionally misinterpreted phrases or words.

## 2023-02-03 ENCOUNTER — TELEPHONE (OUTPATIENT)
Dept: HEMATOLOGY/ONCOLOGY | Facility: CLINIC | Age: 38
End: 2023-02-03
Payer: COMMERCIAL

## 2023-02-08 ENCOUNTER — PATIENT MESSAGE (OUTPATIENT)
Dept: HEMATOLOGY/ONCOLOGY | Facility: CLINIC | Age: 38
End: 2023-02-08
Payer: COMMERCIAL

## 2023-02-08 DIAGNOSIS — C82.91 FOLLICULAR LYMPHOMA OF LYMPH NODES OF NECK, UNSPECIFIED FOLLICULAR LYMPHOMA TYPE: Primary | ICD-10-CM

## 2023-02-22 ENCOUNTER — PATIENT MESSAGE (OUTPATIENT)
Dept: HEMATOLOGY/ONCOLOGY | Facility: CLINIC | Age: 38
End: 2023-02-22
Payer: COMMERCIAL

## 2023-02-22 ENCOUNTER — LAB VISIT (OUTPATIENT)
Dept: LAB | Facility: HOSPITAL | Age: 38
End: 2023-02-22
Attending: INTERNAL MEDICINE
Payer: COMMERCIAL

## 2023-02-22 ENCOUNTER — HOSPITAL ENCOUNTER (OUTPATIENT)
Dept: RADIOLOGY | Facility: HOSPITAL | Age: 38
Discharge: HOME | End: 2023-02-22
Attending: INTERNAL MEDICINE
Payer: COMMERCIAL

## 2023-02-22 VITALS — BODY MASS INDEX: 41.54 KG/M2 | HEIGHT: 61 IN | WEIGHT: 220 LBS

## 2023-02-22 DIAGNOSIS — C82.91 FOLLICULAR LYMPHOMA OF LYMPH NODES OF NECK, UNSPECIFIED FOLLICULAR LYMPHOMA TYPE: ICD-10-CM

## 2023-02-22 LAB
ALBUMIN SERPL BCP-MCNC: 4.3 G/DL (ref 3.5–5.2)
ALP SERPL-CCNC: 66 U/L (ref 55–135)
ALT SERPL W/O P-5'-P-CCNC: 24 U/L (ref 10–44)
ANION GAP SERPL CALC-SCNC: 10 MMOL/L (ref 8–16)
AST SERPL-CCNC: 16 U/L (ref 10–40)
BASOPHILS # BLD AUTO: 0.09 K/UL (ref 0–0.2)
BASOPHILS NFR BLD: 1.1 % (ref 0–1.9)
BILIRUB SERPL-MCNC: 0.5 MG/DL (ref 0.1–1)
BUN SERPL-MCNC: 18 MG/DL (ref 6–20)
CALCIUM SERPL-MCNC: 9.2 MG/DL (ref 8.7–10.5)
CHLORIDE SERPL-SCNC: 106 MMOL/L (ref 95–110)
CO2 SERPL-SCNC: 23 MMOL/L (ref 23–29)
CREAT SERPL-MCNC: 0.7 MG/DL (ref 0.5–1.4)
DIFFERENTIAL METHOD: ABNORMAL
EOSINOPHIL # BLD AUTO: 0.1 K/UL (ref 0–0.5)
EOSINOPHIL NFR BLD: 1.2 % (ref 0–8)
ERYTHROCYTE [DISTWIDTH] IN BLOOD BY AUTOMATED COUNT: 13 % (ref 11.5–14.5)
EST. GFR  (NO RACE VARIABLE): >60 ML/MIN/1.73 M^2
GLUCOSE SERPL-MCNC: 83 MG/DL (ref 70–110)
GLUCOSE SERPL-MCNC: 95 MG/DL (ref 70–110)
HCT VFR BLD AUTO: 42.9 % (ref 37–48.5)
HGB BLD-MCNC: 14.3 G/DL (ref 12–16)
IMM GRANULOCYTES # BLD AUTO: 0.07 K/UL (ref 0–0.04)
IMM GRANULOCYTES NFR BLD AUTO: 0.9 % (ref 0–0.5)
LDH SERPL L TO P-CCNC: 128 U/L (ref 110–260)
LYMPHOCYTES # BLD AUTO: 0.7 K/UL (ref 1–4.8)
LYMPHOCYTES NFR BLD: 8.2 % (ref 18–48)
MCH RBC QN AUTO: 29.5 PG (ref 27–31)
MCHC RBC AUTO-ENTMCNC: 33.3 G/DL (ref 32–36)
MCV RBC AUTO: 89 FL (ref 82–98)
MONOCYTES # BLD AUTO: 0.7 K/UL (ref 0.3–1)
MONOCYTES NFR BLD: 8.9 % (ref 4–15)
NEUTROPHILS # BLD AUTO: 6.5 K/UL (ref 1.8–7.7)
NEUTROPHILS NFR BLD: 79.7 % (ref 38–73)
NRBC BLD-RTO: 0 /100 WBC
PLATELET # BLD AUTO: 308 K/UL (ref 150–450)
PMV BLD AUTO: 9 FL (ref 9.2–12.9)
POTASSIUM SERPL-SCNC: 4.3 MMOL/L (ref 3.5–5.1)
PROT SERPL-MCNC: 7 G/DL (ref 6–8.4)
RBC # BLD AUTO: 4.84 M/UL (ref 4–5.4)
SODIUM SERPL-SCNC: 139 MMOL/L (ref 136–145)
URATE SERPL-MCNC: 5 MG/DL (ref 2.4–5.7)
WBC # BLD AUTO: 8.2 K/UL (ref 3.9–12.7)

## 2023-02-22 PROCEDURE — 83615 LACTATE (LD) (LDH) ENZYME: CPT | Performed by: INTERNAL MEDICINE

## 2023-02-22 PROCEDURE — 85025 COMPLETE CBC W/AUTO DIFF WBC: CPT | Performed by: INTERNAL MEDICINE

## 2023-02-22 PROCEDURE — A9552 F18 FDG: HCPCS | Mod: PO

## 2023-02-22 PROCEDURE — 80053 COMPREHEN METABOLIC PANEL: CPT | Performed by: INTERNAL MEDICINE

## 2023-02-22 PROCEDURE — 84550 ASSAY OF BLOOD/URIC ACID: CPT | Performed by: INTERNAL MEDICINE

## 2023-02-22 PROCEDURE — 36415 COLL VENOUS BLD VENIPUNCTURE: CPT | Performed by: INTERNAL MEDICINE

## 2023-02-23 ENCOUNTER — OFFICE VISIT (OUTPATIENT)
Dept: PODIATRY | Facility: CLINIC | Age: 38
End: 2023-02-23
Payer: COMMERCIAL

## 2023-02-23 ENCOUNTER — HOSPITAL ENCOUNTER (OUTPATIENT)
Dept: RADIOLOGY | Facility: CLINIC | Age: 38
Discharge: HOME | End: 2023-02-23
Attending: PODIATRIST
Payer: COMMERCIAL

## 2023-02-23 VITALS — HEIGHT: 61 IN | BODY MASS INDEX: 41.54 KG/M2 | WEIGHT: 220 LBS

## 2023-02-23 DIAGNOSIS — S93.622D LISFRANC'S SPRAIN, LEFT, SUBSEQUENT ENCOUNTER: ICD-10-CM

## 2023-02-23 DIAGNOSIS — M79.672 LEFT FOOT PAIN: ICD-10-CM

## 2023-02-23 DIAGNOSIS — S93.622D SPRAIN OF TARSOMETATARSAL LIGAMENT OF LEFT FOOT, SUBSEQUENT ENCOUNTER: Primary | ICD-10-CM

## 2023-02-23 PROCEDURE — 3008F PR BODY MASS INDEX (BMI) DOCUMENTED: ICD-10-PCS | Mod: CPTII,S$GLB,, | Performed by: PODIATRIST

## 2023-02-23 PROCEDURE — 1159F MED LIST DOCD IN RCRD: CPT | Mod: CPTII,S$GLB,, | Performed by: PODIATRIST

## 2023-02-23 PROCEDURE — 99024 POSTOP FOLLOW-UP VISIT: CPT | Mod: S$GLB,,, | Performed by: PODIATRIST

## 2023-02-23 PROCEDURE — 73630 X-RAY EXAM OF FOOT: CPT | Mod: LT,S$GLB,, | Performed by: RADIOLOGY

## 2023-02-23 PROCEDURE — 1159F PR MEDICATION LIST DOCUMENTED IN MEDICAL RECORD: ICD-10-PCS | Mod: CPTII,S$GLB,, | Performed by: PODIATRIST

## 2023-02-23 PROCEDURE — 73630 XR FOOT COMPLETE 3 VIEW LEFT: ICD-10-PCS | Mod: LT,S$GLB,, | Performed by: RADIOLOGY

## 2023-02-23 PROCEDURE — 3008F BODY MASS INDEX DOCD: CPT | Mod: CPTII,S$GLB,, | Performed by: PODIATRIST

## 2023-02-23 PROCEDURE — 99024 PR POST-OP FOLLOW-UP VISIT: ICD-10-PCS | Mod: S$GLB,,, | Performed by: PODIATRIST

## 2023-02-23 PROCEDURE — 1160F PR REVIEW ALL MEDS BY PRESCRIBER/CLIN PHARMACIST DOCUMENTED: ICD-10-PCS | Mod: CPTII,S$GLB,, | Performed by: PODIATRIST

## 2023-02-23 PROCEDURE — 1160F RVW MEDS BY RX/DR IN RCRD: CPT | Mod: CPTII,S$GLB,, | Performed by: PODIATRIST

## 2023-02-23 NOTE — LETTER
February 23, 2023      [facility] - Podiatry  [address]  [place] [address]  Phone: [phone]  Fax: [phone]       Patient: Jerry Coronado   YOB: 1985  Date of Visit: 02/23/2023    To Whom It May Concern:    Ltaisha Coronado  was at [facility] on 02/23/2023. The patient may return to work on 2/27/2023 with no restrictions. Please allow to wear the boot cast as needed. If you have any questions or concerns, or if I can be of further assistance, please do not hesitate to contact me.    Sincerely,  Electronically Signed by: JESIKA Metcalf LPN

## 2023-02-23 NOTE — PROGRESS NOTES
"  [address] [facility] [address] [place] [address]  Phone: [phone]   [phone]    Patient's PCP:Severo Saenz MD  Referring Provider:No ref. provider found    Subjective:      Chief Complaint: Post-op Evaluation    Date of Surgery: 12/21/2022  Procedure: Shortening osteotomy 2nd metatarsal left, release entrapped nerve 2nd IS left     HPI:   Jerry Coronado is a 37 y.o. female who returns to the clinic today for her post-operative visit. Jerry Coronado rates pain a 0/10 on a pain scale. Compliance of Care:  boot cast.    Vitals:    02/23/23 1452   Weight: 99.8 kg (220 lb)   Height: 5' 1" (1.549 m)   PainSc: 0-No pain        Past Surgical History:   Procedure Laterality Date    ABLATION OF DYSRHYTHMIC FOCUS  2016    x2    BIOPSY OF CERVICAL LYMPH NODE Left 11/29/2021    Procedure: BIOPSY, LYMPH NODE, CERVICAL;  Surgeon: Blanco Kerns MD;  Location: [facility] OR;  Service: General;  Laterality: Left;    CERVIX LESION DESTRUCTION  11/2021    CONIZATION OF CERVIX USING LOOP ELECTROSURGICAL EXCISION PROCEDURE (LEEP) N/A 07/21/2021    Procedure: LEEP CONIZATION, CERVIX;  Surgeon: Donna Castillo MD;  Location: [facility] OR;  Service: OB/GYN;  Laterality: N/A;    EXCISION OF LESION OF NOSE N/A 10/14/2019    Procedure: EXCISION, LESION, NOSE;  Surgeon: Umesh Morales MD;  Location: [facility] OR;  Service: ENT;  Laterality: N/A;    FUNCTIONAL ENDOSCOPIC SINUS SURGERY (FESS) N/A 10/14/2019    Procedure: FESS (FUNCTIONAL ENDOSCOPIC SINUS SURGERY);  Surgeon: Umesh Morales MD;  Location: [facility] OR;  Service: ENT;  Laterality: N/A;    HYSTEROSCOPY WITH DILATION AND CURETTAGE OF UTERUS N/A 07/21/2021    Procedure: HYSTEROSCOPY, WITH DILATION AND CURETTAGE OF UTERUS;  Surgeon: Donna Castillo MD;  Location: [facility] OR;  Service: OB/GYN;  Laterality: N/A;    MEDIPORT REMOVAL Right 06/20/2022    Procedure: REMOVAL, CATHETER, CENTRAL VENOUS, TUNNELED, WITH PORT;  Surgeon: Blanco Kerns MD;  Location: [facility] OR;  " Service: General;  Laterality: Right;    NASAL SEPTOPLASTY N/A 10/14/2019    Procedure: SEPTOPLASTY, NOSE;  Surgeon: Umesh Morales MD;  Location: [facility] OR;  Service: ENT;  Laterality: N/A;    NASAL SINUS SURGERY  10/14/2019    OSTEOTOMY OF METATARSAL BONE Left 12/21/2022    Procedure: OSTEOTOMY, METATARSAL BONE;  Surgeon: Hola Escalante DPM;  Location: [facility] OR;  Service: Podiatry;  Laterality: Left;  synthes janes notified 12/19 TW  , RELEASE ENTRAPPED NERVE 2ND INTERMETATARSAL SPACE    PILONIDAL CYST DRAINAGE      REVISION OF SCAR  06/20/2022    Procedure: REVISION, SCAR;  Surgeon: Blanco Kerns MD;  Location: [facility] OR;  Service: General;;    SKIN TAG REMOVAL Left 06/20/2022    Procedure: REMOVAL, SKIN TAG;  Surgeon: Blanco Kerns MD;  Location: [facility] OR;  Service: General;  Laterality: Left;    TONSILLECTOMY      TYMPANOPLASTY N/A 10/14/2019    Procedure: TYMPANOPLASTY;  Surgeon: Umesh Morales MD;  Location: [facility] OR;  Service: ENT;  Laterality: N/A;    TYMPANOTOMY Left 10/14/2019    left     Past Medical History:   Diagnosis Date    Allergic rhinitis     Anxiety     Cancer 12/01/2021    B-cell folicular lymphoma    Depression     Hypothyroidism     SVT (supraventricular tachycardia) 2011     Family History   Problem Relation Age of Onset    Hypertension Mother     Heart disease Father 59        CABG    Heart failure Father     Breast cancer Maternal Aunt 55    Colon cancer Neg Hx     Ovarian cancer Neg Hx         Social History:   Marital Status: Single  Alcohol History:  reports current alcohol use of about 5.0 standard drinks per week.  Tobacco History:  reports that she quit smoking about 13 years ago. Her smoking use included cigarettes. She started smoking about 22 years ago. She has a 4.50 pack-year smoking history. She has never used smokeless tobacco.  Drug History:  reports no history of drug use.    Review of patient's allergies indicates:  No Known Allergies    Current Outpatient  Medications   Medication Sig Dispense Refill    busPIRone (BUSPAR) 10 MG tablet Take 10 mg by mouth 2 (two) times daily.      cephALEXin (KEFLEX) 500 MG capsule Take 1 capsule (500 mg total) by mouth every 12 (twelve) hours. 14 capsule 0    doxycycline (MONODOX) 100 MG capsule 1 capsule twice daily by mouth (Patient taking differently: Take 100 mg by mouth every 12 (twelve) hours. 1 capsule twice daily by mouth) 60 capsule 11    dronabinoL (MARINOL) 5 MG capsule Take 1 capsule (5 mg total) by mouth 2 (two) times daily as needed (decreased appetite). 60 capsule 0    fluticasone propionate (FLONASE) 50 mcg/actuation nasal spray 2 sprays (100 mcg total) by Each Nostril route once daily. (Patient taking differently: 2 sprays by Each Nostril route 2 (two) times a day.) 11.1 mL 5    gabapentin (NEURONTIN) 600 MG tablet Take 600 mg by mouth 3 (three) times daily as needed.      hydrOXYzine pamoate (VISTARIL) 50 MG Cap Take 1 capsule (50 mg total) by mouth 2 (two) times daily as needed. 60 capsule 2    levothyroxine (SYNTHROID) 50 MCG tablet Take 1 tablet by mouth once daily (Patient taking differently: Take 50 mcg by mouth before breakfast.) 30 tablet 5    mirtazapine (REMERON) 7.5 MG Tab TAKE 2 TABLETS (15 MG TOTAL) BY MOUTH NIGHTLY. 180 tablet 1    multivitamin capsule Take 1 capsule by mouth.      naproxen (NAPROSYN) 500 MG tablet TAKE 1 TABLET BY MOUTH TWICE A DAY 60 tablet 1    ondansetron (ZOFRAN) 8 MG tablet Take 8 mg by mouth.      ondansetron (ZOFRAN-ODT) 8 MG TbDL Take 1 tablet (8 mg total) by mouth every 8 (eight) hours as needed (chemotherapy-induced nausea and vomiting). 40 tablet 5    prazosin (MINIPRESS) 1 MG Cap Take 1 mg by mouth every evening.      promethazine (PHENERGAN) 25 MG tablet Take 1 tablet (25 mg total) by mouth every 8 (eight) hours as needed for Nausea. 15 tablet 0    tiZANidine (ZANAFLEX) 4 MG tablet Take 4 mg by mouth 3 (three) times daily as needed.      ALPRAZolam (XANAX) 1 MG tablet Take  1 tablet (1 mg total) by mouth 2 (two) times daily as needed for Anxiety. 30 tablet 0    sertraline (ZOLOFT) 100 MG tablet Take 1 tablet (100 mg total) by mouth once daily. 30 tablet 1     No current facility-administered medications for this visit.       Review of Systems   Constitutional:  Negative for chills, fatigue, fever and unexpected weight change.   HENT:  Negative for hearing loss and trouble swallowing.    Eyes:  Negative for photophobia and visual disturbance.   Respiratory:  Negative for cough, shortness of breath and wheezing.    Cardiovascular:  Negative for chest pain, palpitations and leg swelling.   Gastrointestinal:  Negative for abdominal pain and nausea.   Genitourinary:  Negative for dysuria and frequency.   Musculoskeletal:  Negative for arthralgias, back pain, gait problem, joint swelling and myalgias.   Skin:  Negative for rash and wound.   Neurological:  Negative for tremors, seizures, weakness, numbness and headaches.   Hematological:  Does not bruise/bleed easily.       Objective:        Post-op surgery of the 1. Elevating osteotomy 2nd metatarsal left foot 2. Release of entrapped nerve 2nd intermetatarsal space left foot with normal healing, no signs of infection or dehiscence of wound. Hardware in place. Normal post op exam today. No redness, no drainage, no increase in local temperature, no significant swelling, sutures.steri-strips are intact.     Imaging:   AP, lateral, lateral oblique weight-bearing x-rays left foot:  Normal healing osteotomy 2nd metatarsal no displacement or complication of hardware.    Physical Exam:   Foot Exam    General  General Appearance: appears stated age and healthy   Orientation: alert and oriented to person, place, and time   Affect: appropriate   Gait: antalgic   Assistance: (Cam Walker boot cast left foot)        Physical Exam       Assessment:       1. Sprain of tarsometatarsal ligament of left foot, subsequent encounter    2. Lisfranc's sprain, left,  subsequent encounter    3. Left foot pain      Plan:   Sprain of tarsometatarsal ligament of left foot, subsequent encounter    Lisfranc's sprain, left, subsequent encounter    Left foot pain    Evaluated patient she is healing normally x-rays show good healing.  She is going to transition out a Cam Walker boot cast into running shoe over the next 2 weeks.  She may return to work in 5 days with a note to wear Cam Walker boot cast as needed until she get into her running shoe.  She is return to see me in 4 weeks for x-rays and probable final evaluation.  No follow-ups on file.    Procedures - None        This note was created using Dragon voice recognition software that occasionally misinterpreted phrases or words.

## 2023-03-08 PROBLEM — Z79.899 IMMUNODEFICIENCY DUE TO DRUG THERAPY: Status: ACTIVE | Noted: 2023-03-08

## 2023-03-08 PROBLEM — D84.821 IMMUNODEFICIENCY DUE TO DRUG THERAPY: Status: ACTIVE | Noted: 2023-03-08

## 2023-03-09 ENCOUNTER — TELEPHONE (OUTPATIENT)
Dept: PODIATRY | Facility: CLINIC | Age: 38
End: 2023-03-09
Payer: COMMERCIAL

## 2023-03-09 DIAGNOSIS — S93.622A LISFRANC'S SPRAIN, LEFT, INITIAL ENCOUNTER: Primary | ICD-10-CM

## 2023-03-09 RX ORDER — HYDROCODONE BITARTRATE AND ACETAMINOPHEN 5; 325 MG/1; MG/1
1 TABLET ORAL EVERY 4 HOURS PRN
Qty: 15 TABLET | Refills: 0 | Status: SHIPPED | OUTPATIENT
Start: 2023-03-09 | End: 2023-03-16

## 2023-03-09 NOTE — TELEPHONE ENCOUNTER
----- Message from Lilli Hicks sent at 3/9/2023  9:38 AM CST -----  Regarding: Pain medication refill  Pt is back at work, had surgery Dec 21st. And is in pain, is on her feet at work and  asking for a refill for her pain medications.   Thank you.   Lilli

## 2023-03-30 ENCOUNTER — OFFICE VISIT (OUTPATIENT)
Dept: PODIATRY | Facility: CLINIC | Age: 38
End: 2023-03-30
Payer: COMMERCIAL

## 2023-03-30 ENCOUNTER — HOSPITAL ENCOUNTER (OUTPATIENT)
Dept: RADIOLOGY | Facility: CLINIC | Age: 38
Discharge: HOME | End: 2023-03-30
Attending: PODIATRIST
Payer: COMMERCIAL

## 2023-03-30 VITALS — RESPIRATION RATE: 18 BRPM | HEIGHT: 61 IN | WEIGHT: 220 LBS | BODY MASS INDEX: 41.54 KG/M2

## 2023-03-30 DIAGNOSIS — S93.622D LISFRANC'S SPRAIN, LEFT, SUBSEQUENT ENCOUNTER: ICD-10-CM

## 2023-03-30 DIAGNOSIS — M79.672 LEFT FOOT PAIN: ICD-10-CM

## 2023-03-30 DIAGNOSIS — S93.622D SPRAIN OF TARSOMETATARSAL LIGAMENT OF LEFT FOOT, SUBSEQUENT ENCOUNTER: ICD-10-CM

## 2023-03-30 DIAGNOSIS — S93.622A LISFRANC'S SPRAIN, LEFT, INITIAL ENCOUNTER: Primary | ICD-10-CM

## 2023-03-30 PROCEDURE — 99213 OFFICE O/P EST LOW 20 MIN: CPT | Mod: S$GLB,,, | Performed by: PODIATRIST

## 2023-03-30 PROCEDURE — 73630 XR FOOT COMPLETE 3 VIEW LEFT: ICD-10-PCS | Mod: LT,S$GLB,, | Performed by: RADIOLOGY

## 2023-03-30 PROCEDURE — 99213 PR OFFICE/OUTPT VISIT, EST, LEVL III, 20-29 MIN: ICD-10-PCS | Mod: S$GLB,,, | Performed by: PODIATRIST

## 2023-03-30 PROCEDURE — 1159F PR MEDICATION LIST DOCUMENTED IN MEDICAL RECORD: ICD-10-PCS | Mod: CPTII,S$GLB,, | Performed by: PODIATRIST

## 2023-03-30 PROCEDURE — 1160F PR REVIEW ALL MEDS BY PRESCRIBER/CLIN PHARMACIST DOCUMENTED: ICD-10-PCS | Mod: CPTII,S$GLB,, | Performed by: PODIATRIST

## 2023-03-30 PROCEDURE — 3008F BODY MASS INDEX DOCD: CPT | Mod: CPTII,S$GLB,, | Performed by: PODIATRIST

## 2023-03-30 PROCEDURE — 3008F PR BODY MASS INDEX (BMI) DOCUMENTED: ICD-10-PCS | Mod: CPTII,S$GLB,, | Performed by: PODIATRIST

## 2023-03-30 PROCEDURE — 1160F RVW MEDS BY RX/DR IN RCRD: CPT | Mod: CPTII,S$GLB,, | Performed by: PODIATRIST

## 2023-03-30 PROCEDURE — 73630 X-RAY EXAM OF FOOT: CPT | Mod: LT,S$GLB,, | Performed by: RADIOLOGY

## 2023-03-30 PROCEDURE — 1159F MED LIST DOCD IN RCRD: CPT | Mod: CPTII,S$GLB,, | Performed by: PODIATRIST

## 2023-03-30 RX ORDER — MIRTAZAPINE 15 MG/1
7.5 TABLET, FILM COATED ORAL NIGHTLY PRN
COMMUNITY
Start: 2023-02-22 | End: 2023-10-09

## 2023-03-30 NOTE — PROGRESS NOTES
"  [address] [facility] [address] [place] [address]  Phone: [phone]   [phone]    Patient's PCP:Severo Saenz MD  Referring Provider:No ref. provider found    Subjective:      Chief Complaint: Post-op Evaluation (Shortening osteotomy 2nd metatarsal left, release entrapped nerve 2nd IS left )    Date of Surgery: 12/21/2023  Procedure: Shortening osteotomy 2nd metatarsal left, release entrapped nerve 2nd IS left     HPI:   Jerry Coronado is a 37 y.o. female who returns to the clinic today for her post-operative visit. Jerry Coronado rates pain a 3/10 on a pain scale. Compliance of Care: weightbearing in running shoe. Notes increased pain and swelling as the day progresses along with pro longed use. Using epsom salt soak,ice, and ibuprofen/naproxen to manage pain and swelling    Vitals:    03/30/23 0858   Resp: 18   Weight: 99.8 kg (220 lb)   Height: 5' 1" (1.549 m)   PainSc:   3        Past Surgical History:   Procedure Laterality Date    ABLATION OF DYSRHYTHMIC FOCUS  2016    x2    BIOPSY OF CERVICAL LYMPH NODE Left 11/29/2021    Procedure: BIOPSY, LYMPH NODE, CERVICAL;  Surgeon: Blanco Kerns MD;  Location: [facility] OR;  Service: General;  Laterality: Left;    CERVIX LESION DESTRUCTION  11/2021    CONIZATION OF CERVIX USING LOOP ELECTROSURGICAL EXCISION PROCEDURE (LEEP) N/A 07/21/2021    Procedure: LEEP CONIZATION, CERVIX;  Surgeon: Donna Castillo MD;  Location: [facility] OR;  Service: OB/GYN;  Laterality: N/A;    EXCISION OF LESION OF NOSE N/A 10/14/2019    Procedure: EXCISION, LESION, NOSE;  Surgeon: Umesh Morales MD;  Location: [facility] OR;  Service: ENT;  Laterality: N/A;    FUNCTIONAL ENDOSCOPIC SINUS SURGERY (FESS) N/A 10/14/2019    Procedure: FESS (FUNCTIONAL ENDOSCOPIC SINUS SURGERY);  Surgeon: Umesh Morales MD;  Location: [facility] OR;  Service: ENT;  Laterality: N/A;    HYSTEROSCOPY WITH DILATION AND CURETTAGE OF UTERUS N/A 07/21/2021    Procedure: HYSTEROSCOPY, WITH DILATION AND " CURETTAGE OF UTERUS;  Surgeon: Donna Castillo MD;  Location: [facility] OR;  Service: OB/GYN;  Laterality: N/A;    MEDIPORT REMOVAL Right 06/20/2022    Procedure: REMOVAL, CATHETER, CENTRAL VENOUS, TUNNELED, WITH PORT;  Surgeon: Blanco Kerns MD;  Location: [facility] OR;  Service: General;  Laterality: Right;    NASAL SEPTOPLASTY N/A 10/14/2019    Procedure: SEPTOPLASTY, NOSE;  Surgeon: Umesh Morales MD;  Location: [facility] OR;  Service: ENT;  Laterality: N/A;    NASAL SINUS SURGERY  10/14/2019    OSTEOTOMY OF METATARSAL BONE Left 12/21/2022    Procedure: OSTEOTOMY, METATARSAL BONE;  Surgeon: Hola Escalante DPM;  Location: [facility] OR;  Service: Podiatry;  Laterality: Left;  synthes janes notified 12/19 TW  , RELEASE ENTRAPPED NERVE 2ND INTERMETATARSAL SPACE    PILONIDAL CYST DRAINAGE      REVISION OF SCAR  06/20/2022    Procedure: REVISION, SCAR;  Surgeon: Blanco Kerns MD;  Location: [facility] OR;  Service: General;;    SKIN TAG REMOVAL Left 06/20/2022    Procedure: REMOVAL, SKIN TAG;  Surgeon: Blanco Kerns MD;  Location: [facility] OR;  Service: General;  Laterality: Left;    TONSILLECTOMY      TYMPANOPLASTY N/A 10/14/2019    Procedure: TYMPANOPLASTY;  Surgeon: Umesh Morales MD;  Location: [facility];  Service: ENT;  Laterality: N/A;    TYMPANOTOMY Left 10/14/2019    left     Past Medical History:   Diagnosis Date    Allergic rhinitis     Anxiety     Cancer 12/01/2021    B-cell folicular lymphoma    Depression     Hypothyroidism     SVT (supraventricular tachycardia) 2011     Family History   Problem Relation Age of Onset    Hypertension Mother     Heart disease Father 59        CABG    Heart failure Father     Breast cancer Maternal Aunt 55    Colon cancer Neg Hx     Ovarian cancer Neg Hx         Social History:   Marital Status: Single  Alcohol History:  reports current alcohol use of about 5.0 standard drinks per week.  Tobacco History:  reports that she quit smoking about 13 years ago. Her smoking use included  cigarettes. She started smoking about 22 years ago. She has a 4.50 pack-year smoking history. She has never used smokeless tobacco.  Drug History:  reports no history of drug use.    Review of patient's allergies indicates:  No Known Allergies    Current Outpatient Medications   Medication Sig Dispense Refill    ALPRAZolam (XANAX) 1 MG tablet Take 1 tablet (1 mg total) by mouth 2 (two) times daily as needed for Anxiety. 30 tablet 0    busPIRone (BUSPAR) 10 MG tablet Take 10 mg by mouth 2 (two) times daily.      cephALEXin (KEFLEX) 500 MG capsule Take 1 capsule (500 mg total) by mouth every 12 (twelve) hours. 14 capsule 0    doxycycline (MONODOX) 100 MG capsule 1 capsule twice daily by mouth (Patient taking differently: Take 100 mg by mouth every 12 (twelve) hours. 1 capsule twice daily by mouth) 60 capsule 11    dronabinoL (MARINOL) 5 MG capsule Take 1 capsule (5 mg total) by mouth 2 (two) times daily as needed (decreased appetite). 60 capsule 0    fluticasone propionate (FLONASE) 50 mcg/actuation nasal spray 2 sprays (100 mcg total) by Each Nostril route once daily. (Patient taking differently: 2 sprays by Each Nostril route 2 (two) times a day.) 11.1 mL 5    gabapentin (NEURONTIN) 600 MG tablet Take 600 mg by mouth 3 (three) times daily as needed.      hydrOXYzine pamoate (VISTARIL) 50 MG Cap Take 1 capsule (50 mg total) by mouth 2 (two) times daily as needed. 60 capsule 2    levothyroxine (SYNTHROID) 50 MCG tablet Take 1 tablet by mouth once daily (Patient taking differently: Take 50 mcg by mouth before breakfast.) 30 tablet 5    mirtazapine (REMERON) 15 MG tablet Take 7.5 mg by mouth nightly as needed.      multivitamin capsule Take 1 capsule by mouth.      naproxen (NAPROSYN) 500 MG tablet TAKE 1 TABLET BY MOUTH TWICE A DAY 60 tablet 1    ondansetron (ZOFRAN) 8 MG tablet Take 8 mg by mouth.      ondansetron (ZOFRAN-ODT) 8 MG TbDL Take 1 tablet (8 mg total) by mouth every 8 (eight) hours as needed  (chemotherapy-induced nausea and vomiting). 40 tablet 5    prazosin (MINIPRESS) 1 MG Cap Take 1 mg by mouth every evening.      promethazine (PHENERGAN) 25 MG tablet Take 1 tablet (25 mg total) by mouth every 8 (eight) hours as needed for Nausea. 15 tablet 0    sertraline (ZOLOFT) 100 MG tablet Take 1 tablet (100 mg total) by mouth once daily. 30 tablet 1    tiZANidine (ZANAFLEX) 4 MG tablet Take 4 mg by mouth 3 (three) times daily as needed.       No current facility-administered medications for this visit.       Review of Systems      Objective:        Post-op surgery of the Release of entrapped nerve 2nd intermetatarsal space left foot in elevating osteotomy 2nd metatarsal with 2.0 mm screw fixation left foot with normal healing, no signs of infection or dehiscence of wound. Hardware in place. Normal post op exam today. No redness, no drainage, no increase in local temperature, no significant swelling, sutures.steri-strips are intact.  Patient still has some mild swelling and some mild pain.  She has little bit of pain at the PIPJ of the 2nd toe in his explained to her the 2nd toe is slightly elevated with this type of procedure although I did a lengthening of the tendon.  She has little slight contracture of the PIPJ and I told this time we just allow the swelling to diminish continue treating it conservatively with adequate shoe gear.  If the toe becomes more flexed she may have to have a procedure done on the toe.  She is to wait 2 months return to see my associate Dr. Rex Angela if she is having any concerns.  She is doing well she will not need to see us    Imaging:       Physical Exam:   Foot Exam    General  General Appearance: appears stated age and healthy   Orientation: alert and oriented to person, place, and time   Affect: appropriate   Gait: antalgic         Physical Exam       Assessment:       1. Lisfranc's sprain, left, initial encounter    2. Sprain of tarsometatarsal ligament of left foot,  subsequent encounter    3. Lisfranc's sprain, left, subsequent encounter    4. Left foot pain      Plan:   Lisfranc's sprain, left, initial encounter    Sprain of tarsometatarsal ligament of left foot, subsequent encounter    Lisfranc's sprain, left, subsequent encounter    Left foot pain    Return in 2 months if needed if she is doing well she will not return to see us see my note in the above section under the postoperative status  No follow-ups on file.    Procedures - None      This note was created using Dragon voice recognition software that occasionally misinterpreted phrases or words.

## 2023-04-05 PROBLEM — D84.821 IMMUNODEFICIENCY DUE TO DRUG THERAPY: Status: RESOLVED | Noted: 2023-03-08 | Resolved: 2023-04-05

## 2023-04-05 PROBLEM — Z79.899 IMMUNODEFICIENCY DUE TO DRUG THERAPY: Status: RESOLVED | Noted: 2023-03-08 | Resolved: 2023-04-05

## 2023-04-12 ENCOUNTER — OCCUPATIONAL HEALTH (OUTPATIENT)
Dept: URGENT CARE | Facility: CLINIC | Age: 38
End: 2023-04-12
Payer: COMMERCIAL

## 2023-04-12 ENCOUNTER — OFFICE VISIT (OUTPATIENT)
Dept: ORTHOPEDICS | Facility: CLINIC | Age: 38
End: 2023-04-12
Payer: COMMERCIAL

## 2023-04-12 VITALS — BODY MASS INDEX: 42.48 KG/M2 | WEIGHT: 225 LBS | HEIGHT: 61 IN

## 2023-04-12 DIAGNOSIS — M23.203 OTHER OLD TEAR OF MEDIAL MENISCUS OF RIGHT KNEE: ICD-10-CM

## 2023-04-12 DIAGNOSIS — Z13.9 ENCOUNTER FOR SCREENING: Primary | ICD-10-CM

## 2023-04-12 DIAGNOSIS — M17.11 PRIMARY OSTEOARTHRITIS OF RIGHT KNEE: Primary | ICD-10-CM

## 2023-04-12 LAB
CTP QC/QA: YES
POC 10 PANEL DRUG SCREEN: NEGATIVE

## 2023-04-12 PROCEDURE — 3008F BODY MASS INDEX DOCD: CPT | Mod: CPTII,S$GLB,, | Performed by: PHYSICIAN ASSISTANT

## 2023-04-12 PROCEDURE — 20610 LARGE JOINT ASPIRATION/INJECTION: R KNEE: ICD-10-PCS | Mod: RT,S$GLB,, | Performed by: PHYSICIAN ASSISTANT

## 2023-04-12 PROCEDURE — 80305 DRUG TEST PRSMV DIR OPT OBS: CPT | Mod: S$GLB,,, | Performed by: EMERGENCY MEDICINE

## 2023-04-12 PROCEDURE — 1159F PR MEDICATION LIST DOCUMENTED IN MEDICAL RECORD: ICD-10-PCS | Mod: CPTII,S$GLB,, | Performed by: PHYSICIAN ASSISTANT

## 2023-04-12 PROCEDURE — 3008F PR BODY MASS INDEX (BMI) DOCUMENTED: ICD-10-PCS | Mod: CPTII,S$GLB,, | Performed by: PHYSICIAN ASSISTANT

## 2023-04-12 PROCEDURE — 99213 OFFICE O/P EST LOW 20 MIN: CPT | Mod: 25,S$GLB,, | Performed by: PHYSICIAN ASSISTANT

## 2023-04-12 PROCEDURE — 99213 PR OFFICE/OUTPT VISIT, EST, LEVL III, 20-29 MIN: ICD-10-PCS | Mod: 25,S$GLB,, | Performed by: PHYSICIAN ASSISTANT

## 2023-04-12 PROCEDURE — 1159F MED LIST DOCD IN RCRD: CPT | Mod: CPTII,S$GLB,, | Performed by: PHYSICIAN ASSISTANT

## 2023-04-12 PROCEDURE — 20610 DRAIN/INJ JOINT/BURSA W/O US: CPT | Mod: RT,S$GLB,, | Performed by: PHYSICIAN ASSISTANT

## 2023-04-12 PROCEDURE — 80305 POCT RAPID DRUG SCREEN 10 PANEL: ICD-10-PCS | Mod: S$GLB,,, | Performed by: EMERGENCY MEDICINE

## 2023-04-12 RX ORDER — TRIAMCINOLONE ACETONIDE 40 MG/ML
40 INJECTION, SUSPENSION INTRA-ARTICULAR; INTRAMUSCULAR
Status: DISCONTINUED | OUTPATIENT
Start: 2023-04-12 | End: 2023-04-12 | Stop reason: HOSPADM

## 2023-04-12 RX ADMIN — TRIAMCINOLONE ACETONIDE 40 MG: 40 INJECTION, SUSPENSION INTRA-ARTICULAR; INTRAMUSCULAR at 08:04

## 2023-04-12 NOTE — PROGRESS NOTES
[facility] ORTHOPEDICS  [address]  [place] [address]  Phone: [phone]   [phone]    Patient's PCP: Seveor Saenz MD  Referring Provider: No ref. provider found    Subjective:      Chief Complaint:   Chief Complaint   Patient presents with    Right Knee - Pain     Right knee injected last on 10/13/23, which did help, pain is back to  Normal, swelling, gives way at times       Past Medical History:   Diagnosis Date    Allergic rhinitis     Anxiety     Cancer 12/01/2021    B-cell folicular lymphoma    Depression     Hypothyroidism     SVT (supraventricular tachycardia) 2011       Past Surgical History:   Procedure Laterality Date    ABLATION OF DYSRHYTHMIC FOCUS  2016    x2    BIOPSY OF CERVICAL LYMPH NODE Left 11/29/2021    Procedure: BIOPSY, LYMPH NODE, CERVICAL;  Surgeon: Blanco Kerns MD;  Location: [facility] OR;  Service: General;  Laterality: Left;    CERVIX LESION DESTRUCTION  11/2021    CONIZATION OF CERVIX USING LOOP ELECTROSURGICAL EXCISION PROCEDURE (LEEP) N/A 07/21/2021    Procedure: LEEP CONIZATION, CERVIX;  Surgeon: Donna Castillo MD;  Location: [facility] OR;  Service: OB/GYN;  Laterality: N/A;    EXCISION OF LESION OF NOSE N/A 10/14/2019    Procedure: EXCISION, LESION, NOSE;  Surgeon: Umesh Morales MD;  Location: [facility] OR;  Service: ENT;  Laterality: N/A;    FUNCTIONAL ENDOSCOPIC SINUS SURGERY (FESS) N/A 10/14/2019    Procedure: FESS (FUNCTIONAL ENDOSCOPIC SINUS SURGERY);  Surgeon: Umesh Morales MD;  Location: [facility] OR;  Service: ENT;  Laterality: N/A;    HYSTEROSCOPY WITH DILATION AND CURETTAGE OF UTERUS N/A 07/21/2021    Procedure: HYSTEROSCOPY, WITH DILATION AND CURETTAGE OF UTERUS;  Surgeon: Donna Castillo MD;  Location: [facility] OR;  Service: OB/GYN;  Laterality: N/A;    MEDIPORT REMOVAL Right 06/20/2022    Procedure: REMOVAL, CATHETER, CENTRAL VENOUS, TUNNELED, WITH PORT;  Surgeon: Blanco Kerns MD;  Location: [facility] OR;  Service: General;  Laterality: Right;     NASAL SEPTOPLASTY N/A 10/14/2019    Procedure: SEPTOPLASTY, NOSE;  Surgeon: Umesh Morales MD;  Location: [facility] OR;  Service: ENT;  Laterality: N/A;    NASAL SINUS SURGERY  10/14/2019    OSTEOTOMY OF METATARSAL BONE Left 12/21/2022    Procedure: OSTEOTOMY, METATARSAL BONE;  Surgeon: Hola Escalante DPM;  Location: [facility] OR;  Service: Podiatry;  Laterality: Left;  synthes janes notified 12/19 TW  , RELEASE ENTRAPPED NERVE 2ND INTERMETATARSAL SPACE    PILONIDAL CYST DRAINAGE      REVISION OF SCAR  06/20/2022    Procedure: REVISION, SCAR;  Surgeon: Blanco Kerns MD;  Location: [facility] OR;  Service: General;;    SKIN TAG REMOVAL Left 06/20/2022    Procedure: REMOVAL, SKIN TAG;  Surgeon: Blanco Kerns MD;  Location: [facility] OR;  Service: General;  Laterality: Left;    TONSILLECTOMY      TYMPANOPLASTY N/A 10/14/2019    Procedure: TYMPANOPLASTY;  Surgeon: Umesh Morales MD;  Location: [facility] OR;  Service: ENT;  Laterality: N/A;    TYMPANOTOMY Left 10/14/2019    left       Current Outpatient Medications   Medication Sig    ALPRAZolam (XANAX) 1 MG tablet Take 1 tablet (1 mg total) by mouth 2 (two) times daily as needed for Anxiety.    busPIRone (BUSPAR) 10 MG tablet Take 10 mg by mouth 2 (two) times daily.    doxycycline (MONODOX) 100 MG capsule 1 capsule twice daily by mouth (Patient taking differently: Take 100 mg by mouth every 12 (twelve) hours. 1 capsule twice daily by mouth)    dronabinoL (MARINOL) 5 MG capsule Take 1 capsule (5 mg total) by mouth 2 (two) times daily as needed (decreased appetite).    fluticasone propionate (FLONASE) 50 mcg/actuation nasal spray 2 sprays (100 mcg total) by Each Nostril route once daily. (Patient taking differently: 2 sprays by Each Nostril route 2 (two) times a day.)    gabapentin (NEURONTIN) 600 MG tablet Take 600 mg by mouth 3 (three) times daily as needed.    hydrOXYzine pamoate (VISTARIL) 50 MG Cap Take 1 capsule (50 mg total) by mouth 2 (two) times daily as needed.     "levothyroxine (SYNTHROID) 50 MCG tablet Take 1 tablet by mouth once daily (Patient taking differently: Take 50 mcg by mouth before breakfast.)    mirtazapine (REMERON) 15 MG tablet Take 7.5 mg by mouth nightly as needed.    multivitamin capsule Take 1 capsule by mouth.    naproxen (NAPROSYN) 500 MG tablet TAKE 1 TABLET BY MOUTH TWICE A DAY    prazosin (MINIPRESS) 1 MG Cap Take 1 mg by mouth every evening.    sertraline (ZOLOFT) 100 MG tablet Take 1 tablet (100 mg total) by mouth once daily.    tiZANidine (ZANAFLEX) 4 MG tablet Take 4 mg by mouth 3 (three) times daily as needed.     No current facility-administered medications for this visit.       Review of patient's allergies indicates:  No Known Allergies    Family History   Problem Relation Age of Onset    Hypertension Mother     Heart disease Father 59        CABG    Heart failure Father     Breast cancer Maternal Aunt 55    Colon cancer Neg Hx     Ovarian cancer Neg Hx        Social History     Socioeconomic History    Marital status: Single   Occupational History    Occupation: [employer]     Comment: [employer]   Tobacco Use    Smoking status: Former     Packs/day: 0.50     Years: 9.00     Pack years: 4.50     Types: Cigarettes     Start date: 2000     Quit date: 2009     Years since quittin.7    Smokeless tobacco: Never   Substance and Sexual Activity    Alcohol use: Yes     Alcohol/week: 5.0 standard drinks     Types: 5 Shots of liquor per week     Comment: "occasionally"    Drug use: No    Sexual activity: Not Currently     Partners: Male     Birth control/protection: OCP   Social History Narrative    The patient does not exercise regularly ().  Rates diet as fair.  She is not satisfied with weight.           Prior to meeting with the patient I reviewed the medical chart in [facility]. This included reviewing the previous progress notes from our office, review of the patient's last appointment with their primary care provider, review of any visits to the " emergency room, and review of any pain management appointments or procedures.    History of present illness:  37-year-old female, returns to clinic today for right knee pain.  We have seen her before in the past for her right knee.  She had a steroid injection in her right knee back in October of 2022, during a follow-up visit in November she had documented cartilage loss on an MRI of the right knee, primarily lateral femoral condyle and a questionable nondisplaced meniscal tear and we had recommended viscosupplementation.  This however has not been completed.      Review of Systems:    Constitutional: Negative for chills, fever and weight loss.   HENT: Negative for congestion.    Eyes: Negative for discharge and redness.   Respiratory: Negative for cough and shortness of breath.    Cardiovascular: Negative for chest pain.   Gastrointestinal: Negative for nausea and vomiting.   Musculoskeletal: See HPI.   Skin: Negative for rash.   Neurological: Negative for headaches.   Endo/Heme/Allergies: Does not bruise/bleed easily.   Psychiatric/Behavioral: The patient is not nervous/anxious.    All other systems reviewed and are negative.       Objective:      Physical Examination:    Vital Signs:  There were no vitals filed for this visit.    Body mass index is 42.51 kg/m².    This a well-developed, well nourished patient in no acute distress.  They are alert and oriented and cooperative to examination.     Examination of the right knee, skin is dry and intact, no erythema ecchymosis, no signs symptoms of infection, no effusion, no crepitus.  Range of motion 0-110 degrees.  Stable anterior-posterior varus and valgus stress.  Calf soft nontender, straight leg raise negative.      Pertinent New Results:        XRAY Report / Interpretation:   No new images were taken today however MRI of the right knee from September of 2022 was reviewed.  Showed some full-thickness cartilage loss over the lateral femoral condyle in addition to  a nondisplaced medial meniscal tear.          Assessment:       1. Primary osteoarthritis of right knee    2. Other old tear of medial meniscus of right knee      Plan:     Primary osteoarthritis of right knee  -     Prior authorization Order  -     Large Joint Aspiration/Injection: R knee    Other old tear of medial meniscus of right knee        Follow up in about 2 weeks (around 4/26/2023) for after Synv[employer] auth .    37-year-old female with a small area of cartilage loss of the lateral femoral condyle and a nondisplaced medial meniscal tear.  Returns to clinic today 6 months since her last steroid injection.  She has new insurance, she would like to resubmit for viscosupplementation.  We injected the right knee today lidocaine and triamcinolone anterior lateral approach sterile technique patient tolerated well.  We will see her back with the approval for viscosupplementation.      SHYAM Nuñez, PA-C    This note was created using [employer] voice recognition software that occasionally misinterprets words or phrases.

## 2023-04-12 NOTE — PROCEDURES
Large Joint Aspiration/Injection: R knee    Date/Time: 4/12/2023 8:45 AM  Performed by: LOU Mehta  Authorized by: LOU Mehta     Consent Done?:  Yes (Verbal)  Indications:  Arthritis and pain  Site marked: the procedure site was marked    Timeout: prior to procedure the correct patient, procedure, and site was verified    Prep: patient was prepped and draped in usual sterile fashion      Local anesthesia used?: Yes    Local anesthetic:  Lidocaine 1% without epinephrine    Details:  Needle Size:  25 G  Ultrasonic Guidance for needle placement?: No    Location:  Knee  Site:  R knee  Medications:  40 mg triamcinolone acetonide 40 mg/mL  Patient tolerance:  Patient tolerated the procedure well with no immediate complications

## 2023-05-15 ENCOUNTER — TELEPHONE (OUTPATIENT)
Dept: HEMATOLOGY/ONCOLOGY | Facility: CLINIC | Age: 38
End: 2023-05-15
Payer: COMMERCIAL

## 2023-05-15 ENCOUNTER — PATIENT MESSAGE (OUTPATIENT)
Dept: HEMATOLOGY/ONCOLOGY | Facility: CLINIC | Age: 38
End: 2023-05-15
Payer: COMMERCIAL

## 2023-05-15 DIAGNOSIS — F06.30 MOOD DISORDER DUE TO A GENERAL MEDICAL CONDITION: Primary | ICD-10-CM

## 2023-05-15 DIAGNOSIS — F43.9 SITUATIONAL STRESS: ICD-10-CM

## 2023-05-15 DIAGNOSIS — F41.1 GENERALIZED ANXIETY DISORDER: ICD-10-CM

## 2023-05-15 RX ORDER — ALPRAZOLAM 2 MG/1
2 TABLET ORAL 2 TIMES DAILY PRN
Qty: 40 TABLET | Refills: 0 | Status: SHIPPED | OUTPATIENT
Start: 2023-05-15 | End: 2023-08-31 | Stop reason: SDUPTHER

## 2023-05-15 NOTE — TELEPHONE ENCOUNTER
I corresponded with her via patient messaging.    Ki Nova M.D.  Hematology/Oncology  [facility] - [address], [address]  [place] [address]  Phone: [phone]  Fax: [phone]        ----- Message from Licha Mejia RN sent at 5/15/2023  2:28 PM CDT -----  Hey, she sent a portal message as well.  ----- Message -----  From: Srinivas Jimenes  Sent: 5/15/2023   2:24 PM CDT  To: [facility] Staff    .Type:  Needs Medical Advice    Who Called: pt    Would the patient rather a call back or a response via [facility]? Call back  Best Call Back Number: [phone]  Additional Information:     Pt stated her dad is at the end of his life and pt need some medication please call pt sure

## 2023-05-17 ENCOUNTER — PATIENT MESSAGE (OUTPATIENT)
Dept: OBSTETRICS AND GYNECOLOGY | Facility: CLINIC | Age: 38
End: 2023-05-17
Payer: COMMERCIAL

## 2023-05-18 NOTE — TELEPHONE ENCOUNTER
----- Message from Licha Mejia RN sent at 5/6/2022 11:44 AM CDT -----  Good morning,    Dr. Nova is referring this patient to surgery for port removal due to port issues/pain. Thanks.            05/06/2022              1341  Attempted to contact patient regarding the above message. No answer. Left voicemail.    Nsaids Counseling: NSAID Counseling: I discussed with the patient that NSAIDs should be taken with food. Prolonged use of NSAIDs can result in the development of stomach ulcers.  Patient advised to stop taking NSAIDs if abdominal pain occurs.  The patient verbalized understanding of the proper use and possible adverse effects of NSAIDs.  All of the patient's questions and concerns were addressed.

## 2023-05-19 ENCOUNTER — TELEPHONE (OUTPATIENT)
Dept: ORTHOPEDICS | Facility: CLINIC | Age: 38
End: 2023-05-19

## 2023-05-19 ENCOUNTER — TELEPHONE (OUTPATIENT)
Dept: OBSTETRICS AND GYNECOLOGY | Facility: CLINIC | Age: 38
End: 2023-05-19
Payer: COMMERCIAL

## 2023-05-19 NOTE — TELEPHONE ENCOUNTER
----- Message from Fidel Fischer sent at 5/19/2023  8:29 AM CDT -----  Regarding: Appointment  Name of Who is Calling:  Patient          What is the request in detail:  Patient stated she has a cyst on her vagina that hurts really bad            Can the clinic reply by [facility]: Yes            [facility]: [phone]

## 2023-05-19 NOTE — TELEPHONE ENCOUNTER
Spoke with patient, I received her auth from [facility] regarding her synvsic injection, informed her that medicaid does not cover this at all, so whatever is remaining from [facility] would be her responsibility. She is going to call her insurance to find out this amount and then call us back to schedule.     APPROVAL #: [id number] per Sylvie sullivan good from5/19/23-8/16/23

## 2023-07-10 ENCOUNTER — PATIENT MESSAGE (OUTPATIENT)
Dept: PRIMARY CARE CLINIC | Facility: CLINIC | Age: 38
End: 2023-07-10
Payer: COMMERCIAL

## 2023-07-10 RX ORDER — SULFAMETHOXAZOLE AND TRIMETHOPRIM 800; 160 MG/1; MG/1
1 TABLET ORAL 2 TIMES DAILY
Qty: 14 TABLET | Refills: 0 | Status: SHIPPED | OUTPATIENT
Start: 2023-07-10 | End: 2023-10-09

## 2023-07-10 RX ORDER — IBUPROFEN 800 MG/1
800 TABLET ORAL EVERY 8 HOURS PRN
Qty: 60 TABLET | Refills: 0 | Status: SHIPPED | OUTPATIENT
Start: 2023-07-10

## 2023-07-10 NOTE — TELEPHONE ENCOUNTER
Rx sent for oral antibiotics and ibuprofen  Clean with plan soap (dial or dove) and water.  Would avoid soaking.

## 2023-08-31 DIAGNOSIS — F43.9 SITUATIONAL STRESS: ICD-10-CM

## 2023-08-31 RX ORDER — ALPRAZOLAM 2 MG/1
2 TABLET ORAL 2 TIMES DAILY PRN
Qty: 40 TABLET | Refills: 0 | Status: SHIPPED | OUTPATIENT
Start: 2023-08-31 | End: 2023-10-09 | Stop reason: SDUPTHER

## 2023-08-31 NOTE — TELEPHONE ENCOUNTER
No care due was identified.  [facility] Embedded Care Due Messages. Reference number: [id number].   8/31/2023 9:59:10 AM CDT

## 2023-09-01 RX ORDER — LEVOTHYROXINE SODIUM 50 UG/1
TABLET ORAL
Qty: 30 TABLET | Refills: 3 | Status: SHIPPED | OUTPATIENT
Start: 2023-09-01 | End: 2024-01-16

## 2023-09-03 ENCOUNTER — HOSPITAL ENCOUNTER (EMERGENCY)
Facility: HOSPITAL | Age: 38
Discharge: HOME | End: 2023-09-03
Attending: EMERGENCY MEDICINE
Payer: MEDICAID

## 2023-09-03 ENCOUNTER — PATIENT MESSAGE (OUTPATIENT)
Dept: PRIMARY CARE CLINIC | Facility: CLINIC | Age: 38
End: 2023-09-03
Payer: MEDICAID

## 2023-09-03 ENCOUNTER — PATIENT MESSAGE (OUTPATIENT)
Dept: HEMATOLOGY/ONCOLOGY | Facility: CLINIC | Age: 38
End: 2023-09-03
Payer: MEDICAID

## 2023-09-03 VITALS
OXYGEN SATURATION: 99 % | SYSTOLIC BLOOD PRESSURE: 161 MMHG | TEMPERATURE: 98 F | DIASTOLIC BLOOD PRESSURE: 79 MMHG | HEIGHT: 61 IN | HEART RATE: 89 BPM | WEIGHT: 225 LBS | BODY MASS INDEX: 42.48 KG/M2 | RESPIRATION RATE: 16 BRPM

## 2023-09-03 DIAGNOSIS — T14.8XXA BRUISING: Primary | ICD-10-CM

## 2023-09-03 DIAGNOSIS — R23.3 EASY BRUISING: ICD-10-CM

## 2023-09-03 DIAGNOSIS — C82.91 FOLLICULAR LYMPHOMA OF LYMPH NODES OF NECK, UNSPECIFIED FOLLICULAR LYMPHOMA TYPE: ICD-10-CM

## 2023-09-03 DIAGNOSIS — M54.9 BACK PAIN, UNSPECIFIED BACK LOCATION, UNSPECIFIED BACK PAIN LATERALITY, UNSPECIFIED CHRONICITY: ICD-10-CM

## 2023-09-03 DIAGNOSIS — Z86.39 HISTORY OF IRON DEFICIENCY: Primary | ICD-10-CM

## 2023-09-03 LAB
ALBUMIN SERPL BCP-MCNC: 4.4 G/DL (ref 3.5–5.2)
ALP SERPL-CCNC: 66 U/L (ref 55–135)
ALT SERPL W/O P-5'-P-CCNC: 88 U/L (ref 10–44)
ANION GAP SERPL CALC-SCNC: 13 MMOL/L (ref 8–16)
AST SERPL-CCNC: 38 U/L (ref 10–40)
B-HCG UR QL: NEGATIVE
BASOPHILS # BLD AUTO: 0.11 K/UL (ref 0–0.2)
BASOPHILS NFR BLD: 1 % (ref 0–1.9)
BILIRUB SERPL-MCNC: 0.7 MG/DL (ref 0.1–1)
BNP SERPL-MCNC: 16 PG/ML (ref 0–99)
BUN SERPL-MCNC: 17 MG/DL (ref 6–20)
CALCIUM SERPL-MCNC: 8.9 MG/DL (ref 8.7–10.5)
CHLORIDE SERPL-SCNC: 104 MMOL/L (ref 95–110)
CO2 SERPL-SCNC: 21 MMOL/L (ref 23–29)
CREAT SERPL-MCNC: 0.7 MG/DL (ref 0.5–1.4)
CREAT SERPL-MCNC: 0.8 MG/DL (ref 0.5–1.4)
CTP QC/QA: YES
DIFFERENTIAL METHOD: ABNORMAL
EOSINOPHIL # BLD AUTO: 0.2 K/UL (ref 0–0.5)
EOSINOPHIL NFR BLD: 1.6 % (ref 0–8)
ERYTHROCYTE [DISTWIDTH] IN BLOOD BY AUTOMATED COUNT: 13.3 % (ref 11.5–14.5)
EST. GFR  (NO RACE VARIABLE): >60 ML/MIN/1.73 M^2
GLUCOSE SERPL-MCNC: 79 MG/DL (ref 70–110)
HCT VFR BLD AUTO: 44.4 % (ref 37–48.5)
HGB BLD-MCNC: 15 G/DL (ref 12–16)
IMM GRANULOCYTES # BLD AUTO: 0.16 K/UL (ref 0–0.04)
IMM GRANULOCYTES NFR BLD AUTO: 1.4 % (ref 0–0.5)
INR PPP: 0.9 (ref 0.8–1.2)
LYMPHOCYTES # BLD AUTO: 1.1 K/UL (ref 1–4.8)
LYMPHOCYTES NFR BLD: 10.1 % (ref 18–48)
MAGNESIUM SERPL-MCNC: 2.2 MG/DL (ref 1.6–2.6)
MCH RBC QN AUTO: 30.6 PG (ref 27–31)
MCHC RBC AUTO-ENTMCNC: 33.8 G/DL (ref 32–36)
MCV RBC AUTO: 91 FL (ref 82–98)
MONOCYTES # BLD AUTO: 0.9 K/UL (ref 0.3–1)
MONOCYTES NFR BLD: 8.1 % (ref 4–15)
NEUTROPHILS # BLD AUTO: 8.6 K/UL (ref 1.8–7.7)
NEUTROPHILS NFR BLD: 77.8 % (ref 38–73)
NRBC BLD-RTO: 0 /100 WBC
PLATELET # BLD AUTO: 222 K/UL (ref 150–450)
PMV BLD AUTO: 9.2 FL (ref 9.2–12.9)
POTASSIUM SERPL-SCNC: 3.1 MMOL/L (ref 3.5–5.1)
PROT SERPL-MCNC: 7.1 G/DL (ref 6–8.4)
PROTHROMBIN TIME: 10.3 SEC (ref 9–12.5)
RBC # BLD AUTO: 4.9 M/UL (ref 4–5.4)
SAMPLE: NORMAL
SODIUM SERPL-SCNC: 138 MMOL/L (ref 136–145)
TROPONIN I SERPL HS-MCNC: 3.5 PG/ML (ref 0–14.9)
WBC # BLD AUTO: 11.05 K/UL (ref 3.9–12.7)

## 2023-09-03 PROCEDURE — 85025 COMPLETE CBC W/AUTO DIFF WBC: CPT | Performed by: EMERGENCY MEDICINE

## 2023-09-03 PROCEDURE — 80053 COMPREHEN METABOLIC PANEL: CPT | Performed by: EMERGENCY MEDICINE

## 2023-09-03 PROCEDURE — 85610 PROTHROMBIN TIME: CPT | Performed by: EMERGENCY MEDICINE

## 2023-09-03 PROCEDURE — 96374 THER/PROPH/DIAG INJ IV PUSH: CPT | Mod: 59

## 2023-09-03 PROCEDURE — 99285 EMERGENCY DEPT VISIT HI MDM: CPT | Mod: 25

## 2023-09-03 PROCEDURE — 82565 ASSAY OF CREATININE: CPT | Mod: 59

## 2023-09-03 PROCEDURE — 81025 URINE PREGNANCY TEST: CPT | Performed by: EMERGENCY MEDICINE

## 2023-09-03 PROCEDURE — 93010 ELECTROCARDIOGRAM REPORT: CPT | Mod: ,,, | Performed by: GENERAL PRACTICE

## 2023-09-03 PROCEDURE — 83735 ASSAY OF MAGNESIUM: CPT | Performed by: EMERGENCY MEDICINE

## 2023-09-03 PROCEDURE — 25500020 PHARM REV CODE 255: Performed by: EMERGENCY MEDICINE

## 2023-09-03 PROCEDURE — 93005 ELECTROCARDIOGRAM TRACING: CPT | Performed by: GENERAL PRACTICE

## 2023-09-03 PROCEDURE — 83880 ASSAY OF NATRIURETIC PEPTIDE: CPT | Performed by: EMERGENCY MEDICINE

## 2023-09-03 PROCEDURE — 63600175 PHARM REV CODE 636 W HCPCS: Performed by: EMERGENCY MEDICINE

## 2023-09-03 PROCEDURE — 93010 EKG 12-LEAD: ICD-10-PCS | Mod: ,,, | Performed by: GENERAL PRACTICE

## 2023-09-03 PROCEDURE — 84484 ASSAY OF TROPONIN QUANT: CPT | Performed by: EMERGENCY MEDICINE

## 2023-09-03 RX ORDER — LORAZEPAM 2 MG/ML
1 INJECTION INTRAMUSCULAR
Status: COMPLETED | OUTPATIENT
Start: 2023-09-03 | End: 2023-09-03

## 2023-09-03 RX ADMIN — LORAZEPAM 1 MG: 2 INJECTION INTRAMUSCULAR; INTRAVENOUS at 11:09

## 2023-09-03 RX ADMIN — IOHEXOL 100 ML: 350 INJECTION, SOLUTION INTRAVENOUS at 12:09

## 2023-09-03 NOTE — ED PROVIDER NOTES
Encounter Date: 9/3/2023       History     Chief Complaint   Patient presents with    Back Pain     Pt has a history of lymphoma and for the past two weeks has been having middle back pain into shoulder blades and has been bruising easily.      Patient presents complaining of bruising to the lower extremities and upper back pain.  Patient states she has a history of B-cell lymphoma is concerned the bruising in the back pain could be related.  Patient is in remission from this.  At the worst symptoms are mild.  Bruises could be from her dog jumping on her legs.      Review of patient's allergies indicates:  No Known Allergies  Past Medical History:   Diagnosis Date    Allergic rhinitis     Anxiety     Cancer 12/01/2021    B-cell folicular lymphoma    Depression     Hypothyroidism     SVT (supraventricular tachycardia) 2011     Past Surgical History:   Procedure Laterality Date    ABLATION OF DYSRHYTHMIC FOCUS  2016    x2    BIOPSY OF CERVICAL LYMPH NODE Left 11/29/2021    Procedure: BIOPSY, LYMPH NODE, CERVICAL;  Surgeon: Blanco Kerns MD;  Location: [facility] OR;  Service: General;  Laterality: Left;    CERVIX LESION DESTRUCTION  11/2021    CONIZATION OF CERVIX USING LOOP ELECTROSURGICAL EXCISION PROCEDURE (LEEP) N/A 07/21/2021    Procedure: LEEP CONIZATION, CERVIX;  Surgeon: Donna Castillo MD;  Location: [facility] OR;  Service: OB/GYN;  Laterality: N/A;    EXCISION OF LESION OF NOSE N/A 10/14/2019    Procedure: EXCISION, LESION, NOSE;  Surgeon: Umesh Morales MD;  [place] [facility] [place] [place];  Service: ENT;  Laterality: N/A;    FUNCTIONAL ENDOSCOPIC SINUS SURGERY (FESS) N/A 10/14/2019    Procedure: FESS (FUNCTIONAL ENDOSCOPIC SINUS SURGERY);  Surgeon: Umesh Morales MD;  [place] [facility] [place];  Service: ENT;  Laterality: N/A;    HYSTEROSCOPY WITH DILATION AND CURETTAGE OF UTERUS N/A 07/21/2021    Procedure: HYSTEROSCOPY, WITH DILATION AND CURETTAGE OF UTERUS;  Surgeon: Donna Castillo MD;  Location: [facility] OR;  Service: OB/GYN;   "Laterality: N/A;    MEDIPORT REMOVAL Right 2022    Procedure: REMOVAL, CATHETER, CENTRAL VENOUS, TUNNELED, WITH PORT;  Surgeon: Blanco Kerns MD;  Location: [facility] OR;  Service: General;  Laterality: Right;    NASAL SEPTOPLASTY N/A 10/14/2019    Procedure: SEPTOPLASTY, NOSE;  Surgeon: Umesh Morales MD;  Location: [facility] OR;  Service: ENT;  Laterality: N/A;    NASAL SINUS SURGERY  10/14/2019    OSTEOTOMY OF METATARSAL BONE Left 2022    Procedure: OSTEOTOMY, METATARSAL BONE;  Surgeon: Hola Escalante DPM;  Location: [facility] OR;  Service: Podiatry;  Laterality: Left;  synthes janes notified  TW  , RELEASE ENTRAPPED NERVE 2ND INTERMETATARSAL SPACE    PILONIDAL CYST DRAINAGE      REVISION OF SCAR  2022    Procedure: REVISION, SCAR;  Surgeon: Blanco Kerns MD;  Location: [facility] OR;  Service: General;;    SKIN TAG REMOVAL Left 2022    Procedure: REMOVAL, SKIN TAG;  Surgeon: Blanco Kerns MD;  Location: [facility] OR;  Service: General;  Laterality: Left;    TONSILLECTOMY      TYMPANOPLASTY N/A 10/14/2019    Procedure: TYMPANOPLASTY;  Surgeon: Umesh Morales MD;  Location: [facility] OR;  Service: ENT;  Laterality: N/A;    TYMPANOTOMY Left 10/14/2019    left     Family History   Problem Relation Age of Onset    Hypertension Mother     Heart disease Father 59        CABG    Heart failure Father     Breast cancer Maternal Aunt 55    Colon cancer Neg Hx     Ovarian cancer Neg Hx      Social History     Tobacco Use    Smoking status: Former     Current packs/day: 0.00     Average packs/day: 0.5 packs/day for 9.0 years (4.5 ttl pk-yrs)     Types: Cigarettes     Start date: 2000     Quit date: 2009     Years since quittin.1    Smokeless tobacco: Never   Substance Use Topics    Alcohol use: Yes     Alcohol/week: 5.0 standard drinks of alcohol     Types: 5 Shots of liquor per week     Comment: "occasionally"    Drug use: No     Review of Systems   All other systems reviewed and are " negative.      Physical Exam     Initial Vitals [09/03/23 1016]   BP Pulse Resp Temp SpO2   (!) 169/111 87 16 98 °F (36.7 °C) 98 %      MAP       --         Physical Exam    Nursing note and vitals reviewed.  Constitutional: She appears well-developed and well-nourished.   Pleasant, polite   HENT:   Head: Normocephalic and atraumatic.   Eyes: EOM are normal.   Neck: Neck supple.   Normal range of motion.  Cardiovascular:  Intact distal pulses.           Pulmonary/Chest: Breath sounds normal. No respiratory distress.   Musculoskeletal:      Cervical back: Normal range of motion and neck supple.      Comments: There is some mild small bruises to the lower extremities that appear a different stages some purplish some with yellow.  There is no purpuric or abnormal appearing bruises.     Neurological: She is alert and oriented to person, place, and time.   Skin: Skin is warm and dry. Capillary refill takes less than 2 seconds.   Psychiatric: She has a normal mood and affect. Her behavior is normal. Judgment and thought content normal.         ED Course   Procedures  Labs Reviewed   CBC W/ AUTO DIFFERENTIAL - Abnormal; Notable for the following components:       Result Value    Immature Granulocytes 1.4 (*)     Gran # (ANC) 8.6 (*)     Immature Grans (Abs) 0.16 (*)     Gran % 77.8 (*)     Lymph % 10.1 (*)     All other components within normal limits   COMPREHENSIVE METABOLIC PANEL - Abnormal; Notable for the following components:    Potassium 3.1 (*)     CO2 21 (*)     ALT 88 (*)     All other components within normal limits   MAGNESIUM   TROPONIN I HIGH SENSITIVITY   B-TYPE NATRIURETIC PEPTIDE   PROTIME-INR   POCT URINE PREGNANCY   ISTAT CREATININE   POCT CREATININE        ECG Results              EKG 12-lead (In process)  Result time 09/03/23 11:55:24      In process by Interface, Lab In [facility] (09/03/23 11:55:24)                   Narrative:    Test Reason : R07.9,    Vent. Rate : 084 BPM     Atrial Rate : 084 BPM      P-R Int : 152 ms          QRS Dur : 082 ms      QT Int : 416 ms       P-R-T Axes : 030 028 028 degrees     QTc Int : 491 ms    Normal sinus rhythm  Prolonged QT  Abnormal ECG  When compared with ECG of 07-FEB-2004 11:30,  T wave inversion no longer evident in Anterior leads    Referred By: [email or web address]   SELF           Confirmed By:                                   Imaging Results              CTA Chest Non-Coronary (PE Studies) (Final result)  Result time 09/03/23 12:36:46      Final result by Shannan Pompa MD (09/03/23 12:36:46)                   Narrative:    All CT scans at this facility used dose modulation, iterative reconstruction and/or weight-based dosing when appropriate to reduce radiation doses  as low as reasonably achievable.    HISTORY: Chest pain.    FINDINGS: Thin axial imaging through the chest was performed with 100 mL Omnipaque 350 IV contrast, with sagittal and coronal reformatted images and 3-D reconstructions performed on an independent workstation, with images stored in the patient's permanent electronic medical record.    This is a high-grade quality study for the evaluation of pulmonary embolism. The pulmonary arteries are normal in appearance without pulmonary emboli noted up to the subsegmental level, noting limitations of CT technique for identifying small isolated subsegmental emboli.    The heart is normal in size. Aorta is normal in caliber.    LUNGS: No pulmonary nodules, infiltrates or pleural effusions. The trachea and bronchi are normal.  Mediastinum: There is no hilar, mediastinal or axillary adenopathy.  The musculature is normal. There is no acute osseous abnormality.  The visualized portion of the upper abdomen is unremarkable.    IMPRESSION: No CT evidence of pulmonary emboli    No acute pulmonary process    Electronically signed by:  Shannan Pompa MD  9/3/2023 12:36 PM CDT Workstation: [id number]                                     X-Ray Chest AP Portable (Final  result)  Result time 09/03/23 11:23:01      Final result by Shannan Pompa MD (09/03/23 11:23:01)                   Narrative:    XR CHEST 1 VIEW    CLINICAL HISTORY:  38 years Female Chest Pain    COMPARISON: None    FINDINGS: Cardiomediastinal silhouette is within normal limits. Lungs are normally expanded with no airspace consolidation. No pleural effusion or pneumothorax. No acute osseous abnormality.    IMPRESSION: No acute pulmonary process.    Electronically signed by:  Shannan Pompa MD  9/3/2023 11:23 AM CDT Workstation: [id number]                                     Medications   LORazepam injection 1 mg (1 mg Intravenous Given 9/3/23 2782)   iohexoL (OMNIPAQUE 350) injection 100 mL (100 mLs Intravenous Given 9/3/23 1223)     Medical Decision Making  Considerations include but are not limited to normal bruising, chronic back pain, pulmonary embolism, tumor, mass, blood dyscrasia    MDM    Patient presents for emergent evaluation of acute bruising that poses a threat to life and/or bodily function.    In the ED patient found to have acute bruising and back pain.    I ordered labs and personally reviewed them.  Labs significant for normal hemoglobin.  I ordered X-rays and personally reviewed them and reviewed the radiologist interpretation.  Xray significant for no acute cardiopulmonary process.    I ordered EKG and personally reviewed it.  EKG significant for regular rate and rhythm without ST elevation.    I ordered CT scan and personally reviewed it and reviewed the radiologist interpretation.  CT significant for no acute process in the chest, no PE, no mass or tumor.      Discharge MDM    Patient was managed in the ED with IV Ativan.    The response to treatment was improved.    Patient was discharged in stable condition.  Detailed return precautions discussed.    Amount and/or Complexity of Data Reviewed  Labs: ordered. Decision-making details documented in ED Course.  Radiology:  ordered.    Risk  Prescription drug management.               ED Course as of 09/03/23 1249   Sun Sep 03, 2023   1216 Protime: 10.3 [AP]   1217 INR: 0.9 [AP]   1217 Sodium: 138 [AP]   1217 Potassium(!): 3.1 [AP]   1217 Chloride: 104 [AP]   1217 CO2(!): 21 [AP]   1217 Glucose: 79 [AP]   1217 BUN: 17 [AP]   1217 Creatinine: 0.7 [AP]   1217 Calcium: 8.9 [AP]   1217 Albumin: 4.4 [AP]   1217 BILIRUBIN TOTAL: 0.7 [AP]   1217 Alkaline Phosphatase: 66 [AP]   1217 AST: 38 [AP]   1217 ALT(!): 88 [AP]   1217 WBC: 11.05 [AP]   1217 Hematocrit: 44.4 [AP]   1217 Platelets: 222 [AP]      ED Course User Index  [AP] Rony King MD                    Clinical Impression:   Final diagnoses:  [T14.8XXA] Bruising (Primary)  [M54.9] Back pain, unspecified back location, unspecified back pain laterality, unspecified chronicity        ED Disposition Condition    Discharge Stable          ED Prescriptions    None       Follow-up Information       Follow up With Specialties Details Why Contact Info    Severo Saenz MD Internal Medicine Schedule an appointment as soon as possible for a visit in 2 days  [address]  [address] , [address] Floor  [facility] [address]  [phone]               Rony King MD  09/03/23 1242

## 2023-10-02 ENCOUNTER — TELEPHONE (OUTPATIENT)
Dept: ORTHOPEDICS | Facility: CLINIC | Age: 38
End: 2023-10-02

## 2023-10-02 NOTE — TELEPHONE ENCOUNTER
----- Message from Sonia Dubon LPN sent at 9/29/2023  1:04 PM CDT -----  Last appointment was in April of 2023. Will need an appointment to schedule surgery and also updated referral from pcp. (Has healthy blue)  ----- Message -----  From: Eugenia Quezada, Patient Care Assistant  Sent: 9/29/2023  11:26 AM CDT  To: Sonia Dubon LPN    Wants to schedule knee scope

## 2023-10-04 ENCOUNTER — PATIENT MESSAGE (OUTPATIENT)
Dept: HEMATOLOGY/ONCOLOGY | Facility: CLINIC | Age: 38
End: 2023-10-04
Payer: MEDICAID

## 2023-10-06 ENCOUNTER — LAB VISIT (OUTPATIENT)
Dept: LAB | Facility: HOSPITAL | Age: 38
End: 2023-10-06
Attending: INTERNAL MEDICINE
Payer: MEDICAID

## 2023-10-06 DIAGNOSIS — G47.00 INSOMNIA, UNSPECIFIED: ICD-10-CM

## 2023-10-06 DIAGNOSIS — C82.91 FOLLICULAR LYMPHOMA OF LYMPH NODES OF NECK, UNSPECIFIED FOLLICULAR LYMPHOMA TYPE: ICD-10-CM

## 2023-10-06 LAB
ALBUMIN SERPL BCP-MCNC: 4.5 G/DL (ref 3.5–5.2)
ALP SERPL-CCNC: 76 U/L (ref 55–135)
ALT SERPL W/O P-5'-P-CCNC: 40 U/L (ref 10–44)
ANION GAP SERPL CALC-SCNC: 9 MMOL/L (ref 8–16)
AST SERPL-CCNC: 19 U/L (ref 10–40)
BASOPHILS # BLD AUTO: 0.12 K/UL (ref 0–0.2)
BASOPHILS NFR BLD: 0.9 % (ref 0–1.9)
BILIRUB SERPL-MCNC: 0.5 MG/DL (ref 0.1–1)
BUN SERPL-MCNC: 17 MG/DL (ref 6–20)
CALCIUM SERPL-MCNC: 9.4 MG/DL (ref 8.7–10.5)
CHLORIDE SERPL-SCNC: 105 MMOL/L (ref 95–110)
CO2 SERPL-SCNC: 24 MMOL/L (ref 23–29)
CREAT SERPL-MCNC: 1.1 MG/DL (ref 0.5–1.4)
DIFFERENTIAL METHOD: ABNORMAL
EOSINOPHIL # BLD AUTO: 0.1 K/UL (ref 0–0.5)
EOSINOPHIL NFR BLD: 0.7 % (ref 0–8)
ERYTHROCYTE [DISTWIDTH] IN BLOOD BY AUTOMATED COUNT: 12.4 % (ref 11.5–14.5)
EST. GFR  (NO RACE VARIABLE): >60 ML/MIN/1.73 M^2
GLUCOSE SERPL-MCNC: 101 MG/DL (ref 70–110)
HCT VFR BLD AUTO: 45.3 % (ref 37–48.5)
HGB BLD-MCNC: 15.6 G/DL (ref 12–16)
IMM GRANULOCYTES # BLD AUTO: 0.12 K/UL (ref 0–0.04)
IMM GRANULOCYTES NFR BLD AUTO: 0.9 % (ref 0–0.5)
LDH SERPL L TO P-CCNC: 133 U/L (ref 110–260)
LYMPHOCYTES # BLD AUTO: 1.3 K/UL (ref 1–4.8)
LYMPHOCYTES NFR BLD: 9.3 % (ref 18–48)
MCH RBC QN AUTO: 30.6 PG (ref 27–31)
MCHC RBC AUTO-ENTMCNC: 34.4 G/DL (ref 32–36)
MCV RBC AUTO: 89 FL (ref 82–98)
MONOCYTES # BLD AUTO: 0.8 K/UL (ref 0.3–1)
MONOCYTES NFR BLD: 6 % (ref 4–15)
NEUTROPHILS # BLD AUTO: 11.2 K/UL (ref 1.8–7.7)
NEUTROPHILS NFR BLD: 82.2 % (ref 38–73)
NRBC BLD-RTO: 0 /100 WBC
PLATELET # BLD AUTO: 280 K/UL (ref 150–450)
PMV BLD AUTO: 9.2 FL (ref 9.2–12.9)
POTASSIUM SERPL-SCNC: 4 MMOL/L (ref 3.5–5.1)
PROT SERPL-MCNC: 6.9 G/DL (ref 6–8.4)
RBC # BLD AUTO: 5.1 M/UL (ref 4–5.4)
SODIUM SERPL-SCNC: 138 MMOL/L (ref 136–145)
URATE SERPL-MCNC: 5.6 MG/DL (ref 2.4–5.7)
WBC # BLD AUTO: 13.61 K/UL (ref 3.9–12.7)

## 2023-10-06 PROCEDURE — 36415 COLL VENOUS BLD VENIPUNCTURE: CPT | Performed by: INTERNAL MEDICINE

## 2023-10-06 PROCEDURE — 83615 LACTATE (LD) (LDH) ENZYME: CPT | Performed by: INTERNAL MEDICINE

## 2023-10-06 PROCEDURE — 85025 COMPLETE CBC W/AUTO DIFF WBC: CPT | Performed by: INTERNAL MEDICINE

## 2023-10-06 PROCEDURE — 80053 COMPREHEN METABOLIC PANEL: CPT | Performed by: INTERNAL MEDICINE

## 2023-10-06 PROCEDURE — 84550 ASSAY OF BLOOD/URIC ACID: CPT | Performed by: INTERNAL MEDICINE

## 2023-10-06 RX ORDER — MIRTAZAPINE 7.5 MG/1
TABLET, FILM COATED ORAL
Qty: 180 TABLET | Refills: 1 | Status: SHIPPED | OUTPATIENT
Start: 2023-10-06 | End: 2023-10-09

## 2023-10-06 NOTE — PROGRESS NOTES
PATIENT: Jerry Coronado  MRN: [id number]  DATE: 10/9/2023    Diagnosis:   1. 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 Morbid obesity    3. Mood disorder due to a general medical condition    4. Situational stress      Chief Complaint: Lymphoma    Oncologic History:      Oncologic History 1. Follicular lymphoma      Oncologic Treatment 1. Bendamustine/rituximab - 2022      Pathology 11/29/21:  Lymph node, left supraclavicular region, excision:  --Follicular lymphoma with high Ki-67 proliferation index, see comment  Comment: Concomitantly submitted flow cytometric analysis detects B-cell lymphoma of germinal center  origin expressing CD19, bright CD20, and CD10 with kappa light chain restriction. CD5 is negative in this  population.  Although the majority of the lymph node appears to represent as grade 2 follicular lymphoma, the Ki-67  proliferation index is much higher than expected for typical low-grade follicular lymphoma. Additionally there  is a very small area suggestive of grade 3A follicular lymphoma as well, representing less than 5% of the overall cellularity. This sampling leaves concern for a higher grade process elsewhere and correlation with  clinical and radiologic findings including PET scan findings is highly recommended with additional biopsy of  areas of high SUV if indicated.      Telemedicine visit:  The patient location is: home  The chief complaint leading to consultation is: follicular lymphoma    Visit type: audiovisual    Face to Face time with patient: 5 minutes  10 minutes of total time spent on the encounter, which includes face to face time and non-face to face time preparing to see the patient (eg, review of tests), Obtaining and/or reviewing separately obtained history, Documenting clinical information in the electronic or other health record, Independently interpreting results (not separately reported) and communicating results to the  patient/family/caregiver, or Care coordination (not separately reported).     Each patient to whom he or she provides medical services by telemedicine is:  (1) informed of the relationship between the physician and patient and the respective role of any other health care provider with respect to management of the patient; and (2) notified that he or she may decline to receive medical services by telemedicine and may withdraw from such care at any time.    Notes: see below      Subjective:    History of Present Illness: Ms. Coronado is a 38 y.o. female who presented in December 2021 for evaluation and management of B cell lymphoma. She was referred by Dr. Kerns.    - presenting symptom was enlarged supraclavicular lymph nodes  - she underwent excisional lymph node biopsy on 11/29/21. Pathology revealed a follicular lymphoma with high ki-67 proliferation index.  - she reports having a CT scan in [place] in early November with enlarged lymph nodes noted throughout.  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 This may make treatment decisions challenging logistically.  - she received cycle #1 of bendamustine/rituximab on 1/25/22 - 1/26/22 in [place], [place].  - she underwent PET/CT scan on 4/14/22.  - she completed 6 cycles of bendamustine/rituximab (last was on 6/16/22 - 6/17/22).  - she underwent port removal on 6/20/22.  - she underwent pet scan on 6/29/22.  - she underwent pet scan on 2/22/23.    Interval history:  - she presents for a follow-up appointment for her follicular lymphoma  - overall, she is okay. She is anxious. She endorses fatigue, poor sleep hygiene. She denies fever, night sweats, unintentional weight loss.   - She denies shortness of breath, chest pain, vomiting, constipation.     Past medical, surgical, family, and social histories have been reviewed and updated below.    Past Medical History:   Past Medical History:   Diagnosis Date    Allergic  rhinitis     Anxiety     Cancer 12/01/2021    B-cell folicular lymphoma    Depression     Hypothyroidism     SVT (supraventricular tachycardia) 2011       Past Surgical History:   Past Surgical History:   Procedure Laterality Date    ABLATION OF DYSRHYTHMIC FOCUS  2016    x2    BIOPSY OF CERVICAL LYMPH NODE Left 11/29/2021    Procedure: BIOPSY, LYMPH NODE, CERVICAL;  Surgeon: Blanco Kerns MD;  Location: [facility] OR;  Service: General;  Laterality: Left;    CERVIX LESION DESTRUCTION  11/2021    CONIZATION OF CERVIX USING LOOP ELECTROSURGICAL EXCISION PROCEDURE (LEEP) N/A 07/21/2021    Procedure: LEEP CONIZATION, CERVIX;  Surgeon: Donna Castillo MD;  [facility]  Service: OB[facility]GYN;  Laterality: N/A;    EXCISION OF LESION OF NOSE N/A 10/14/2019    Procedure: EXCISION, LESION, NOSE;  Surgeon: Umesh Morales MD;  Location: [facility] OR;  Service: ENT;  Laterality: N/A;    FUNCTIONAL ENDOSCOPIC SINUS SURGERY (FESS) N/A 10/14/2019    Procedure: FESS (FUNCTIONAL ENDOSCOPIC SINUS SURGERY);  Surgeon: Umesh Morales MD;  Location: [facility] OR;  Service: ENT;  Laterality: N/A;    HYSTEROSCOPY WITH DILATION AND CURETTAGE OF UTERUS N/A 07/21/2021    Procedure: HYSTEROSCOPY, WITH DILATION AND CURETTAGE OF UTERUS;  Surgeon: Donna Castillo MD;  Location: [facility];  Service: OB/GYN;  Laterality: N/A;    MEDIPORT REMOVAL Right 06/20/2022    Procedure: REMOVAL, CATHETER, CENTRAL VENOUS, TUNNELED, WITH PORT;  Surgeon: Blanco Kerns MD;  Location: [facility] OR;  Service: General;  Laterality: Right;    NASAL SEPTOPLASTY N/A 10/14/2019    Procedure: SEPTOPLASTY, NOSE;  Surgeon: Umesh Morales MD;  Location: [facility] OR;  Service: ENT;  Laterality: N/A;    NASAL SINUS SURGERY  10/14/2019    OSTEOTOMY OF METATARSAL BONE Left 12/21/2022    Procedure: OSTEOTOMY, METATARSAL BONE;  Surgeon: Hola Escalante DPM;  Location: [facility] OR;  Service: Podiatry;  Laterality: Left;  denys marrero notified 12/19 TW  , RELEASE ENTRAPPED NERVE 2ND  INTERMETATARSAL SPACE    PILONIDAL CYST DRAINAGE      REVISION OF SCAR  06/20/2022    Procedure: REVISION, SCAR;  Surgeon: Blanco Kerns MD;  Location: [facility] OR;  Service: General;;    SKIN TAG REMOVAL Left 06/20/2022    Procedure: REMOVAL, SKIN TAG;  Surgeon: Blanco Kerns MD;  Location: [facility] OR;  Service: General;  Laterality: Left;    TONSILLECTOMY      TYMPANOPLASTY N/A 10/14/2019    Procedure: TYMPANOPLASTY;  Surgeon: Umesh Morales MD;  Location: [facility] OR;  Service: ENT;  Laterality: N/A;    TYMPANOTOMY Left 10/14/2019    left       Family History:   Family History   Problem Relation Age of Onset    Hypertension Mother     Heart disease Father 59        CABG    Heart failure Father     Breast cancer Maternal Aunt 55    Colon cancer Neg Hx     Ovarian cancer Neg Hx        Social History:  reports that she quit smoking about 14 years ago. Her smoking use included cigarettes. She started smoking about 23 years ago. She has a 4.5 pack-year smoking history. She has never used smokeless tobacco. She reports current alcohol use of about 5.0 standard drinks of alcohol per week. She reports that she does not use drugs.    Allergies:  Review of patient's allergies indicates:  No Known Allergies    Medications:  Current Outpatient Medications   Medication Sig Dispense Refill    ALPRAZolam (XANAX) 2 MG Tab Take 1 tablet (2 mg total) by mouth 2 (two) times daily as needed. 40 tablet 0    busPIRone (BUSPAR) 10 MG tablet Take 10 mg by mouth 2 (two) times daily.      doxycycline (MONODOX) 100 MG capsule 1 capsule twice daily by mouth (Patient taking differently: Take 100 mg by mouth every 12 (twelve) hours. 1 capsule twice daily by mouth) 60 capsule 11    dronabinoL (MARINOL) 5 MG capsule Take 1 capsule (5 mg total) by mouth 2 (two) times daily as needed (decreased appetite). 60 capsule 0    fluticasone propionate (FLONASE) 50 mcg/actuation nasal spray 2 sprays (100 mcg total) by Each Nostril route once daily. (Patient  taking differently: 2 sprays by Each Nostril route 2 (two) times a day.) 11.1 mL 5    gabapentin (NEURONTIN) 600 MG tablet Take 600 mg by mouth 3 (three) times daily as needed.      hydrOXYzine pamoate (VISTARIL) 50 MG Cap Take 1 capsule (50 mg total) by mouth 2 (two) times daily as needed. 60 capsule 2    ibuprofen (ADVIL,MOTRIN) 800 MG tablet Take 1 tablet (800 mg total) by mouth every 8 (eight) hours as needed for Pain. 60 tablet 0    levothyroxine (SYNTHROID) 50 MCG tablet Take 1 tablet by mouth once daily 30 tablet 3    mirtazapine (REMERON) 15 MG tablet Take 7.5 mg by mouth nightly as needed.      mirtazapine (REMERON) 7.5 MG Tab TAKE 2 TABLETS (15 MG TOTAL) BY MOUTH NIGHTLY. 180 tablet 1    multivitamin capsule Take 1 capsule by mouth.      naproxen (NAPROSYN) 500 MG tablet TAKE 1 TABLET BY MOUTH TWICE A DAY 60 tablet 1    prazosin (MINIPRESS) 1 MG Cap Take 1 mg by mouth every evening.      sertraline (ZOLOFT) 100 MG tablet Take 1 tablet (100 mg total) by mouth once daily. 30 tablet 1    sulfamethoxazole-trimethoprim 800-160mg (BACTRIM DS) 800-160 mg Tab Take 1 tablet by mouth 2 (two) times daily. 14 tablet 0    tiZANidine (ZANAFLEX) 4 MG tablet Take 4 mg by mouth 3 (three) times daily as needed.       No current facility-administered medications for this visit.       Review of Systems   Constitutional:  Positive for fatigue. Negative for appetite change and unexpected weight change.   HENT:  Negative for mouth sores and sore throat.    Eyes:  Negative for visual disturbance.   Respiratory:  Negative for cough and shortness of breath.    Cardiovascular:  Negative for chest pain.   Gastrointestinal:  Negative for abdominal pain, constipation, diarrhea and vomiting.   Genitourinary:  Negative for dysuria and frequency.   Musculoskeletal:  Negative for back pain.   Skin:  Negative for rash.   Neurological:  Negative for headaches.   Hematological:  Negative for adenopathy.   Psychiatric/Behavioral:  The patient  is nervous/anxious.        ECOG Performance Status:   ECOG SCORE 1       Objective:      Vitals:   There were no vitals filed for this visit.  BMI: There is no height or weight on file to calculate BMI.  Deferred due to telemedicine visit.    Physical Exam  Deferred due to telemedicine visit.    Laboratory Data:  Labs have been reviewed.    Lab Results   Component Value Date    WBC 13.61 (H) 10/06/2023    HGB 15.6 10/06/2023    HCT 45.3 10/06/2023    MCV 89 10/06/2023     10/06/2023       Imaging:     CTA chest (9/3/23):  IMPRESSION: No CT evidence of pulmonary emboli  No acute pulmonary process    pet scan (2/22/23): I have personally reviewed the images  No evidence of FDG avid lymphoproliferative disease.      pet scan (6/29/22): I have personally reviewed the images  No evidence of FDG avid lymphoproliferative disease.    PET/CT scan (4/14/22): I have personally reviewed the images  Differences in instrumentation and technique preclude semiquantitative comparison between the current PET/CT study and the prior.     Background:  - Liver 4.2 SUV max.  - Spleen 3.5 SUV max.  - Spleen 11.9 x 4.8 cm (AP x TR)  - Blood pool 3.3 SUV max     No FDG avid lymphadenopathy or mass. The largest cervical node is a 5 x 5 mm supraclavicular node with SUV max 1.0 (image 86). The largest left axillary node measures 18 x 7 mm with SUV max 0.8 (image 103).     Additional findings:  -Right sided IJ medical infusion port with tip at the level of the SVC.  -Relative diffuse low-attenuation liver in keeping with steatosis.  -3.6 cm simple fluid attenuation on FDG avid cyst in the right ovary favored to represent a physiologic cyst in this age group. The left ovary and uterus are within normal limits.     IMPRESSION:  No evidence of FDG avid malignancy.    PET/CT scan (12/17/21): I have personally reviewed the images    IN THE HEAD AND NECK:     Multiple left-sided cervical lymph nodes, left and right supraclavicular lymph nodes  and left axillary lymph nodes.  The index lesions, as follow:     Hypermetabolic lymph node at the left level Va region, measures 1.2 cm (axial series 2, image 68) with SUV max of 14.0.     Hypermetabolic left supraclavicular lymph node, measures 1.9 cm (axial series 2, image 82) with SUV max of 15.0.     Hypermetabolic left axillary lymph node, measures 3.5 cm (axial series 2, image 116) with SUV max of 12.0.     Hypermetabolic right supraclavicular lymph node, measures 1.4 cm (axial series 2, image 79) with SUV max of 13.0.     IN THE CHEST:     Focal radiotracer uptake in the left hilum with no corresponding lesion on CT images (axial fused image 116) with SUV max of 4.4.  No abnormal lesions throughout the pulmonary parenchyma.     IN THE ABDOMEN AND PELVIS:     There is a subcentimeter hypermetabolic lesion in the subcutaneous tissue of the right back at the level of L4, measures 0.9 cm (axial series 2, image 198) with SUV max of 6.7.  There is physiologic tracer distribution within the abdominal organs and excretion into the genitourinary system.     The spleen has normal uptake and is normal at 11.0 cm in craniocaudal dimension.     IN THE BONES:     There are no hypermetabolic lesions worrisome for malignancy.     In the extremities, there are no hypermetabolic lesions worrisome for malignancy.     Impression:     Hypermetabolic cervical, left axillary, and left hilar lymph nodes consistent with biopsy-proven follicular lymphoma (performed on 11/29/2021), as above.     Hypermetabolic subcutaneous tissue of the right nodule at the level of L4, concerning for additional lymphomatous deposit as described above.      Assessment:       1. 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 Morbid obesity    3. Mood disorder due to a general medical condition    4. Situational stress           Plan:     1. Follicular lymphoma  - I have reviewed her chart  - supraclavicular lymph  "node biopsy (11/29/21) revealed follicular lymphoma. Importantly, in the pathology report is the following: "Although the majority of the lymph node appears to represent as grade 2 follicular lymphoma, the Ki-67  proliferation index is much higher than expected for typical low-grade follicular lymphoma. Additionally there  is a very small area suggestive of grade 3A follicular lymphoma as well, representing less than 5% of the  overall cellularity."  - she reports having a CT scan in [place] in early November with enlarged lymph nodes noted throughout.  - PET/CT scan (12/17/21) revealed "hypermetabolic cervical, left axillary, and left hilar lymph nodes" as well as "hypermetabolic subcutaneous tissue of the right nodule at the level of L4, concerning for additional lymphomatous deposit."  - she received cycle #1 of bendamustine/rituximab on 1/25/22 - 1/26/22 in [place], [place].  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 No evidence of hypermetabolic tumor  - The risks and benefits of chemotherapy were discussed, written information was given, and informed consent was obtained.  - she completed 6 cycles of bendamustine/rituximab (last was on 6/16/22 - 6/17/22).  - she underwent port removal on 6/20/22.  - pet scan (6/29/22) revealed no evidence of hypermetabolic tumor.  - pet scan (2/22/23) revealed no evidence of hypermetabolic tumor.  - CTA chest (9/3/23): no evidence of recurrence.  - Labs have been reviewed. Mild leukocytosis noted, likely reactive.  - clinically, she is doing well with clinical symptoms to suggest recurrence of lymphoma.  - return to clinic in 6 months.    2. Morbid obesity  - no weight since telemedicine visit.  - continue to monitor    3. Mood disorder due to medical condition  - moderate symptoms. Continue xanax as needed. I sent a refill in.    4. Insomnia  - moderate symptoms. Continue mirtazapine    5. " Advance Care Planning     Power of   After our discussion (at previous visit), the patient decided to complete a HCPOA and appointed her  brother Velasquez Coronado ([phone]) and dad Dirk Coronado ([phone]) .        - return to clinic in 6 months.    Ki Nova M.D.  Hematology/Oncology  [facility] - [address], [address]  [place] [address]  Phone: [phone]  Fax: [phone]

## 2023-10-09 ENCOUNTER — OFFICE VISIT (OUTPATIENT)
Dept: HEMATOLOGY/ONCOLOGY | Facility: CLINIC | Age: 38
End: 2023-10-09
Payer: MEDICAID

## 2023-10-09 DIAGNOSIS — E66.01 MORBID OBESITY: ICD-10-CM

## 2023-10-09 DIAGNOSIS — F43.9 SITUATIONAL STRESS: ICD-10-CM

## 2023-10-09 DIAGNOSIS — F06.30 MOOD DISORDER DUE TO A GENERAL MEDICAL CONDITION: ICD-10-CM

## 2023-10-09 DIAGNOSIS — C82.91 FOLLICULAR LYMPHOMA OF LYMPH NODES OF NECK, UNSPECIFIED FOLLICULAR LYMPHOMA TYPE: Primary | ICD-10-CM

## 2023-10-09 PROCEDURE — 99214 OFFICE O/P EST MOD 30 MIN: CPT | Mod: 95,,, | Performed by: INTERNAL MEDICINE

## 2023-10-09 PROCEDURE — 1160F RVW MEDS BY RX/DR IN RCRD: CPT | Mod: CPTII,95,, | Performed by: INTERNAL MEDICINE

## 2023-10-09 PROCEDURE — 1160F PR REVIEW ALL MEDS BY PRESCRIBER/CLIN PHARMACIST DOCUMENTED: ICD-10-PCS | Mod: CPTII,95,, | Performed by: INTERNAL MEDICINE

## 2023-10-09 PROCEDURE — 1159F MED LIST DOCD IN RCRD: CPT | Mod: CPTII,95,, | Performed by: INTERNAL MEDICINE

## 2023-10-09 PROCEDURE — 99214 PR OFFICE/OUTPT VISIT, EST, LEVL IV, 30-39 MIN: ICD-10-PCS | Mod: 95,,, | Performed by: INTERNAL MEDICINE

## 2023-10-09 PROCEDURE — 1159F PR MEDICATION LIST DOCUMENTED IN MEDICAL RECORD: ICD-10-PCS | Mod: CPTII,95,, | Performed by: INTERNAL MEDICINE

## 2023-10-09 RX ORDER — ALPRAZOLAM 2 MG/1
2 TABLET ORAL 2 TIMES DAILY PRN
Qty: 40 TABLET | Refills: 0 | Status: SHIPPED | OUTPATIENT
Start: 2023-10-09 | End: 2024-01-08 | Stop reason: SDUPTHER

## 2023-10-09 NOTE — Clinical Note
1. Schedule labs cbc, cmp, uric acid, ldh in 6 months near her house. 2. Schedule virtual visit 1-2 days after labs.  Thanks!

## 2023-10-16 ENCOUNTER — PATIENT MESSAGE (OUTPATIENT)
Dept: HEMATOLOGY/ONCOLOGY | Facility: CLINIC | Age: 38
End: 2023-10-16
Payer: MEDICAID

## 2023-10-18 ENCOUNTER — PATIENT MESSAGE (OUTPATIENT)
Dept: HEMATOLOGY/ONCOLOGY | Facility: CLINIC | Age: 38
End: 2023-10-18
Payer: MEDICAID

## 2023-10-18 DIAGNOSIS — R11.2 NAUSEA AND VOMITING, UNSPECIFIED VOMITING TYPE: ICD-10-CM

## 2023-10-18 DIAGNOSIS — J32.9 CHRONIC SINUSITIS, UNSPECIFIED LOCATION: Primary | ICD-10-CM

## 2023-10-18 RX ORDER — PROMETHAZINE HYDROCHLORIDE 25 MG/1
25 TABLET ORAL EVERY 8 HOURS PRN
Qty: 40 TABLET | Refills: 1 | Status: SHIPPED | OUTPATIENT
Start: 2023-10-18 | End: 2024-02-02

## 2023-10-18 RX ORDER — METHYLPREDNISOLONE 4 MG/1
TABLET ORAL
Qty: 21 EACH | Refills: 0 | Status: SHIPPED | OUTPATIENT
Start: 2023-10-18 | End: 2023-11-08

## 2023-10-18 RX ORDER — AZITHROMYCIN 250 MG/1
TABLET, FILM COATED ORAL
Qty: 6 TABLET | Refills: 0 | Status: SHIPPED | OUTPATIENT
Start: 2023-10-18 | End: 2023-10-23

## 2023-10-18 RX ORDER — FLUTICASONE PROPIONATE 50 MCG
2 SPRAY, SUSPENSION (ML) NASAL DAILY
Qty: 11.1 ML | Refills: 5 | Status: SHIPPED | OUTPATIENT
Start: 2023-10-18

## 2023-11-21 ENCOUNTER — TELEPHONE (OUTPATIENT)
Dept: PRIMARY CARE CLINIC | Facility: CLINIC | Age: 38
End: 2023-11-21
Payer: MEDICAID

## 2023-11-21 ENCOUNTER — TELEPHONE (OUTPATIENT)
Dept: HEMATOLOGY/ONCOLOGY | Facility: CLINIC | Age: 38
End: 2023-11-21
Payer: MEDICAID

## 2023-11-21 DIAGNOSIS — M25.561 ACUTE PAIN OF RIGHT KNEE: Primary | ICD-10-CM

## 2023-11-21 NOTE — TELEPHONE ENCOUNTER
----- Message from Manuel Amin sent at 11/21/2023  1:27 PM CST -----  Contact: Pt  .Type:  Patient Requesting Referral    Who Called:pt  Does the patient already have the specialty appointment scheduled?:  If yes, what is the date of that appointment?:  Referral to What Specialty: Orthopedic  Reason for Referral: torn mensutinis right  knee  Does the patient want the referral with a specific physician?:no  [facility]  Patient Requesting a Response?:yes  Would the patient rather a call back or a response via [facility]?  Call back  Best Call Back Number: [phone]  Additional Information:

## 2023-11-21 NOTE — TELEPHONE ENCOUNTER
----- Message from Manuel Amin sent at 11/21/2023  1:27 PM CST -----  Contact: Pt  .Type:  Patient Requesting Referral    Who Called:pt  Does the patient already have the specialty appointment scheduled?:  If yes, what is the date of that appointment?:  Referral to What Specialty: Orthopedic  Reason for Referral: torn mensutinis right  knee  Does the patient want the referral with a specific physician?:no  [facility]  Patient Requesting a Response?:yes  Would the patient rather a call back or a response via [facility]?  Call back  Best Call Back Number: [phone]  Additional Information:

## 2023-12-08 ENCOUNTER — TELEPHONE (OUTPATIENT)
Dept: PODIATRY | Facility: CLINIC | Age: 38
End: 2023-12-08
Payer: MEDICAID

## 2023-12-08 NOTE — TELEPHONE ENCOUNTER
----- Message from Jade Russ sent at 12/8/2023 11:47 AM CST -----  Regarding: Steriod shot ASAP  Type:  Sooner Apoointment Request    Caller is requesting a sooner appointment.  Caller declined first available appointment listed below.  Caller will not accept being placed on the waitlist and is requesting a message be sent to doctor.  Name of Caller:pt      Would the patient rather a call back or a response via [facility]? Call back    Best Call Back Number:[phone]    Additional Information: Pt st she needs Steriod shot ASAP, Pt was seeing Hola Escalante DPM in the past and would like a call back ------- thank You

## 2023-12-09 ENCOUNTER — HOSPITAL ENCOUNTER (EMERGENCY)
Facility: HOSPITAL | Age: 38
Discharge: HOME | End: 2023-12-09
Attending: EMERGENCY MEDICINE
Payer: MEDICAID

## 2023-12-09 VITALS
WEIGHT: 225 LBS | SYSTOLIC BLOOD PRESSURE: 142 MMHG | DIASTOLIC BLOOD PRESSURE: 83 MMHG | HEART RATE: 108 BPM | OXYGEN SATURATION: 96 % | RESPIRATION RATE: 15 BRPM | BODY MASS INDEX: 42.51 KG/M2 | TEMPERATURE: 98 F

## 2023-12-09 DIAGNOSIS — R55 NEAR SYNCOPE: ICD-10-CM

## 2023-12-09 DIAGNOSIS — R06.02 SHORTNESS OF BREATH: ICD-10-CM

## 2023-12-09 DIAGNOSIS — G43.809 OTHER MIGRAINE WITHOUT STATUS MIGRAINOSUS, NOT INTRACTABLE: ICD-10-CM

## 2023-12-09 DIAGNOSIS — E86.0 DEHYDRATION: Primary | ICD-10-CM

## 2023-12-09 LAB
ALBUMIN SERPL BCP-MCNC: 4.5 G/DL (ref 3.5–5.2)
ALP SERPL-CCNC: 54 U/L (ref 55–135)
ALT SERPL W/O P-5'-P-CCNC: 28 U/L (ref 10–44)
ANION GAP SERPL CALC-SCNC: 9 MMOL/L (ref 8–16)
AST SERPL-CCNC: 16 U/L (ref 10–40)
B-HCG UR QL: NEGATIVE
BASOPHILS # BLD AUTO: 0.14 K/UL (ref 0–0.2)
BASOPHILS NFR BLD: 1.2 % (ref 0–1.9)
BILIRUB SERPL-MCNC: 0.2 MG/DL (ref 0.1–1)
BNP SERPL-MCNC: 12 PG/ML (ref 0–99)
BUN SERPL-MCNC: 25 MG/DL (ref 6–20)
CALCIUM SERPL-MCNC: 8.5 MG/DL (ref 8.7–10.5)
CHLORIDE SERPL-SCNC: 112 MMOL/L (ref 95–110)
CO2 SERPL-SCNC: 23 MMOL/L (ref 23–29)
CREAT SERPL-MCNC: 1.4 MG/DL (ref 0.5–1.4)
CREAT SERPL-MCNC: 1.6 MG/DL (ref 0.5–1.4)
CTP QC/QA: YES
DIFFERENTIAL METHOD BLD: ABNORMAL
EOSINOPHIL # BLD AUTO: 0.2 K/UL (ref 0–0.5)
EOSINOPHIL NFR BLD: 2 % (ref 0–8)
ERYTHROCYTE [DISTWIDTH] IN BLOOD BY AUTOMATED COUNT: 12.8 % (ref 11.5–14.5)
EST. GFR  (NO RACE VARIABLE): 49.1 ML/MIN/1.73 M^2
GLUCOSE SERPL-MCNC: 91 MG/DL (ref 70–110)
GLUCOSE SERPL-MCNC: 92 MG/DL (ref 70–110)
HCT VFR BLD AUTO: 42.2 % (ref 37–48.5)
HGB BLD-MCNC: 14 G/DL (ref 12–16)
IMM GRANULOCYTES # BLD AUTO: 0.11 K/UL (ref 0–0.04)
IMM GRANULOCYTES NFR BLD AUTO: 0.9 % (ref 0–0.5)
LYMPHOCYTES # BLD AUTO: 1.6 K/UL (ref 1–4.8)
LYMPHOCYTES NFR BLD: 13.4 % (ref 18–48)
MAGNESIUM SERPL-MCNC: 2.2 MG/DL (ref 1.6–2.6)
MCH RBC QN AUTO: 30.3 PG (ref 27–31)
MCHC RBC AUTO-ENTMCNC: 33.2 G/DL (ref 32–36)
MCV RBC AUTO: 91 FL (ref 82–98)
MONOCYTES # BLD AUTO: 0.7 K/UL (ref 0.3–1)
MONOCYTES NFR BLD: 6.1 % (ref 4–15)
NEUTROPHILS # BLD AUTO: 9.2 K/UL (ref 1.8–7.7)
NEUTROPHILS NFR BLD: 76.4 % (ref 38–73)
NRBC BLD-RTO: 0 /100 WBC
PLATELET # BLD AUTO: 316 K/UL (ref 150–450)
PMV BLD AUTO: 8.9 FL (ref 9.2–12.9)
POTASSIUM SERPL-SCNC: 3.8 MMOL/L (ref 3.5–5.1)
PROT SERPL-MCNC: 6.8 G/DL (ref 6–8.4)
RBC # BLD AUTO: 4.62 M/UL (ref 4–5.4)
SAMPLE: ABNORMAL
SODIUM SERPL-SCNC: 144 MMOL/L (ref 136–145)
TROPONIN I SERPL HS-MCNC: 2.3 PG/ML (ref 0–14.9)
TROPONIN I SERPL HS-MCNC: <2.3 PG/ML (ref 0–14.9)
WBC # BLD AUTO: 12.06 K/UL (ref 3.9–12.7)

## 2023-12-09 PROCEDURE — 93005 ELECTROCARDIOGRAM TRACING: CPT | Performed by: GENERAL PRACTICE

## 2023-12-09 PROCEDURE — 83735 ASSAY OF MAGNESIUM: CPT

## 2023-12-09 PROCEDURE — 85025 COMPLETE CBC W/AUTO DIFF WBC: CPT

## 2023-12-09 PROCEDURE — 96374 THER/PROPH/DIAG INJ IV PUSH: CPT

## 2023-12-09 PROCEDURE — 82962 GLUCOSE BLOOD TEST: CPT

## 2023-12-09 PROCEDURE — 83880 ASSAY OF NATRIURETIC PEPTIDE: CPT

## 2023-12-09 PROCEDURE — 96361 HYDRATE IV INFUSION ADD-ON: CPT

## 2023-12-09 PROCEDURE — 93010 ELECTROCARDIOGRAM REPORT: CPT | Mod: ,,, | Performed by: GENERAL PRACTICE

## 2023-12-09 PROCEDURE — 96375 TX/PRO/DX INJ NEW DRUG ADDON: CPT

## 2023-12-09 PROCEDURE — 80053 COMPREHEN METABOLIC PANEL: CPT

## 2023-12-09 PROCEDURE — 84484 ASSAY OF TROPONIN QUANT: CPT

## 2023-12-09 PROCEDURE — 82565 ASSAY OF CREATININE: CPT | Mod: 59

## 2023-12-09 PROCEDURE — 63600175 PHARM REV CODE 636 W HCPCS: Performed by: NURSE PRACTITIONER

## 2023-12-09 PROCEDURE — 81025 URINE PREGNANCY TEST: CPT

## 2023-12-09 PROCEDURE — 25000003 PHARM REV CODE 250: Performed by: NURSE PRACTITIONER

## 2023-12-09 PROCEDURE — 99284 EMERGENCY DEPT VISIT MOD MDM: CPT | Mod: 25

## 2023-12-09 RX ORDER — DIPHENHYDRAMINE HYDROCHLORIDE 50 MG/ML
12.5 INJECTION, SOLUTION INTRAMUSCULAR; INTRAVENOUS
Status: COMPLETED | OUTPATIENT
Start: 2023-12-09 | End: 2023-12-09

## 2023-12-09 RX ORDER — PROCHLORPERAZINE EDISYLATE 5 MG/ML
10 INJECTION INTRAMUSCULAR; INTRAVENOUS
Status: COMPLETED | OUTPATIENT
Start: 2023-12-09 | End: 2023-12-09

## 2023-12-09 RX ADMIN — SODIUM CHLORIDE 1000 ML: 0.9 INJECTION, SOLUTION INTRAVENOUS at 04:12

## 2023-12-09 RX ADMIN — DIPHENHYDRAMINE HYDROCHLORIDE 12.5 MG: 50 INJECTION INTRAMUSCULAR; INTRAVENOUS at 04:12

## 2023-12-09 RX ADMIN — PROCHLORPERAZINE EDISYLATE 10 MG: 5 INJECTION INTRAMUSCULAR; INTRAVENOUS at 04:12

## 2023-12-09 RX ADMIN — SODIUM CHLORIDE 1000 ML: 9 INJECTION, SOLUTION INTRAVENOUS at 04:12

## 2023-12-09 NOTE — DISCHARGE INSTRUCTIONS
Keep yourself well hydrated.  This will help you to avoid headaches.  Follow up either sinus surgeon.  Return to the ED for any worsening symptoms or any other concerns.

## 2023-12-12 ENCOUNTER — PATIENT MESSAGE (OUTPATIENT)
Dept: HEMATOLOGY/ONCOLOGY | Facility: CLINIC | Age: 38
End: 2023-12-12
Payer: MEDICAID

## 2023-12-14 ENCOUNTER — TELEPHONE (OUTPATIENT)
Dept: PODIATRY | Facility: CLINIC | Age: 38
End: 2023-12-14
Payer: MEDICAID

## 2023-12-14 NOTE — TELEPHONE ENCOUNTER
----- Message from Rut Hill sent at 12/14/2023 12:16 PM CST -----  Regarding: Sooner Appointment Reschedule Request  Contact: patient at [phone]  Type:  Sooner Appointment Reschedule Request    Name of Caller:  patient at [phone]    When is the first available appointment?  12/18    Additional Information:  patient can't bear the pain and is wanting just a shot in her foot. Please call and advise. Thank you

## 2023-12-20 ENCOUNTER — OFFICE VISIT (OUTPATIENT)
Dept: PODIATRY | Facility: CLINIC | Age: 38
End: 2023-12-20
Attending: FAMILY MEDICINE
Payer: MEDICAID

## 2023-12-20 ENCOUNTER — HOSPITAL ENCOUNTER (OUTPATIENT)
Dept: RADIOLOGY | Facility: CLINIC | Age: 38
Discharge: HOME | End: 2023-12-20
Attending: PODIATRIST
Payer: MEDICAID

## 2023-12-20 VITALS — WEIGHT: 224.44 LBS | BODY MASS INDEX: 42.37 KG/M2 | HEIGHT: 61 IN

## 2023-12-20 DIAGNOSIS — M79.672 LEFT FOOT PAIN: ICD-10-CM

## 2023-12-20 DIAGNOSIS — M77.52 CAPSULITIS OF METATARSOPHALANGEAL (MTP) JOINT OF LEFT FOOT: Primary | ICD-10-CM

## 2023-12-20 DIAGNOSIS — R52 PAINFUL SCAR: ICD-10-CM

## 2023-12-20 DIAGNOSIS — L90.5 PAINFUL SCAR: ICD-10-CM

## 2023-12-20 PROCEDURE — 1159F PR MEDICATION LIST DOCUMENTED IN MEDICAL RECORD: ICD-10-PCS | Mod: CPTII,,, | Performed by: PODIATRIST

## 2023-12-20 PROCEDURE — 99214 OFFICE O/P EST MOD 30 MIN: CPT | Mod: PBBFAC,PN,25 | Performed by: PODIATRIST

## 2023-12-20 PROCEDURE — 3008F PR BODY MASS INDEX (BMI) DOCUMENTED: ICD-10-PCS | Mod: CPTII,,, | Performed by: PODIATRIST

## 2023-12-20 PROCEDURE — 99214 PR OFFICE/OUTPT VISIT, EST, LEVL IV, 30-39 MIN: ICD-10-PCS | Mod: 25,S$PBB,, | Performed by: PODIATRIST

## 2023-12-20 PROCEDURE — 3008F BODY MASS INDEX DOCD: CPT | Mod: CPTII,,, | Performed by: PODIATRIST

## 2023-12-20 PROCEDURE — 99214 OFFICE O/P EST MOD 30 MIN: CPT | Mod: 25,S$PBB,, | Performed by: PODIATRIST

## 2023-12-20 PROCEDURE — 20600 DRAIN/INJ JOINT/BURSA W/O US: CPT | Mod: S$PBB,LT,, | Performed by: PODIATRIST

## 2023-12-20 PROCEDURE — 99999 PR PBB SHADOW E&M-EST. PATIENT-LVL IV: ICD-10-PCS | Mod: PBBFAC,,, | Performed by: PODIATRIST

## 2023-12-20 PROCEDURE — 99999 PR PBB SHADOW E&M-EST. PATIENT-LVL IV: CPT | Mod: PBBFAC,,, | Performed by: PODIATRIST

## 2023-12-20 PROCEDURE — 20600 PR DRAIN/INJECT SMALL JOINT/BURSA: ICD-10-PCS | Mod: S$PBB,LT,, | Performed by: PODIATRIST

## 2023-12-20 PROCEDURE — 1160F RVW MEDS BY RX/DR IN RCRD: CPT | Mod: CPTII,,, | Performed by: PODIATRIST

## 2023-12-20 PROCEDURE — 73630 X-RAY EXAM OF FOOT: CPT | Mod: 26,LT,S$GLB, | Performed by: RADIOLOGY

## 2023-12-20 PROCEDURE — 20600 DRAIN/INJ JOINT/BURSA W/O US: CPT | Mod: PBBFAC,PN | Performed by: PODIATRIST

## 2023-12-20 PROCEDURE — 1159F MED LIST DOCD IN RCRD: CPT | Mod: CPTII,,, | Performed by: PODIATRIST

## 2023-12-20 PROCEDURE — 1160F PR REVIEW ALL MEDS BY PRESCRIBER/CLIN PHARMACIST DOCUMENTED: ICD-10-PCS | Mod: CPTII,,, | Performed by: PODIATRIST

## 2023-12-20 PROCEDURE — 73630 XR FOOT COMPLETE 3 VIEW LEFT: ICD-10-PCS | Mod: 26,LT,S$GLB, | Performed by: RADIOLOGY

## 2023-12-20 NOTE — PROGRESS NOTES
"  [address] [facility] [address]  [place] [address]  Phone: [phone]   [phone]    Patient's PCP:Severo Saenz MD  Referring Provider: No ref. provider found    Subjective:      Chief Complaint:: Foot Pain (Pain in the left foot)    Foot Pain  Associated symptoms include arthralgias. Pertinent negatives include no abdominal pain, chest pain, chills, coughing, fatigue, fever, headaches, joint swelling, myalgias, nausea, neck pain, numbness, rash or weakness.     Jerry Coronado is a 38 y.o. female who presents today with a complaint of left foot pain . The current episode started 3 months ago.  The symptoms include walking on rocks, constant sharp burning pain. Probable cause of complaint unknown.  The symptoms are aggravated by pressure and prolonged walking . The problem has worsened. Treatment to date have included iced, elevation which provided some relief.         Vitals:    12/20/23 1550   Weight: 101.8 kg (224 lb 6.9 oz)   Height: 5' 1" (1.549 m)   PainSc:   7      Shoe Size: 8.5-9    Past Surgical History:   Procedure Laterality Date    ABLATION OF DYSRHYTHMIC FOCUS  2016    x2    BIOPSY OF CERVICAL LYMPH NODE Left 11/29/2021    Procedure: BIOPSY, LYMPH NODE, CERVICAL;  Surgeon: Blanco Kerns MD;  Location: [facility] OR;  Service: General;  Laterality: Left;    CERVIX LESION DESTRUCTION  11/2021    CONIZATION OF CERVIX USING LOOP ELECTROSURGICAL EXCISION PROCEDURE (LEEP) N/A 07/21/2021    Procedure: LEEP CONIZATION, CERVIX;  Surgeon: Donna Castillo MD;  Location: [facility] OR;  Service: OB/GYN;  Laterality: N/A;    EXCISION OF LESION OF NOSE N/A 10/14/2019    Procedure: EXCISION, LESION, NOSE;  Surgeon: Umesh Morales MD;  Location: [facility] OR;  Service: ENT;  Laterality: N/A;    FUNCTIONAL ENDOSCOPIC SINUS SURGERY (FESS) N/A 10/14/2019    Procedure: FESS (FUNCTIONAL ENDOSCOPIC SINUS SURGERY);  Surgeon: Umesh Morales MD;  Location: [facility] OR;  Service: ENT;  Laterality: N/A;    HYSTEROSCOPY " WITH DILATION AND CURETTAGE OF UTERUS N/A 07/21/2021    Procedure: HYSTEROSCOPY, WITH DILATION AND CURETTAGE OF UTERUS;  Surgeon: Donna Castillo MD;  Location: [facility] OR;  Service: OB/GYN;  Laterality: N/A;    MEDIPORT REMOVAL Right 06/20/2022    Procedure: REMOVAL, CATHETER, CENTRAL VENOUS, TUNNELED, WITH PORT;  Surgeon: Blanco Kerns MD;  Location: [facility] OR;  Service: General;  Laterality: Right;    NASAL SEPTOPLASTY N/A 10/14/2019    Procedure: SEPTOPLASTY, NOSE;  Surgeon: Umesh Morales MD;  Location: [facility] OR;  Service: ENT;  Laterality: N/A;    NASAL SINUS SURGERY  10/14/2019    OSTEOTOMY OF METATARSAL BONE Left 12/21/2022    Procedure: OSTEOTOMY, METATARSAL BONE;  Surgeon: Hola Escalante DPM;  Location: [facility] OR;  Service: Podiatry;  Laterality: Left;  Zursh notified 12/19 TW  , RELEASE ENTRAPPED NERVE 2ND INTERMETATARSAL SPACE    PILONIDAL CYST DRAINAGE      REVISION OF SCAR  06/20/2022    Procedure: REVISION, SCAR;  Surgeon: Blanco Kerns MD;  Location: [facility] OR;  Service: General;;    SINUS SURGERY  11/30/2023    SKIN TAG REMOVAL Left 06/20/2022    Procedure: REMOVAL, SKIN TAG;  Surgeon: Blanco Kerns MD;  Location: [facility] OR;  Service: General;  Laterality: Left;    TONSILLECTOMY      TYMPANOPLASTY N/A 10/14/2019    Procedure: TYMPANOPLASTY;  Surgeon: Umesh Morales MD;  Location: [facility];  Service: ENT;  Laterality: N/A;    TYMPANOTOMY Left 10/14/2019    left     Past Medical History:   Diagnosis Date    Allergic rhinitis     Anxiety     Cancer 12/01/2021    B-cell folicular lymphoma    Depression     Hypothyroidism     SVT (supraventricular tachycardia) 2011     Family History   Problem Relation Age of Onset    Hypertension Mother     Heart disease Father 59        CABG    Heart failure Father     Breast cancer Maternal Aunt 55    Colon cancer Neg Hx     Ovarian cancer Neg Hx         Social History:   Marital Status: Single  Alcohol History:  reports current alcohol use of about 5.0  standard drinks of alcohol per week.  Tobacco History:  reports that she quit smoking about 14 years ago. Her smoking use included cigarettes. She started smoking about 23 years ago. She has a 4.5 pack-year smoking history. She has never used smokeless tobacco.  Drug History:  reports no history of drug use.    Review of patient's allergies indicates:  No Known Allergies    Current Outpatient Medications   Medication Sig Dispense Refill    ALPRAZolam (XANAX) 2 MG Tab Take 1 tablet (2 mg total) by mouth 2 (two) times daily as needed. 40 tablet 0    busPIRone (BUSPAR) 10 MG tablet Take 10 mg by mouth 2 (two) times daily.      doxycycline (MONODOX) 100 MG capsule 1 capsule twice daily by mouth (Patient taking differently: Take 100 mg by mouth every 12 (twelve) hours. 1 capsule twice daily by mouth) 60 capsule 11    dronabinoL (MARINOL) 5 MG capsule Take 1 capsule (5 mg total) by mouth 2 (two) times daily as needed (decreased appetite). 60 capsule 0    fluticasone propionate (FLONASE) 50 mcg/actuation nasal spray 2 sprays (100 mcg total) by Each Nostril route once daily. 11.1 mL 5    gabapentin (NEURONTIN) 600 MG tablet Take 600 mg by mouth 3 (three) times daily as needed.      hydrOXYzine pamoate (VISTARIL) 50 MG Cap Take 1 capsule (50 mg total) by mouth 2 (two) times daily as needed. 60 capsule 2    ibuprofen (ADVIL,MOTRIN) 800 MG tablet Take 1 tablet (800 mg total) by mouth every 8 (eight) hours as needed for Pain. 60 tablet 0    levothyroxine (SYNTHROID) 50 MCG tablet Take 1 tablet by mouth once daily 30 tablet 3    meloxicam (MOBIC) 15 MG tablet Take 1 tablet (15 mg total) by mouth once daily. 30 tablet 1    multivitamin capsule Take 1 capsule by mouth.      prazosin (MINIPRESS) 1 MG Cap Take 1 mg by mouth every evening.      promethazine (PHENERGAN) 25 MG tablet Take 1 tablet (25 mg total) by mouth every 8 (eight) hours as needed for Nausea. 40 tablet 1    sertraline (ZOLOFT) 100 MG tablet Take 1 tablet (100 mg  total) by mouth once daily. 30 tablet 1    tiZANidine (ZANAFLEX) 4 MG tablet Take 4 mg by mouth 3 (three) times daily as needed.      traMADoL (ULTRAM) 50 mg tablet Take 1 tablet (50 mg total) by mouth every 6 (six) hours as needed for Pain. 28 tablet 0     No current facility-administered medications for this visit.       Review of Systems   Constitutional:  Negative for chills, fatigue, fever and unexpected weight change.   HENT:  Negative for hearing loss and trouble swallowing.    Eyes:  Negative for photophobia and visual disturbance.   Respiratory:  Negative for cough, shortness of breath and wheezing.    Cardiovascular:  Negative for chest pain, palpitations and leg swelling.   Gastrointestinal:  Negative for abdominal pain and nausea.   Genitourinary:  Negative for dysuria and frequency.   Musculoskeletal:  Positive for arthralgias. Negative for back pain, gait problem, joint swelling, myalgias and neck pain.   Skin:  Negative for rash and wound.   Neurological:  Negative for tremors, seizures, weakness, numbness and headaches.   Hematological:  Does not bruise/bleed easily.         Objective:        Physical Exam:   Foot Exam    General  General Appearance: appears stated age and healthy   Orientation: alert and oriented to person, place, and time   Affect: appropriate   Gait: antalgic       Left Foot/Ankle      Inspection and Palpation  Ecchymosis: none  Tenderness: lesser metatarsophalangeal joints (Metatarsal head and 2nd interspace)  Swelling: lesser metatarsophalangeal joints (Plantar 2nd metatarsal head)  Hammertoes: second toe (2nd toe with slight dorsal medial deviation at 2nd MPJ)  Skin Exam: skin intact;   Neurovascular  Dorsalis pedis: 2+  Posterior tibial: 2+  Capillary refill: 2+  Saphenous nerve sensation: normal  Tibial nerve sensation: normal  Superficial peroneal nerve sensation: normal  Deep peroneal nerve sensation: normal  Sural nerve sensation: normal    Muscle Strength  Ankle  dorsiflexion: 5  Ankle plantar flexion: 5  Ankle inversion: 5  Ankle eversion: 5  Great toe extension: 5  Great toe flexion: 5    Range of Motion    Normal left ankle ROM    Tests  Paresthesia: positive      Physical Exam  Cardiovascular:      Pulses:           Dorsalis pedis pulses are 2+ on the left side.        Posterior tibial pulses are 2+ on the left side.               Left Ankle/Foot Exam     Swelling   The patient is swollen on the lesser metatarsophalangeal joints.    Tenderness   The patient is tender to palpation of the lesser metatarsophalangeal joints.    Range of Motion   The patient has normal left ankle ROM.       Muscle Strength   Left Lower Extremity   Ankle Dorsiflexion:  5   Plantar flexion:  5/5     Reflexes     Left Side  Paresthesia: present    Vascular Exam       Left Pulses  Dorsalis Pedis:      2+  Posterior Tibial:      2+           Imaging: X-Ray Foot Complete Left  Narrative: EXAMINATION:  XR FOOT COMPLETE 3 VIEW LEFT    CLINICAL HISTORY:  .  Pain in left foot    TECHNIQUE:  AP, lateral and oblique views of the left foot were performed.    COMPARISON:  03/30/2023    FINDINGS:  There has been osteotomy of the 2nd metatarsal head with a single screw.  No acute fracture or dislocation.  There are small dorsal and plantar heel spurs.  The soft tissues are unremarkable.  Impression: As above    Electronically signed by: Cliff Sheehan MD  Date:    12/20/2023  Time:    16:23               Assessment:       1. Capsulitis of metatarsophalangeal (MTP) joint of left foot    2. Left foot pain    3. Painful scar      Plan:   Capsulitis of metatarsophalangeal (MTP) joint of left foot  -     methylPREDNISolone acetate injection 20 mg  -     BUPivacaine injection 7.5 mg  -     dexAMETHasone injection 4 mg  -     Ambulatory referral/consult to Physical/Occupational Therapy; Future; Expected date: 12/27/2023  -     AIR CAST WALKER BOOT FOR HOME USE    Left foot pain  -     X-Ray Foot Complete Left  -      methylPREDNISolone acetate injection 20 mg  -     BUPivacaine injection 7.5 mg  -     dexAMETHasone injection 4 mg  -     Ambulatory referral/consult to Physical/Occupational Therapy; Future; Expected date: 12/27/2023  -     AIR CAST WALKER BOOT FOR HOME USE  -     meloxicam (MOBIC) 15 MG tablet; Take 1 tablet (15 mg total) by mouth once daily.  Dispense: 30 tablet; Refill: 1  -     traMADoL (ULTRAM) 50 mg tablet; Take 1 tablet (50 mg total) by mouth every 6 (six) hours as needed for Pain.  Dispense: 28 tablet; Refill: 0    Painful scar  -     methylPREDNISolone acetate injection 20 mg  -     BUPivacaine injection 7.5 mg  -     dexAMETHasone injection 4 mg  -     Ambulatory referral/consult to Physical/Occupational Therapy; Future; Expected date: 12/27/2023  -     AIR CAST WALKER BOOT FOR HOME USE      Follow up if symptoms worsen or fail to improve.    Procedures Informed consent was obtained.  Time-out was called.  The area was prepped with alcohol, and a steroid injection was performed at left 2nd MPJ and 2nd intermetatarsal space using 1.5 cc of 0.5% Marcaine w/out epi, 1 cc Dexamethasone, 0.5 cc Methylprednisolone. Patient tolerated the procedure well.           I discussed the patient that I believe her pain is coming from the slight dorsiflexion of the 2nd toe putting excess stress on the 2nd MPJ.  It appears that her pain is coming more from the metatarsophalangeal joint as opposed to possible neuroma.  Also I discussed the findings of significant scar tissue in the 2nd intermetatarsal space.  We discussed different ongoing treatment options.  I am going to give her a boot.  I am also going to a steroid injection into the symptomatic area which should help with inflammation and potentially break down some of the scar tissue.  I am also going to send her to physical therapy to work on mobilizing the 2nd MPJ.    Counseling:     I provided patient education verbally regarding:   Patient diagnosis, treatment  options, as well as alternatives, risks, and benefits.     This note was created using Dragon voice recognition software that occasionally misinterpreted phrases or words.

## 2023-12-21 ENCOUNTER — PATIENT MESSAGE (OUTPATIENT)
Dept: PODIATRY | Facility: CLINIC | Age: 38
End: 2023-12-21
Payer: MEDICAID

## 2023-12-21 PROCEDURE — 99999PBSHW PR PBB SHADOW TECHNICAL ONLY FILED TO HB: ICD-10-PCS | Mod: PBBFAC,,,

## 2023-12-21 PROCEDURE — 99999PBSHW PR PBB SHADOW TECHNICAL ONLY FILED TO HB: Mod: PBBFAC,,,

## 2023-12-21 RX ORDER — TRAMADOL HYDROCHLORIDE 50 MG/1
50 TABLET ORAL EVERY 6 HOURS PRN
Qty: 28 TABLET | Refills: 0 | Status: SHIPPED | OUTPATIENT
Start: 2023-12-21 | End: 2024-01-24 | Stop reason: SDUPTHER

## 2023-12-21 RX ORDER — DEXAMETHASONE SODIUM PHOSPHATE 4 MG/ML
4 INJECTION, SOLUTION INTRA-ARTICULAR; INTRALESIONAL; INTRAMUSCULAR; INTRAVENOUS; SOFT TISSUE
Status: COMPLETED | OUTPATIENT
Start: 2023-12-21 | End: 2023-12-21

## 2023-12-21 RX ORDER — BUPIVACAINE HYDROCHLORIDE 5 MG/ML
1.5 INJECTION, SOLUTION PERINEURAL
Status: COMPLETED | OUTPATIENT
Start: 2023-12-21 | End: 2023-12-21

## 2023-12-21 RX ORDER — METHYLPREDNISOLONE ACETATE 40 MG/ML
20 INJECTION, SUSPENSION INTRA-ARTICULAR; INTRALESIONAL; INTRAMUSCULAR; SOFT TISSUE
Status: COMPLETED | OUTPATIENT
Start: 2023-12-21 | End: 2023-12-21

## 2023-12-21 RX ORDER — MELOXICAM 15 MG/1
15 TABLET ORAL DAILY
Qty: 30 TABLET | Refills: 1 | Status: SHIPPED | OUTPATIENT
Start: 2023-12-21 | End: 2024-02-19

## 2023-12-21 RX ADMIN — BUPIVACAINE HYDROCHLORIDE 7.5 MG: 5 INJECTION, SOLUTION PERINEURAL at 08:12

## 2023-12-21 RX ADMIN — DEXAMETHASONE SODIUM PHOSPHATE 4 MG: 4 INJECTION INTRA-ARTICULAR; INTRALESIONAL; INTRAMUSCULAR; INTRAVENOUS; SOFT TISSUE at 08:12

## 2023-12-21 RX ADMIN — METHYLPREDNISOLONE ACETATE 20 MG: 40 INJECTION, SUSPENSION INTRA-ARTICULAR; INTRALESIONAL; INTRAMUSCULAR; INTRASYNOVIAL; SOFT TISSUE at 08:12

## 2023-12-27 ENCOUNTER — PATIENT MESSAGE (OUTPATIENT)
Dept: PODIATRY | Facility: CLINIC | Age: 38
End: 2023-12-27
Payer: MEDICAID

## 2024-01-04 ENCOUNTER — OFFICE VISIT (OUTPATIENT)
Dept: ORTHOPEDICS | Facility: CLINIC | Age: 39
End: 2024-01-04
Payer: MEDICAID

## 2024-01-04 VITALS — BODY MASS INDEX: 42.29 KG/M2 | HEIGHT: 61 IN | WEIGHT: 224 LBS

## 2024-01-04 DIAGNOSIS — M25.561 ACUTE PAIN OF RIGHT KNEE: ICD-10-CM

## 2024-01-04 DIAGNOSIS — M17.11 PRIMARY OSTEOARTHRITIS OF RIGHT KNEE: Primary | ICD-10-CM

## 2024-01-04 PROCEDURE — 1160F RVW MEDS BY RX/DR IN RCRD: CPT | Mod: CPTII,S$GLB,, | Performed by: ORTHOPAEDIC SURGERY

## 2024-01-04 PROCEDURE — 1159F MED LIST DOCD IN RCRD: CPT | Mod: CPTII,S$GLB,, | Performed by: ORTHOPAEDIC SURGERY

## 2024-01-04 PROCEDURE — 99213 OFFICE O/P EST LOW 20 MIN: CPT | Mod: S$GLB,,, | Performed by: ORTHOPAEDIC SURGERY

## 2024-01-04 PROCEDURE — 3008F BODY MASS INDEX DOCD: CPT | Mod: CPTII,S$GLB,, | Performed by: ORTHOPAEDIC SURGERY

## 2024-01-04 NOTE — PROGRESS NOTES
[facility]    Subjective:     Chief Complaint:   Chief Complaint   Patient presents with    Right Knee - Pain     Right knee pain. Pt would like to discuss surgery and would like a shot (last shot was 4/12/23). Constant aching pain and must work with knee brace.       Past Medical History:   Diagnosis Date    Allergic rhinitis     Anxiety     Cancer 12/01/2021    B-cell folicular lymphoma    Depression     Hypothyroidism     SVT (supraventricular tachycardia) 2011       Past Surgical History:   Procedure Laterality Date    ABLATION OF DYSRHYTHMIC FOCUS  2016    x2    BIOPSY OF CERVICAL LYMPH NODE Left 11/29/2021    Procedure: BIOPSY, LYMPH NODE, CERVICAL;  Surgeon: Blanco Kerns MD;  Location: [facility] OR;  Service: General;  Laterality: Left;    CERVIX LESION DESTRUCTION  11/2021    CONIZATION OF CERVIX USING LOOP ELECTROSURGICAL EXCISION PROCEDURE (LEEP) N/A 07/21/2021    Procedure: LEEP CONIZATION, CERVIX;  Surgeon: Donna Castillo MD;  Location: [facility] OR;  Service: OB/GYN;  Laterality: N/A;    EXCISION OF LESION OF NOSE N/A 10/14/2019    Procedure: EXCISION, LESION, NOSE;  Surgeon: Umesh Morales MD;  Location: [facility] OR;  Service: ENT;  Laterality: N/A;    FUNCTIONAL ENDOSCOPIC SINUS SURGERY (FESS) N/A 10/14/2019    Procedure: FESS (FUNCTIONAL ENDOSCOPIC SINUS SURGERY);  Surgeon: Umesh Morales MD;  Location: [facility] OR;  Service: ENT;  Laterality: N/A;    HYSTEROSCOPY WITH DILATION AND CURETTAGE OF UTERUS N/A 07/21/2021    Procedure: HYSTEROSCOPY, WITH DILATION AND CURETTAGE OF UTERUS;  Surgeon: Donna Castillo MD;  Location: [facility] OR;  Service: OB/GYN;  Laterality: N/A;    MEDIPORT REMOVAL Right 06/20/2022    Procedure: REMOVAL, CATHETER, CENTRAL VENOUS, TUNNELED, WITH PORT;  Surgeon: Blanco Kerns MD;  Location: [facility] OR;  Service: General;  Laterality: Right;    NASAL SEPTOPLASTY N/A 10/14/2019    Procedure: SEPTOPLASTY, NOSE;  Surgeon: Umesh Morales MD;  Location: [facility] OR;  Service: ENT;   Laterality: N/A;    NASAL SINUS SURGERY  10/14/2019    OSTEOTOMY OF METATARSAL BONE Left 12/21/2022    Procedure: OSTEOTOMY, METATARSAL BONE;  Surgeon: Hola Escalante DPM;  Location: [facility] OR;  Service: Podiatry;  Laterality: Left;  denys marrero notified 12/19 TW  , RELEASE ENTRAPPED NERVE 2ND INTERMETATARSAL SPACE    PILONIDAL CYST DRAINAGE      REVISION OF SCAR  06/20/2022    Procedure: REVISION, SCAR;  Surgeon: Blanco Kerns MD;  Location: [facility] OR;  Service: General;;    SINUS SURGERY  11/30/2023    SKIN TAG REMOVAL Left 06/20/2022    Procedure: REMOVAL, SKIN TAG;  Surgeon: Blanco Kerns MD;  Location: [facility] OR;  Service: General;  Laterality: Left;    TONSILLECTOMY      TYMPANOPLASTY N/A 10/14/2019    Procedure: TYMPANOPLASTY;  Surgeon: Umesh Morales MD;  Location: [facility] OR;  Service: ENT;  Laterality: N/A;    TYMPANOTOMY Left 10/14/2019    left       Current Outpatient Medications   Medication Sig    ALPRAZolam (XANAX) 2 MG Tab Take 1 tablet (2 mg total) by mouth 2 (two) times daily as needed.    busPIRone (BUSPAR) 10 MG tablet Take 10 mg by mouth 2 (two) times daily.    doxycycline (MONODOX) 100 MG capsule 1 capsule twice daily by mouth (Patient taking differently: Take 100 mg by mouth every 12 (twelve) hours. 1 capsule twice daily by mouth)    dronabinoL (MARINOL) 5 MG capsule Take 1 capsule (5 mg total) by mouth 2 (two) times daily as needed (decreased appetite).    fluticasone propionate (FLONASE) 50 mcg/actuation nasal spray 2 sprays (100 mcg total) by Each Nostril route once daily.    gabapentin (NEURONTIN) 600 MG tablet Take 600 mg by mouth 3 (three) times daily as needed.    hydrOXYzine pamoate (VISTARIL) 50 MG Cap Take 1 capsule (50 mg total) by mouth 2 (two) times daily as needed.    ibuprofen (ADVIL,MOTRIN) 800 MG tablet Take 1 tablet (800 mg total) by mouth every 8 (eight) hours as needed for Pain.    levothyroxine (SYNTHROID) 50 MCG tablet Take 1 tablet by mouth once daily    meloxicam  "(MOBIC) 15 MG tablet Take 1 tablet (15 mg total) by mouth once daily.    multivitamin capsule Take 1 capsule by mouth.    prazosin (MINIPRESS) 1 MG Cap Take 1 mg by mouth every evening.    promethazine (PHENERGAN) 25 MG tablet Take 1 tablet (25 mg total) by mouth every 8 (eight) hours as needed for Nausea.    sertraline (ZOLOFT) 100 MG tablet Take 1 tablet (100 mg total) by mouth once daily.    tiZANidine (ZANAFLEX) 4 MG tablet Take 4 mg by mouth 3 (three) times daily as needed.    traMADoL (ULTRAM) 50 mg tablet Take 1 tablet (50 mg total) by mouth every 6 (six) hours as needed for Pain.     No current facility-administered medications for this visit.       Review of patient's allergies indicates:  No Known Allergies    Family History   Problem Relation Age of Onset    Hypertension Mother     Heart disease Father 59        CABG    Heart failure Father     Breast cancer Maternal Aunt 55    Colon cancer Neg Hx     Ovarian cancer Neg Hx        Social History     Socioeconomic History    Marital status: Single   Occupational History    Occupation: [employer]     Comment: [employer]   Tobacco Use    Smoking status: Former     Current packs/day: 0.00     Average packs/day: 0.5 packs/day for 9.0 years (4.5 ttl pk-yrs)     Types: Cigarettes     Start date: 2000     Quit date: 2009     Years since quittin.5    Smokeless tobacco: Never   Substance and Sexual Activity    Alcohol use: Yes     Alcohol/week: 5.0 standard drinks of alcohol     Types: 5 Shots of liquor per week     Comment: "occasionally"    Drug use: No    Sexual activity: Not Currently     Partners: Male     Birth control/protection: OCP   Social History Narrative    The patient does not exercise regularly ().  Rates diet as fair.  She is not satisfied with weight.           History of present illness:  Patient returns today for her right knee.  Continues complain of pain and instability on the right side.  The knee simply gives way.  It is constant " discomfort.  She wants to have something done.  She has had 2 injections and physical therapy.  That has not helped.      Review of Systems:    Constitution: Negative for chills, fever, and sweats.  Negative for unexplained weight loss.    HENT:  Negative for headaches and blurry vision.    Cardiovascular:Negative for chest pain or irregular heart beat. Negative for hypertension.    Respiratory:  Negative for cough and shortness of breath.    Gastrointestinal: Negative for abdominal pain, heartburn, melena, nausea, and vomitting.    Genitourinary:  Negative bladder incontinence and dysuria.    Musculoskeletal:  See HPI for details.     Neurological: Negative for numbness.    Psychiatric/Behavioral: Negative for depression.  The patient is not nervous/anxious.      Endocrine: Negative for polyuria    Hematologic/Lymphatic: Negative for bleeding problem.  Does not bruise/bleed easily.    Skin: Negative for poor would healing and rash    Objective:      Physical Examination:    Vital Signs:  There were no vitals filed for this visit.    Body mass index is 42.32 kg/m².    This a well-developed, well nourished patient in no acute distress.  They are alert and oriented and cooperative to examination.        Patient appears to be stated age she is good range of motion 0-130 degrees the knee is stable to varus valgus stresses she is lot of medial joint line tenderness.  Positive Rosa's on the right side.  Pertinent New Results:    XRAY Report / Interpretation:       Assessment/Plan:      Medial meniscal tear right knee.  She has failed a prolonged course of conservative care.  I have offered her arthroscopy partial medial meniscectomy.  The risks and benefits discussed with her in great detail.  She understood and wished to proceed      This note was created using Dragon voice recognition software that occasionally misinterpreted phrases or words.

## 2024-01-06 ENCOUNTER — PATIENT MESSAGE (OUTPATIENT)
Dept: OBSTETRICS AND GYNECOLOGY | Facility: CLINIC | Age: 39
End: 2024-01-06
Payer: MEDICAID

## 2024-01-08 DIAGNOSIS — F43.9 SITUATIONAL STRESS: ICD-10-CM

## 2024-01-08 RX ORDER — ALPRAZOLAM 2 MG/1
2 TABLET ORAL 2 TIMES DAILY PRN
Qty: 40 TABLET | Refills: 0 | Status: SHIPPED | OUTPATIENT
Start: 2024-01-08 | End: 2024-02-07

## 2024-01-16 RX ORDER — LEVOTHYROXINE SODIUM 50 UG/1
50 TABLET ORAL
Qty: 30 TABLET | Refills: 3 | Status: SHIPPED | OUTPATIENT
Start: 2024-01-16 | End: 2024-04-23 | Stop reason: SDUPTHER

## 2024-01-19 DIAGNOSIS — S83.241D ACUTE MEDIAL MENISCUS TEAR OF RIGHT KNEE, SUBSEQUENT ENCOUNTER: ICD-10-CM

## 2024-01-19 DIAGNOSIS — S83.241D ACUTE MEDIAL MENISCAL TEAR, RIGHT, SUBSEQUENT ENCOUNTER: Primary | ICD-10-CM

## 2024-01-19 RX ORDER — CEFAZOLIN SODIUM 2 G/50ML
2 SOLUTION INTRAVENOUS
Status: CANCELLED | OUTPATIENT
Start: 2024-01-19

## 2024-01-23 ENCOUNTER — PATIENT MESSAGE (OUTPATIENT)
Dept: ORTHOPEDICS | Facility: CLINIC | Age: 39
End: 2024-01-23

## 2024-01-23 DIAGNOSIS — S83.241D ACUTE MEDIAL MENISCAL TEAR, RIGHT, SUBSEQUENT ENCOUNTER: Primary | ICD-10-CM

## 2024-01-23 DIAGNOSIS — M79.672 LEFT FOOT PAIN: ICD-10-CM

## 2024-01-24 RX ORDER — TRAMADOL HYDROCHLORIDE 50 MG/1
50 TABLET ORAL EVERY 6 HOURS PRN
Qty: 28 TABLET | Refills: 0 | Status: SHIPPED | OUTPATIENT
Start: 2024-01-24 | End: 2024-02-06

## 2024-01-26 ENCOUNTER — OFFICE VISIT (OUTPATIENT)
Dept: OBSTETRICS AND GYNECOLOGY | Facility: CLINIC | Age: 39
End: 2024-01-26
Payer: MEDICAID

## 2024-01-26 ENCOUNTER — LAB VISIT (OUTPATIENT)
Dept: LAB | Facility: OTHER | Age: 39
End: 2024-01-26
Attending: OBSTETRICS & GYNECOLOGY
Payer: MEDICAID

## 2024-01-26 VITALS
SYSTOLIC BLOOD PRESSURE: 112 MMHG | HEIGHT: 61 IN | DIASTOLIC BLOOD PRESSURE: 84 MMHG | WEIGHT: 226 LBS | BODY MASS INDEX: 42.67 KG/M2

## 2024-01-26 DIAGNOSIS — L02.32 BOIL OF BUTTOCK: Primary | ICD-10-CM

## 2024-01-26 DIAGNOSIS — Z11.3 SCREEN FOR STD (SEXUALLY TRANSMITTED DISEASE): ICD-10-CM

## 2024-01-26 LAB
HCV AB SERPL QL IA: NORMAL
HIV 1+2 AB+HIV1 P24 AG SERPL QL IA: NORMAL

## 2024-01-26 PROCEDURE — 99213 OFFICE O/P EST LOW 20 MIN: CPT | Mod: S$PBB,,, | Performed by: OBSTETRICS & GYNECOLOGY

## 2024-01-26 PROCEDURE — 99212 OFFICE O/P EST SF 10 MIN: CPT | Mod: PBBFAC | Performed by: OBSTETRICS & GYNECOLOGY

## 2024-01-26 PROCEDURE — 36415 COLL VENOUS BLD VENIPUNCTURE: CPT | Performed by: OBSTETRICS & GYNECOLOGY

## 2024-01-26 PROCEDURE — 3008F BODY MASS INDEX DOCD: CPT | Mod: CPTII,,, | Performed by: OBSTETRICS & GYNECOLOGY

## 2024-01-26 PROCEDURE — 86803 HEPATITIS C AB TEST: CPT | Performed by: OBSTETRICS & GYNECOLOGY

## 2024-01-26 PROCEDURE — 86592 SYPHILIS TEST NON-TREP QUAL: CPT | Performed by: OBSTETRICS & GYNECOLOGY

## 2024-01-26 PROCEDURE — 99999 PR PBB SHADOW E&M-EST. PATIENT-LVL II: CPT | Mod: PBBFAC,,, | Performed by: OBSTETRICS & GYNECOLOGY

## 2024-01-26 PROCEDURE — 87389 HIV-1 AG W/HIV-1&-2 AB AG IA: CPT | Performed by: OBSTETRICS & GYNECOLOGY

## 2024-01-26 PROCEDURE — 3074F SYST BP LT 130 MM HG: CPT | Mod: CPTII,,, | Performed by: OBSTETRICS & GYNECOLOGY

## 2024-01-26 PROCEDURE — 3079F DIAST BP 80-89 MM HG: CPT | Mod: CPTII,,, | Performed by: OBSTETRICS & GYNECOLOGY

## 2024-01-26 PROCEDURE — 87491 CHLMYD TRACH DNA AMP PROBE: CPT | Performed by: OBSTETRICS & GYNECOLOGY

## 2024-01-26 NOTE — PROGRESS NOTES
Subjective:       Patient ID: Jerry Coronado is a 38 y.o. female.    Chief Complaint:  Consult (Bartholins Cyst Eval) and Follow-up      History of Present Illness  37 yo  presents with  complaints of a recurrent cyst to left perineum.  Concerned for Bartholin's cyst.  Denies current symptoms but notes cyst occurs with sweating.   Also requesting STD screening.     Patient Active Problem List   Diagnosis    Insomnia secondary to depression with anxiety    Low back pain    Acne    Situational stress    Breast hypertrophy    Hypothyroidism    Allergic rhinitis    Chronic infection of sinus    Hypertrophy of nasal turbinates    Deviated nasal septum    Eustachian tube dysfunction    Acquired atrophy of thyroid    Alcohol abuse    Alcohol use with alcohol-induced mood disorder    Atypical squamous cells of undetermined significance (ASCUS) on Papanicolaou smear of cervix    Carpal tunnel syndrome    Cervical intraepithelial neoplasia grade 2    Overdose    Jaw pain    Moderate recurrent major depression    Obstructive sleep apnea syndrome    Other depressive episodes    Personal history of other infectious and parasitic diseases    Postherpetic neuralgia    Shock    Supraventricular tachycardia    Tobacco user    Vaginal high risk human papillomavirus (HPV) DNA test positive    Low grade squamous intraepithelial dysplasia    Atypical glandular cells of undetermined significance (LEE) on cervical Pap smear    Cervical dysplasia    Abnormal uterine bleeding (AUB)    Arcuate uterus    Supraclavicular lymphadenopathy    Axillary lymphadenopathy    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    Encounter for removal of tunneled central venous catheter (CVC) with port    Decreased range of motion (ROM) of right knee       Past Medical History:   Diagnosis Date    Allergic rhinitis     Anxiety     Cancer 2021    B-cell folicular lymphoma    Depression     Hypothyroidism     SVT (supraventricular tachycardia)  2011       Past Surgical History:   Procedure Laterality Date    ABLATION OF DYSRHYTHMIC FOCUS  2016    x2    BIOPSY OF CERVICAL LYMPH NODE Left 11/29/2021    Procedure: BIOPSY, LYMPH NODE, CERVICAL;  Surgeon: Blanco Kerns MD;  Location: [facility] OR;  Service: General;  Laterality: Left;    CERVIX LESION DESTRUCTION  11/2021    CONIZATION OF CERVIX USING LOOP ELECTROSURGICAL EXCISION PROCEDURE (LEEP) N/A 07/21/2021    Procedure: LEEP CONIZATION, CERVIX;  Surgeon: Donna Castillo MD;  Location: [facility] OR;  Service: OB/GYN;  Laterality: N/A;    EXCISION OF LESION OF NOSE N/A 10/14/2019    Procedure: EXCISION, LESION, NOSE;  Surgeon: Umesh Morales MD;  Location: [facility] OR;  Service: ENT;  Laterality: N/A;    FUNCTIONAL ENDOSCOPIC SINUS SURGERY (FESS) N/A 10/14/2019    Procedure: FESS (FUNCTIONAL ENDOSCOPIC SINUS SURGERY);  Surgeon: Umesh Morales MD;  Location: [facility] OR;  Service: ENT;  Laterality: N/A;    HYSTEROSCOPY WITH DILATION AND CURETTAGE OF UTERUS N/A 07/21/2021    Procedure: HYSTEROSCOPY, WITH DILATION AND CURETTAGE OF UTERUS;  Surgeon: Donna Castillo MD;  Location: [facility] OR;  Service: OB/GYN;  Laterality: N/A;    MEDIPORT REMOVAL Right 06/20/2022    Procedure: REMOVAL, CATHETER, CENTRAL VENOUS, TUNNELED, WITH PORT;  Surgeon: Blanco Kerns MD;  Location: [facility] OR;  Service: General;  Laterality: Right;    NASAL SEPTOPLASTY N/A 10/14/2019    Procedure: SEPTOPLASTY, NOSE;  Surgeon: Umesh Morales MD;  Location: [facility] OR;  Service: ENT;  Laterality: N/A;    NASAL SINUS SURGERY  10/14/2019    OSTEOTOMY OF METATARSAL BONE Left 12/21/2022    Procedure: OSTEOTOMY, METATARSAL BONE;  Surgeon: Hola Escalante DPM;  Location: [facility] OR;  Service: Podiatry;  Laterality: Left;  denys marrero notified 12/19 TW  , RELEASE ENTRAPPED NERVE 2ND INTERMETATARSAL SPACE    PILONIDAL CYST DRAINAGE      REVISION OF SCAR  06/20/2022    Procedure: REVISION, SCAR;  Surgeon: Blanco Kerns MD;  Location: [facility] OR;  Service:  "General;;    SINUS SURGERY  2023    SKIN TAG REMOVAL Left 2022    Procedure: REMOVAL, SKIN TAG;  Surgeon: Blanco Kerns MD;  Location: [facility] OR;  Service: General;  Laterality: Left;    TONSILLECTOMY      TYMPANOPLASTY N/A 10/14/2019    Procedure: TYMPANOPLASTY;  Surgeon: Umesh Morales MD;  Location: [facility] OR;  Service: ENT;  Laterality: N/A;    TYMPANOTOMY Left 10/14/2019    left       OB History    Para Term  AB Living   1 0 0   1     SAB IAB Ectopic Multiple Live Births     1            # Outcome Date GA Lbr Matteo/2nd Weight Sex Delivery Anes PTL Lv   1 IAB 2010              Birth Comments: elective AB      Obstetric Comments   Menarche age 12.    History of abnormal PAP smear: YES   History of sexually transmitted disease:  YES: HPV.                Patient's last menstrual period was 2023.   Date of Last Pap: 2021    Review of Systems  Review of Systems   Constitutional:  Negative for activity change, appetite change and fatigue.   Respiratory:  Negative for shortness of breath.    Cardiovascular:  Negative for chest pain.   Gastrointestinal:  Negative for abdominal pain, constipation and diarrhea.   Endocrine: Negative for cold intolerance and heat intolerance.   Genitourinary:  Negative for dysuria, frequency, menstrual irregularity, pelvic pain and vaginal discharge.   Integumentary:  Negative for breast mass, breast discharge and breast tenderness.   Psychiatric/Behavioral:  Negative for dysphoric mood. The patient is not nervous/anxious.    Breast: Negative for mass and tenderness       Objective:   /84   Ht 5' 1" (1.549 m)   Wt 102.5 kg (225 lb 15.5 oz)   LMP 2023   BMI 42.70 kg/m²   Body mass index is 42.7 kg/m².    APPEARANCE: Well nourished, well developed, in no acute distress.  PSYCH: Appropriate mood and affect.  SKIN: No acne or hirsutism  NECK: Neck symmetric without masses or thyromegaly  NODES: No inguinal, axillary, or supraclavicular lymph " node enlargement  ABDOMEN: Soft.  No tenderness or masses.    CARDIOVASCULAR: No edema of peripheral extremities  PELVIC: Normal external genitalia without lesions. Evidence of healed boil to left perineum. No erythema, induration, fluctuance. No drainage.  Normal hair distribution.  Adequate perineal body.     Results for orders placed or performed during the hospital encounter of 12/09/23   Magnesium   Result Value Ref Range    Magnesium 2.2 1.6 - 2.6 mg/dL   CBC auto differential   Result Value Ref Range    WBC 12.06 3.90 - 12.70 K/uL    RBC 4.62 4.00 - 5.40 M/uL    Hemoglobin 14.0 12.0 - 16.0 g/dL    Hematocrit 42.2 37.0 - 48.5 %    MCV 91 82 - 98 fL    MCH 30.3 27.0 - 31.0 pg    MCHC 33.2 32.0 - 36.0 g/dL    RDW 12.8 11.5 - 14.5 %    Platelets 316 150 - 450 K/uL    MPV 8.9 (L) 9.2 - 12.9 fL    Immature Granulocytes 0.9 (H) 0.0 - 0.5 %    Gran # (ANC) 9.2 (H) 1.8 - 7.7 K/uL    Immature Grans (Abs) 0.11 (H) 0.00 - 0.04 K/uL    Lymph # 1.6 1.0 - 4.8 K/uL    Mono # 0.7 0.3 - 1.0 K/uL    Eos # 0.2 0.0 - 0.5 K/uL    Baso # 0.14 0.00 - 0.20 K/uL    nRBC 0 0 /100 WBC    Gran % 76.4 (H) 38.0 - 73.0 %    Lymph % 13.4 (L) 18.0 - 48.0 %    Mono % 6.1 4.0 - 15.0 %    Eosinophil % 2.0 0.0 - 8.0 %    Basophil % 1.2 0.0 - 1.9 %    Differential Method Automated    Comprehensive metabolic panel   Result Value Ref Range    Sodium 144 136 - 145 mmol/L    Potassium 3.8 3.5 - 5.1 mmol/L    Chloride 112 (H) 95 - 110 mmol/L    CO2 23 23 - 29 mmol/L    Glucose 91 70 - 110 mg/dL    BUN 25 (H) 6 - 20 mg/dL    Creatinine 1.4 0.5 - 1.4 mg/dL    Calcium 8.5 (L) 8.7 - 10.5 mg/dL    Total Protein 6.8 6.0 - 8.4 g/dL    Albumin 4.5 3.5 - 5.2 g/dL    Total Bilirubin 0.2 0.1 - 1.0 mg/dL    Alkaline Phosphatase 54 (L) 55 - 135 U/L    AST 16 10 - 40 U/L    ALT 28 10 - 44 U/L    eGFR 49.1 (A) >60 mL/min/1.73 m^2    Anion Gap 9 8 - 16 mmol/L   Troponin I High Sensitivity #1   Result Value Ref Range    Troponin I High Sensitivity 2.3 0.0 - 14.9 pg/mL    B-Type natriuretic peptide (BNP)   Result Value Ref Range    BNP 12 0 - 99 pg/mL   Troponin I High Sensitivity #2   Result Value Ref Range    Troponin I High Sensitivity <2.3 0.0 - 14.9 pg/mL   POCT urine pregnancy   Result Value Ref Range    POC Preg Test, Ur Negative Negative     Acceptable Yes    POCT glucose   Result Value Ref Range    POC Glucose 92 70 - 110   ISTAT CREATININE   Result Value Ref Range    POC Creatinine 1.6 (H) 0.5 - 1.4 mg/dL    Sample VENOUS        Assessment/ Plan:     1. Boil of buttock        2. Screen for STD (sexually transmitted disease)  HIV 1/2 Ag/Ab (4th Gen)    RPR    HEPATITIS C ANTIBODY    C. trachomatis/N. gonorrhoeae by AMP DNA       Recommend antibacterial body wash and frequent changing of peripads.       Follow up in about 1 month (around 2/26/2024) for annual well woman exam or as needed.            Donna Castillo MD  [facility]  01/26/2024    Answers submitted by the patient for this visit:  Mass Questionnaire (Submitted on 1/21/2024)  Chief Complaint: Pulmonary/Chest Tumor/Mass  Chronicity: chronic  Onset: more than 1 month ago  Frequency: constantly  Progression since onset: waxing and waning  swollen glands: Yes  Aggravated by: intercourse, bending, walking  treatments tried: NSAIDs  Improvement on treatment: no relief

## 2024-01-29 LAB — RPR SER QL: NORMAL

## 2024-01-30 LAB
C TRACH DNA SPEC QL NAA+PROBE: NOT DETECTED
N GONORRHOEA DNA SPEC QL NAA+PROBE: NOT DETECTED

## 2024-02-02 ENCOUNTER — HOSPITAL ENCOUNTER (OUTPATIENT)
Dept: PREADMISSION TESTING | Facility: HOSPITAL | Age: 39
Discharge: HOME | End: 2024-02-02
Attending: ORTHOPAEDIC SURGERY
Payer: MEDICAID

## 2024-02-02 VITALS — HEIGHT: 61 IN | WEIGHT: 226 LBS | BODY MASS INDEX: 42.67 KG/M2

## 2024-02-02 DIAGNOSIS — Z01.818 PRE-OP TESTING: Primary | ICD-10-CM

## 2024-02-02 NOTE — DISCHARGE INSTRUCTIONS
SURGERY INSTRUCTIONS    A NURSE WILL CALL YOU BETWEEN 4 AND 6 PM THE EVENING PRIOR TO YOUR SURGERY.    ENTER THE HOSPITAL THROUGH THE PARKING GARAGE ENTRANCE AND CHECK IN AT REGISTRATION IN THE FRONT OF THE HOSPITAL.    THE  WILL GIVE YOU PAPERWORK TO BRING TO THE OUTPATIENT SURGERY DEPARTMENT.     THE NIGHT BEFORE YOUR SURGERY USE 1/2 BOTTLE OF SKIN PREP FROM THE NECK DOWN, THE MORNING OF SURGERY USE THE OTHER 1/2 OF BOTTLE. DO NOT USE SKIN PREP ON FACE OR HAIR, YOU MAY USE YOUR OWN FACE WASH AND SHAMPOO/CONDITIONER.     IF YOU ARE A SMOKER PLEASE DO NOT SMOKE OR VAPE THE DAY OF YOUR SURGERY.    DO NOT EAT OR DRINK ANYTHING AFTER MIDNIGHT THE NIGHT BEFORE YOUR SURGERY/PROCEDURE, THIS INCLUDES GUM, CANDY AND MINTS.  YOU MAY BRUSH YOUR TEETH AND RINSE YOUR MOUTH.     PLEASE FOLLOW INSTRUCTIONS REGARDING YOUR MEDICATIONS THAT ARE PRINTED ON YOUR MEDICATION SHEET ATTACHED. IF YOU ARE INSTRUCTED TO TAKE ANY MEDICATION THE MORNING OF SURGERY PLEASE DO SO WITH A SMALL SIP OF WATER.     *IF YOU ARE A DIABETIC MAKE SURE YOU HOLD ALL DIABETIC MEDICATION THE MORNING OF YOUR SURGERY AND CHECK YOUR BLOOD SUGAR THE MORNING OF YOUR SURGERY.    HOLD ASPIRIN, ASPIRIN CONTAINING PRODUCTS, FISH OIL,  AND IBUPROFEN 7 DAYS PRIOR TO YOUR SURGERY.     IF YOU ARE ON PRESCRIBED BLOOD THINNERS PLEASE FOLLOW YOUR SURGEONS ORDERS OR THE PRESCRIBING DOCTORS ORDERS REGARDING THESE MEDICATIONS.     YOU MAY TAKE TYLENOL OR PRESCRIPTION PAIN MEDICATION IF NEEDED UP UNTIL THE DAY OF SURGERY      **THE MORNING OF SURGERY**    NO LOTIONS, POWDERS OR PERFUME ON SKIN ONCE SKIN PREP IS USED.      REMOVE ALL JEWELRY.     DO NOT BRING ANY VALUABLES WITH YOU .    WEAR LOOSE COMFORTABLE CLOTHING .    DO NOT WEAR CONTACT LENSES.    BRING A CASE FOR GLASSES, HEARING AIDES AND DENTURES.    IF YOU HAVE A NERVE STIMULATOR PLEASE BRING CONTROLLER WITH YOU THE MORNING OF SURGERY.     BRING OVERNIGHT BAG IF INDICATED AND  CPAP MACHINE  IF YOU USE ONE AT HOME.    MAKE SURE YOU HAVE TRANSPORTATION ARRANGEMENTS FOR YOUR DISCHARGE, YOU CAN NOT BE DISCHARGED TO A [facility], [facility] OR .    CALL YOUR DOCTOR FOR ANY CHANGED IN YOUR CONDITION PRIOR TO SURGERY.    IF YOU HAVE ANY QUESTIONS REGARDING YOUR UPCOMING SURGERY CALL US [phone]

## 2024-02-06 DIAGNOSIS — S83.241D ACUTE MEDIAL MENISCAL TEAR, RIGHT, SUBSEQUENT ENCOUNTER: Primary | ICD-10-CM

## 2024-02-06 RX ORDER — OXYCODONE AND ACETAMINOPHEN 7.5; 325 MG/1; MG/1
1 TABLET ORAL EVERY 6 HOURS PRN
Qty: 28 TABLET | Refills: 0 | Status: SHIPPED | OUTPATIENT
Start: 2024-02-06 | End: 2024-02-12 | Stop reason: SDUPTHER

## 2024-02-07 ENCOUNTER — HOSPITAL ENCOUNTER (OUTPATIENT)
Facility: HOSPITAL | Age: 39
Discharge: HOME | End: 2024-02-07
Attending: ORTHOPAEDIC SURGERY | Admitting: ORTHOPAEDIC SURGERY
Payer: MEDICAID

## 2024-02-07 ENCOUNTER — ANESTHESIA EVENT (OUTPATIENT)
Dept: SURGERY | Facility: HOSPITAL | Age: 39
End: 2024-02-07
Payer: MEDICAID

## 2024-02-07 ENCOUNTER — ANESTHESIA (OUTPATIENT)
Dept: SURGERY | Facility: HOSPITAL | Age: 39
End: 2024-02-07
Payer: COMMERCIAL

## 2024-02-07 VITALS
HEIGHT: 61 IN | TEMPERATURE: 98 F | SYSTOLIC BLOOD PRESSURE: 134 MMHG | BODY MASS INDEX: 42.48 KG/M2 | WEIGHT: 225 LBS | HEART RATE: 88 BPM | RESPIRATION RATE: 17 BRPM | DIASTOLIC BLOOD PRESSURE: 79 MMHG | OXYGEN SATURATION: 97 %

## 2024-02-07 DIAGNOSIS — S83.241D ACUTE MEDIAL MENISCUS TEAR OF RIGHT KNEE, SUBSEQUENT ENCOUNTER: Primary | ICD-10-CM

## 2024-02-07 DIAGNOSIS — S83.241D ACUTE MEDIAL MENISCAL TEAR, RIGHT, SUBSEQUENT ENCOUNTER: ICD-10-CM

## 2024-02-07 DIAGNOSIS — Z98.890 PONV (POSTOPERATIVE NAUSEA AND VOMITING): Primary | ICD-10-CM

## 2024-02-07 DIAGNOSIS — R11.2 PONV (POSTOPERATIVE NAUSEA AND VOMITING): Primary | ICD-10-CM

## 2024-02-07 DIAGNOSIS — Z01.818 PRE-OP TESTING: ICD-10-CM

## 2024-02-07 LAB
B-HCG UR QL: NEGATIVE
CTP QC/QA: YES

## 2024-02-07 PROCEDURE — 63600175 PHARM REV CODE 636 W HCPCS: Performed by: NURSE ANESTHETIST, CERTIFIED REGISTERED

## 2024-02-07 PROCEDURE — 71000033 HC RECOVERY, INTIAL HOUR: Performed by: ORTHOPAEDIC SURGERY

## 2024-02-07 PROCEDURE — 36000710: Performed by: ORTHOPAEDIC SURGERY

## 2024-02-07 PROCEDURE — 71000039 HC RECOVERY, EACH ADD'L HOUR: Performed by: ORTHOPAEDIC SURGERY

## 2024-02-07 PROCEDURE — 27201423 OPTIME MED/SURG SUP & DEVICES STERILE SUPPLY: Performed by: ORTHOPAEDIC SURGERY

## 2024-02-07 PROCEDURE — D9220A PRA ANESTHESIA: Mod: ANES,,, | Performed by: ANESTHESIOLOGY

## 2024-02-07 PROCEDURE — 37000008 HC ANESTHESIA 1ST 15 MINUTES: Performed by: ORTHOPAEDIC SURGERY

## 2024-02-07 PROCEDURE — 37000009 HC ANESTHESIA EA ADD 15 MINS: Performed by: ORTHOPAEDIC SURGERY

## 2024-02-07 PROCEDURE — 63600175 PHARM REV CODE 636 W HCPCS: Performed by: ANESTHESIOLOGY

## 2024-02-07 PROCEDURE — 71000015 HC POSTOP RECOV 1ST HR: Performed by: ORTHOPAEDIC SURGERY

## 2024-02-07 PROCEDURE — 63600175 PHARM REV CODE 636 W HCPCS: Performed by: ORTHOPAEDIC SURGERY

## 2024-02-07 PROCEDURE — 81025 URINE PREGNANCY TEST: CPT | Performed by: ANESTHESIOLOGY

## 2024-02-07 PROCEDURE — 29881 ARTHRS KNE SRG MNISECTMY M/L: CPT | Mod: RT,,, | Performed by: ORTHOPAEDIC SURGERY

## 2024-02-07 PROCEDURE — 25000003 PHARM REV CODE 250: Performed by: ANESTHESIOLOGY

## 2024-02-07 PROCEDURE — 36000711: Performed by: ORTHOPAEDIC SURGERY

## 2024-02-07 PROCEDURE — D9220A PRA ANESTHESIA: Mod: CRNA,,, | Performed by: NURSE ANESTHETIST, CERTIFIED REGISTERED

## 2024-02-07 PROCEDURE — 25000003 PHARM REV CODE 250: Performed by: NURSE ANESTHETIST, CERTIFIED REGISTERED

## 2024-02-07 RX ORDER — LIDOCAINE HYDROCHLORIDE 20 MG/ML
INJECTION INTRAVENOUS
Status: DISCONTINUED | OUTPATIENT
Start: 2024-02-07 | End: 2024-02-07

## 2024-02-07 RX ORDER — ONDANSETRON HYDROCHLORIDE 2 MG/ML
4 INJECTION, SOLUTION INTRAVENOUS DAILY PRN
Status: DISCONTINUED | OUTPATIENT
Start: 2024-02-07 | End: 2024-02-07 | Stop reason: HOSPADM

## 2024-02-07 RX ORDER — ONDANSETRON 8 MG/1
8 TABLET, ORALLY DISINTEGRATING ORAL EVERY 8 HOURS PRN
Qty: 21 TABLET | Refills: 0 | Status: SHIPPED | OUTPATIENT
Start: 2024-02-07 | End: 2024-02-14

## 2024-02-07 RX ORDER — HYDROCODONE BITARTRATE AND ACETAMINOPHEN 5; 325 MG/1; MG/1
1 TABLET ORAL EVERY 4 HOURS PRN
Status: CANCELLED | OUTPATIENT
Start: 2024-02-07

## 2024-02-07 RX ORDER — FENTANYL CITRATE 50 UG/ML
INJECTION, SOLUTION INTRAMUSCULAR; INTRAVENOUS
Status: DISCONTINUED | OUTPATIENT
Start: 2024-02-07 | End: 2024-02-07

## 2024-02-07 RX ORDER — MUPIROCIN 20 MG/G
OINTMENT TOPICAL 2 TIMES DAILY
Status: CANCELLED | OUTPATIENT
Start: 2024-02-07 | End: 2024-02-09

## 2024-02-07 RX ORDER — METHYLPREDNISOLONE ACETATE 80 MG/ML
INJECTION, SUSPENSION INTRA-ARTICULAR; INTRALESIONAL; INTRAMUSCULAR; SOFT TISSUE
Status: DISCONTINUED | OUTPATIENT
Start: 2024-02-07 | End: 2024-02-07 | Stop reason: HOSPADM

## 2024-02-07 RX ORDER — DIPHENHYDRAMINE HYDROCHLORIDE 50 MG/ML
INJECTION INTRAMUSCULAR; INTRAVENOUS
Status: DISCONTINUED | OUTPATIENT
Start: 2024-02-07 | End: 2024-02-07

## 2024-02-07 RX ORDER — ACETAMINOPHEN 10 MG/ML
INJECTION, SOLUTION INTRAVENOUS
Status: DISCONTINUED | OUTPATIENT
Start: 2024-02-07 | End: 2024-02-07

## 2024-02-07 RX ORDER — FAMOTIDINE 10 MG/ML
INJECTION INTRAVENOUS
Status: DISCONTINUED | OUTPATIENT
Start: 2024-02-07 | End: 2024-02-07

## 2024-02-07 RX ORDER — MIDAZOLAM HYDROCHLORIDE 1 MG/ML
INJECTION INTRAMUSCULAR; INTRAVENOUS
Status: DISCONTINUED | OUTPATIENT
Start: 2024-02-07 | End: 2024-02-07

## 2024-02-07 RX ORDER — ONDANSETRON HYDROCHLORIDE 2 MG/ML
INJECTION, SOLUTION INTRAVENOUS
Status: DISCONTINUED | OUTPATIENT
Start: 2024-02-07 | End: 2024-02-07

## 2024-02-07 RX ORDER — PROPOFOL 10 MG/ML
INJECTION, EMULSION INTRAVENOUS
Status: DISCONTINUED | OUTPATIENT
Start: 2024-02-07 | End: 2024-02-07

## 2024-02-07 RX ORDER — DEXAMETHASONE SODIUM PHOSPHATE 4 MG/ML
INJECTION, SOLUTION INTRA-ARTICULAR; INTRALESIONAL; INTRAMUSCULAR; INTRAVENOUS; SOFT TISSUE
Status: DISCONTINUED | OUTPATIENT
Start: 2024-02-07 | End: 2024-02-07

## 2024-02-07 RX ORDER — BUPIVACAINE HYDROCHLORIDE 5 MG/ML
INJECTION, SOLUTION EPIDURAL; INTRACAUDAL
Status: DISCONTINUED | OUTPATIENT
Start: 2024-02-07 | End: 2024-02-07 | Stop reason: HOSPADM

## 2024-02-07 RX ORDER — KETAMINE HYDROCHLORIDE 10 MG/ML
INJECTION, SOLUTION INTRAMUSCULAR; INTRAVENOUS
Status: DISCONTINUED | OUTPATIENT
Start: 2024-02-07 | End: 2024-02-07

## 2024-02-07 RX ORDER — OXYCODONE HYDROCHLORIDE 5 MG/1
5 TABLET ORAL
Status: COMPLETED | OUTPATIENT
Start: 2024-02-07 | End: 2024-02-07

## 2024-02-07 RX ORDER — HYDROMORPHONE HYDROCHLORIDE 1 MG/ML
0.2 INJECTION, SOLUTION INTRAMUSCULAR; INTRAVENOUS; SUBCUTANEOUS EVERY 5 MIN PRN
Status: DISCONTINUED | OUTPATIENT
Start: 2024-02-07 | End: 2024-02-07 | Stop reason: HOSPADM

## 2024-02-07 RX ORDER — CEFAZOLIN SODIUM 2 G/50ML
2 SOLUTION INTRAVENOUS
Status: COMPLETED | OUTPATIENT
Start: 2024-02-07 | End: 2024-02-07

## 2024-02-07 RX ADMIN — FENTANYL CITRATE 50 MCG: 50 INJECTION, SOLUTION INTRAMUSCULAR; INTRAVENOUS at 09:02

## 2024-02-07 RX ADMIN — LIDOCAINE HYDROCHLORIDE 50 MG: 20 INJECTION, SOLUTION INTRAVENOUS at 08:02

## 2024-02-07 RX ADMIN — ACETAMINOPHEN 1000 MG: 10 INJECTION, SOLUTION INTRAVENOUS at 09:02

## 2024-02-07 RX ADMIN — OXYCODONE HYDROCHLORIDE 5 MG: 5 TABLET ORAL at 10:02

## 2024-02-07 RX ADMIN — HYDROMORPHONE HYDROCHLORIDE 0.2 MG: 1 INJECTION, SOLUTION INTRAMUSCULAR; INTRAVENOUS; SUBCUTANEOUS at 10:02

## 2024-02-07 RX ADMIN — ONDANSETRON 4 MG: 2 INJECTION INTRAMUSCULAR; INTRAVENOUS at 09:02

## 2024-02-07 RX ADMIN — SODIUM CHLORIDE, SODIUM LACTATE, POTASSIUM CHLORIDE, AND CALCIUM CHLORIDE: .6; .31; .03; .02 INJECTION, SOLUTION INTRAVENOUS at 09:02

## 2024-02-07 RX ADMIN — KETAMINE HYDROCHLORIDE 50 MG: 10 INJECTION, SOLUTION INTRAMUSCULAR; INTRAVENOUS at 09:02

## 2024-02-07 RX ADMIN — FAMOTIDINE 20 MG: 10 INJECTION, SOLUTION INTRAVENOUS at 09:02

## 2024-02-07 RX ADMIN — DEXAMETHASONE SODIUM PHOSPHATE 8 MG: 4 INJECTION, SOLUTION INTRAMUSCULAR; INTRAVENOUS at 09:02

## 2024-02-07 RX ADMIN — DIPHENHYDRAMINE HYDROCHLORIDE 6.25 MG: 50 INJECTION INTRAMUSCULAR; INTRAVENOUS at 09:02

## 2024-02-07 RX ADMIN — ONDANSETRON 4 MG: 2 INJECTION INTRAMUSCULAR; INTRAVENOUS at 10:02

## 2024-02-07 RX ADMIN — CEFAZOLIN SODIUM 2 G: 2 SOLUTION INTRAVENOUS at 09:02

## 2024-02-07 RX ADMIN — MIDAZOLAM HYDROCHLORIDE 2 MG: 1 INJECTION, SOLUTION INTRAMUSCULAR; INTRAVENOUS at 08:02

## 2024-02-07 RX ADMIN — PROPOFOL 200 MG: 10 INJECTION, EMULSION INTRAVENOUS at 08:02

## 2024-02-07 NOTE — TRANSFER OF CARE
"Anesthesia Transfer of Care Note    Patient: Jerry Coronado    Procedure(s) Performed: Procedure(s) (LRB):  ARTHROSCOPY, KNEE, WITH MENISCECTOMY, RIGHT (Right)    Patient location: PACU    Anesthesia Type: general    Transport from OR: Transported from OR on room air with adequate spontaneous ventilation    Post pain: adequate analgesia    Post assessment: no apparent anesthetic complications    Post vital signs: stable    Level of consciousness: awake and alert    Nausea/Vomiting: no nausea/vomiting    Complications: none    Transfer of care protocol was followed      Last vitals: Visit Vitals  /78   Pulse 100   Temp 36.8 °C (98.2 °F) (Oral)   Resp 17   Ht 5' 1" (1.549 m)   Wt 102.1 kg (225 lb)   SpO2 (!) 94%   Breastfeeding No   BMI 42.51 kg/m²     "

## 2024-02-07 NOTE — OP NOTE
[facility] hospital  Surgery Department  Operative Note    SUMMARY     Date of Procedure: 2/7/2024     Procedure:  Partial medial meniscectomy right knee                        Plica resection right knee    Surgeon(s) and Role:     * Matthew Elliott MD - Primary    Assisting Surgeon:  Otto    Pre-Operative Diagnosis: Acute medial meniscal tear, right, subsequent encounter [S83.241D]    Post-Operative Diagnosis: Post-Op Diagnosis Codes:     * Acute medial meniscal tear, right, subsequent encounter [S83.241D]    Anesthesia: General    Intraoperative Findings:  The patellofemoral joint was noted to have grade 3 chondromalacia.  The patella tracked centrally.  The ACL and PCL were intact.  The lateral compartment was pristine.  The medial compartment had intact cartilaginous surfaces.  There was an undersurface tear of the medial meniscus consisting of a flap off the undersurface right at the level of the root the remainder of the meniscus was stable there was a thick plica extending from the capsule to the fat pad draping across the medial femoral condyle    Description of the Findings of the Procedure:  Patient was placed on the operative table in supine position.  The knee was prepped and draped in the usual sterile manner for surgery inferomedial inferolateral portals were established and diagnostic arthroscopy was undertaken the findings of those stated above at this point I turned my attention to the medial meniscus.  There was a flap that I was able to simply excised with the shaver.  I now examined the plica was very thickened and angry and I used a biter to released the plica and then a shaver to excise the plica.  I now gently debrided the undersurface of the patella.  The arthroscopic equipment was removed and the portals were closed with nylon stitches.  Sterile dressings were applied and the patient was noted to be in stable condition    Complications: No    Estimated Blood Loss (EBL): * No values  recorded between 2/7/2024  9:43 AM and 2/7/2024  9:50 AM *           Implants: * No implants in log *    Specimens:   Specimen (24h ago, onward)      None                    Condition: Good    Disposition: PACU - hemodynamically stable.              Discharge Note    SUMMARY     Admit Date: 2/7/2024    Discharge Date and Time:  02/07/2024 9:50 AM    Hospital Course (synopsis of major diagnoses, care, treatment, and services provided during the course of the hospital stay): Uneventful      Final Diagnosis: Post-Op Diagnosis Codes:     * Acute medial meniscal tear, right, subsequent encounter [S83.241D]    Disposition: Home or Self Care    Follow Up/Patient Instructions:     Medications:  Reconciled Home Medications:   Current Discharge Medication List        CONTINUE these medications which have NOT CHANGED    Details   ALPRAZolam (XANAX) 2 MG Tab Take 1 tablet (2 mg total) by mouth 2 (two) times daily as needed.  Qty: 40 tablet, Refills: 0    Comments: More than 7-day prescription is medically necessary. Thanks!  Associated Diagnoses: Situational stress      busPIRone (BUSPAR) 10 MG tablet Take 10 mg by mouth 2 (two) times daily.      dronabinoL (MARINOL) 5 MG capsule Take 1 capsule (5 mg total) by mouth 2 (two) times daily as needed (decreased appetite).  Qty: 60 capsule, Refills: 0    Associated Diagnoses: Decreased appetite      fluticasone propionate (FLONASE) 50 mcg/actuation nasal spray 2 sprays (100 mcg total) by Each Nostril route once daily.  Qty: 11.1 mL, Refills: 5    Associated Diagnoses: Chronic sinusitis, unspecified location      gabapentin (NEURONTIN) 600 MG tablet Take 600 mg by mouth 3 (three) times daily as needed.      ibuprofen (ADVIL,MOTRIN) 800 MG tablet Take 1 tablet (800 mg total) by mouth every 8 (eight) hours as needed for Pain.  Qty: 60 tablet, Refills: 0      levothyroxine (SYNTHROID) 50 MCG tablet TAKE 1 TABLET BY MOUTH EVERY DAY  Qty: 30 tablet, Refills: 3      meloxicam (MOBIC) 15 MG  tablet Take 1 tablet (15 mg total) by mouth once daily.  Qty: 30 tablet, Refills: 1    Associated Diagnoses: Left foot pain      multivitamin capsule Take 1 capsule by mouth.      tiZANidine (ZANAFLEX) 4 MG tablet Take 4 mg by mouth 3 (three) times daily as needed.      oxyCODONE-acetaminophen (PERCOCET) 7.5-325 mg per tablet Take 1 tablet by mouth every 6 (six) hours as needed for Pain.  Qty: 28 tablet, Refills: 0    Associated Diagnoses: Acute medial meniscal tear, right, subsequent encounter           Discharge Procedure Orders   Diet general     Activity as tolerated     Sponge bath only until clinic visit     Ice to affected area     Weight bearing restrictions (specify)     Remove dressing in 24 hours     Call MD for:  temperature >100.4     Call MD for:  persistent nausea and vomiting     Call MD for:  severe uncontrolled pain     Call MD for:  difficulty breathing, headache or visual disturbances     Call MD for:  redness, tenderness, or signs of infection (pain, swelling, redness, odor or green/yellow discharge around incision site)     Call MD for:  hives     Call MD for:  persistent dizziness or light-headedness     Call MD for:  extreme fatigue     Shower on day dressing removed (No bath)

## 2024-02-07 NOTE — H&P
CC/Indication for Procedure: 38 y.o. female with Acute medial meniscal tear, right, subsequent encounter [S83.241D]  Acute medial meniscus tear of right knee, subsequent encounter [S83.241D].    Patient scheduled for KS KNEE SCOPE SINGLE MENISECECTOMY [[id number]] (ARTHROSCOPY, KNEE, WITH MENISCECTOMY, RIGHT)  KS KNEE SCOPE SINGLE MENISECECTOMY [[id number]].    Past Medical History:   Diagnosis Date    Allergic rhinitis     Anxiety     Cancer 12/01/2021    B-cell folicular lymphoma    Depression     Hypothyroidism     SVT (supraventricular tachycardia) 2011     Past Surgical History:   Procedure Laterality Date    ABLATION OF DYSRHYTHMIC FOCUS  2016    x2    BIOPSY OF CERVICAL LYMPH NODE Left 11/29/2021    Procedure: BIOPSY, LYMPH NODE, CERVICAL;  Surgeon: Blanco Kerns MD;  Location: [facility] OR;  Service: General;  Laterality: Left;    CERVIX LESION DESTRUCTION  11/2021    CONIZATION OF CERVIX USING LOOP ELECTROSURGICAL EXCISION PROCEDURE (LEEP) N/A 07/21/2021    Procedure: LEEP CONIZATION, CERVIX;  Surgeon: Donna Castillo MD;  Location: [facility] OR;  Service: OB/GYN;  Laterality: N/A;    EXCISION OF LESION OF NOSE N/A 10/14/2019    Procedure: EXCISION, LESION, NOSE;  Surgeon: Umesh Morales MD;  Location: [facility] OR;  Service: ENT;  Laterality: N/A;    FUNCTIONAL ENDOSCOPIC SINUS SURGERY (FESS) N/A 10/14/2019    Procedure: FESS (FUNCTIONAL ENDOSCOPIC SINUS SURGERY);  Surgeon: Umesh Morales MD;  Location: [facility] OR;  Service: ENT;  Laterality: N/A;    HYSTEROSCOPY WITH DILATION AND CURETTAGE OF UTERUS N/A 07/21/2021    Procedure: HYSTEROSCOPY, WITH DILATION AND CURETTAGE OF UTERUS;  Surgeon: Donna Castillo MD;  Location: [facility] OR;  Service: OB/GYN;  Laterality: N/A;    MEDIPORT REMOVAL Right 06/20/2022    Procedure: REMOVAL, CATHETER, CENTRAL VENOUS, TUNNELED, WITH PORT;  Surgeon: Blanco Kerns MD;  Location: [facility] OR;  Service: General;  Laterality: Right;    NASAL SEPTOPLASTY N/A 10/14/2019    Procedure: SEPTOPLASTY,  "NOSE;  Surgeon: Umesh Morales MD;  Location: [facility] OR;  Service: ENT;  Laterality: N/A;    NASAL SINUS SURGERY  10/14/2019    OSTEOTOMY OF METATARSAL BONE Left 2022    Procedure: OSTEOTOMY, METATARSAL BONE;  Surgeon: Hola Escalante DPM;  Location: [facility] OR;  Service: Podiatry;  Laterality: Left;  synthes janes notified  TW  , RELEASE ENTRAPPED NERVE 2ND INTERMETATARSAL SPACE    PILONIDAL CYST DRAINAGE      REVISION OF SCAR  2022    Procedure: REVISION, SCAR;  Surgeon: Blanco Kerns MD;  Location: [facility] OR;  Service: General;;    SINUS SURGERY  2023    SKIN TAG REMOVAL Left 2022    Procedure: REMOVAL, SKIN TAG;  Surgeon: Blanco Kerns MD;  Location: [facility] OR;  Service: General;  Laterality: Left;    TONSILLECTOMY      TYMPANOPLASTY N/A 10/14/2019    Procedure: TYMPANOPLASTY;  Surgeon: Umesh Morales MD;  Location: [facility] OR;  Service: ENT;  Laterality: N/A;    TYMPANOTOMY Left 10/14/2019    left     Family History   Problem Relation Age of Onset    Hypertension Mother     Heart disease Father 59        CABG    Heart failure Father     Breast cancer Maternal Aunt 55    Colon cancer Neg Hx     Ovarian cancer Neg Hx      Social History     Socioeconomic History    Marital status: Single   Occupational History    Occupation: [employer]     Comment: [employer]   Tobacco Use    Smoking status: Former     Current packs/day: 0.00     Average packs/day: 0.5 packs/day for 9.0 years (4.5 ttl pk-yrs)     Types: Cigarettes     Start date: 2000     Quit date: 2009     Years since quittin.6    Smokeless tobacco: Never   Substance and Sexual Activity    Alcohol use: Yes     Alcohol/week: 5.0 standard drinks of alcohol     Types: 5 Shots of liquor per week     Comment: "occasionally"    Drug use: No    Sexual activity: Not Currently     Partners: Male     Birth control/protection: OCP   Social History Narrative    The patient does not exercise regularly ().  Rates diet as fair.  She is " not satisfied with weight.           Review of patient's allergies indicates:  No Known Allergies      Current Facility-Administered Medications:     cefazolin (ANCEF) 2 gram in dextrose 5% 50 mL IVPB (premix), 2 g, Intravenous, On Call Procedure, Matthew Elliott MD    HYDROmorphone injection 0.2 mg, 0.2 mg, Intravenous, Q5 Min PRN, Nura Hines Jr., MD    ondansetron injection 4 mg, 4 mg, Intravenous, Daily PRN, Nura Hines Jr., MD    oxyCODONE immediate release tablet 5 mg, 5 mg, Oral, PRN, Nura Hines Jr., MD    ROS:    Denies chest pain or palpitations  Denies shortness of breath  Denies fevers or chills  Denies chest pain  Denies abdominal pain    PE:    General Appearance: Well nourished  Orientation: Oriented to time, place, person  Mental Status: Alert  Heart: RRR  Lungs: CTA  Abdomen: Soft and non-tender    Anesthesia/Surgery risks, benefits and alternative options discussed and understood by patient/family.    This note was created using Dragon voice recognition software that occasionally misinterpreted phrases or words.

## 2024-02-07 NOTE — PLAN OF CARE
1115- pt is alert and oriented breathing even unlabored with 4/10 right knee pain. Surgical site is clean and dry with no redness or swelling noted. Good pulse to RLE. Pt sitting up drinking fluids. 1200- pt is alert and oriented breathing even unlabored with 4/10 pain to her right knee. Surgical site is clean and dry with no redness or swelling noted. Good pulse to RLE. Pt tolerated po intake with no n/v. Detailed discharge instructions given to the pt and her mother.

## 2024-02-07 NOTE — ANESTHESIA POSTPROCEDURE EVALUATION
Anesthesia Post Evaluation    Patient: Jerry Coronado    Procedure(s) Performed: Procedure(s) (LRB):  ARTHROSCOPY, KNEE, WITH MENISCECTOMY (Right)    Final Anesthesia Type: general      Patient location during evaluation: PACU  Patient participation: Yes- Able to Participate  Level of consciousness: awake and alert  Post-procedure vital signs: reviewed and stable  Pain management: adequate  Airway patency: patent    PONV status at discharge: No PONV  Anesthetic complications: no      Cardiovascular status: blood pressure returned to baseline and stable  Respiratory status: unassisted and room air  Hydration status: euvolemic  Follow-up not needed.              Vitals Value Taken Time   /75 02/07/24 1100   Temp 36.6 °C (97.9 °F) 02/07/24 1030   Pulse 91 02/07/24 1107   Resp 18 02/07/24 1107   SpO2 95 % 02/07/24 1107   Vitals shown include unvalidated device data.      Event Time   Out of Recovery 11:12:32         Pain/Grisel Score: Pain Rating Prior to Med Admin: 5 (2/7/2024 10:51 AM)  Grisel Score: 10 (2/7/2024 11:00 AM)

## 2024-02-07 NOTE — ANESTHESIA PREPROCEDURE EVALUATION
02/07/2024  Jerry Coronado is a 38 y.o., female.      Pre-op Assessment    I have reviewed the Patient Summary Reports.     I have reviewed the Nursing Notes. I have reviewed the NPO Status.   I have reviewed the Medications.     Review of Systems  Anesthesia Hx:  No problems with previous Anesthesia   Neg history of prior surgery.          Denies Family Hx of Anesthesia complications.    Denies Personal Hx of Anesthesia complications.                    Social:  Former Smoker, Alcohol Use       Hematology/Oncology:  Hematology Normal                                 Oncology Comments: B cell lymphoma in remission     EENT/Dental:  EENT/Dental Normal           Cardiovascular:         Dysrhythmias (Hx of SVT , s/p ablation)                                    Pulmonary:        Sleep Apnea           Education provided regarding risk of obstructive sleep apnea            Renal/:  Renal/ Normal                 Hepatic/GI:  Hepatic/GI Normal                 Musculoskeletal:  Musculoskeletal Normal                Neurological:  Neurology Normal                                      Endocrine:   Hypothyroidism        Obesity / BMI > 30  Dermatological:  Skin Normal    Psych:   anxiety depression              Patient Active Problem List   Diagnosis    Insomnia secondary to depression with anxiety    Low back pain    Acne    Situational stress    Breast hypertrophy    Hypothyroidism    Allergic rhinitis    Chronic infection of sinus    Hypertrophy of nasal turbinates    Deviated nasal septum    Eustachian tube dysfunction    Acquired atrophy of thyroid    Alcohol abuse    Alcohol use with alcohol-induced mood disorder    Atypical squamous cells of undetermined significance (ASCUS) on Papanicolaou smear of cervix    Carpal tunnel syndrome    Cervical intraepithelial neoplasia grade 2    Overdose    Jaw pain     Moderate recurrent major depression    Obstructive sleep apnea syndrome    Other depressive episodes    Personal history of other infectious and parasitic diseases    Postherpetic neuralgia    Shock    Supraventricular tachycardia    Tobacco user    Vaginal high risk human papillomavirus (HPV) DNA test positive    Low grade squamous intraepithelial dysplasia    Atypical glandular cells of undetermined significance (LEE) on cervical Pap smear    Cervical dysplasia    Abnormal uterine bleeding (AUB)    Arcuate uterus    Supraclavicular lymphadenopathy    Axillary lymphadenopathy    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    Encounter for removal of tunneled central venous catheter (CVC) with port    Decreased range of motion (ROM) of right knee       Past Surgical History:   Procedure Laterality Date    ABLATION OF DYSRHYTHMIC FOCUS  2016    x2    BIOPSY OF CERVICAL LYMPH NODE Left 11/29/2021    Procedure: BIOPSY, LYMPH NODE, CERVICAL;  Surgeon: Blanco Kerns MD;  Location: [facility] OR;  Service: General;  Laterality: Left;    CERVIX LESION DESTRUCTION  11/2021    CONIZATION OF CERVIX USING LOOP ELECTROSURGICAL EXCISION PROCEDURE (LEEP) N/A 07/21/2021    Procedure: LEEP CONIZATION, CERVIX;  Surgeon: Donna Castillo MD;  [facility];  Service: OB/GYN;  Laterality: N/A;    EXCISION OF LESION OF NOSE N/A 10/14/2019    Procedure: EXCISION, LESION, NOSE;  Surgeon: Umesh Morales MD;  Location: [facility] OR;  Service: ENT;  Laterality: N/A;    FUNCTIONAL ENDOSCOPIC SINUS SURGERY (FESS) N/A 10/14/2019    Procedure: FESS (FUNCTIONAL ENDOSCOPIC SINUS SURGERY);  Surgeon: Umesh Morales MD;  Location: [facility] OR;  Service: ENT;  Laterality: N/A;    HYSTEROSCOPY WITH DILATION AND CURETTAGE OF UTERUS N/A 07/21/2021    Procedure: HYSTEROSCOPY, WITH DILATION AND CURETTAGE OF UTERUS;  Surgeon: Donna Castillo MD;  [facility];  Service: OB/GYN;  Laterality: N/A;    MEDIPORT REMOVAL Right 06/20/2022    Procedure: REMOVAL,  CATHETER, CENTRAL VENOUS, TUNNELED, WITH PORT;  Surgeon: Blanco Kerns MD;  Location: [facility] OR;  Service: General;  Laterality: Right;    NASAL SEPTOPLASTY N/A 10/14/2019    Procedure: SEPTOPLASTY, NOSE;  Surgeon: Umesh Morales MD;  Location: [facility] OR;  Service: ENT;  Laterality: N/A;    NASAL SINUS SURGERY  10/14/2019    OSTEOTOMY OF METATARSAL BONE Left 12/21/2022    Procedure: OSTEOTOMY, METATARSAL BONE;  Surgeon: Hola Escalante DPM;  Location: [facility] OR;  Service: Podiatry;  Laterality: Left;  synthes janes notified 12/19 TW  , RELEASE ENTRAPPED NERVE 2ND INTERMETATARSAL SPACE    PILONIDAL CYST DRAINAGE      REVISION OF SCAR  06/20/2022    Procedure: REVISION, SCAR;  Surgeon: Blanco Kerns MD;  Location: [facility] OR;  Service: General;;    SINUS SURGERY  11/30/2023    SKIN TAG REMOVAL Left 06/20/2022    Procedure: REMOVAL, SKIN TAG;  Surgeon: Blanco Kerns MD;  Location: [facility] OR;  Service: General;  Laterality: Left;    TONSILLECTOMY      TYMPANOPLASTY N/A 10/14/2019    Procedure: TYMPANOPLASTY;  Surgeon: Umesh Morales MD;  Location: [facility] OR;  Service: ENT;  Laterality: N/A;    TYMPANOTOMY Left 10/14/2019    left        Tobacco Use:  The patient  reports that she quit smoking about 14 years ago. Her smoking use included cigarettes. She started smoking about 23 years ago. She has a 4.5 pack-year smoking history. She has never used smokeless tobacco.     Results for orders placed or performed during the hospital encounter of 12/09/23   EKG 12-lead    Collection Time: 12/09/23  4:08 PM    Narrative    Test Reason : R55,    Vent. Rate : 091 BPM     Atrial Rate : 091 BPM     P-R Int : 138 ms          QRS Dur : 082 ms      QT Int : 384 ms       P-R-T Axes : 012 021 033 degrees     QTc Int : 472 ms    Normal sinus rhythm  Normal ECG  When compared with ECG of 03-SEP-2023 10:33,  No significant change was found  Confirmed by Alberto Nichols MD ([id number]) on 12/10/2023 10:08:15 PM    Referred By: MARILEE    SELF           Confirmed By:Alberto Nichols MD             Lab Results   Component Value Date    WBC 12.06 12/09/2023    HGB 14.0 12/09/2023    HCT 42.2 12/09/2023    MCV 91 12/09/2023     12/09/2023     BMP  Lab Results   Component Value Date     12/09/2023    K 3.8 12/09/2023     (H) 12/09/2023    CO2 23 12/09/2023    BUN 25 (H) 12/09/2023    CREATININE 1.4 12/09/2023    CALCIUM 8.5 (L) 12/09/2023    ANIONGAP 9 12/09/2023    GLU 91 12/09/2023     10/06/2023    GLU 79 09/03/2023       No results found for this or any previous visit.   Latest Reference Range & Units 02/07/24 07:26   hCG Qualitative, Urine Negative  Negative            Physical Exam  General: Well nourished and Alert    Airway:  Mallampati: II   Mouth Opening: Normal  TM Distance: Normal  Tongue: Normal  Neck ROM: Normal ROM    Dental:  Intact    Chest/Lungs:  Clear to auscultation, Normal Respiratory Rate    Heart:  Rate: Normal  Rhythm: Regular Rhythm  Sounds: Normal        Anesthesia Plan  Type of Anesthesia, risks & benefits discussed:    Anesthesia Type: Gen Supraglottic Airway  Intra-op Monitoring Plan: Standard ASA Monitors  Post Op Pain Control Plan: multimodal analgesia  Induction:  IV  Informed Consent: Informed consent signed with the Patient and all parties understand the risks and agree with anesthesia plan.  All questions answered. Patient consented to blood products? Yes  ASA Score: 3  Anesthesia Plan Notes: Tylenol 1 gm IV, Ketamine  Zofran 4 mg, Decadron 4 mg, Pepcid 20 mg, Benadryl 6.25 mg    Ready For Surgery From Anesthesia Perspective.     .

## 2024-02-08 ENCOUNTER — OFFICE VISIT (OUTPATIENT)
Dept: ORTHOPEDICS | Facility: CLINIC | Age: 39
End: 2024-02-08
Payer: MEDICAID

## 2024-02-08 VITALS — WEIGHT: 225 LBS | HEIGHT: 61 IN | BODY MASS INDEX: 42.48 KG/M2

## 2024-02-08 DIAGNOSIS — Z98.890 S/P ARTHROSCOPY OF RIGHT KNEE: Primary | ICD-10-CM

## 2024-02-08 PROCEDURE — 99024 POSTOP FOLLOW-UP VISIT: CPT | Mod: S$GLB,,, | Performed by: ORTHOPAEDIC SURGERY

## 2024-02-08 PROCEDURE — 1159F MED LIST DOCD IN RCRD: CPT | Mod: CPTII,S$GLB,, | Performed by: ORTHOPAEDIC SURGERY

## 2024-02-08 PROCEDURE — 1160F RVW MEDS BY RX/DR IN RCRD: CPT | Mod: CPTII,S$GLB,, | Performed by: ORTHOPAEDIC SURGERY

## 2024-02-08 RX ORDER — LIDOCAINE 50 MG/G
OINTMENT TOPICAL
COMMUNITY
Start: 2023-08-24 | End: 2024-04-23

## 2024-02-08 RX ORDER — MIRTAZAPINE 7.5 MG/1
TABLET, FILM COATED ORAL
COMMUNITY
End: 2024-05-30

## 2024-02-08 RX ORDER — TRAMADOL HYDROCHLORIDE 50 MG/1
1 TABLET ORAL EVERY 6 HOURS PRN
COMMUNITY
End: 2024-04-23

## 2024-02-08 RX ORDER — BUSPIRONE HYDROCHLORIDE 15 MG/1
1 TABLET ORAL 2 TIMES DAILY
COMMUNITY

## 2024-02-08 RX ORDER — PROMETHAZINE HYDROCHLORIDE 6.25 MG/5ML
SYRUP ORAL
COMMUNITY
End: 2024-04-23

## 2024-02-08 NOTE — PROGRESS NOTES
[facility]    Subjective:     Chief Complaint:   Chief Complaint   Patient presents with    Right Knee - Post-op Evaluation     POD 1 Right knee scope 2/7/24 Throbbing to sharp pain. States pain medication has not manage pain well written for every 6hrs needs to take around every 4hrs.        Past Medical History:   Diagnosis Date    Allergic rhinitis     Anxiety     Cancer 12/01/2021    B-cell folicular lymphoma    Depression     Hypothyroidism     SVT (supraventricular tachycardia) 2011       Past Surgical History:   Procedure Laterality Date    ABLATION OF DYSRHYTHMIC FOCUS  2016    x2    BIOPSY OF CERVICAL LYMPH NODE Left 11/29/2021    Procedure: BIOPSY, LYMPH NODE, CERVICAL;  Surgeon: Blanco Kerns MD;  Location: [facility] OR;  Service: General;  Laterality: Left;    CERVIX LESION DESTRUCTION  11/2021    CONIZATION OF CERVIX USING LOOP ELECTROSURGICAL EXCISION PROCEDURE (LEEP) N/A 07/21/2021    Procedure: LEEP CONIZATION, CERVIX;  Surgeon: Donna Castillo MD;  Location: [facility] OR;  Service: OB/GYN;  Laterality: N/A;    EXCISION OF LESION OF NOSE N/A 10/14/2019    Procedure: EXCISION, LESION, NOSE;  Surgeon: Umesh Morales MD;  Location: [facility] OR;  Service: ENT;  Laterality: N/A;    FUNCTIONAL ENDOSCOPIC SINUS SURGERY (FESS) N/A 10/14/2019    Procedure: FESS (FUNCTIONAL ENDOSCOPIC SINUS SURGERY);  Surgeon: Umesh Morales MD;  Location: [facility] OR;  Service: ENT;  Laterality: N/A;    HYSTEROSCOPY WITH DILATION AND CURETTAGE OF UTERUS N/A 07/21/2021    Procedure: HYSTEROSCOPY, WITH DILATION AND CURETTAGE OF UTERUS;  Surgeon: Donna Castillo MD;  Location: [facility] OR;  Service: OB/GYN;  Laterality: N/A;    KNEE ARTHROSCOPY W/ MENISCECTOMY Right 2/7/2024    Procedure: ARTHROSCOPY, KNEE, WITH MENISCECTOMY;  Surgeon: Matthew Elliott MD;  Location: [facility] OR;  Service: Orthopedics;  Laterality: Right;  partial medical meniscectomy and plica resection    MEDIPORT REMOVAL Right 06/20/2022    Procedure: REMOVAL,  CATHETER, CENTRAL VENOUS, TUNNELED, WITH PORT;  Surgeon: Blanco Kerns MD;  Location: [facility] OR;  Service: General;  Laterality: Right;    NASAL SEPTOPLASTY N/A 10/14/2019    Procedure: SEPTOPLASTY, NOSE;  Surgeon: Umesh Morales MD;  Location: [facility] OR;  Service: ENT;  Laterality: N/A;    NASAL SINUS SURGERY  10/14/2019    OSTEOTOMY OF METATARSAL BONE Left 12/21/2022    Procedure: OSTEOTOMY, METATARSAL BONE;  Surgeon: Hola Escalante DPM;  Location: [facility] OR;  Service: Podiatry;  Laterality: Left;  synthes janes notified 12/19 TW  , RELEASE ENTRAPPED NERVE 2ND INTERMETATARSAL SPACE    PILONIDAL CYST DRAINAGE      REVISION OF SCAR  06/20/2022    Procedure: REVISION, SCAR;  Surgeon: Blanco Kerns MD;  Location: [facility] OR;  Service: General;;    SINUS SURGERY  11/30/2023    SKIN TAG REMOVAL Left 06/20/2022    Procedure: REMOVAL, SKIN TAG;  Surgeon: Blanco Kerns MD;  Location: [facility] OR;  Service: General;  Laterality: Left;    TONSILLECTOMY      TYMPANOPLASTY N/A 10/14/2019    Procedure: TYMPANOPLASTY;  Surgeon: Umesh Morales MD;  Location: [facility] OR;  Service: ENT;  Laterality: N/A;    TYMPANOTOMY Left 10/14/2019    left       Current Outpatient Medications   Medication Sig    LIDOcaine (XYLOCAINE) 5 % Oint ointment SMARTSIG:Topical Every 4-6 Hours    ALPRAZolam (XANAX) 2 MG Tab Take 1 tablet (2 mg total) by mouth 2 (two) times daily as needed.    busPIRone (BUSPAR) 15 MG tablet Take 1 tablet by mouth 2 (two) times daily.    dronabinoL (MARINOL) 5 MG capsule Take 1 capsule (5 mg total) by mouth 2 (two) times daily as needed (decreased appetite).    fluticasone propionate (FLONASE) 50 mcg/actuation nasal spray 2 sprays (100 mcg total) by Each Nostril route once daily.    gabapentin (NEURONTIN) 600 MG tablet Take 600 mg by mouth 3 (three) times daily as needed.    ibuprofen (ADVIL,MOTRIN) 800 MG tablet Take 1 tablet (800 mg total) by mouth every 8 (eight) hours as needed for Pain.    levothyroxine (SYNTHROID)  "50 MCG tablet TAKE 1 TABLET BY MOUTH EVERY DAY (Patient taking differently: Take 50 mcg by mouth before breakfast.)    meloxicam (MOBIC) 15 MG tablet Take 1 tablet (15 mg total) by mouth once daily.    mirtazapine (REMERON) 7.5 MG Tab TAKE 2 TABLETS (15 MG TOTAL) BY MOUTH NIGHTLY.    multivitamin capsule Take 1 capsule by mouth.    ondansetron (ZOFRAN-ODT) 8 MG TbDL Take 1 tablet (8 mg total) by mouth every 8 (eight) hours as needed.    oxyCODONE-acetaminophen (PERCOCET) 7.5-325 mg per tablet Take 1 tablet by mouth every 6 (six) hours as needed for Pain.    promethazine (PHENERGAN) 6.25 mg/5 mL syrup     tiZANidine (ZANAFLEX) 4 MG tablet Take 4 mg by mouth 3 (three) times daily as needed.    traMADoL (ULTRAM) 50 mg tablet Take 1 tablet by mouth every 6 (six) hours as needed.     No current facility-administered medications for this visit.       Review of patient's allergies indicates:  No Known Allergies    Family History   Problem Relation Age of Onset    Hypertension Mother     Heart disease Father 59        CABG    Heart failure Father     Breast cancer Maternal Aunt 55    Colon cancer Neg Hx     Ovarian cancer Neg Hx        Social History     Socioeconomic History    Marital status: Single   Occupational History    Occupation: [employer]     Comment: [employer]   Tobacco Use    Smoking status: Former     Current packs/day: 0.00     Average packs/day: 0.5 packs/day for 9.0 years (4.5 ttl pk-yrs)     Types: Cigarettes     Start date: 2000     Quit date: 2009     Years since quittin.6    Smokeless tobacco: Never   Substance and Sexual Activity    Alcohol use: Yes     Alcohol/week: 5.0 standard drinks of alcohol     Types: 5 Shots of liquor per week     Comment: "occasionally"    Drug use: No    Sexual activity: Not Currently     Partners: Male     Birth control/protection: OCP   Social History Narrative    The patient does not exercise regularly ().  Rates diet as fair.  She is not satisfied with weight.    "        History of present illness: Jerry comes in today postop day 1 right knee arthroscopy.  She had a partial medial meniscectomy and plica excision.  Comes in today for wound check, dressing change, pain control assessment.  She is having a good bit discomfort postoperatively.    Review of Systems:    Constitution: Negative for chills, fever, and sweats.  Negative for unexplained weight loss.    HENT:  Negative for headaches and blurry vision.    Cardiovascular:Negative for chest pain or irregular heart beat. Negative for hypertension.    Respiratory:  Negative for cough and shortness of breath.    Gastrointestinal: Negative for abdominal pain, heartburn, melena, nausea, and vomitting.    Genitourinary:  Negative bladder incontinence and dysuria.    Musculoskeletal:  See HPI for details.     Neurological: Negative for numbness.    Psychiatric/Behavioral: Negative for depression.  The patient is not nervous/anxious.      Endocrine: Negative for polyuria    Hematologic/Lymphatic: Negative for bleeding problem.  Does not bruise/bleed easily.    Skin: Negative for poor would healing and rash    Objective:      Physical Examination:    Vital Signs:  There were no vitals filed for this visit.    Body mass index is 42.51 kg/m².    This a well-developed, well nourished patient in no acute distress.  They are alert and oriented and cooperative to examination.        Right knee exam: Skin to the right knee is clean dry and intact.  No erythema or ecchymosis.  No signs or symptoms of infection.  She is neurovascularly intact throughout the right lower extremity right calf is soft and nontender.  Two arthroscopic portals healing well without wound dehiscence or drainage.  She can weightbear as tolerated right lower extremity.  She has the expected antalgic gait at postop day 1.      Pertinent New Results:    XRAY Report / Interpretation:   No new radiographs taken on today's clinic visit.    Assessment/Plan:      1. Status  "post right knee arthroscopy.      Dressing change to right knee today.  She was advised to clean the operative site once a day with warm soapy water but not apply any topical creams or ointments.  Do not submerge operative site underwater in a pool or bathtub type environment to allow after suture removal.  She was advised to add into the postoperative treatment regimen an anti-inflammatory for choosing.  We will see her back in 2 weeks for wound check and suture.    Alberto Scott, Physician Assistant, served in the capacity as a "scribe" for this patient encounter.  A "face-to-face" encounter occurred with Dr. Matthew Elliott on this date.  The treatment plan and medical decision-making is outlined above. Patient was seen and examined with a chaperone.       This note was created using Dragon voice recognition software that occasionally misinterpreted phrases or words.          "

## 2024-02-11 PROBLEM — Z98.890 S/P ARTHROSCOPY OF RIGHT KNEE: Status: ACTIVE | Noted: 2024-02-11

## 2024-02-12 ENCOUNTER — CLINICAL SUPPORT (OUTPATIENT)
Dept: REHABILITATION | Facility: HOSPITAL | Age: 39
End: 2024-02-12
Payer: MEDICAID

## 2024-02-12 DIAGNOSIS — R29.898 DECREASED STRENGTH OF LOWER EXTREMITY: ICD-10-CM

## 2024-02-12 DIAGNOSIS — R26.9 GAIT ABNORMALITY: ICD-10-CM

## 2024-02-12 DIAGNOSIS — M25.661 DECREASED ROM OF RIGHT KNEE: ICD-10-CM

## 2024-02-12 DIAGNOSIS — S83.241D ACUTE MEDIAL MENISCAL TEAR, RIGHT, SUBSEQUENT ENCOUNTER: ICD-10-CM

## 2024-02-12 DIAGNOSIS — Z98.890 S/P ARTHROSCOPY OF RIGHT KNEE: Primary | ICD-10-CM

## 2024-02-12 PROCEDURE — 97161 PT EVAL LOW COMPLEX 20 MIN: CPT | Mod: PN | Performed by: PHYSICAL THERAPIST

## 2024-02-12 PROCEDURE — 97110 THERAPEUTIC EXERCISES: CPT | Mod: PN | Performed by: PHYSICAL THERAPIST

## 2024-02-12 RX ORDER — OXYCODONE AND ACETAMINOPHEN 7.5; 325 MG/1; MG/1
1 TABLET ORAL EVERY 8 HOURS PRN
Qty: 21 TABLET | Refills: 0 | Status: SHIPPED | OUTPATIENT
Start: 2024-02-14 | End: 2024-02-21

## 2024-02-13 NOTE — PLAN OF CARE
[facility]   Physical Therapy Initial Evaluation     Name: Jerry Coronado  Clinic Number: [id number]    Therapy Diagnosis:   Encounter Diagnoses   Name Primary?    S/P arthroscopy of right knee Yes    Decreased ROM of right knee     Decreased strength of lower extremity     Gait abnormality         Physician: Matthew Elliott MD    Physician Orders: PT Eval and Treat   Medical Diagnosis from Referral: S/P arthroscopy of right knee   Evaluation Date: 2/12/2024  Authorization Period Expiration: 12/31/2024  Plan of Care Expiration: 4/19/2024  Progress Note Due: 3/12/2024  Visit # / Visits authorized: 1/ 1 (POC: 1/16)   FOTO: 1/3    Precautions: Standard   Insurance: Payor: MEDICAID / Plan: HEALTHY BLUE [place] [place] / Product Type: Managed Medicaid /     Time In: 1100  Time Out: 1200  Total Appointment Time (timed & untimed codes): 60 minutes      SUBJECTIVE   Date of onset: Date of surgery: 2/7/2024    History of current condition - Jerry reports: a 2 year history of progressive right knee pain. She reports decreased ability to stand and ambulate for prolonged periods. She states she had to use her upper extremities to get out of a chair. 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 She underwent a right knee arthroscopy with medial menisectomy on 2/7/2024. She was discharge the same day and noted severe right knee pain the following day. She presents to outpatient physical therapy ambulating FWB without assistive device. She reports continued difficulty with standing and walking for prolonged periods. She notes increased stiffness in the mornings which improves with movement. She reports difficulty with ascending and descending stairs, with descending worse than ascending.     Falls: none    Imaging, -rays: no interpretation found in note    Prior Therapy: PT  Social History: Single lives alone  Occupation: Respiratory Therapist  Prior Level of  Function: Independent  Current Level of Function: Decreased ability to perform ADL and limited tolerance to standing and walking.    Pain:  Current 4/10, worst 10/10, best 3/10   Location: right knee    Description: Throbbing  Aggravating Factors: Standing, Bending, and Walking  Easing Factors: pain medication    Patients goals: Decrease pain and improve tolerance to ambulation     Medical History:   Past Medical History:   Diagnosis Date    Allergic rhinitis     Anxiety     Cancer 12/01/2021    B-cell folicular lymphoma    Depression     Hypothyroidism     SVT (supraventricular tachycardia) 2011       Surgical History:   Jerry Coronado  has a past surgical history that includes Ablation of dysrhythmic focus (2016); Cervix lesion destruction (11/2021); Pilonidal cyst drainage; Nasal sinus surgery (10/14/2019); Tympanotomy (Left, 10/14/2019); Functional endoscopic sinus surgery (FESS) (N/A, 10/14/2019); Excision of lesion of nose (N/A, 10/14/2019); Nasal septoplasty (N/A, 10/14/2019); Tympanoplasty (N/A, 10/14/2019); Tonsillectomy; Conization of cervix using loop electrosurgical excision procedure (LEEP) (N/A, 07/21/2021); Hysteroscopy with dilation and curettage of uterus (N/A, 07/21/2021); Biopsy of cervical lymph node (Left, 11/29/2021); Mediport removal (Right, 06/20/2022); Skin tag removal (Left, 06/20/2022); Revision of scar (06/20/2022); Osteotomy of metatarsal bone (Left, 12/21/2022); Sinus surgery (11/30/2023); and Knee arthroscopy w/ meniscectomy (Right, 2/7/2024).    Medications:   Jerry has a current medication list which includes the following prescription(s): alprazolam, buspirone, dronabinol, fluticasone propionate, gabapentin, ibuprofen, levothyroxine, lidocaine, meloxicam, mirtazapine, multivitamin, ondansetron, oxycodone-acetaminophen, promethazine, tizanidine, tramadol, [DISCONTINUED] bupropion, [DISCONTINUED] drospirenone-ethinyl estradiol, [DISCONTINUED] duloxetine, [DISCONTINUED]  levalbuterol, and [DISCONTINUED] valacyclovir.    Allergies:   Review of patient's allergies indicates:  No Known Allergies       OBJECTIVE     Posture: Decreased lumbar lordosis and forward head in standing  Palpation: Severe point tenderness noted with palpation of the medial and lateral borders of the patella  Sensation: Intact    Range of Motion/Strength:     Knee ROM Left Right Pain/Dysfunction with Movement   Knee flexion 131 degrees 73 degress    Knee extension 0 degrees -6 degrees      Knee MMT Left Right   Knee flexion 5/5 4/5   Knee extension 5/5 4/5   Hip flexion 5/5 4+/5   Hip extension  5/5 4-/5   Hip abduction 5/5 4-/5   Hip external rotation  4+/5 4-/5   Hip internal rotation  4+/5 4-/5       Flexibility Left Right   Hamstrings 52 degrees 46 degrees   Achilles 0 degrees -2 degrees       Special Tests:  Left Right   Casandra's sign negative. negative.       Gait Without AD   Analysis Decreased stance time on the right lower extremity        Limitation/Restriction for FOTO  Survey    Therapist reviewed FOTO scores for Jerry Coronado on 2/12/2024.   FOTO documents entered into [employer] - see Media section.    Intake Score: 31%         TREATMENT     Total Treatment time (time-based codes) separate from Evaluation: 30 minutes      Jerry received the treatments listed below:      THERAPEUTIC EXERCISES to develop strength, ROM, and flexibility for 30 minutes including :     Long sitting Hamstring stretch 3 x 30 sec  Long sitting Gastroc-soleus stretch 3 x 30 sec  Plantar fascia stretch 3 x 30 sec  Quad sets with towel under the heel 3 x 10  Straight leg raise with hip external rotation 3 x 10  Side lying hip abduction 3 x 10  Heel slides 3 x 10 with strap  Seated heel slides 3 x 10      PATIENT EDUCATION AND HOME EXERCISES     Education provided:   - Ice for pain management as needed    Written Home Exercises Provided: yes. Exercises were reviewed and Jerry was able to demonstrate them prior to the end of the  session.  Jerry demonstrated good  understanding of the education provided. See EMR under Patient Instructions for exercises provided during therapy sessions.    ASSESSMENT     Jerry is a 38 y.o. female referred to outpatient Physical Therapy with a medical diagnosis of S/P arthroscopy of right knee . Patient presents with :    Right knee pain  Decreased right knee ROM  Decreased right lower extremity strength  Decreased ability to perform ADL and limited tolerance to standing and walking.    Patient prognosis is Good.   Patientt will benefit from skilled outpatient Physical Therapy to address the deficits stated above and in the chart below, provide patient /family education, and to maximize patientt's level of independence.     Plan of care discussed with patient: Yes  Patient's spiritual, cultural and educational needs considered and patient is agreeable to the plan of care and goals as stated below:     Anticipated Barriers for therapy: none    Medical Necessity is demonstrated by the following  History  Co-morbidities and personal factors that may impact the plan of care [x] LOW: no personal factors / co-morbidities  [] MODERATE: 1-2 personal factors / co-morbidities  [] HIGH: 3+ personal factors / co-morbidities    Moderate / High Support Documentation:      Examination  Body Structures and Functions, activity limitations and participation restrictions that may impact the plan of care [x] LOW: addressing 1-2 elements  [] MODERATE: 3+ elements  [] HIGH: 4+ elements (please support below)    Moderate / High Support Documentation:      Clinical Presentation [x] LOW: stable  [] MODERATE: Evolving  [] HIGH: Unstable     Decision Making/ Complexity Score: low       Goals:      SHORT TERM GOALS:  4 weeks  Progress Date met   The patient will begin a written HEP [] Met  [] Not Met  [] Progressing     Increase knee ROM to 0* - 110* [] Met  [] Not Met  [] Progressing     Decrease soft tissue tenderness to mild [] Met  []  Not Met  [] Progressing     Increase posterior hip strength to 4/5 [] Met  [] Not Met  [] Progressing     The patient will be able to ascend/descend 10 step/stairs without onset of symptoms.   [] Met  [] Not Met  [] Progressing        LONG TERM GOALS: 8 weeks  Progress Date met   The patient will be independent with a HEP for maintenance [] Met  [] Not Met  [] Progressing     Increase knee ROM to 0* to 130* [] Met  [] Not Met  [] Progressing     Increase knee strength to 4+/5 [] Met  [] Not Met  [] Progressing     The patient will ambulate on a community level without gait deviation . [] Met  [] Not Met  [] Progressing     Increase FOTO to 53%. [] Met  [] Not Met  [] Progressing     The patient will be able to ascend/descend 20 step/stairs without onset of symptoms.   [] Met  [] Not Met  [] Progressing           PLAN   Plan of care Certification: 2/12/2024 to 4/19/2024.    Outpatient Physical Therapy 2 times weekly for 8 weeks to include the following interventions: Electrical Stimulation NMES, Gait Training, Manual Therapy, Neuromuscular Re-ed, Patient Education, Therapeutic Activities, and Therapeutic Exercise.     Pascual Cowan, PT      I CERTIFY THE NEED FOR THESE SERVICES FURNISHED UNDER THIS PLAN OF TREATMENT AND WHILE UNDER MY CARE   Physician's comments:     Physician's Signature: ___________________________________________________

## 2024-02-15 ENCOUNTER — CLINICAL SUPPORT (OUTPATIENT)
Dept: REHABILITATION | Facility: HOSPITAL | Age: 39
End: 2024-02-15
Payer: COMMERCIAL

## 2024-02-15 DIAGNOSIS — R29.898 DECREASED STRENGTH OF LOWER EXTREMITY: ICD-10-CM

## 2024-02-15 DIAGNOSIS — R26.9 GAIT ABNORMALITY: ICD-10-CM

## 2024-02-15 DIAGNOSIS — Z98.890 S/P ARTHROSCOPY OF RIGHT KNEE: Primary | ICD-10-CM

## 2024-02-15 DIAGNOSIS — M25.661 DECREASED ROM OF RIGHT KNEE: ICD-10-CM

## 2024-02-15 PROCEDURE — 97110 THERAPEUTIC EXERCISES: CPT | Mod: PN

## 2024-02-15 NOTE — PROGRESS NOTES
"[facility]   Physical Therapy Treatment Note      Name: Jerry Coronado  Clinic Number: [id number]    Therapy Diagnosis: No diagnosis found.  Physician: Matthew Elliott MD    Visit Date: 2/15/2024    Physician Orders: PT Eval and Treat   Medical Diagnosis from Referral: S/P arthroscopy of right knee   Evaluation Date: 2/12/2024  Authorization Period Expiration: 12/31/2024  Plan of Care Expiration: 4/19/2024  Progress Note Due: 3/12/2024  Visit # / Visits authorized: 1/ 1 (POC: 1/16)   FOTO: 1/3     Precautions: Standard   Insurance: Payor: MEDICAID / Plan: [place] BLUE [place] [place] / Product Type: Managed Medicaid /      Time In: 1100  Time Out: 1200  Total Appointment Time (timed & untimed codes): 60 minutes    PTA Visit #: 1/5       Subjective     Patient reports: ***.  She {Actions; was/was not:59237} compliant with home exercise program.  Response to previous treatment: ***  Functional change: ***    Pain: {0-10:77960::"0"}/10  Location: {RIGHT/LEFT/BILATERAL:37509} {LOCATION ON BODY:76967}     Objective      Objective Measures updated at progress report unless specified.     Treatment     Jerry received the treatments listed below:      THERAPEUTIC EXERCISES to develop strength, ROM, and flexibility for 30 minutes including :      Long sitting Hamstring stretch 3 x 30 sec  Long sitting Gastroc-soleus stretch 3 x 30 sec  Plantar fascia stretch 3 x 30 sec  Quad sets with towel under the heel 3 x 10  Straight leg raise with hip external rotation 3 x 10  Side lying hip abduction 3 x 10  Heel slides 3 x 10 with strap  Seated heel slides 3 x 10       Patient Education and Home Exercises       Education provided:   - home exercises     Written Home Exercises Provided: Patient instructed to cont prior HEP. Exercises were reviewed and Jerry was able to demonstrate them prior to the end of the session.  Jerry demonstrated good  understanding of the education provided. See Electronic Medical " Record under Patient Instructions for exercises provided during therapy sessions    Assessment     ***    Jerry Is progressing well towards her goals.   Patient prognosis is Good.     Patient will continue to benefit from skilled outpatient physical therapy to address the deficits listed in the problem list box on initial evaluation, provide pt/family education and to maximize pt's level of independence in the home and community environment.     Patient's spiritual, cultural and educational needs considered and pt agreeable to plan of care and goals.     Anticipated barriers to physical therapy: none     Goals:   SHORT TERM GOALS:  4 weeks  Progress Date met   The patient will begin a written HEP [] Met  [] Not Met  [x] Progressing     Increase knee ROM to 0* - 110* [] Met  [] Not Met  [x] Progressing     Decrease soft tissue tenderness to mild [] Met  [] Not Met  [x] Progressing     Increase posterior hip strength to 4/5 [] Met  [] Not Met  [x] Progressing     The patient will be able to ascend/descend 10 step/stairs without onset of symptoms.    [] Met  [] Not Met  [x] Progressing        LONG TERM GOALS: 8 weeks  Progress Date met   The patient will be independent with a HEP for maintenance [] Met  [] Not Met  [x] Progressing     Increase knee ROM to 0* to 130* [] Met  [] Not Met  [x] Progressing     Increase knee strength to 4+/5 [] Met  [] Not Met  [x] Progressing     The patient will ambulate on a community level without gait deviation . [] Met  [] Not Met  [x] Progressing     Increase FOTO to 53%. [] Met  [] Not Met  [x] Progressing     The patient will be able to ascend/descend 20 step/stairs without onset of symptoms.    [] Met  [] Not Met  [x] Progressing          Plan     Continue per Plan of Care     Danitza Llamas, PTA

## 2024-02-15 NOTE — PROGRESS NOTES
"[facility]  Outpatient Physical Therapy Daily Treatment Note      Name: Jerry Coronado  Clinic Number: [id number]  Visit Date: 2/15/2024    Therapy Diagnosis:   Encounter Diagnoses   Name Primary?    S/P arthroscopy of right knee Yes    Decreased ROM of right knee     Decreased strength of lower extremity     Gait abnormality        Physician: Matthew Elliott MD    Physician Orders: PT Eval and Treat   Medical Diagnosis from Referral: S/P arthroscopy of right knee   Evaluation Date: 2/12/2024  Authorization Period Expiration: 12/31/2024  Plan of Care Expiration: 4/19/2024  Progress Note Due: 3/12/2024  Visit # / Visits authorized: 1/ 1 (POC: 2/16)   FOTO: 1/3  PTA Visit #: 0/5     FOTO Due Visit 5 -  Follow up  FOTO Due Visit 10 - Follow up    Time In: 200  Time Out: 253  Total Billable Timed: 53 minutes  Total Billable Un-timed: 0 minutes    Precautions: Standard    Subjective     The patient presents to therapy she is having some pain, but has been doing her exercises    Response to previous treatment: eval  Functional change: none    Pain: 4/10  Location: right knee      Objective       Jerry received therapeutic exercises to develop strength, endurance, ROM, flexibility, posture, and core stabilization for 53 minutes including:  Nu-Step x 8 min    Long sitting Hamstring stretch 3 x 30 sec  Long sitting Gastroc-soleus stretch 3 x 30 sec  Plantar fascia stretch 3 x 30 sec  Quad sets with towel under the heel 3 x 10  Straight leg raise with hip external rotation 3 x 10  Side lying hip abduction 3 x 10  Heel slides 3 x 10 with strap  Seated heel slides 3 x 10    Sit to stand from 18" box x 20   Stand HR/TR x 30   Single leg balance 3 x 30 sec       Patient Education and HEP     She was compliant with home exercise program.    Education provided:   - Do Home Exercise Program     Written Home Exercises Provided: Patient instructed to cont prior HEP.  Exercises were reviewed and Jerry was " able to demonstrate them prior to the end of the session.  Jerry demonstrated fair  understanding of the education provided.     See EMR under Patient Instructions for exercises provided prior visit.    Assessment     The patient is improving, she still has mild antalgic gait.  She had minimal quad lag with straight leg raise.  She will benefit from continued therapy     Pt will continue to benefit from skilled outpatient physical therapy to address the deficits listed in the problem list box on initial evaluation, provide pt/family education and to maximize pt's level of independence in the home and community environment.     Jerry Is progressing well towards her goals.   Pt prognosis is Good.     Pt's spiritual, cultural and educational needs considered and pt agreeable to plan of care and goals.    Anticipated barriers to physical therapy: none    Goals:           SHORT TERM GOALS:  4 weeks  Progress Date met   The patient will begin a written HEP [] Met  [] Not Met  [x] Progressing     Increase knee ROM to 0* - 110* [] Met  [] Not Met  [x] Progressing     Decrease soft tissue tenderness to mild [] Met  [] Not Met  [x] Progressing     Increase posterior hip strength to 4/5 [] Met  [] Not Met  [x] Progressing     The patient will be able to ascend/descend 10 step/stairs without onset of symptoms.    [] Met  [] Not Met  [x] Progressing        LONG TERM GOALS: 8 weeks  Progress Date met   The patient will be independent with a HEP for maintenance [] Met  [] Not Met  [x] Progressing     Increase knee ROM to 0* to 130* [] Met  [] Not Met  [x] Progressing     Increase knee strength to 4+/5 [] Met  [] Not Met  [x] Progressing     The patient will ambulate on a community level without gait deviation . [] Met  [] Not Met  [x] Progressing     Increase FOTO to 53%. [] Met  [] Not Met  [x] Progressing     The patient will be able to ascend/descend 20 step/stairs without onset of symptoms.    [] Met  [] Not Met  [x] Progressing          Plan     Continue with the plan of care established per initial evaluation    Arie Phillips, PT

## 2024-02-16 ENCOUNTER — OFFICE VISIT (OUTPATIENT)
Dept: ORTHOPEDICS | Facility: CLINIC | Age: 39
End: 2024-02-16
Payer: MEDICAID

## 2024-02-16 VITALS — WEIGHT: 225 LBS | BODY MASS INDEX: 42.48 KG/M2 | HEIGHT: 61 IN

## 2024-02-16 DIAGNOSIS — Z98.890 S/P ARTHROSCOPY OF RIGHT KNEE: Primary | ICD-10-CM

## 2024-02-16 PROCEDURE — 1160F RVW MEDS BY RX/DR IN RCRD: CPT | Mod: CPTII,S$GLB,, | Performed by: PHYSICIAN ASSISTANT

## 2024-02-16 PROCEDURE — 99024 POSTOP FOLLOW-UP VISIT: CPT | Mod: S$GLB,,, | Performed by: PHYSICIAN ASSISTANT

## 2024-02-16 PROCEDURE — 1159F MED LIST DOCD IN RCRD: CPT | Mod: CPTII,S$GLB,, | Performed by: PHYSICIAN ASSISTANT

## 2024-02-16 NOTE — PROGRESS NOTES
[facility] ORTHOPEDICS  [address]  [place] [address]  Phone: [phone]   [phone]    Patient's PCP:Severo Saenz MD  Referring Provider: No ref. provider found    POST-OP Note:    Subjective:        Chief Complaint:   Chief Complaint   Patient presents with    Right Knee - Post-op Evaluation     Patient is here for a Po f/up Right knee scope 2.7.24 & suture removal, states her pain is better, a bit sore after working her first shift last night.        Past Medical History:   Diagnosis Date    Allergic rhinitis     Anxiety     Cancer 12/01/2021    B-cell folicular lymphoma    Depression     Hypothyroidism     SVT (supraventricular tachycardia) 2011       Past Surgical History:   Procedure Laterality Date    ABLATION OF DYSRHYTHMIC FOCUS  2016    x2    BIOPSY OF CERVICAL LYMPH NODE Left 11/29/2021    Procedure: BIOPSY, LYMPH NODE, CERVICAL;  Surgeon: Blanco Kerns MD;  Location: [facility] OR;  Service: General;  Laterality: Left;    CERVIX LESION DESTRUCTION  11/2021    CONIZATION OF CERVIX USING LOOP ELECTROSURGICAL EXCISION PROCEDURE (LEEP) N/A 07/21/2021    Procedure: LEEP CONIZATION, CERVIX;  Surgeon: Donna Castillo MD;  Location: [facility] OR;  Service: OB/GYN;  Laterality: N/A;    EXCISION OF LESION OF NOSE N/A 10/14/2019    Procedure: EXCISION, LESION, NOSE;  Surgeon: Umesh Morales MD;  Location: [facility] OR;  Service: ENT;  Laterality: N/A;    FUNCTIONAL ENDOSCOPIC SINUS SURGERY (FESS) N/A 10/14/2019    Procedure: FESS (FUNCTIONAL ENDOSCOPIC SINUS SURGERY);  Surgeon: Umesh Morales MD;  Location: [facility] OR;  Service: ENT;  Laterality: N/A;    HYSTEROSCOPY WITH DILATION AND CURETTAGE OF UTERUS N/A 07/21/2021    Procedure: HYSTEROSCOPY, WITH DILATION AND CURETTAGE OF UTERUS;  Surgeon: Donna Castillo MD;  Location: [facility] OR;  Service: OB/GYN;  Laterality: N/A;    KNEE ARTHROSCOPY W/ MENISCECTOMY Right 2/7/2024    Procedure: ARTHROSCOPY, KNEE, WITH MENISCECTOMY;  Surgeon: Earl  MD Matthew;  Location: [facility] OR;  Service: Orthopedics;  Laterality: Right;  partial medical meniscectomy and plica resection    MEDIPORT REMOVAL Right 06/20/2022    Procedure: REMOVAL, CATHETER, CENTRAL VENOUS, TUNNELED, WITH PORT;  Surgeon: Blanco Kerns MD;  Location: [facility] OR;  Service: General;  Laterality: Right;    NASAL SEPTOPLASTY N/A 10/14/2019    Procedure: SEPTOPLASTY, NOSE;  Surgeon: Umesh Morales MD;  Location: [facility] OR;  Service: ENT;  Laterality: N/A;    NASAL SINUS SURGERY  10/14/2019    OSTEOTOMY OF METATARSAL BONE Left 12/21/2022    Procedure: OSTEOTOMY, METATARSAL BONE;  Surgeon: Hola Escalante DPM;  Location: [facility] OR;  Service: Podiatry;  Laterality: Left;  synthes janes notified 12/19 TW  , RELEASE ENTRAPPED NERVE 2ND INTERMETATARSAL SPACE    PILONIDAL CYST DRAINAGE      REVISION OF SCAR  06/20/2022    Procedure: REVISION, SCAR;  Surgeon: Blanco Kerns MD;  Location: [facility] OR;  Service: General;;    SINUS SURGERY  11/30/2023    SKIN TAG REMOVAL Left 06/20/2022    Procedure: REMOVAL, SKIN TAG;  Surgeon: Blanco Kerns MD;  Location: [facility] OR;  Service: General;  Laterality: Left;    TONSILLECTOMY      TYMPANOPLASTY N/A 10/14/2019    Procedure: TYMPANOPLASTY;  Surgeon: Umesh Morales MD;  Location: [facility] OR;  Service: ENT;  Laterality: N/A;    TYMPANOTOMY Left 10/14/2019    left       Current Outpatient Medications   Medication Sig    busPIRone (BUSPAR) 15 MG tablet Take 1 tablet by mouth 2 (two) times daily.    dronabinoL (MARINOL) 5 MG capsule Take 1 capsule (5 mg total) by mouth 2 (two) times daily as needed (decreased appetite).    fluticasone propionate (FLONASE) 50 mcg/actuation nasal spray 2 sprays (100 mcg total) by Each Nostril route once daily.    gabapentin (NEURONTIN) 600 MG tablet Take 600 mg by mouth 3 (three) times daily as needed.    ibuprofen (ADVIL,MOTRIN) 800 MG tablet Take 1 tablet (800 mg total) by mouth every 8 (eight) hours as needed for Pain.    levothyroxine  "(SYNTHROID) 50 MCG tablet TAKE 1 TABLET BY MOUTH EVERY DAY (Patient taking differently: Take 50 mcg by mouth before breakfast.)    LIDOcaine (XYLOCAINE) 5 % Oint ointment SMARTSIG:Topical Every 4-6 Hours    meloxicam (MOBIC) 15 MG tablet Take 1 tablet (15 mg total) by mouth once daily.    mirtazapine (REMERON) 7.5 MG Tab TAKE 2 TABLETS (15 MG TOTAL) BY MOUTH NIGHTLY.    multivitamin capsule Take 1 capsule by mouth.    oxyCODONE-acetaminophen (PERCOCET) 7.5-325 mg per tablet Take 1 tablet by mouth every 8 (eight) hours as needed for Pain.    promethazine (PHENERGAN) 6.25 mg/5 mL syrup     tiZANidine (ZANAFLEX) 4 MG tablet Take 4 mg by mouth 3 (three) times daily as needed.    traMADoL (ULTRAM) 50 mg tablet Take 1 tablet by mouth every 6 (six) hours as needed.    ALPRAZolam (XANAX) 2 MG Tab Take 1 tablet (2 mg total) by mouth 2 (two) times daily as needed.     No current facility-administered medications for this visit.       Review of patient's allergies indicates:  No Known Allergies    Family History   Problem Relation Age of Onset    Hypertension Mother     Heart disease Father 59        CABG    Heart failure Father     Breast cancer Maternal Aunt 55    Colon cancer Neg Hx     Ovarian cancer Neg Hx        Social History     Socioeconomic History    Marital status: Single   Occupational History    Occupation: Traxer     Comment: [employer]   Tobacco Use    Smoking status: Former     Current packs/day: 0.00     Average packs/day: 0.5 packs/day for 9.0 years (4.5 ttl pk-yrs)     Types: Cigarettes     Start date: 2000     Quit date: 2009     Years since quittin.6    Smokeless tobacco: Never   Substance and Sexual Activity    Alcohol use: Yes     Alcohol/week: 5.0 standard drinks of alcohol     Types: 5 Shots of liquor per week     Comment: "occasionally"    Drug use: No    Sexual activity: Not Currently     Partners: Male     Birth control/protection: OCP   Social History Narrative    The patient does not " exercise regularly ().  Rates diet as fair.  She is not satisfied with weight.           History of present illness:  38-year-old female, returns to clinic today status post right knee arthroscopy done on February 6.  Here today for routine follow-up.    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  She underwent partial medial meniscectomy and plica excision    Date of Procedure: 2/7/2024      Procedure:  Partial medial meniscectomy right knee                        Plica resection right knee     Surgeon(s) and Role:     * Matthew Elliott MD - Primary     Assisting Surgeon:  Otto     Pre-Operative Diagnosis: Acute medial meniscal tear, right, subsequent encounter [S83.241D]     Post-Operative Diagnosis: Post-Op Diagnosis Codes:     * Acute medial meniscal tear, right, subsequent encounter [S83.241D]     Anesthesia: General     Intraoperative Findings:  The patellofemoral joint was noted to have grade 3 chondromalacia.  The patella tracked centrally.  The ACL and PCL were intact.  The lateral compartment was pristine.  The medial compartment had intact cartilaginous surfaces.  There was an undersurface tear of the medial meniscus consisting of a flap off the undersurface right at the level of the root the remainder of the meniscus was stable there was a thick plica extending from the capsule to the fat pad draping across the medial femoral condyle     Description of the Findings of the Procedure:  Patient was placed on the operative table in supine position.  The knee was prepped and draped in the usual sterile manner for surgery inferomedial inferolateral portals were established and diagnostic arthroscopy was undertaken the findings of those stated above at this point I turned my attention to the medial meniscus.  There was a flap that I was able to simply excised with the shaver.   I now examined the plica was very thickened and angry and I used a biter to released the plica and then a shaver to excise the plica.  I now gently debrided the undersurface of the patella.  The arthroscopic equipment was removed and the portals were closed with nylon stitches.  Sterile dressings were applied and the patient was noted to be in stable condition       Review of Systems:    Musculoskeletal:  See HPI       Objective:        Physical Examination:    Vital Signs: There were no vitals filed for this visit.    Body mass index is 42.51 kg/m².    This a well-developed, well nourished patient in no acute distress.  They are alert and oriented and cooperative to examination.        Examination of the right knee, skin is dry and intact, no erythema or ecchymosis, no signs symptoms of infection.  Range of motion 0-120 degrees.  Stable anterior-posterior varus and valgus stress.  No evidence of wound failure dehiscence.  Mattress sutures were noted.  Removed today without complication.  Calf soft nontender, straight leg raise negative.    Pertinent New Results:     XRAY Report / Interpretation:        Assessment:       1. S/P arthroscopy of right knee      Plan:     S/P arthroscopy of right knee        Follow up for 1 month f/u - right knee scope 2/7/24.    Stable status post arthroscopy of the right knee.  Unfortunately the patient has patellofemoral joint changes with near full-thickness cartilage loss.  She also underwent partial medial meniscectomy and plica resection.  She is doing well in terms of pain control.  She is doing physical therapy for quad strengthening.  She can resume her normal activities, we will check her back in a month.    [unfilled]  SHYAM Nuñez PA-C    This note was created using [employer] voice recognition software that occasionally misinterprets words or phrases.

## 2024-02-19 ENCOUNTER — CLINICAL SUPPORT (OUTPATIENT)
Dept: REHABILITATION | Facility: HOSPITAL | Age: 39
End: 2024-02-19
Payer: MEDICAID

## 2024-02-19 DIAGNOSIS — Z98.890 S/P ARTHROSCOPY OF RIGHT KNEE: Primary | ICD-10-CM

## 2024-02-19 DIAGNOSIS — M25.661 DECREASED ROM OF RIGHT KNEE: ICD-10-CM

## 2024-02-19 DIAGNOSIS — R26.9 GAIT ABNORMALITY: ICD-10-CM

## 2024-02-19 DIAGNOSIS — R29.898 DECREASED STRENGTH OF LOWER EXTREMITY: ICD-10-CM

## 2024-02-19 PROCEDURE — 97110 THERAPEUTIC EXERCISES: CPT | Mod: PN,CQ

## 2024-02-19 NOTE — PROGRESS NOTES
"[facility]  Outpatient Physical Therapy Daily Treatment Note      Name: Jerry Coronado  Clinic Number: [id number]  Visit Date: 2/19/2024    Therapy Diagnosis:   Encounter Diagnoses   Name Primary?    S/P arthroscopy of right knee Yes    Decreased ROM of right knee     Decreased strength of lower extremity     Gait abnormality        Physician: Matthew Elliott MD    Physician Orders: PT Eval and Treat   Medical Diagnosis from Referral: S/P arthroscopy of right knee   Evaluation Date: 2/12/2024  Authorization Period Expiration: 12/31/2024  Plan of Care Expiration: 4/19/2024  Progress Note Due: 3/12/2024  Visit # / Visits authorized: 1/ 1 (POC: 3/16)   FOTO: 1/3  PTA Visit #: 1/5     FOTO Due Visit 5 -  Follow up  FOTO Due Visit 10 - Follow up    Time In: 800  Time Out: 853  Total Billable Timed: 53 minutes  Total Billable Un-timed: 0 minutes    Precautions: Standard    Subjective     The patient presents to therapy: had some lateral pain the other day, but is doing better today.   Response to previous treatment: no issues stated   Functional change: ongoing     Pain: 4/10  Location: right knee      Objective       Jerry received therapeutic exercises to develop strength, endurance, ROM, flexibility, posture, and core stabilization for 53 minutes including:    Nu-Step x 8 min    Long sitting Hamstring stretch 3 x 30 sec  Long sitting Gastroc-soleus stretch 3 x 30 sec  Plantar fascia stretch 3 x 30 sec  Quad sets with towel under the heel 3 x 10  Short Arc Quads 2# 3 x 10   Straight leg raise with hip external rotation 3 x 10  Side lying hip abduction 3 x 10  Bridges Green Theraband 3 x 10   Heel slides 3 x 10 with strap    Sit to stand from 18" box x 20   Stand HR/TR x 30   Forward step ups 2 x 10     Single leg balance 3 x 30 sec       Patient Education and HEP     She was compliant with home exercise program.    Education provided:   - Do Home Exercise Program     Written Home Exercises " Provided: Patient instructed to cont prior HEP.  Exercises were reviewed and Jerry was able to demonstrate them prior to the end of the session.  Jerry demonstrated fair  understanding of the education provided.     See EMR under Patient Instructions for exercises provided prior visit.    Assessment     Patient has increased right knee discomfort after work. Noted right knee hyperextension and right ankle compensations. Progress strength to help reduce discomfort. Progress as tolerated.     Jerry Is progressing well towards her goals.   Pt prognosis is Good.     Pt will continue to benefit from skilled outpatient physical therapy to address the deficits listed in the problem list box on initial evaluation, provide pt/family education and to maximize pt's level of independence in the home and community environment.     Pt's spiritual, cultural and educational needs considered and pt agreeable to plan of care and goals.    Anticipated barriers to physical therapy: none    Goals:      SHORT TERM GOALS:  4 weeks  Progress Date met   The patient will begin a written HEP [] Met  [] Not Met  [x] Progressing     Increase knee ROM to 0* - 110* [] Met  [] Not Met  [x] Progressing     Decrease soft tissue tenderness to mild [] Met  [] Not Met  [x] Progressing     Increase posterior hip strength to 4/5 [] Met  [] Not Met  [x] Progressing     The patient will be able to ascend/descend 10 step/stairs without onset of symptoms.    [] Met  [] Not Met  [x] Progressing        LONG TERM GOALS: 8 weeks  Progress Date met   The patient will be independent with a HEP for maintenance [] Met  [] Not Met  [x] Progressing     Increase knee ROM to 0* to 130* [] Met  [] Not Met  [x] Progressing     Increase knee strength to 4+/5 [] Met  [] Not Met  [x] Progressing     The patient will ambulate on a community level without gait deviation . [] Met  [] Not Met  [x] Progressing     Increase FOTO to 53%. [] Met  [] Not Met  [x] Progressing     The  patient will be able to ascend/descend 20 step/stairs without onset of symptoms.    [] Met  [] Not Met  [x] Progressing         Plan     Continue with the plan of care established per initial evaluation    Danitza Llamas PTA

## 2024-02-21 DIAGNOSIS — M79.672 LEFT FOOT PAIN: ICD-10-CM

## 2024-02-22 ENCOUNTER — CLINICAL SUPPORT (OUTPATIENT)
Dept: REHABILITATION | Facility: HOSPITAL | Age: 39
End: 2024-02-22
Payer: COMMERCIAL

## 2024-02-22 DIAGNOSIS — Z98.890 S/P ARTHROSCOPY OF RIGHT KNEE: Primary | ICD-10-CM

## 2024-02-22 DIAGNOSIS — M25.661 DECREASED ROM OF RIGHT KNEE: ICD-10-CM

## 2024-02-22 DIAGNOSIS — R26.9 GAIT ABNORMALITY: ICD-10-CM

## 2024-02-22 DIAGNOSIS — R29.898 DECREASED STRENGTH OF LOWER EXTREMITY: ICD-10-CM

## 2024-02-22 PROCEDURE — 97110 THERAPEUTIC EXERCISES: CPT | Mod: PN,CQ

## 2024-02-22 RX ORDER — MELOXICAM 15 MG/1
15 TABLET ORAL
Qty: 30 TABLET | Refills: 1 | OUTPATIENT
Start: 2024-02-22

## 2024-02-22 NOTE — TELEPHONE ENCOUNTER
Spoke to patient who states she still has some medication for the moment. Patient advised to call for refill when ready.

## 2024-02-22 NOTE — PROGRESS NOTES
"[facility]  Outpatient Physical Therapy Daily Treatment Note      Name: Jerry Coronado  Clinic Number: [id number]  Visit Date: 2/22/2024    Therapy Diagnosis:   No diagnosis found.      Physician: Matthew Elliott MD    Physician Orders: PT Eval and Treat   Medical Diagnosis from Referral: S/P arthroscopy of right knee   Evaluation Date: 2/12/2024  Authorization Period Expiration: 12/31/2024  Plan of Care Expiration: 4/19/2024  Progress Note Due: 3/12/2024  Visit # / Visits authorized: 1/ 1 (POC: 3/16)   FOTO: 1/3  PTA Visit #: 1/5     FOTO Due Visit 5 -  Follow up  FOTO Due Visit 10 - Follow up    Time In: 800  Time Out: 853  Total Billable Timed: 53 minutes  Total Billable Un-timed: 0 minutes    Precautions: Standard    Subjective     The patient presents to therapy: had some lateral pain the other day, but is doing better today.   Response to previous treatment: no issues stated   Functional change: ongoing     Pain: 4/10  Location: right knee      Objective       Jerry received therapeutic exercises to develop strength, endurance, ROM, flexibility, posture, and core stabilization for 53 minutes including:    Nu-Step x 8 min    Long sitting Hamstring stretch 3 x 30 sec  Long sitting Gastroc-soleus stretch 3 x 30 sec  Plantar fascia stretch 3 x 30 sec  Quad sets with towel under the heel 3 x 10  Short Arc Quads 2# 3 x 10   Straight leg raise with hip external rotation 3 x 10  Side lying hip abduction 3 x 10  Bridges Green Theraband 3 x 10   Heel slides 3 x 10 with strap    Sit to stand from 18" box x 20   Stand HR/TR x 30   Forward step ups 2 x 10     Single leg balance 3 x 30 sec       Patient Education and HEP     She was compliant with home exercise program.    Education provided:   - Do Home Exercise Program     Written Home Exercises Provided: Patient instructed to cont prior HEP.  Exercises were reviewed and Jerry was able to demonstrate them prior to the end of the session.  Jeryr " demonstrated fair  understanding of the education provided.     See EMR under Patient Instructions for exercises provided prior visit.    Assessment     Patient has increased right knee discomfort after work. Noted right knee hyperextension and right ankle compensations. Progress strength to help reduce discomfort. Progress as tolerated.     Jerry Is progressing well towards her goals.   Pt prognosis is Good.     Pt will continue to benefit from skilled outpatient physical therapy to address the deficits listed in the problem list box on initial evaluation, provide pt/family education and to maximize pt's level of independence in the home and community environment.     Pt's spiritual, cultural and educational needs considered and pt agreeable to plan of care and goals.    Anticipated barriers to physical therapy: none    Goals:      SHORT TERM GOALS:  4 weeks  Progress Date met   The patient will begin a written HEP [] Met  [] Not Met  [x] Progressing     Increase knee ROM to 0* - 110* [] Met  [] Not Met  [x] Progressing     Decrease soft tissue tenderness to mild [] Met  [] Not Met  [x] Progressing     Increase posterior hip strength to 4/5 [] Met  [] Not Met  [x] Progressing     The patient will be able to ascend/descend 10 step/stairs without onset of symptoms.    [] Met  [] Not Met  [x] Progressing        LONG TERM GOALS: 8 weeks  Progress Date met   The patient will be independent with a HEP for maintenance [] Met  [] Not Met  [x] Progressing     Increase knee ROM to 0* to 130* [] Met  [] Not Met  [x] Progressing     Increase knee strength to 4+/5 [] Met  [] Not Met  [x] Progressing     The patient will ambulate on a community level without gait deviation . [] Met  [] Not Met  [x] Progressing     Increase FOTO to 53%. [] Met  [] Not Met  [x] Progressing     The patient will be able to ascend/descend 20 step/stairs without onset of symptoms.    [] Met  [] Not Met  [x] Progressing         Plan     Continue with  the plan of care established per initial evaluation    Pascual Cowan, PT

## 2024-02-22 NOTE — PROGRESS NOTES
[facility]  Outpatient Physical Therapy Daily Treatment Note      Name: Jerry Coronado  Clinic Number: [id number]  Visit Date: 2/22/2024    Therapy Diagnosis:   Encounter Diagnoses   Name Primary?    S/P arthroscopy of right knee Yes    Decreased ROM of right knee     Decreased strength of lower extremity     Gait abnormality          Physician: Matthew Elliott MD    Physician Orders: PT Eval and Treat   Medical Diagnosis from Referral: S/P arthroscopy of right knee   Evaluation Date: 2/12/2024  Authorization Period Expiration: 12/31/2024  Plan of Care Expiration: 4/19/2024  Progress Note Due: 3/12/2024  Visit # / Visits authorized: 2/ 1 (POC: 4/16)   FOTO: 1/3  PTA Visit #: 1/5     FOTO Due Visit 5 -  Follow up  FOTO Due Visit 10 - Follow up    Time In: 210 (patient late)  Time Out: 300  Total Billable Timed: 50 minutes  Total Billable Un-timed: 0 minutes    Precautions: Standard    Subjective     The patient presents to therapy: still having some soreness.   Response to previous treatment: no issues stated   Functional change: minor decrease in pain     Pain: 3/10  Location: right knee      Objective       Jerry received therapeutic exercises to develop strength, endurance, ROM, flexibility, posture, and core stabilization for 50 minutes including:    Recumbent bike x 5 minutes     Long sitting Hamstring stretch 3 x 30 sec  Long sitting Gastroc-soleus stretch 3 x 30 sec  Quad sets with towel under the heel 3 x 10  Short Arc Quads 3# 3 x 10   Straight leg raise with hip external rotation 1# 3 x 10  Side lying hip abduction 1# 3 x 10  Bridges Green Theraband 3 x 10     Forward step ups 2 x 10   Shuttle 50# 3 x 10    Shuttle Right Lower Extremity 25# 3 x 10  Lateral walks Red Theraband 5 yards x 3 laps     Single leg balance 3 x 30 sec       Patient Education and HEP     She was compliant with home exercise program.    Education provided:   - Do Home Exercise Program     Written Home  Exercises Provided: Patient instructed to cont prior HEP.  Exercises were reviewed and Jerry was able to demonstrate them prior to the end of the session.  Jerry demonstrated fair  understanding of the education provided.     See EMR under Patient Instructions for exercises provided prior visit.    Assessment     Patient continues to have soreness in right lateral aspect of knee, but tolerated strength and stability progressions. Progress strength to help reduce discomfort. Progress as tolerated.     Jerry Is progressing well towards her goals.   Pt prognosis is Good.     Pt will continue to benefit from skilled outpatient physical therapy to address the deficits listed in the problem list box on initial evaluation, provide pt/family education and to maximize pt's level of independence in the home and community environment.     Pt's spiritual, cultural and educational needs considered and pt agreeable to plan of care and goals.    Anticipated barriers to physical therapy: none    Goals:      SHORT TERM GOALS:  4 weeks  Progress Date met   The patient will begin a written HEP [] Met  [] Not Met  [x] Progressing     Increase knee ROM to 0* - 110* [] Met  [] Not Met  [x] Progressing     Decrease soft tissue tenderness to mild [] Met  [] Not Met  [x] Progressing     Increase posterior hip strength to 4/5 [] Met  [] Not Met  [x] Progressing     The patient will be able to ascend/descend 10 step/stairs without onset of symptoms.    [] Met  [] Not Met  [x] Progressing        LONG TERM GOALS: 8 weeks  Progress Date met   The patient will be independent with a HEP for maintenance [] Met  [] Not Met  [x] Progressing     Increase knee ROM to 0* to 130* [] Met  [] Not Met  [x] Progressing     Increase knee strength to 4+/5 [] Met  [] Not Met  [x] Progressing     The patient will ambulate on a community level without gait deviation . [] Met  [] Not Met  [x] Progressing     Increase FOTO to 53%. [] Met  [] Not Met  [x] Progressing      The patient will be able to ascend/descend 20 step/stairs without onset of symptoms.    [] Met  [] Not Met  [x] Progressing         Plan     Continue with the plan of care established per initial evaluation    Danitza Llamas PTA

## 2024-02-26 ENCOUNTER — CLINICAL SUPPORT (OUTPATIENT)
Dept: REHABILITATION | Facility: HOSPITAL | Age: 39
End: 2024-02-26
Payer: MEDICAID

## 2024-02-26 DIAGNOSIS — R29.898 DECREASED STRENGTH OF LOWER EXTREMITY: ICD-10-CM

## 2024-02-26 DIAGNOSIS — R26.9 GAIT ABNORMALITY: ICD-10-CM

## 2024-02-26 DIAGNOSIS — Z98.890 S/P ARTHROSCOPY OF RIGHT KNEE: Primary | ICD-10-CM

## 2024-02-26 DIAGNOSIS — M25.661 DECREASED ROM OF RIGHT KNEE: ICD-10-CM

## 2024-02-26 PROCEDURE — 97110 THERAPEUTIC EXERCISES: CPT | Mod: PN,CQ

## 2024-02-26 NOTE — PROGRESS NOTES
[facility]  Outpatient Physical Therapy Daily Treatment Note      Name: Jerry Coronado  Clinic Number: [id number]  Visit Date: 2/26/2024    Therapy Diagnosis:   Encounter Diagnoses   Name Primary?    S/P arthroscopy of right knee Yes    Decreased ROM of right knee     Decreased strength of lower extremity     Gait abnormality          Physician: Matthew Elliott MD    Physician Orders: PT Eval and Treat   Medical Diagnosis from Referral: S/P arthroscopy of right knee   Evaluation Date: 2/12/2024  Authorization Period Expiration: 12/31/2024  Plan of Care Expiration: 4/19/2024  Progress Note Due: 3/12/2024  Visit # / Visits authorized: 3/ 1 (POC: 5/16)   FOTO: 1/3  PTA Visit #: 1/5     FOTO Due Visit 5 -  Follow up  FOTO Due Visit 10 - Follow up    Time In: 808  Time Out: 845  Total Billable Timed: 45 minutes  Total Billable Un-timed: 0 minutes    Precautions: Standard    Subjective     The patient presents to therapy: knee is doing alright upon arrival.   Response to previous treatment: knee felt better the next day  Functional change: minor decrease in pain     Pain: 2/10  Location: right knee      Objective       Jerry received therapeutic exercises to develop strength, endurance, ROM, flexibility, posture, and core stabilization for 45 minutes including:    Recumbent bike x 5 minutes     Quad sets with towel under the heel 3 x 10  Short Arc Quads 3# 3 x 10   Straight leg raise with hip external rotation 1# 3 x 10  Side lying hip abduction 1# 3 x 10  Bridges Green Theraband 3 x 10     Forward step ups 2 x 10   Shuttle 50# 3 x 10    Shuttle Right Lower Extremity 25# 3 x 10  Lateral walks Red Theraband 5 yards x 3 laps     Single leg balance 3 x 30 sec       Patient Education and HEP     She was compliant with home exercise program.    Education provided:   - Do Home Exercise Program     Written Home Exercises Provided: Patient instructed to cont prior HEP.  Exercises were reviewed and Jerry  was able to demonstrate them prior to the end of the session.  Jerry demonstrated fair  understanding of the education provided.     See EMR under Patient Instructions for exercises provided prior visit.    Assessment     Patient has improved tolerance to strength with reports of less discomfort. Challenged with single leg stance due to muscle weakness. Progress stability and endurance with closed chain strengthening.     Jerry Is progressing well towards her goals.   Pt prognosis is Good.     Pt will continue to benefit from skilled outpatient physical therapy to address the deficits listed in the problem list box on initial evaluation, provide pt/family education and to maximize pt's level of independence in the home and community environment.     Pt's spiritual, cultural and educational needs considered and pt agreeable to plan of care and goals.    Anticipated barriers to physical therapy: none    Goals:      SHORT TERM GOALS:  4 weeks  Progress Date met   The patient will begin a written HEP [] Met  [] Not Met  [x] Progressing     Increase knee ROM to 0* - 110* [] Met  [] Not Met  [x] Progressing     Decrease soft tissue tenderness to mild [] Met  [] Not Met  [x] Progressing     Increase posterior hip strength to 4/5 [] Met  [] Not Met  [x] Progressing     The patient will be able to ascend/descend 10 step/stairs without onset of symptoms.    [] Met  [] Not Met  [x] Progressing        LONG TERM GOALS: 8 weeks  Progress Date met   The patient will be independent with a HEP for maintenance [] Met  [] Not Met  [x] Progressing     Increase knee ROM to 0* to 130* [] Met  [] Not Met  [x] Progressing     Increase knee strength to 4+/5 [] Met  [] Not Met  [x] Progressing     The patient will ambulate on a community level without gait deviation . [] Met  [] Not Met  [x] Progressing     Increase FOTO to 53%. [] Met  [] Not Met  [x] Progressing     The patient will be able to ascend/descend 20 step/stairs without onset of  symptoms.    [] Met  [] Not Met  [x] Progressing         Plan     Continue with the plan of care established per initial evaluation    Danitza Llamas, PTA

## 2024-03-04 ENCOUNTER — CLINICAL SUPPORT (OUTPATIENT)
Dept: REHABILITATION | Facility: HOSPITAL | Age: 39
End: 2024-03-04
Payer: COMMERCIAL

## 2024-03-04 DIAGNOSIS — R26.9 GAIT ABNORMALITY: ICD-10-CM

## 2024-03-04 DIAGNOSIS — Z98.890 S/P ARTHROSCOPY OF RIGHT KNEE: Primary | ICD-10-CM

## 2024-03-04 DIAGNOSIS — M25.661 DECREASED ROM OF RIGHT KNEE: ICD-10-CM

## 2024-03-04 DIAGNOSIS — R29.898 DECREASED STRENGTH OF LOWER EXTREMITY: ICD-10-CM

## 2024-03-04 PROCEDURE — 97110 THERAPEUTIC EXERCISES: CPT | Mod: PN,CQ

## 2024-03-04 NOTE — PROGRESS NOTES
[facility]  Outpatient Physical Therapy Daily Treatment Note      Name: Jerry Coronado  Clinic Number: [id number]  Visit Date: 3/4/2024    Therapy Diagnosis:   Encounter Diagnoses   Name Primary?    S/P arthroscopy of right knee Yes    Decreased ROM of right knee     Decreased strength of lower extremity     Gait abnormality          Physician: Matthew Elliott MD    Physician Orders: PT Eval and Treat   Medical Diagnosis from Referral: S/P arthroscopy of right knee   Evaluation Date: 2/12/2024  Authorization Period Expiration: 12/31/2024  Plan of Care Expiration: 4/19/2024  Progress Note Due: 3/12/2024  Visit # / Visits authorized: 4/ 1 (POC: 6/16)   FOTO: 1/3  PTA Visit #: 1/5     FOTO Due Visit 5 -  Follow up  FOTO Due Visit 10 - Follow up    Time In: 800  Time Out: 845  Total Billable Timed: 45 minutes  Total Billable Un-timed: 0 minutes    Precautions: Standard    Subjective     The patient presents to therapy: knee gets sore after her work days, but is doing alright.   Response to previous treatment: no complaints   Functional change: minor decrease in pain     Pain: 2/10  Location: right knee      Objective       Jerry received therapeutic exercises to develop strength, endurance, ROM, flexibility, posture, and core stabilization for 45 minutes including:    Recumbent bike x 5 minutes     Short Arc Quads 3# 3 x 10   Straight leg raise with hip external rotation 2# 3 x 10  Side lying hip abduction 2# 3 x 10  Bridges Green Theraband 3 x 10     Forward step ups 2 x 10   Lateral step ups 2 x 10   Shuttle 50# 3 x 10    Shuttle Right Lower Extremity 25# 3 x 10  Lateral walks Red Theraband 5 yards x 3 laps   Precor hip abduction 60# 3 x 10   Precor hip adduction 70# 3 x 10     Single leg balance, airex  3 x 30 sec       Patient Education and HEP     She was compliant with home exercise program.    Education provided:   - Do Home Exercise Program     Written Home Exercises Provided: Patient  instructed to cont prior HEP.  Exercises were reviewed and Jerry was able to demonstrate them prior to the end of the session.  Jerry demonstrated fair  understanding of the education provided.     See EMR under Patient Instructions for exercises provided prior visit.    Assessment     Patient continues to have slight discomfort in the knee, pain is decreased and showing improvements with strength and stability. Continue to progress to reach remaining goals.     Jerry Is progressing well towards her goals.   Pt prognosis is Good.     Pt will continue to benefit from skilled outpatient physical therapy to address the deficits listed in the problem list box on initial evaluation, provide pt/family education and to maximize pt's level of independence in the home and community environment.     Pt's spiritual, cultural and educational needs considered and pt agreeable to plan of care and goals.    Anticipated barriers to physical therapy: none    Goals:      SHORT TERM GOALS:  4 weeks  Progress Date met   The patient will begin a written HEP [] Met  [] Not Met  [x] Progressing     Increase knee ROM to 0* - 110* [] Met  [] Not Met  [x] Progressing     Decrease soft tissue tenderness to mild [] Met  [] Not Met  [x] Progressing     Increase posterior hip strength to 4/5 [] Met  [] Not Met  [x] Progressing     The patient will be able to ascend/descend 10 step/stairs without onset of symptoms.    [] Met  [] Not Met  [x] Progressing        LONG TERM GOALS: 8 weeks  Progress Date met   The patient will be independent with a HEP for maintenance [] Met  [] Not Met  [x] Progressing     Increase knee ROM to 0* to 130* [] Met  [] Not Met  [x] Progressing     Increase knee strength to 4+/5 [] Met  [] Not Met  [x] Progressing     The patient will ambulate on a community level without gait deviation . [] Met  [] Not Met  [x] Progressing     Increase FOTO to 53%. [] Met  [] Not Met  [x] Progressing     The patient will be able to  ascend/descend 20 step/stairs without onset of symptoms.    [] Met  [] Not Met  [x] Progressing         Plan     Continue with the plan of care established per initial evaluation    Danitza Llamas, PTA

## 2024-03-14 ENCOUNTER — OFFICE VISIT (OUTPATIENT)
Dept: ORTHOPEDICS | Facility: CLINIC | Age: 39
End: 2024-03-14
Payer: COMMERCIAL

## 2024-03-14 VITALS — HEIGHT: 61 IN | BODY MASS INDEX: 42.49 KG/M2 | WEIGHT: 225.06 LBS

## 2024-03-14 DIAGNOSIS — Z98.890 S/P ARTHROSCOPY OF RIGHT KNEE: Primary | ICD-10-CM

## 2024-03-14 PROCEDURE — 1159F MED LIST DOCD IN RCRD: CPT | Mod: CPTII,S$GLB,, | Performed by: ORTHOPAEDIC SURGERY

## 2024-03-14 PROCEDURE — 1160F RVW MEDS BY RX/DR IN RCRD: CPT | Mod: CPTII,S$GLB,, | Performed by: ORTHOPAEDIC SURGERY

## 2024-03-14 PROCEDURE — 99024 POSTOP FOLLOW-UP VISIT: CPT | Mod: S$GLB,POP,, | Performed by: ORTHOPAEDIC SURGERY

## 2024-03-14 RX ORDER — TRIAMCINOLONE ACETONIDE 40 MG/ML
40 INJECTION, SUSPENSION INTRA-ARTICULAR; INTRAMUSCULAR
Status: DISCONTINUED | OUTPATIENT
Start: 2024-03-14 | End: 2024-03-14 | Stop reason: HOSPADM

## 2024-03-14 RX ORDER — OXYCODONE AND ACETAMINOPHEN 7.5; 325 MG/1; MG/1
1 TABLET ORAL CONTINUOUS PRN
COMMUNITY
End: 2024-06-06

## 2024-03-14 RX ADMIN — TRIAMCINOLONE ACETONIDE 40 MG: 40 INJECTION, SUSPENSION INTRA-ARTICULAR; INTRAMUSCULAR at 08:03

## 2024-03-14 NOTE — PROCEDURES
Large Joint Aspiration/Injection: R knee    Date/Time: 3/14/2024 8:00 AM    Performed by: Matthew Elliott MD  Authorized by: Matthew Elliott MD    Consent Done?:  Yes (Verbal)  Indications:  Pain  Site marked: the procedure site was marked    Timeout: prior to procedure the correct patient, procedure, and site was verified    Prep: patient was prepped and draped in usual sterile fashion      Local anesthesia used?: Yes    Local anesthetic:  Lidocaine 1% without epinephrine    Details:  Needle Size:  25 G  Ultrasonic Guidance for needle placement?: No    Approach:  Anterolateral  Location:  Knee  Site:  R knee  Medications:  40 mg triamcinolone acetonide 40 mg/mL  Patient tolerance:  Patient tolerated the procedure well with no immediate complications

## 2024-03-14 NOTE — PROGRESS NOTES
[facility]    Subjective:     Chief Complaint:   Chief Complaint   Patient presents with    Right Knee - Post-op Evaluation, Follow-up     PO Right knee scope 2/7/2024       Past Medical History:   Diagnosis Date    Allergic rhinitis     Anxiety     Cancer 12/01/2021    B-cell folicular lymphoma    Depression     Hypothyroidism     SVT (supraventricular tachycardia) 2011       Past Surgical History:   Procedure Laterality Date    ABLATION OF DYSRHYTHMIC FOCUS  2016    x2    BIOPSY OF CERVICAL LYMPH NODE Left 11/29/2021    Procedure: BIOPSY, LYMPH NODE, CERVICAL;  Surgeon: Blanco Kerns MD;  Location: [facility] OR;  Service: General;  Laterality: Left;    CERVIX LESION DESTRUCTION  11/2021    CONIZATION OF CERVIX USING LOOP ELECTROSURGICAL EXCISION PROCEDURE (LEEP) N/A 07/21/2021    Procedure: LEEP CONIZATION, CERVIX;  Surgeon: Donna Castillo MD;  Location: [facility] OR;  Service: OB/GYN;  Laterality: N/A;    EXCISION OF LESION OF NOSE N/A 10/14/2019    Procedure: EXCISION, LESION, NOSE;  Surgeon: Umesh Morales MD;  Location: [facility] OR;  Service: ENT;  Laterality: N/A;    FUNCTIONAL ENDOSCOPIC SINUS SURGERY (FESS) N/A 10/14/2019    Procedure: FESS (FUNCTIONAL ENDOSCOPIC SINUS SURGERY);  Surgeon: Umesh Morales MD;  Location: [facility] OR;  Service: ENT;  Laterality: N/A;    HYSTEROSCOPY WITH DILATION AND CURETTAGE OF UTERUS N/A 07/21/2021    Procedure: HYSTEROSCOPY, WITH DILATION AND CURETTAGE OF UTERUS;  Surgeon: Donna Castillo MD;  Location: [facility] OR;  Service: OB/GYN;  Laterality: N/A;    KNEE ARTHROSCOPY W/ MENISCECTOMY Right 2/7/2024    Procedure: ARTHROSCOPY, KNEE, WITH MENISCECTOMY;  Surgeon: Matthew Elliott MD;  Location: [facility] OR;  Service: Orthopedics;  Laterality: Right;  partial medical meniscectomy and plica resection    MEDIPORT REMOVAL Right 06/20/2022    Procedure: REMOVAL, CATHETER, CENTRAL VENOUS, TUNNELED, WITH PORT;  Surgeon: Blanco Kerns MD;  Location: [facility] OR;  Service: General;   Laterality: Right;    NASAL SEPTOPLASTY N/A 10/14/2019    Procedure: SEPTOPLASTY, NOSE;  Surgeon: Umesh Morales MD;  Location: [facility] OR;  Service: ENT;  Laterality: N/A;    NASAL SINUS SURGERY  10/14/2019    OSTEOTOMY OF METATARSAL BONE Left 12/21/2022    Procedure: OSTEOTOMY, METATARSAL BONE;  Surgeon: Hola Escalante DPM;  Location: [facility] OR;  Service: Podiatry;  Laterality: Left;  synthes janes notified 12/19 TW  , RELEASE ENTRAPPED NERVE 2ND INTERMETATARSAL SPACE    PILONIDAL CYST DRAINAGE      REVISION OF SCAR  06/20/2022    Procedure: REVISION, SCAR;  Surgeon: Blanco Kerns MD;  Location: [facility] OR;  Service: General;;    SINUS SURGERY  11/30/2023    SKIN TAG REMOVAL Left 06/20/2022    Procedure: REMOVAL, SKIN TAG;  Surgeon: Blanco Kerns MD;  Location: [facility] OR;  Service: General;  Laterality: Left;    TONSILLECTOMY      TYMPANOPLASTY N/A 10/14/2019    Procedure: TYMPANOPLASTY;  Surgeon: Umesh Morales MD;  Location: [facility] OR;  Service: ENT;  Laterality: N/A;    TYMPANOTOMY Left 10/14/2019    left       Current Outpatient Medications   Medication Sig    ALPRAZolam (XANAX) 2 MG Tab Take 1 tablet (2 mg total) by mouth 2 (two) times daily as needed.    busPIRone (BUSPAR) 15 MG tablet Take 1 tablet by mouth 2 (two) times daily.    dronabinoL (MARINOL) 5 MG capsule Take 1 capsule (5 mg total) by mouth 2 (two) times daily as needed (decreased appetite).    fluticasone propionate (FLONASE) 50 mcg/actuation nasal spray 2 sprays (100 mcg total) by Each Nostril route once daily.    gabapentin (NEURONTIN) 600 MG tablet Take 600 mg by mouth 3 (three) times daily as needed.    ibuprofen (ADVIL,MOTRIN) 800 MG tablet Take 1 tablet (800 mg total) by mouth every 8 (eight) hours as needed for Pain.    levothyroxine (SYNTHROID) 50 MCG tablet TAKE 1 TABLET BY MOUTH EVERY DAY (Patient taking differently: Take 50 mcg by mouth before breakfast.)    LIDOcaine (XYLOCAINE) 5 % Oint ointment SMARTSIG:Topical Every 4-6 Hours     "mirtazapine (REMERON) 7.5 MG Tab TAKE 2 TABLETS (15 MG TOTAL) BY MOUTH NIGHTLY.    multivitamin capsule Take 1 capsule by mouth.    oxyCODONE-acetaminophen (PERCOCET) 7.5-325 mg per tablet Take 1 tablet by mouth continuous prn for Pain.    promethazine (PHENERGAN) 6.25 mg/5 mL syrup     tiZANidine (ZANAFLEX) 4 MG tablet Take 4 mg by mouth 3 (three) times daily as needed.    traMADoL (ULTRAM) 50 mg tablet Take 1 tablet by mouth every 6 (six) hours as needed.     No current facility-administered medications for this visit.       Review of patient's allergies indicates:  No Known Allergies    Family History   Problem Relation Age of Onset    Hypertension Mother     Heart disease Father 59        CABG    Heart failure Father     Breast cancer Maternal Aunt 55    Colon cancer Neg Hx     Ovarian cancer Neg Hx        Social History     Socioeconomic History    Marital status: Single   Occupational History    Occupation: [employer]     Comment: [employer]   Tobacco Use    Smoking status: Former     Current packs/day: 0.00     Average packs/day: 0.5 packs/day for 9.0 years (4.5 ttl pk-yrs)     Types: Cigarettes     Start date: 2000     Quit date: 2009     Years since quittin.7    Smokeless tobacco: Never   Substance and Sexual Activity    Alcohol use: Yes     Alcohol/week: 5.0 standard drinks of alcohol     Types: 5 Shots of liquor per week     Comment: "occasionally"    Drug use: No    Sexual activity: Not Currently     Partners: Male     Birth control/protection: OCP   Social History Narrative    The patient does not exercise regularly ().  Rates diet as fair.  She is not satisfied with weight.           History of present illness:  Patient comes in today for follow-up for her right knee.  She is about 5 weeks postop right knee arthroscopy with partial medial meniscectomy and plica resection.  She was found have grade 3 changes in the patellofemoral joint.  The medial and lateral compartments were pristine.  She " has been working with physical therapy.  She still has some generalized discomfort about the anterior aspect of the knee.  This is likely secondary to the osteoarthritis.    Review of Systems:    Constitution: Negative for chills, fever, and sweats.  Negative for unexplained weight loss.    HENT:  Negative for headaches and blurry vision.    Cardiovascular:Negative for chest pain or irregular heart beat. Negative for hypertension.    Respiratory:  Negative for cough and shortness of breath.    Gastrointestinal: Negative for abdominal pain, heartburn, melena, nausea, and vomitting.    Genitourinary:  Negative bladder incontinence and dysuria.    Musculoskeletal:  See HPI for details.     Neurological: Negative for numbness.    Psychiatric/Behavioral: Negative for depression.  The patient is not nervous/anxious.      Endocrine: Negative for polyuria    Hematologic/Lymphatic: Negative for bleeding problem.  Does not bruise/bleed easily.    Skin: Negative for poor would healing and rash    Objective:      Physical Examination:    Vital Signs:  There were no vitals filed for this visit.    Body mass index is 42.53 kg/m².    This a well-developed, well nourished patient in no acute distress.  They are alert and oriented and cooperative to examination.        Right knee exam: Skin to the right knee is clean dry and intact.  No erythema or ecchymosis.  No signs or symptoms of infection.  She is neurovascularly intact throughout the right lower extremity.  Right calf is soft and nontender.  Right knee range of motion well-preserved at 0-120 degrees.  Knee stable to varus and valgus stresses.  She can weightbear as tolerated on the right lower extremity.  She has some diffuse discomfort to palpation to the anterior aspect of the knee.  Some slight patellofemoral crepitus.      Pertinent New Results:    XRAY Report / Interpretation:   No new radiographs taken on today's clinic visit.    Assessment/Plan:      1. Status post  "right knee arthroscopy with partial medial meniscectomy and plica resection.      Due to her continued discomfort from her right knee patellofemoral arthritis, we did administer an injection into the knee today via an anterolateral approach with 40 mg of Kenalog and lidocaine.  She tolerated this well.  She can discontinue physical therapy as she feels as if it is not being beneficial any longer.  She can follow up with us in 2 months or on an as-needed basis if she is still symptomatic.    Alberto Scott, Physician Assistant, served in the capacity as a "scribe" for this patient encounter.  A "face-to-face" encounter occurred with Dr. Matthew Elliott on this date.  The treatment plan and medical decision-making is outlined above. Patient was seen and examined with a chaperone.       This note was created using Dragon voice recognition software that occasionally misinterpreted phrases or words.          "

## 2024-03-15 ENCOUNTER — PATIENT MESSAGE (OUTPATIENT)
Dept: PSYCHIATRY | Facility: CLINIC | Age: 39
End: 2024-03-15
Payer: COMMERCIAL

## 2024-03-15 ENCOUNTER — PATIENT MESSAGE (OUTPATIENT)
Dept: HEMATOLOGY/ONCOLOGY | Facility: CLINIC | Age: 39
End: 2024-03-15
Payer: COMMERCIAL

## 2024-03-15 DIAGNOSIS — C82.91 FOLLICULAR LYMPHOMA OF LYMPH NODES OF NECK, UNSPECIFIED FOLLICULAR LYMPHOMA TYPE: ICD-10-CM

## 2024-03-15 DIAGNOSIS — F06.30 MOOD DISORDER DUE TO A GENERAL MEDICAL CONDITION: Primary | ICD-10-CM

## 2024-03-16 DIAGNOSIS — C82.91 FOLLICULAR LYMPHOMA OF LYMPH NODES OF NECK, UNSPECIFIED FOLLICULAR LYMPHOMA TYPE: Primary | ICD-10-CM

## 2024-03-18 ENCOUNTER — PATIENT MESSAGE (OUTPATIENT)
Dept: HEMATOLOGY/ONCOLOGY | Facility: CLINIC | Age: 39
End: 2024-03-18
Payer: COMMERCIAL

## 2024-03-19 ENCOUNTER — HOSPITAL ENCOUNTER (OUTPATIENT)
Dept: RADIOLOGY | Facility: CLINIC | Age: 39
Discharge: HOME | End: 2024-03-19
Attending: PODIATRIST
Payer: COMMERCIAL

## 2024-03-19 ENCOUNTER — OFFICE VISIT (OUTPATIENT)
Dept: PODIATRY | Facility: CLINIC | Age: 39
End: 2024-03-19
Payer: COMMERCIAL

## 2024-03-19 VITALS — HEIGHT: 61 IN | WEIGHT: 228.81 LBS | BODY MASS INDEX: 43.2 KG/M2

## 2024-03-19 DIAGNOSIS — M79.672 LEFT FOOT PAIN: ICD-10-CM

## 2024-03-19 DIAGNOSIS — M19.079 INFLAMMATION OF FOOT JOINT: ICD-10-CM

## 2024-03-19 DIAGNOSIS — M77.52 CAPSULITIS OF METATARSOPHALANGEAL (MTP) JOINT OF LEFT FOOT: Primary | ICD-10-CM

## 2024-03-19 DIAGNOSIS — R29.898 DECREASED STRENGTH OF LOWER EXTREMITY: ICD-10-CM

## 2024-03-19 PROCEDURE — 1160F RVW MEDS BY RX/DR IN RCRD: CPT | Mod: CPTII,S$GLB,, | Performed by: PODIATRIST

## 2024-03-19 PROCEDURE — 99213 OFFICE O/P EST LOW 20 MIN: CPT | Mod: S$GLB,,, | Performed by: PODIATRIST

## 2024-03-19 PROCEDURE — 3008F BODY MASS INDEX DOCD: CPT | Mod: CPTII,S$GLB,, | Performed by: PODIATRIST

## 2024-03-19 PROCEDURE — 1159F MED LIST DOCD IN RCRD: CPT | Mod: CPTII,S$GLB,, | Performed by: PODIATRIST

## 2024-03-19 PROCEDURE — 99999 PR PBB SHADOW E&M-EST. PATIENT-LVL IV: CPT | Mod: PBBFAC,,, | Performed by: PODIATRIST

## 2024-03-19 PROCEDURE — 73630 X-RAY EXAM OF FOOT: CPT | Mod: LT,S$GLB,, | Performed by: RADIOLOGY

## 2024-03-19 NOTE — PROGRESS NOTES
"  [address] [facility] [address] [place] [address]  Phone: [phone]   [phone]    Patient's PCP:Severo Saenz MD  Referring Provider: No ref. provider found    Subjective:      Chief Complaint:: Foot Pain (Left foot pain under the 2nd and 3rd toes)    Foot Pain  Pertinent negatives include no abdominal pain, arthralgias, chest pain, chills, coughing, fatigue, fever, headaches, joint swelling, myalgias, nausea, neck pain, numbness, rash or weakness.     Jerry Coronado is a 38 y.o. female who presents today with a complaint of left foot pain under the 2nd and 3rd toe. . The current episode started about 2 months ago.  The symptoms include feels like walking on marble. Probable cause of complaint unknown.  The symptoms are aggravated by prolonged walking and standing . The problem has stayed the same. Treatment to date have included ice and elevation which provided some relief.         Vitals:    03/19/24 0829   Weight: 103.8 kg (228 lb 13.4 oz)   Height: 5' 1" (1.549 m)   PainSc:   6      Shoe Size: 8.5    Past Surgical History:   Procedure Laterality Date    ABLATION OF DYSRHYTHMIC FOCUS  2016    x2    BIOPSY OF CERVICAL LYMPH NODE Left 11/29/2021    Procedure: BIOPSY, LYMPH NODE, CERVICAL;  Surgeon: Blanco Kerns MD;  Location: [facility] OR;  Service: General;  Laterality: Left;    CERVIX LESION DESTRUCTION  11/2021    CONIZATION OF CERVIX USING LOOP ELECTROSURGICAL EXCISION PROCEDURE (LEEP) N/A 07/21/2021    Procedure: LEEP CONIZATION, CERVIX;  Surgeon: Donna Castillo MD;  Location: [facility] OR;  Service: OB/GYN;  Laterality: N/A;    EXCISION OF LESION OF NOSE N/A 10/14/2019    Procedure: EXCISION, LESION, NOSE;  Surgeon: Umesh Morales MD;  Location: [facility] OR;  Service: ENT;  Laterality: N/A;    FUNCTIONAL ENDOSCOPIC SINUS SURGERY (FESS) N/A 10/14/2019    Procedure: FESS (FUNCTIONAL ENDOSCOPIC SINUS SURGERY);  Surgeon: Umesh Morales MD;  Location: [facility] OR;  Service: ENT;  Laterality: N/A; "    HYSTEROSCOPY WITH DILATION AND CURETTAGE OF UTERUS N/A 07/21/2021    Procedure: HYSTEROSCOPY, WITH DILATION AND CURETTAGE OF UTERUS;  Surgeon: Donna Castillo MD;  Location: [facility];  Service: OB/GYN;  Laterality: N/A;    KNEE ARTHROSCOPY W/ MENISCECTOMY Right 2/7/2024    Procedure: ARTHROSCOPY, KNEE, WITH MENISCECTOMY;  Surgeon: Matthew Elliott MD;  Location: [facility];  Service: Orthopedics;  Laterality: Right;  partial medical meniscectomy and plica resection    MEDIPORT REMOVAL Right 06/20/2022    Procedure: REMOVAL, CATHETER, CENTRAL VENOUS, TUNNELED, WITH PORT;  Surgeon: Blanco Kerns MD;  Location: [facility];  Service: General;  Laterality: Right;    NASAL SEPTOPLASTY N/A 10/14/2019    Procedure: SEPTOPLASTY, NOSE;  Surgeon: mUesh Morales MD;  Location: [facility];  Service: ENT;  Laterality: N/A;    NASAL SINUS SURGERY  10/14/2019    OSTEOTOMY OF METATARSAL BONE Left 12/21/2022    Procedure: OSTEOTOMY, METATARSAL BONE;  Surgeon: Hola Escalante DPM;  Location: [facility];  Service: Podiatry;  Laterality: Left;  [employer] notified 12/19 TW  , RELEASE ENTRAPPED NERVE 2ND INTERMETATARSAL SPACE    PILONIDAL CYST DRAINAGE      REVISION OF SCAR  06/20/2022    Procedure: REVISION, SCAR;  Surgeon: Blanco Kerns MD;  Location: [facility];  Service: General;;    SINUS SURGERY  11/30/2023    SKIN TAG REMOVAL Left 06/20/2022    Procedure: REMOVAL, SKIN TAG;  Surgeon: Blanco Kerns MD;  Location: [facility] OR;  Service: General;  Laterality: Left;    TONSILLECTOMY      TYMPANOPLASTY N/A 10/14/2019    Procedure: TYMPANOPLASTY;  Surgeon: Umesh Morales MD;  Location: [facility];  Service: ENT;  Laterality: N/A;    TYMPANOTOMY Left 10/14/2019    left     Past Medical History:   Diagnosis Date    Allergic rhinitis     Anxiety     Cancer 12/01/2021    B-cell folicular lymphoma    Depression     Hypothyroidism     SVT (supraventricular tachycardia) 2011     Family History   Problem Relation Age of Onset    Hypertension  Mother     Heart disease Father 59        CABG    Heart failure Father     Breast cancer Maternal Aunt 55    Colon cancer Neg Hx     Ovarian cancer Neg Hx         Social History:   Marital Status: Single  Alcohol History:  reports current alcohol use of about 5.0 standard drinks of alcohol per week.  Tobacco History:  reports that she quit smoking about 14 years ago. Her smoking use included cigarettes. She started smoking about 23 years ago. She has a 4.5 pack-year smoking history. She has never used smokeless tobacco.  Drug History:  reports no history of drug use.    Review of patient's allergies indicates:  No Known Allergies    Current Outpatient Medications   Medication Sig Dispense Refill    ALPRAZolam (XANAX) 2 MG Tab Take 1 tablet (2 mg total) by mouth 2 (two) times daily as needed. 40 tablet 0    busPIRone (BUSPAR) 15 MG tablet Take 1 tablet by mouth 2 (two) times daily.      dronabinoL (MARINOL) 5 MG capsule Take 1 capsule (5 mg total) by mouth 2 (two) times daily as needed (decreased appetite). 60 capsule 0    fluticasone propionate (FLONASE) 50 mcg/actuation nasal spray 2 sprays (100 mcg total) by Each Nostril route once daily. 11.1 mL 5    gabapentin (NEURONTIN) 600 MG tablet Take 600 mg by mouth 3 (three) times daily as needed.      ibuprofen (ADVIL,MOTRIN) 800 MG tablet Take 1 tablet (800 mg total) by mouth every 8 (eight) hours as needed for Pain. 60 tablet 0    levothyroxine (SYNTHROID) 50 MCG tablet TAKE 1 TABLET BY MOUTH EVERY DAY (Patient taking differently: Take 50 mcg by mouth before breakfast.) 30 tablet 3    LIDOcaine (XYLOCAINE) 5 % Oint ointment SMARTSIG:Topical Every 4-6 Hours      mirtazapine (REMERON) 7.5 MG Tab TAKE 2 TABLETS (15 MG TOTAL) BY MOUTH NIGHTLY.      multivitamin capsule Take 1 capsule by mouth.      oxyCODONE-acetaminophen (PERCOCET) 7.5-325 mg per tablet Take 1 tablet by mouth continuous prn for Pain.      promethazine (PHENERGAN) 6.25 mg/5 mL syrup       tiZANidine  (ZANAFLEX) 4 MG tablet Take 4 mg by mouth 3 (three) times daily as needed.      traMADoL (ULTRAM) 50 mg tablet Take 1 tablet by mouth every 6 (six) hours as needed.       No current facility-administered medications for this visit.       Review of Systems   Constitutional:  Negative for chills, fatigue, fever and unexpected weight change.   HENT:  Negative for hearing loss and trouble swallowing.    Eyes:  Negative for photophobia and visual disturbance.   Respiratory:  Negative for cough, shortness of breath and wheezing.    Cardiovascular:  Negative for chest pain, palpitations and leg swelling.   Gastrointestinal:  Negative for abdominal pain and nausea.   Genitourinary:  Negative for dysuria and frequency.   Musculoskeletal:  Negative for arthralgias, back pain, gait problem, joint swelling, myalgias and neck pain.   Skin:  Negative for rash and wound.   Neurological:  Negative for tremors, seizures, weakness, numbness and headaches.   Hematological:  Does not bruise/bleed easily.         Objective:        Physical Exam:   Foot Exam    General  General Appearance: appears stated age and healthy   Orientation: alert and oriented to person, place, and time   Affect: appropriate   Gait: antalgic       Left Foot/Ankle      Inspection and Palpation  Ecchymosis: none  Tenderness: lesser metatarsophalangeal joints (Pain plantar 2nd 3rd metatarsal head 3rd metatarsal more sensitive than 2nd)  Swelling: none   Arch: normal  Hammertoes: second toe (Semi-rigid hammertoe 2nd with mild dorsal contracture 2nd MPJ)  Skin Exam: skin intact;   Neurovascular  Dorsalis pedis: 2+  Posterior tibial: 2+  Capillary refill: 2+  Saphenous nerve sensation: normal  Tibial nerve sensation: normal  Superficial peroneal nerve sensation: normal  Deep peroneal nerve sensation: normal  Sural nerve sensation: normal    Muscle Strength  Ankle dorsiflexion: 5  Ankle plantar flexion: 5  Ankle inversion: 5  Ankle eversion: 5  Great toe extension:  5  Great toe flexion: 5    Range of Motion    Normal left ankle ROM        Physical Exam  Cardiovascular:      Pulses:           Dorsalis pedis pulses are 2+ on the left side.        Posterior tibial pulses are 2+ on the left side.   Musculoskeletal:        Feet:                Left Ankle/Foot Exam     Tenderness   The patient is tender to palpation of the lesser metatarsophalangeal joints.    Range of Motion   The patient has normal left ankle ROM.     Muscle Strength   The patient has normal left ankle strength.      Muscle Strength   Left Lower Extremity   Ankle Dorsiflexion:  5   Plantar flexion:  5/5     Vascular Exam       Left Pulses  Dorsalis Pedis:      2+  Posterior Tibial:      2+           Imaging:   X-Ray Foot Complete Left  Narrative: EXAMINATION:  XR FOOT COMPLETE 3 VIEW LEFT    CLINICAL HISTORY:  .  Pain in left foot    TECHNIQUE:  AP, lateral and oblique views of the left foot were performed.    COMPARISON:  Left foot x-ray December 20, 2023    FINDINGS:  A  screw is identified in the distal head of the 2nd meta tarsal.  A small heel spur is noted and a enthesophyte at the Achilles tendon attachment is noted.  An acute fracture is not seen.  Soft tissue swelling is not noted.  Impression: Prior osteotomy at the distal head of the left 2nd metatarsal.  Small heel spur and small Achilles tendon attachment enthesophyte noted.    Electronically signed by: Arie Boyce MD  Date:    03/19/2024  Time:    09:39            Assessment:       1. Capsulitis of metatarsophalangeal (MTP) joint of left foot    2. Left foot pain    3. Decreased strength of lower extremity    4. Inflammation of foot joint      Plan:   Capsulitis of metatarsophalangeal (MTP) joint of left foot    Left foot pain  -     X-Ray Foot Complete Left    Decreased strength of lower extremity    Inflammation of foot joint    Evaluated the patient today had a long discussion with her about the fact that she does have now a contracture of  the 2nd toe consistent with hammertoe in that the previous surgery on the 2nd metatarsal where we will slightly elevated appears to have healed normally.  There appears to be some increased weight-bearing distribution the 3rd metatarsal head with some pain there minimal if any swelling no contracture of the 3rd toes.  Does not appear to be a true Lani sign in any of the interspaces although she is little sensitive at the site of the previous release of the nerve in the 2nd intermetatarsal space.  I explained to her the biomechanics in the way of the foot functions in the way weight distribution occurs in the foot.  I recommended we try conservative care with an a loading accommodative pad placed on inner sole in her shoes.  If offloading it gives her adequate relief then she can just do this on her own with over-the-counter felt or she can get custom-made orthotics.  If she will not custom-made orthotic she will contact us.  I am not recommended any surgery at this point in time.  I explained to her the surgery can sometimes have a cast getting affect we keep on elevating metatarsals and it shifts to 1 of the other ones.  She will return as needed      Procedures          Counseling:   I discussed hammertoe deformity and conservative treatment of deep, wider shoes, padding of the sore areas, OTC NSAID, skin softners, palliative care.  I discussed surgical procedures of fusion of the PIPJ of the toes with wires or implants, the follow up and the possible complications.     I explained what joint inflammation is and  conservative treatment of off loading the joint with OTC inserts and pads vs custom made orthotics.  The use of ice, heat, oral antiinflammatory and topical antiinflammatory and shoe modification as well as rest of the affected area with decrease walking, standing and non-impact exercising.   I provided patient education verbally regarding:   Patient diagnosis, treatment options, as well as  alternatives, risks, and benefits.     This note was created using Dragon voice recognition software that occasionally misinterpreted phrases or words.

## 2024-03-24 ENCOUNTER — PATIENT MESSAGE (OUTPATIENT)
Dept: PODIATRY | Facility: CLINIC | Age: 39
End: 2024-03-24
Payer: COMMERCIAL

## 2024-04-04 ENCOUNTER — PATIENT MESSAGE (OUTPATIENT)
Dept: ADMINISTRATIVE | Facility: HOSPITAL | Age: 39
End: 2024-04-04
Payer: COMMERCIAL

## 2024-04-04 ENCOUNTER — PATIENT OUTREACH (OUTPATIENT)
Dept: ADMINISTRATIVE | Facility: HOSPITAL | Age: 39
End: 2024-04-04
Payer: COMMERCIAL

## 2024-04-04 NOTE — PROGRESS NOTES
Health Maintenance Due   Topic Date Due    Pneumococcal Vaccines (Age 0-64) (1 of 2 - PCV) Never done    COVID-19 Vaccine (3 - Pfizer risk series) 02/02/2021    Hemoglobin A1c (Diabetic Prevention Screening)  01/19/2023     Chart review completed.  Updated. Triggered Links. Immunizations reviewed and updated. Care Everywhere Updated. Care Team Updated.  Patient is due for annual visit. Portal message sent to patient in regards to scheduling.

## 2024-04-09 ENCOUNTER — PATIENT MESSAGE (OUTPATIENT)
Dept: HEMATOLOGY/ONCOLOGY | Facility: CLINIC | Age: 39
End: 2024-04-09
Payer: COMMERCIAL

## 2024-04-13 DIAGNOSIS — J32.9 CHRONIC SINUSITIS, UNSPECIFIED LOCATION: ICD-10-CM

## 2024-04-13 NOTE — TELEPHONE ENCOUNTER
Care Due:                  Date            Visit Type   Department     Provider  --------------------------------------------------------------------------------    Last Visit: None Found      None         None Found  Next Visit: None Scheduled  None         None Found                                                            Last  Test          Frequency    Reason                     Performed    Due Date  --------------------------------------------------------------------------------    Office Visit  12 months..  ibuprofen, levothyroxine.  Not Found    Overdue    TSH.........  12 months..  levothyroxine............  Not Found    Overdue    [employer] Embedded Care Due Messages. Reference number: [id number].   4/13/2024 7:15:31 AM GRACIELAT

## 2024-04-14 RX ORDER — FLUTICASONE PROPIONATE 50 MCG
2 SPRAY, SUSPENSION (ML) NASAL
Qty: 48 ML | Refills: 1 | Status: SHIPPED | OUTPATIENT
Start: 2024-04-14

## 2024-04-15 RX ORDER — LEVOTHYROXINE SODIUM 50 UG/1
50 TABLET ORAL
Qty: 90 TABLET | Refills: 1 | OUTPATIENT
Start: 2024-04-15

## 2024-04-21 NOTE — PROGRESS NOTES
PATIENT: Jerry Coronado  MRN: [id number]  DATE: 4/25/2024    Diagnosis:   1. 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 Morbid obesity    3. Mood disorder due to a general medical condition      Chief Complaint: Lymphoma    Oncologic History:      Oncologic History 1. Follicular lymphoma      Oncologic Treatment 1. Bendamustine/rituximab - 2022      Pathology 11/29/21:  Lymph node, left supraclavicular region, excision:  --Follicular lymphoma with high Ki-67 proliferation index, see comment  Comment: Concomitantly submitted flow cytometric analysis detects B-cell lymphoma of germinal center  origin expressing CD19, bright CD20, and CD10 with kappa light chain restriction. CD5 is negative in this  population.  Although the majority of the lymph node appears to represent as grade 2 follicular lymphoma, the Ki-67  proliferation index is much higher than expected for typical low-grade follicular lymphoma. Additionally there  is a very small area suggestive of grade 3A follicular lymphoma as well, representing less than 5% of the overall cellularity. This sampling leaves concern for a higher grade process elsewhere and correlation with  clinical and radiologic findings including PET scan findings is highly recommended with additional biopsy of  areas of high SUV if indicated.      Telemedicine visit:  The patient location is: home  The chief complaint leading to consultation is: follicular lymphoma    Visit type: audiovisual    Face to Face time with patient: 5 minutes  10 minutes of total time spent on the encounter, which includes face to face time and non-face to face time preparing to see the patient (eg, review of tests), Obtaining and/or reviewing separately obtained history, Documenting clinical information in the electronic or other health record, Independently interpreting results (not separately reported) and communicating results to the patient/family/caregiver, or  Care coordination (not separately reported).     Each patient to whom he or she provides medical services by telemedicine is:  (1) informed of the relationship between the physician and patient and the respective role of any other health care provider with respect to management of the patient; and (2) notified that he or she may decline to receive medical services by telemedicine and may withdraw from such care at any time.    Notes: see below      Subjective:    History of Present Illness: Ms. Coronado is a 38 y.o. female who presented in December 2021 for evaluation and management of B cell lymphoma. She was referred by Dr. Kerns.    - presenting symptom was enlarged supraclavicular lymph nodes  - she underwent excisional lymph node biopsy on 11/29/21. Pathology revealed a follicular lymphoma with high ki-67 proliferation index.  - she reports having a CT scan in [place] in early November with enlarged lymph nodes noted throughout.  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 This may make treatment decisions challenging logistically.  - she received cycle #1 of bendamustine/rituximab on 1/25/22 - 1/26/22 in [place], [place].  - she underwent PET/CT scan on 4/14/22.  - she completed 6 cycles of bendamustine/rituximab (last was on 6/16/22 - 6/17/22).  - she underwent port removal on 6/20/22.  - she underwent pet scan on 6/29/22.  - she underwent pet scan on 2/22/23.    Interval history:  - she presents for a follow-up appointment for her follicular lymphoma  - she underwent pet scan on 4/24/24  - overall, she is okay. Her main issue is stress/anxiety. She is working on this. She has started working out. She endorses fatigue, poor sleep hygiene. She denies fever, night sweats, unintentional weight loss.   - She denies shortness of breath, chest pain, vomiting, constipation.     Past medical, surgical, family, and social histories have been reviewed and updated  below.    Past Medical History:   Past Medical History:   Diagnosis Date    Allergic rhinitis     Anxiety     Cancer 12/01/2021    B-cell folicular lymphoma    Depression     Hypothyroidism     SVT (supraventricular tachycardia) 2011       Past Surgical History:   Past Surgical History:   Procedure Laterality Date    ABLATION OF DYSRHYTHMIC FOCUS  2016    x2    BIOPSY OF CERVICAL LYMPH NODE Left 11/29/2021    Procedure: BIOPSY, LYMPH NODE, CERVICAL;  Surgeon: Blanco Kerns MD;  Location: [facility] OR;  Service: General;  Laterality: Left;    CERVIX LESION DESTRUCTION  11/2021    CONIZATION OF CERVIX USING LOOP ELECTROSURGICAL EXCISION PROCEDURE (LEEP) N/A 07/21/2021    Procedure: LEEP CONIZATION, CERVIX;  Surgeon: Donna Castillo MD;  Location: [facility] OR;  Service: OB/GYN;  Laterality: N/A;    EXCISION OF LESION OF NOSE N/A 10/14/2019    Procedure: EXCISION, LESION, NOSE;  Surgeon: Umesh Morales MD;  Location: [facility] OR;  Service: ENT;  Laterality: N/A;    FUNCTIONAL ENDOSCOPIC SINUS SURGERY (FESS) N/A 10/14/2019    Procedure: FESS (FUNCTIONAL ENDOSCOPIC SINUS SURGERY);  Surgeon: Umesh Morales MD;  Location: [facility] OR;  Service: ENT;  Laterality: N/A;    HYSTEROSCOPY WITH DILATION AND CURETTAGE OF UTERUS N/A 07/21/2021    Procedure: HYSTEROSCOPY, WITH DILATION AND CURETTAGE OF UTERUS;  Surgeon: Donna Castillo MD;  Location: [facility];  Service: OB/GYN;  Laterality: N/A;    KNEE ARTHROSCOPY W/ MENISCECTOMY Right 2/7/2024    Procedure: ARTHROSCOPY, KNEE, WITH MENISCECTOMY;  Surgeon: Matthew Elliott MD;  Location: [facility] OR;  Service: Orthopedics;  Laterality: Right;  partial medical meniscectomy and plica resection    MEDIPORT REMOVAL Right 06/20/2022    Procedure: REMOVAL, CATHETER, CENTRAL VENOUS, TUNNELED, WITH PORT;  Surgeon: Blanco Kerns MD;  Location: [facility] OR;  Service: General;  Laterality: Right;    NASAL SEPTOPLASTY N/A 10/14/2019    Procedure: SEPTOPLASTY, NOSE;  Surgeon: Umesh Morlaes MD;  Location: [facility]  OR;  Service: ENT;  Laterality: N/A;    NASAL SINUS SURGERY  10/14/2019    OSTEOTOMY OF METATARSAL BONE Left 12/21/2022    Procedure: OSTEOTOMY, METATARSAL BONE;  Surgeon: Hola Escalante DPM;  Location: [facility] OR;  Service: Podiatry;  Laterality: Left;  synthes janes notified 12/19 TW  , RELEASE ENTRAPPED NERVE 2ND INTERMETATARSAL SPACE    PILONIDAL CYST DRAINAGE      REVISION OF SCAR  06/20/2022    Procedure: REVISION, SCAR;  Surgeon: Blanco Kerns MD;  Location: [facility] OR;  Service: General;;    SINUS SURGERY  11/30/2023    SKIN TAG REMOVAL Left 06/20/2022    Procedure: REMOVAL, SKIN TAG;  Surgeon: Blanco Kerns MD;  Location: [facility] OR;  Service: General;  Laterality: Left;    TONSILLECTOMY      TYMPANOPLASTY N/A 10/14/2019    Procedure: TYMPANOPLASTY;  Surgeon: Umesh Morales MD;  Location: [facility] OR;  Service: ENT;  Laterality: N/A;    TYMPANOTOMY Left 10/14/2019    left       Family History:   Family History   Problem Relation Name Age of Onset    Hypertension Mother Nadira Hernandez     Heart disease Father Dirk hernandez 59        CABG    Heart failure Father Dirk hernandez     Breast cancer Maternal Aunt  55    Colon cancer Neg Hx      Ovarian cancer Neg Hx         Social History:  reports that she quit smoking about 14 years ago. Her smoking use included cigarettes. She started smoking about 23 years ago. She has a 4.5 pack-year smoking history. She has never used smokeless tobacco. She reports current alcohol use of about 5.0 standard drinks of alcohol per week. She reports that she does not use drugs.    Allergies:  Review of patient's allergies indicates:  No Known Allergies    Medications:  Current Outpatient Medications   Medication Sig Dispense Refill    ALPRAZolam (XANAX) 2 MG Tab Take 1 tablet (2 mg total) by mouth 2 (two) times daily as needed. 40 tablet 0    busPIRone (BUSPAR) 15 MG tablet Take 1 tablet by mouth 2 (two) times daily.      dronabinoL (MARINOL) 5 MG capsule Take 1 capsule (5 mg  total) by mouth 2 (two) times daily as needed (decreased appetite). 60 capsule 0    fluticasone propionate (FLONASE) 50 mcg/actuation nasal spray SPRAY 2 SPRAYS BY EACH NOSTRIL ROUTE ONCE DAILY. 48 mL 1    gabapentin (NEURONTIN) 600 MG tablet Take 600 mg by mouth 3 (three) times daily as needed.      ibuprofen (ADVIL,MOTRIN) 800 MG tablet Take 1 tablet (800 mg total) by mouth every 8 (eight) hours as needed for Pain. 60 tablet 0    levothyroxine (SYNTHROID) 50 MCG tablet TAKE 1 TABLET BY MOUTH EVERY DAY (Patient taking differently: Take 50 mcg by mouth before breakfast.) 30 tablet 3    LIDOcaine (XYLOCAINE) 5 % Oint ointment SMARTSIG:Topical Every 4-6 Hours      mirtazapine (REMERON) 7.5 MG Tab TAKE 2 TABLETS (15 MG TOTAL) BY MOUTH NIGHTLY.      multivitamin capsule Take 1 capsule by mouth.      oxyCODONE-acetaminophen (PERCOCET) 7.5-325 mg per tablet Take 1 tablet by mouth continuous prn for Pain.      promethazine (PHENERGAN) 6.25 mg/5 mL syrup       tiZANidine (ZANAFLEX) 4 MG tablet Take 4 mg by mouth 3 (three) times daily as needed.      traMADoL (ULTRAM) 50 mg tablet Take 1 tablet by mouth every 6 (six) hours as needed.       No current facility-administered medications for this visit.       Review of Systems   Constitutional:  Positive for fatigue. Negative for appetite change and unexpected weight change.   HENT:  Negative for mouth sores and sore throat.    Eyes:  Negative for visual disturbance.   Respiratory:  Negative for cough and shortness of breath.    Cardiovascular:  Negative for chest pain.   Gastrointestinal:  Negative for abdominal pain, constipation, diarrhea and vomiting.   Genitourinary:  Negative for dysuria and frequency.   Musculoskeletal:  Negative for back pain.   Skin:  Negative for rash.   Neurological:  Negative for headaches.   Hematological:  Negative for adenopathy.   Psychiatric/Behavioral:  The patient is nervous/anxious.        ECOG Performance Status:   ECOG SCORE 1        Objective:      Vitals:   There were no vitals filed for this visit.  BMI: There is no height or weight on file to calculate BMI.  Deferred due to telemedicine visit.    Physical Exam  Deferred due to telemedicine visit.    Laboratory Data:  Labs have been reviewed.    Lab Results   Component Value Date    WBC 12.06 12/09/2023    HGB 14.0 12/09/2023    HCT 42.2 12/09/2023    MCV 91 12/09/2023     12/09/2023       Imaging:     pet scan (4/24/24): I have personally reviewed the images  No PET-CT evidence of FDG avid malignancy.     Previously seen hypermetabolic activity associated with the endometrial cavity and the right ovary has resolved.    CTA chest (9/3/23):  IMPRESSION: No CT evidence of pulmonary emboli  No acute pulmonary process    pet scan (2/22/23): I have personally reviewed the images  No evidence of FDG avid lymphoproliferative disease.      pet scan (6/29/22): I have personally reviewed the images  No evidence of FDG avid lymphoproliferative disease.    PET/CT scan (4/14/22): I have personally reviewed the images  Differences in instrumentation and technique preclude semiquantitative comparison between the current PET/CT study and the prior.     Background:  - Liver 4.2 SUV max.  - Spleen 3.5 SUV max.  - Spleen 11.9 x 4.8 cm (AP x TR)  - Blood pool 3.3 SUV max     No FDG avid lymphadenopathy or mass. The largest cervical node is a 5 x 5 mm supraclavicular node with SUV max 1.0 (image 86). The largest left axillary node measures 18 x 7 mm with SUV max 0.8 (image 103).     Additional findings:  -Right sided IJ medical infusion port with tip at the level of the SVC.  -Relative diffuse low-attenuation liver in keeping with steatosis.  -3.6 cm simple fluid attenuation on FDG avid cyst in the right ovary favored to represent a physiologic cyst in this age group. The left ovary and uterus are within normal limits.     IMPRESSION:  No evidence of FDG avid malignancy.    PET/CT scan (12/17/21): I  have personally reviewed the images    IN THE HEAD AND NECK:     Multiple left-sided cervical lymph nodes, left and right supraclavicular lymph nodes and left axillary lymph nodes.  The index lesions, as follow:     Hypermetabolic lymph node at the left level Va region, measures 1.2 cm (axial series 2, image 68) with SUV max of 14.0.     Hypermetabolic left supraclavicular lymph node, measures 1.9 cm (axial series 2, image 82) with SUV max of 15.0.     Hypermetabolic left axillary lymph node, measures 3.5 cm (axial series 2, image 116) with SUV max of 12.0.     Hypermetabolic right supraclavicular lymph node, measures 1.4 cm (axial series 2, image 79) with SUV max of 13.0.     IN THE CHEST:     Focal radiotracer uptake in the left hilum with no corresponding lesion on CT images (axial fused image 116) with SUV max of 4.4.  No abnormal lesions throughout the pulmonary parenchyma.     IN THE ABDOMEN AND PELVIS:     There is a subcentimeter hypermetabolic lesion in the subcutaneous tissue of the right back at the level of L4, measures 0.9 cm (axial series 2, image 198) with SUV max of 6.7.  There is physiologic tracer distribution within the abdominal organs and excretion into the genitourinary system.     The spleen has normal uptake and is normal at 11.0 cm in craniocaudal dimension.     IN THE BONES:     There are no hypermetabolic lesions worrisome for malignancy.     In the extremities, there are no hypermetabolic lesions worrisome for malignancy.     Impression:     Hypermetabolic cervical, left axillary, and left hilar lymph nodes consistent with biopsy-proven follicular lymphoma (performed on 11/29/2021), as above.     Hypermetabolic subcutaneous tissue of the right nodule at the level of L4, concerning for additional lymphomatous deposit as described above.      Assessment:       1. 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 Morbid obesity    3. Mood disorder due to  "a general medical condition             Plan:     1. Follicular lymphoma  - I have reviewed her chart  - supraclavicular lymph node biopsy (11/29/21) revealed follicular lymphoma. Importantly, in the pathology report is the following: "Although the majority of the lymph node appears to represent as grade 2 follicular lymphoma, the Ki-67  proliferation index is much higher than expected for typical low-grade follicular lymphoma. Additionally there  is a very small area suggestive of grade 3A follicular lymphoma as well, representing less than 5% of the  overall cellularity."  - she reports having a CT scan in [place] in early November with enlarged lymph nodes noted throughout.  - PET/CT scan (12/17/21) revealed "hypermetabolic cervical, left axillary, and left hilar lymph nodes" as well as "hypermetabolic subcutaneous tissue of the right nodule at the level of L4, concerning for additional lymphomatous deposit."  - she received cycle #1 of bendamustine/rituximab on 1/25/22 - 1/26/22 in [place], [place].  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 No evidence of hypermetabolic tumor  - The risks and benefits of chemotherapy were discussed, written information was given, and informed consent was obtained.  - she completed 6 cycles of bendamustine/rituximab (last was on 6/16/22 - 6/17/22).  - she underwent port removal on 6/20/22.  - pet scan (6/29/22) revealed no evidence of hypermetabolic tumor.  - pet scan (2/22/23) revealed no evidence of hypermetabolic tumor.  - CTA chest (9/3/23): no evidence of recurrence.  - pet scan (4/24/24):   No PET-CT evidence of FDG avid malignancy.   - Labs have been reviewed. Mild leukocytosis noted, likely reactive.  - clinically, she is doing well with clinical symptoms to suggest recurrence of lymphoma.  - return to clinic in 6 months.    2. Morbid obesity  - no weight since telemedicine visit.  - " continue to monitor    3. Mood disorder due to medical condition  - moderate symptoms. Continue xanax as needed. I sent a refill in.    4. Insomnia  - moderate symptoms. Continue mirtazapine    5. Advance Care Planning     Power of   After our discussion (at previous visit), the patient decided to complete a HCPOA and appointed her  brother Velasquez Coronado ([phone]) and dad Dirk Coronado ([phone]) .        - return to clinic in 6 months.    Ki Nova M.D.  Hematology/Oncology  [facility] - [address], [address]  [place] [address]  Phone: [phone]  Fax: [phone]

## 2024-04-23 ENCOUNTER — OFFICE VISIT (OUTPATIENT)
Dept: FAMILY MEDICINE | Facility: CLINIC | Age: 39
End: 2024-04-23
Payer: COMMERCIAL

## 2024-04-23 VITALS
HEART RATE: 108 BPM | TEMPERATURE: 99 F | SYSTOLIC BLOOD PRESSURE: 130 MMHG | DIASTOLIC BLOOD PRESSURE: 70 MMHG | BODY MASS INDEX: 43.21 KG/M2 | RESPIRATION RATE: 20 BRPM | WEIGHT: 228.88 LBS | HEIGHT: 61 IN | OXYGEN SATURATION: 98 %

## 2024-04-23 DIAGNOSIS — J20.9 ACUTE BRONCHITIS, UNSPECIFIED ORGANISM: ICD-10-CM

## 2024-04-23 DIAGNOSIS — Z00.00 PREVENTATIVE HEALTH CARE: ICD-10-CM

## 2024-04-23 DIAGNOSIS — Z87.891 FORMER CIGARETTE SMOKER: ICD-10-CM

## 2024-04-23 DIAGNOSIS — E03.4 HYPOTHYROIDISM DUE TO ACQUIRED ATROPHY OF THYROID: Primary | ICD-10-CM

## 2024-04-23 DIAGNOSIS — Z13.1 DIABETES MELLITUS SCREENING: ICD-10-CM

## 2024-04-23 PROCEDURE — 3008F BODY MASS INDEX DOCD: CPT | Mod: CPTII,S$GLB,, | Performed by: NURSE PRACTITIONER

## 2024-04-23 PROCEDURE — 1160F RVW MEDS BY RX/DR IN RCRD: CPT | Mod: CPTII,S$GLB,, | Performed by: NURSE PRACTITIONER

## 2024-04-23 PROCEDURE — 99204 OFFICE O/P NEW MOD 45 MIN: CPT | Mod: S$GLB,,, | Performed by: NURSE PRACTITIONER

## 2024-04-23 PROCEDURE — 1159F MED LIST DOCD IN RCRD: CPT | Mod: CPTII,S$GLB,, | Performed by: NURSE PRACTITIONER

## 2024-04-23 PROCEDURE — 99999 PR PBB SHADOW E&M-EST. PATIENT-LVL V: CPT | Mod: PBBFAC,,, | Performed by: NURSE PRACTITIONER

## 2024-04-23 PROCEDURE — 3075F SYST BP GE 130 - 139MM HG: CPT | Mod: CPTII,S$GLB,, | Performed by: NURSE PRACTITIONER

## 2024-04-23 PROCEDURE — 3078F DIAST BP <80 MM HG: CPT | Mod: CPTII,S$GLB,, | Performed by: NURSE PRACTITIONER

## 2024-04-23 RX ORDER — ALPRAZOLAM 2 MG/1
2 TABLET ORAL 2 TIMES DAILY PRN
Qty: 40 TABLET | Refills: 0 | Status: CANCELLED | OUTPATIENT
Start: 2024-04-23 | End: 2024-05-23

## 2024-04-23 RX ORDER — ALBUTEROL SULFATE 90 UG/1
2 AEROSOL, METERED RESPIRATORY (INHALATION) EVERY 6 HOURS PRN
Qty: 18 G | Refills: 0 | Status: SHIPPED | OUTPATIENT
Start: 2024-04-23 | End: 2025-04-23

## 2024-04-23 RX ORDER — LEVOTHYROXINE SODIUM 50 UG/1
50 TABLET ORAL
Qty: 30 TABLET | Refills: 0 | Status: SHIPPED | OUTPATIENT
Start: 2024-04-23 | End: 2024-05-20

## 2024-04-23 RX ORDER — PROMETHAZINE HYDROCHLORIDE AND DEXTROMETHORPHAN HYDROBROMIDE 6.25; 15 MG/5ML; MG/5ML
5 SYRUP ORAL NIGHTLY PRN
Qty: 118 ML | Refills: 0 | Status: SHIPPED | OUTPATIENT
Start: 2024-04-23

## 2024-04-23 RX ORDER — LEVOTHYROXINE SODIUM 50 UG/1
50 TABLET ORAL
Qty: 30 TABLET | Refills: 3 | Status: CANCELLED | OUTPATIENT
Start: 2024-04-23

## 2024-04-23 NOTE — PROGRESS NOTES
"    SUBJECTIVE:      Patient ID: Jerry Hernandez is a 38 y.o. female.    Chief Complaint: Establish Care    New patient here to establish care with me today.  Patient has a past medical history of anxiety and depression, lymphoma, hypothyroidism, SVT, and cigarette smoking.  She is currently seeing her Heme-Onc specialist for management and monitoring of lymphoma.  Patient is taking thyroid medicine daily without side effects or complaints.  Requesting annual blood work at this time.  Patient does have a history of SVT but this has not given her any issues" years"- not currently following with Cardiology.  She is currently prescribed several medications for anxiety, insomnia, and pain.  She is not currently seeing a psychiatrist but is contemplating getting established.  Hematology/oncology is currently writing her Xanax 2 mg p.r.n. to use when needed.  Patient is taking BuSpar once daily, not taking 2nd dose that is prescribed.  Feels her anxiety is relatively well controlled on this regimen.  She is seeking out counseling services for her cancer history which does trigger a lot of her anxiety and stress.     She is currently recovering from the parainfluenza virus infection.  Patient got sick a few weeks ago and was recently seen at urgent care and given steroids, antibiotics, and breathing treatments.  She is continually improving but is still complaining of a wet hacking cough.  Denies any fever, body aches or chills.        Family History   Problem Relation Name Age of Onset    Hypertension Mother Nadira Hernandez     Heart disease Father Dirk hernandez 59        CABG    Heart failure Father Dirk hernandez     Breast cancer Maternal Aunt  55    Colon cancer Neg Hx      Ovarian cancer Neg Hx        Social History     Socioeconomic History    Marital status: Single   Occupational History    Occupation: [employer]     Comment: [employer]   Tobacco Use    Smoking status: Former     Current packs/day: 0.00     Average " "packs/day: 0.5 packs/day for 9.0 years (4.5 ttl pk-yrs)     Types: Cigarettes     Start date: 2000     Quit date: 2009     Years since quittin.8    Smokeless tobacco: Never   Substance and Sexual Activity    Alcohol use: Yes     Alcohol/week: 5.0 standard drinks of alcohol     Types: 5 Shots of liquor per week     Comment: "occasionally"    Drug use: No    Sexual activity: Not Currently     Partners: Male     Birth control/protection: OCP   Social History Narrative    The patient does not exercise regularly ().  Rates diet as fair.  She is not satisfied with weight.         Social Determinants of Health     Financial Resource Strain: Medium Risk (2024)    Overall Financial Resource Strain (CARDIA)     Difficulty of Paying Living Expenses: Somewhat hard   Food Insecurity: Food Insecurity Present (2024)    Hunger Vital Sign     Worried About Running Out of Food in the Last Year: Sometimes true     Ran Out of Food in the Last Year: Sometimes true   Transportation Needs: No Transportation Needs (2024)    PRAPARE - Transportation     Lack of Transportation (Medical): No     Lack of Transportation (Non-Medical): No   Physical Activity: Sufficiently Active (2024)    Exercise Vital Sign     Days of Exercise per Week: 6 days     Minutes of Exercise per Session: 120 min   Stress: Stress Concern Present (2024)    [employer] - Occupational Stress Questionnaire     Feeling of Stress : Very much   Social Connections: Unknown (2024)    Social Connection and Isolation Panel [NHANES]     Frequency of Communication with Friends and Family: Once a week     Frequency of Social Gatherings with Friends and Family: Patient declined     Active Member of Clubs or Organizations: No     Attends Club or Organization Meetings: Never     Marital Status: Never    Housing Stability: High Risk (2024)    Housing Stability Vital Sign     Unable to Pay for Housing in " the Last Year: Yes     Current Outpatient Medications   Medication Sig Dispense Refill    ALPRAZolam (XANAX) 2 MG Tab Take 1 tablet (2 mg total) by mouth 2 (two) times daily as needed. 40 tablet 0    busPIRone (BUSPAR) 15 MG tablet Take 1 tablet by mouth 2 (two) times daily.      fluticasone propionate (FLONASE) 50 mcg/actuation nasal spray SPRAY 2 SPRAYS BY EACH NOSTRIL ROUTE ONCE DAILY. 48 mL 1    gabapentin (NEURONTIN) 600 MG tablet Take 600 mg by mouth 3 (three) times daily as needed.      ibuprofen (ADVIL,MOTRIN) 800 MG tablet Take 1 tablet (800 mg total) by mouth every 8 (eight) hours as needed for Pain. 60 tablet 0    mirtazapine (REMERON) 7.5 MG Tab TAKE 2 TABLETS (15 MG TOTAL) BY MOUTH NIGHTLY.      multivitamin capsule Take 1 capsule by mouth.      oxyCODONE-acetaminophen (PERCOCET) 7.5-325 mg per tablet Take 1 tablet by mouth continuous prn for Pain.      tiZANidine (ZANAFLEX) 4 MG tablet Take 4 mg by mouth 3 (three) times daily as needed.      albuterol (VENTOLIN HFA) 90 mcg/actuation inhaler Inhale 2 puffs into the lungs every 6 (six) hours as needed for Wheezing. Rescue 18 g 0    levothyroxine (SYNTHROID) 50 MCG tablet Take 1 tablet (50 mcg total) by mouth before breakfast. 30 tablet 0    promethazine-dextromethorphan (PROMETHAZINE-DM) 6.25-15 mg/5 mL Syrp Take 5 mLs by mouth nightly as needed (cough). 118 mL 0     No current facility-administered medications for this visit.     Review of patient's allergies indicates:  No Known Allergies   Past Medical History:   Diagnosis Date    Allergic rhinitis     Anxiety     Cancer 12/01/2021    B-cell folicular lymphoma    Depression     Hypothyroidism     SVT (supraventricular tachycardia) 2011     Past Surgical History:   Procedure Laterality Date    ABLATION OF DYSRHYTHMIC FOCUS  2016    x2    BIOPSY OF CERVICAL LYMPH NODE Left 11/29/2021    Procedure: BIOPSY, LYMPH NODE, CERVICAL;  Surgeon: Blanco Kerns MD;  Location: [facility];  Service: General;   Laterality: Left;    CERVIX LESION DESTRUCTION  11/2021    CONIZATION OF CERVIX USING LOOP ELECTROSURGICAL EXCISION PROCEDURE (LEEP) N/A 07/21/2021    Procedure: LEEP CONIZATION, CERVIX;  Surgeon: Donna Castillo MD;  Location: [facility] OR;  Service: OB/GYN;  Laterality: N/A;    EXCISION OF LESION OF NOSE N/A 10/14/2019    Procedure: EXCISION, LESION, NOSE;  Surgeon: Umesh Morales MD;  Location: [facility] OR;  Service: ENT;  Laterality: N/A;    FUNCTIONAL ENDOSCOPIC SINUS SURGERY (FESS) N/A 10/14/2019    Procedure: FESS (FUNCTIONAL ENDOSCOPIC SINUS SURGERY);  Surgeon: Umesh Morales MD;  Location: [facility] OR;  Service: ENT;  Laterality: N/A;    HYSTEROSCOPY WITH DILATION AND CURETTAGE OF UTERUS N/A 07/21/2021    Procedure: HYSTEROSCOPY, WITH DILATION AND CURETTAGE OF UTERUS;  Surgeon: Donna Castillo MD;  Location: [facility] OR;  Service: OB/GYN;  Laterality: N/A;    KNEE ARTHROSCOPY W/ MENISCECTOMY Right 2/7/2024    Procedure: ARTHROSCOPY, KNEE, WITH MENISCECTOMY;  Surgeon: Matthew Elliott MD;  Location: [facility] OR;  Service: Orthopedics;  Laterality: Right;  partial medical meniscectomy and plica resection    MEDIPORT REMOVAL Right 06/20/2022    Procedure: REMOVAL, CATHETER, CENTRAL VENOUS, TUNNELED, WITH PORT;  Surgeon: Blanco Kerns MD;  Location: [facility] OR;  Service: General;  Laterality: Right;    NASAL SEPTOPLASTY N/A 10/14/2019    Procedure: SEPTOPLASTY, NOSE;  Surgeon: Umesh Morales MD;  Location: [facility] OR;  Service: ENT;  Laterality: N/A;    NASAL SINUS SURGERY  10/14/2019    OSTEOTOMY OF METATARSAL BONE Left 12/21/2022    Procedure: OSTEOTOMY, METATARSAL BONE;  Surgeon: Hola Escalante DPM;  Location: [facility] OR;  Service: Podiatry;  Laterality: Left;  denys marrero notified 12/19 TW  , RELEASE ENTRAPPED NERVE 2ND INTERMETATARSAL SPACE    PILONIDAL CYST DRAINAGE      REVISION OF SCAR  06/20/2022    Procedure: REVISION, SCAR;  Surgeon: Blanco Kerns MD;  Location: [facility] OR;  Service: General;;    SINUS SURGERY   "11/30/2023    SKIN TAG REMOVAL Left 06/20/2022    Procedure: REMOVAL, SKIN TAG;  Surgeon: Blanco Kerns MD;  Location: [facility] OR;  Service: General;  Laterality: Left;    TONSILLECTOMY      TYMPANOPLASTY N/A 10/14/2019    Procedure: TYMPANOPLASTY;  Surgeon: Umesh Morales MD;  Location: [facility] OR;  Service: ENT;  Laterality: N/A;    TYMPANOTOMY Left 10/14/2019    left       Review of Systems   Constitutional:  Positive for fatigue. Negative for appetite change, chills, fever and unexpected weight change.   HENT:  Negative for congestion, ear discharge, ear pain, rhinorrhea, sinus pressure, sinus pain, sore throat and trouble swallowing.    Eyes:  Negative for photophobia, pain and visual disturbance.   Respiratory:  Positive for cough. Negative for shortness of breath and wheezing.    Cardiovascular:  Negative for chest pain, palpitations and leg swelling.   Gastrointestinal:  Negative for abdominal pain, constipation, diarrhea, nausea and vomiting.   Endocrine: Negative for cold intolerance, heat intolerance and polydipsia.   Genitourinary:  Negative for dysuria and frequency.   Musculoskeletal:  Negative for myalgias.   Skin:  Negative for color change and rash.   Neurological:  Negative for dizziness, weakness and headaches.   Hematological:  Negative for adenopathy. Does not bruise/bleed easily.   Psychiatric/Behavioral:  Negative for dysphoric mood, sleep disturbance and suicidal ideas. The patient is nervous/anxious.       OBJECTIVE:      Vitals:    04/23/24 0826 04/23/24 0904   BP: 130/70    BP Location: Right arm    Patient Position: Sitting    BP Method: Large (Manual)    Pulse: (!) 120 108   Resp: 20    Temp: 98.5 °F (36.9 °C)    TempSrc: Oral    SpO2: 98%    Weight: 103.8 kg (228 lb 14.4 oz)    Height: 5' 1" (1.549 m)      Physical Exam  Vitals and nursing note reviewed.   Constitutional:       General: She is not in acute distress.     Appearance: Normal appearance. She is well-developed. She is obese. " She is not ill-appearing.   HENT:      Head: Normocephalic.      Right Ear: Tympanic membrane, ear canal and external ear normal.      Left Ear: Tympanic membrane, ear canal and external ear normal.      Nose: Nose normal.      Mouth/Throat:      Mouth: Mucous membranes are moist.      Pharynx: Oropharynx is clear. No oropharyngeal exudate.   Eyes:      General: No scleral icterus.     Extraocular Movements: Extraocular movements intact.      Conjunctiva/sclera: Conjunctivae normal.      Pupils: Pupils are equal, round, and reactive to light.   Neck:      Thyroid: No thyroid mass or thyromegaly.      Trachea: Trachea normal.   Cardiovascular:      Rate and Rhythm: Regular rhythm. Tachycardia present.      Heart sounds: Normal heart sounds. No murmur heard.  Pulmonary:      Effort: Pulmonary effort is normal. No respiratory distress.      Breath sounds: Normal breath sounds. No stridor. No wheezing, rhonchi or rales.      Comments: Hacking crackly cough throughout appointment  Abdominal:      General: Bowel sounds are normal. There is no distension.      Palpations: Abdomen is soft. There is no mass.      Tenderness: There is no abdominal tenderness.   Musculoskeletal:         General: Normal range of motion.      Cervical back: Normal range of motion and neck supple.   Lymphadenopathy:      Cervical: No cervical adenopathy.   Skin:     General: Skin is warm and dry.      Coloration: Skin is not jaundiced or pale.   Neurological:      Mental Status: She is alert and oriented to person, place, and time.   Psychiatric:         Mood and Affect: Mood normal.         Behavior: Behavior normal.         Thought Content: Thought content normal.         Judgment: Judgment normal.        Assessment:       1. Hypothyroidism due to acquired atrophy of thyroid    2. Former cigarette smoker    3. Acute bronchitis, unspecified organism    4. Preventative health care    5. Diabetes mellitus screening        Plan:        Hypothyroidism due to acquired atrophy of thyroid  -     levothyroxine (SYNTHROID) 50 MCG tablet; Take 1 tablet (50 mcg total) by mouth before breakfast.  Dispense: 30 tablet; Refill: 0  -     TSH; Future; Expected date: 04/23/2024  -     T4, FREE; Future; Expected date: 04/23/2024  -check thyroid labs, continue current dose of medication until reviewed    Former cigarette smoker    Acute bronchitis, unspecified organism  -     albuterol (VENTOLIN HFA) 90 mcg/actuation inhaler; Inhale 2 puffs into the lungs every 6 (six) hours as needed for Wheezing. Rescue  Dispense: 18 g; Refill: 0  -     promethazine-dextromethorphan (PROMETHAZINE-DM) 6.25-15 mg/5 mL Syrp; Take 5 mLs by mouth nightly as needed (cough).  Dispense: 118 mL; Refill: 0  -continue inhaler or breathing treatments, cough medicine at bedtime p.r.n., Mucinex or Robitussin during the day with plenty of clear fluids to expectorate mucus; return to clinic in 3-5 days if no improvement    Preventative health care  -     CBC Auto Differential; Future; Expected date: 04/23/2024  -     Comprehensive Metabolic Panel; Future; Expected date: 04/23/2024  -     Lipid Panel; Future; Expected date: 04/23/2024    Diabetes mellitus screening  -     Comprehensive Metabolic Panel; Future; Expected date: 04/23/2024  -     Hemoglobin A1C; Future; Expected date: 04/23/2024        Follow up in about 1 month (around 5/23/2024) for annual .      4/24/2024 MAUREEN Cunha, FNP    This note was created using [employer] voice recognition software that occasionally misinterprets phrases or words.

## 2024-04-24 ENCOUNTER — HOSPITAL ENCOUNTER (OUTPATIENT)
Dept: RADIOLOGY | Facility: HOSPITAL | Age: 39
Discharge: HOME | End: 2024-04-24
Attending: INTERNAL MEDICINE
Payer: COMMERCIAL

## 2024-04-24 VITALS — HEIGHT: 61 IN | WEIGHT: 225 LBS | BODY MASS INDEX: 42.48 KG/M2

## 2024-04-24 DIAGNOSIS — C82.91 FOLLICULAR LYMPHOMA OF LYMPH NODES OF NECK, UNSPECIFIED FOLLICULAR LYMPHOMA TYPE: ICD-10-CM

## 2024-04-24 LAB — GLUCOSE SERPL-MCNC: 84 MG/DL (ref 70–110)

## 2024-04-24 PROCEDURE — 78816 PET IMAGE W/CT FULL BODY: CPT | Mod: TC,PO

## 2024-04-24 PROCEDURE — 78816 PET IMAGE W/CT FULL BODY: CPT | Mod: 26,PS,, | Performed by: RADIOLOGY

## 2024-04-24 PROCEDURE — A9552 F18 FDG: HCPCS | Mod: PO | Performed by: INTERNAL MEDICINE

## 2024-04-24 RX ORDER — FLUDEOXYGLUCOSE F18 500 MCI/ML
12.6 INJECTION INTRAVENOUS
Status: COMPLETED | OUTPATIENT
Start: 2024-04-24 | End: 2024-04-24

## 2024-04-24 RX ADMIN — FLUDEOXYGLUCOSE F-18 12.6 MILLICURIE: 500 INJECTION INTRAVENOUS at 12:04

## 2024-04-25 ENCOUNTER — PATIENT MESSAGE (OUTPATIENT)
Dept: FAMILY MEDICINE | Facility: CLINIC | Age: 39
End: 2024-04-25
Payer: COMMERCIAL

## 2024-04-25 ENCOUNTER — OFFICE VISIT (OUTPATIENT)
Dept: HEMATOLOGY/ONCOLOGY | Facility: CLINIC | Age: 39
End: 2024-04-25
Payer: COMMERCIAL

## 2024-04-25 ENCOUNTER — PATIENT MESSAGE (OUTPATIENT)
Dept: HEMATOLOGY/ONCOLOGY | Facility: CLINIC | Age: 39
End: 2024-04-25

## 2024-04-25 ENCOUNTER — PATIENT MESSAGE (OUTPATIENT)
Dept: PODIATRY | Facility: CLINIC | Age: 39
End: 2024-04-25
Payer: COMMERCIAL

## 2024-04-25 DIAGNOSIS — E66.01 MORBID OBESITY: ICD-10-CM

## 2024-04-25 DIAGNOSIS — R29.898 DECREASED STRENGTH OF LOWER EXTREMITY: ICD-10-CM

## 2024-04-25 DIAGNOSIS — M77.52 CAPSULITIS OF METATARSOPHALANGEAL (MTP) JOINT OF LEFT FOOT: Primary | ICD-10-CM

## 2024-04-25 DIAGNOSIS — F06.30 MOOD DISORDER DUE TO A GENERAL MEDICAL CONDITION: ICD-10-CM

## 2024-04-25 DIAGNOSIS — M79.672 LEFT FOOT PAIN: ICD-10-CM

## 2024-04-25 DIAGNOSIS — C82.91 FOLLICULAR LYMPHOMA OF LYMPH NODES OF NECK, UNSPECIFIED FOLLICULAR LYMPHOMA TYPE: Primary | ICD-10-CM

## 2024-04-25 PROCEDURE — 1160F RVW MEDS BY RX/DR IN RCRD: CPT | Mod: CPTII,95,, | Performed by: INTERNAL MEDICINE

## 2024-04-25 PROCEDURE — 3044F HG A1C LEVEL LT 7.0%: CPT | Mod: CPTII,95,, | Performed by: INTERNAL MEDICINE

## 2024-04-25 PROCEDURE — 99214 OFFICE O/P EST MOD 30 MIN: CPT | Mod: 95,,, | Performed by: INTERNAL MEDICINE

## 2024-04-25 PROCEDURE — 1159F MED LIST DOCD IN RCRD: CPT | Mod: CPTII,95,, | Performed by: INTERNAL MEDICINE

## 2024-04-26 ENCOUNTER — LAB VISIT (OUTPATIENT)
Dept: LAB | Facility: HOSPITAL | Age: 39
End: 2024-04-26
Attending: NURSE PRACTITIONER
Payer: COMMERCIAL

## 2024-04-26 ENCOUNTER — PATIENT MESSAGE (OUTPATIENT)
Dept: FAMILY MEDICINE | Facility: CLINIC | Age: 39
End: 2024-04-26
Payer: COMMERCIAL

## 2024-04-26 DIAGNOSIS — Z00.00 ROUTINE GENERAL MEDICAL EXAMINATION AT A HEALTH CARE FACILITY: Primary | ICD-10-CM

## 2024-04-26 LAB
CHOLEST SERPL-MCNC: 251 MG/DL (ref 120–199)
CHOLEST/HDLC SERPL: 3.2 {RATIO} (ref 2–5)
HDLC SERPL-MCNC: 78 MG/DL (ref 40–75)
HDLC SERPL: 31.1 % (ref 20–50)
LDLC SERPL CALC-MCNC: 155.4 MG/DL (ref 63–159)
NONHDLC SERPL-MCNC: 173 MG/DL
TRIGL SERPL-MCNC: 88 MG/DL (ref 30–150)

## 2024-04-26 PROCEDURE — 80061 LIPID PANEL: CPT | Performed by: NURSE PRACTITIONER

## 2024-04-26 PROCEDURE — 36415 COLL VENOUS BLD VENIPUNCTURE: CPT | Performed by: NURSE PRACTITIONER

## 2024-04-27 ENCOUNTER — NURSE TRIAGE (OUTPATIENT)
Dept: ADMINISTRATIVE | Facility: CLINIC | Age: 39
End: 2024-04-27
Payer: COMMERCIAL
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
## 2024-04-29 NOTE — TELEPHONE ENCOUNTER
Spoke with patient to let her know Dr. Escalante would fill out the order when he gets back in town. Patient states understanding.

## 2024-05-02 ENCOUNTER — TELEPHONE (OUTPATIENT)
Dept: OPTOMETRY | Facility: CLINIC | Age: 39
End: 2024-05-02
Payer: COMMERCIAL

## 2024-05-03 DIAGNOSIS — F43.9 SITUATIONAL STRESS: ICD-10-CM

## 2024-05-03 RX ORDER — ALPRAZOLAM 2 MG/1
2 TABLET ORAL 2 TIMES DAILY PRN
Qty: 40 TABLET | Refills: 0 | Status: SHIPPED | OUTPATIENT
Start: 2024-05-03 | End: 2024-06-02

## 2024-05-08 ENCOUNTER — OFFICE VISIT (OUTPATIENT)
Dept: OPTOMETRY | Facility: CLINIC | Age: 39
End: 2024-05-08
Payer: COMMERCIAL

## 2024-05-08 DIAGNOSIS — H52.4 HYPEROPIA OF BOTH EYES WITH ASTIGMATISM AND PRESBYOPIA: Primary | ICD-10-CM

## 2024-05-08 DIAGNOSIS — H52.03 HYPEROPIA OF BOTH EYES WITH ASTIGMATISM AND PRESBYOPIA: Primary | ICD-10-CM

## 2024-05-08 DIAGNOSIS — H52.203 HYPEROPIA OF BOTH EYES WITH ASTIGMATISM AND PRESBYOPIA: Primary | ICD-10-CM

## 2024-05-08 PROCEDURE — 99999 PR PBB SHADOW E&M-EST. PATIENT-LVL III: CPT | Mod: PBBFAC,,, | Performed by: OPTOMETRIST

## 2024-05-08 PROCEDURE — 1159F MED LIST DOCD IN RCRD: CPT | Mod: CPTII,S$GLB,, | Performed by: OPTOMETRIST

## 2024-05-08 PROCEDURE — 92015 DETERMINE REFRACTIVE STATE: CPT | Mod: S$GLB,,, | Performed by: OPTOMETRIST

## 2024-05-08 PROCEDURE — 92014 COMPRE OPH EXAM EST PT 1/>: CPT | Mod: S$GLB,,, | Performed by: OPTOMETRIST

## 2024-05-08 PROCEDURE — 3044F HG A1C LEVEL LT 7.0%: CPT | Mod: CPTII,S$GLB,, | Performed by: OPTOMETRIST

## 2024-05-08 PROCEDURE — 1160F RVW MEDS BY RX/DR IN RCRD: CPT | Mod: CPTII,S$GLB,, | Performed by: OPTOMETRIST

## 2024-05-08 NOTE — PROGRESS NOTES
HPI    Pt here for annual eye exam DLS- 04/2022   Blur ou at near x mos, no assoc pain or red, constant, no relief over   time.  Pt states her vision has decreased, having so increase the font on her   computer. Wants to try CL.   Occasional floaters, denies FOL.   Denies GTTS.   Last edited by Juan Alberto Camacho, OD on 5/8/2024  2:25 PM.            Assessment /Plan     For exam results, see Encounter Report.    Hyperopia of both eyes with astigmatism and presbyopia      New Spectacle Rx given, discussed different options for glasses. RTC 1 year routine eye exam with dfe, optos done, pt chose to defer dilation

## 2024-05-16 ENCOUNTER — TELEPHONE (OUTPATIENT)
Dept: FAMILY MEDICINE | Facility: CLINIC | Age: 39
End: 2024-05-16
Payer: COMMERCIAL

## 2024-05-19 DIAGNOSIS — E03.4 HYPOTHYROIDISM DUE TO ACQUIRED ATROPHY OF THYROID: ICD-10-CM

## 2024-05-20 RX ORDER — LEVOTHYROXINE SODIUM 50 UG/1
50 TABLET ORAL
Qty: 90 TABLET | Refills: 0 | Status: SHIPPED | OUTPATIENT
Start: 2024-05-20 | End: 2024-05-30 | Stop reason: SDUPTHER

## 2024-05-25 ENCOUNTER — PATIENT MESSAGE (OUTPATIENT)
Dept: ORTHOPEDICS | Facility: CLINIC | Age: 39
End: 2024-05-25

## 2024-05-25 DIAGNOSIS — M17.11 PRIMARY OSTEOARTHRITIS OF RIGHT KNEE: Primary | ICD-10-CM

## 2024-05-27 NOTE — TELEPHONE ENCOUNTER
Patient had right knee arthroscopy on 2/7/24. Requesting Synvisc injection in that knee. She had steroid injection in it on 3/14/24. Order pended to sign if okay to do.

## 2024-05-28 ENCOUNTER — TELEPHONE (OUTPATIENT)
Dept: HEMATOLOGY/ONCOLOGY | Facility: CLINIC | Age: 39
End: 2024-05-28
Payer: COMMERCIAL

## 2024-05-28 ENCOUNTER — TELEPHONE (OUTPATIENT)
Dept: PSYCHIATRY | Facility: CLINIC | Age: 39
End: 2024-05-28
Payer: COMMERCIAL

## 2024-05-30 ENCOUNTER — OFFICE VISIT (OUTPATIENT)
Dept: FAMILY MEDICINE | Facility: CLINIC | Age: 39
End: 2024-05-30
Payer: COMMERCIAL

## 2024-05-30 VITALS
BODY MASS INDEX: 42.57 KG/M2 | WEIGHT: 225.5 LBS | HEIGHT: 61 IN | HEART RATE: 90 BPM | RESPIRATION RATE: 18 BRPM | OXYGEN SATURATION: 96 % | SYSTOLIC BLOOD PRESSURE: 136 MMHG | DIASTOLIC BLOOD PRESSURE: 86 MMHG | TEMPERATURE: 98 F

## 2024-05-30 DIAGNOSIS — Z00.01 ENCOUNTER FOR ROUTINE ADULT HEALTH EXAMINATION WITH ABNORMAL FINDINGS: Primary | ICD-10-CM

## 2024-05-30 DIAGNOSIS — E03.4 HYPOTHYROIDISM DUE TO ACQUIRED ATROPHY OF THYROID: ICD-10-CM

## 2024-05-30 DIAGNOSIS — E78.49 OTHER HYPERLIPIDEMIA: ICD-10-CM

## 2024-05-30 DIAGNOSIS — E66.01 CLASS 3 SEVERE OBESITY DUE TO EXCESS CALORIES WITHOUT SERIOUS COMORBIDITY WITH BODY MASS INDEX (BMI) OF 40.0 TO 44.9 IN ADULT: ICD-10-CM

## 2024-05-30 DIAGNOSIS — Z12.31 SCREENING MAMMOGRAM FOR BREAST CANCER: ICD-10-CM

## 2024-05-30 PROCEDURE — 3008F BODY MASS INDEX DOCD: CPT | Mod: CPTII,S$GLB,, | Performed by: NURSE PRACTITIONER

## 2024-05-30 PROCEDURE — 99395 PREV VISIT EST AGE 18-39: CPT | Mod: S$GLB,,, | Performed by: NURSE PRACTITIONER

## 2024-05-30 PROCEDURE — 1160F RVW MEDS BY RX/DR IN RCRD: CPT | Mod: CPTII,S$GLB,, | Performed by: NURSE PRACTITIONER

## 2024-05-30 PROCEDURE — 99999 PR PBB SHADOW E&M-EST. PATIENT-LVL V: CPT | Mod: PBBFAC,,, | Performed by: NURSE PRACTITIONER

## 2024-05-30 PROCEDURE — 3044F HG A1C LEVEL LT 7.0%: CPT | Mod: CPTII,S$GLB,, | Performed by: NURSE PRACTITIONER

## 2024-05-30 PROCEDURE — 1159F MED LIST DOCD IN RCRD: CPT | Mod: CPTII,S$GLB,, | Performed by: NURSE PRACTITIONER

## 2024-05-30 PROCEDURE — 3079F DIAST BP 80-89 MM HG: CPT | Mod: CPTII,S$GLB,, | Performed by: NURSE PRACTITIONER

## 2024-05-30 PROCEDURE — 3075F SYST BP GE 130 - 139MM HG: CPT | Mod: CPTII,S$GLB,, | Performed by: NURSE PRACTITIONER

## 2024-05-30 RX ORDER — LEVOTHYROXINE SODIUM 50 UG/1
50 TABLET ORAL
Qty: 90 TABLET | Refills: 3 | Status: SHIPPED | OUTPATIENT
Start: 2024-05-30

## 2024-05-30 NOTE — PROGRESS NOTES
SUBJECTIVE:   HPI: Jerry Coronado  is a 38 y.o. female who presents for annual physical .   Annual Exam    Jerry is here for annual exam and physical today.  Blood work reviewed with patient today.  Taking medications as prescribed without side effects or complaints.  Screening Pap smear up-to-date with gynecology.  Breast cancer screening will be due in July when patient turns 39.  Denies any complaints today.      (Not in a hospital admission)    Review of patient's allergies indicates:  No Known Allergies  Current Outpatient Medications on File Prior to Visit   Medication Sig Dispense Refill    albuterol (VENTOLIN HFA) 90 mcg/actuation inhaler Inhale 2 puffs into the lungs every 6 (six) hours as needed for Wheezing. Rescue 18 g 0    ALPRAZolam (XANAX) 2 MG Tab Take 1 tablet (2 mg total) by mouth 2 (two) times daily as needed. 40 tablet 0    busPIRone (BUSPAR) 15 MG tablet Take 1 tablet by mouth 2 (two) times daily.      fluticasone propionate (FLONASE) 50 mcg/actuation nasal spray SPRAY 2 SPRAYS BY EACH NOSTRIL ROUTE ONCE DAILY. 48 mL 1    gabapentin (NEURONTIN) 600 MG tablet Take 600 mg by mouth 3 (three) times daily as needed.      ibuprofen (ADVIL,MOTRIN) 800 MG tablet Take 1 tablet (800 mg total) by mouth every 8 (eight) hours as needed for Pain. 60 tablet 0    multivitamin capsule Take 1 capsule by mouth.      oxyCODONE-acetaminophen (PERCOCET) 7.5-325 mg per tablet Take 1 tablet by mouth continuous prn for Pain.      promethazine-dextromethorphan (PROMETHAZINE-DM) 6.25-15 mg/5 mL Syrp Take 5 mLs by mouth nightly as needed (cough). 118 mL 0    tiZANidine (ZANAFLEX) 4 MG tablet Take 4 mg by mouth 3 (three) times daily as needed.      [DISCONTINUED] levothyroxine (SYNTHROID) 50 MCG tablet TAKE 1 TABLET BY MOUTH BEFORE BREAKFAST. 90 tablet 0    [DISCONTINUED] buPROPion (WELLBUTRIN XL) 300 MG 24 hr tablet Take 1 tablet (300 mg total) by mouth every morning. (Patient not taking: Reported on 5/11/2022) 90 tablet 3     [DISCONTINUED] drospirenone-ethinyl estradioL (MANI) 3-0.03 mg per tablet Mani 3 mg-0.03 mg tablet  TAKE 1 TABLET BY MOUTH EVERY DAY SKIP PLACEBO (Patient not taking: Reported on 5/11/2022) 84 tablet 4    [DISCONTINUED] DULoxetine (CYMBALTA) 60 MG capsule Take 60 mg by mouth once daily.      [DISCONTINUED] levalbuterol (XOPENEX HFA) 45 mcg/actuation inhaler Inhale 1 puff into the lungs every 4 (four) hours as needed for Wheezing or Shortness of Breath. Rescue (Patient not taking: Reported on 5/11/2022) 15 g 1    [DISCONTINUED] mirtazapine (REMERON) 7.5 MG Tab TAKE 2 TABLETS (15 MG TOTAL) BY MOUTH NIGHTLY. (Patient not taking: Reported on 5/30/2024)      [DISCONTINUED] valACYclovir (VALTREX) 1000 MG tablet Take 2 tablets (2,000 mg total) by mouth every 12 (twelve) hours. for 1 day (Patient taking differently: Take 2,000 mg by mouth as needed.) 4 tablet 2     No current facility-administered medications on file prior to visit.     Past Medical History:   Diagnosis Date    Allergic rhinitis     Anxiety     Cancer 12/01/2021    B-cell folicular lymphoma    Depression     Hypothyroidism     SVT (supraventricular tachycardia) 2011     Past Surgical History:   Procedure Laterality Date    ABLATION OF DYSRHYTHMIC FOCUS  2016    x2    BIOPSY OF CERVICAL LYMPH NODE Left 11/29/2021    Procedure: BIOPSY, LYMPH NODE, CERVICAL;  Surgeon: Blanco Kerns MD;  Location: [facility] OR;  Service: General;  Laterality: Left;    CERVIX LESION DESTRUCTION  11/2021    CONIZATION OF CERVIX USING LOOP ELECTROSURGICAL EXCISION PROCEDURE (LEEP) N/A 07/21/2021    Procedure: LEEP CONIZATION, CERVIX;  Surgeon: Donna Castillo MD;  Location: [facility] OR;  Service: OB/GYN;  Laterality: N/A;    EXCISION OF LESION OF NOSE N/A 10/14/2019    Procedure: EXCISION, LESION, NOSE;  Surgeon: Umesh Morales MD;  Location: [facility] OR;  Service: ENT;  Laterality: N/A;    FUNCTIONAL ENDOSCOPIC SINUS SURGERY (FESS) N/A 10/14/2019    Procedure: FESS (FUNCTIONAL  ENDOSCOPIC SINUS SURGERY);  Surgeon: Umesh Morales MD;  Location: [facility];  Service: ENT;  Laterality: N/A;    HYSTEROSCOPY WITH DILATION AND CURETTAGE OF UTERUS N/A 07/21/2021    Procedure: HYSTEROSCOPY, WITH DILATION AND CURETTAGE OF UTERUS;  Surgeon: Donna Castillo MD;  Location: [facility];  Service: OB/GYN;  Laterality: N/A;    KNEE ARTHROSCOPY W/ MENISCECTOMY Right 02/07/2024    Procedure: ARTHROSCOPY, KNEE, WITH MENISCECTOMY;  Surgeon: Matthew Elliott MD;  Location: [facility] OR;  Service: Orthopedics;  Laterality: Right;  partial medical meniscectomy and plica resection    MEDIPORT REMOVAL Right 06/20/2022    Procedure: REMOVAL, CATHETER, CENTRAL VENOUS, TUNNELED, WITH PORT;  Surgeon: Blanco Kerns MD;  Location: [facility] OR;  Service: General;  Laterality: Right;    NASAL SEPTOPLASTY N/A 10/14/2019    Procedure: SEPTOPLASTY, NOSE;  Surgeon: Umesh Morales MD;  Location: [facility];  Service: ENT;  Laterality: N/A;    NASAL SINUS SURGERY  10/14/2019    OSTEOTOMY OF METATARSAL BONE Left 12/21/2022    Procedure: OSTEOTOMY, METATARSAL BONE;  Surgeon: Hola Escalante DPM;  Location: [facility];  Service: Podiatry;  Laterality: Left;  synthes janes notified 12/19 TW  , RELEASE ENTRAPPED NERVE 2ND INTERMETATARSAL SPACE    PILONIDAL CYST DRAINAGE      REVISION OF SCAR  06/20/2022    Procedure: REVISION, SCAR;  Surgeon: Blanco Kerns MD;  Location: [facility] OR;  Service: General;;    SINUS SURGERY  11/30/2023    SKIN TAG REMOVAL Left 06/20/2022    Procedure: REMOVAL, SKIN TAG;  Surgeon: Blanco Kerns MD;  Location: [facility] OR;  Service: General;  Laterality: Left;    TONSILLECTOMY      TYMPANOPLASTY N/A 10/14/2019    Procedure: TYMPANOPLASTY;  Surgeon: Umesh Morales MD;  Location: [facility];  Service: ENT;  Laterality: N/A;    TYMPANOTOMY Left 10/14/2019    left     Family History   Problem Relation Name Age of Onset    Cataracts Mother Nadira Coronado     Glaucoma Mother Nadira Coronado     Hypertension Mother Nadira Coronado  "    Heart disease Father Dirk hernandez 59        CABG    Heart failure Father Dirk hernandez     Breast cancer Maternal Aunt  55    Colon cancer Neg Hx      Ovarian cancer Neg Hx      Macular degeneration Neg Hx      Retinal detachment Neg Hx       Social History     Tobacco Use    Smoking status: Former     Current packs/day: 0.00     Average packs/day: 0.5 packs/day for 9.0 years (4.5 ttl pk-yrs)     Types: Cigarettes     Start date: 2000     Quit date: 2009     Years since quittin.9    Smokeless tobacco: Never   Substance Use Topics    Alcohol use: Yes     Alcohol/week: 5.0 standard drinks of alcohol     Types: 5 Shots of liquor per week     Comment: "occasionally"    Drug use: No      Health Maintenance Topics with due status: Not Due       Topic Last Completion Date    TETANUS VACCINE 2018    Cervical Cancer Screening 2022    Hemoglobin A1c (Diabetic Prevention Screening) 2024     Immunization History   Administered Date(s) Administered    COVID-19, MRNA, LN-S, PF (Pfizer) (Purple Cap) 2020, 2021    HPV 9-Valent 2021, 2021    Hepatitis B, Adolescent/High Risk Infant 04/15/1998    Influenza 2022    Influenza - Quadrivalent - MDCK - PF 2017, 10/22/2020, 10/16/2022    Influenza - Quadrivalent - PF *Preferred* (6 months and older) 2018, 2019, 2023    MMR 04/15/1998    PPD Test 2019    Tdap 2018       Review of Systems   Constitutional:  Positive for fatigue. Negative for appetite change, chills, fever and unexpected weight change.   HENT:  Negative for congestion, ear discharge, ear pain, rhinorrhea, sinus pressure, sinus pain, sore throat and trouble swallowing.    Eyes:  Negative for photophobia, pain and visual disturbance.   Respiratory:  Negative for cough, shortness of breath and wheezing.    Cardiovascular:  Negative for chest pain, palpitations and leg swelling.   Gastrointestinal:  Negative for abdominal pain, " "blood in stool, constipation, diarrhea, nausea and vomiting.        No family history of colon cancer   Endocrine: Negative for cold intolerance, heat intolerance and polydipsia.   Genitourinary:  Negative for dysuria and frequency.   Musculoskeletal:  Negative for myalgias.   Skin:  Negative for color change and rash.   Neurological:  Negative for dizziness, weakness and headaches.   Hematological:  Negative for adenopathy. Does not bruise/bleed easily.   Psychiatric/Behavioral:  Negative for dysphoric mood, sleep disturbance and suicidal ideas. The patient is nervous/anxious.       OBJECTIVE:      Vitals:    05/30/24 0853   BP: 136/86   BP Location: Right arm   Patient Position: Sitting   BP Method: Large (Automatic)   Pulse: 90   Resp: 18   Temp: 98.2 °F (36.8 °C)   TempSrc: Oral   SpO2: 96%   Weight: 102.3 kg (225 lb 8 oz)   Height: 5' 1" (1.549 m)     Physical Exam  Vitals and nursing note reviewed.   Constitutional:       General: She is not in acute distress.     Appearance: Normal appearance. She is well-developed. She is obese. She is not ill-appearing.   HENT:      Head: Normocephalic.      Right Ear: Tympanic membrane, ear canal and external ear normal.      Left Ear: Tympanic membrane, ear canal and external ear normal.      Nose: Nose normal.      Mouth/Throat:      Mouth: Mucous membranes are moist.      Pharynx: Oropharynx is clear. No oropharyngeal exudate.   Eyes:      General: No scleral icterus.     Extraocular Movements: Extraocular movements intact.      Conjunctiva/sclera: Conjunctivae normal.      Pupils: Pupils are equal, round, and reactive to light.   Neck:      Thyroid: No thyroid mass or thyromegaly.      Trachea: Trachea normal.   Cardiovascular:      Rate and Rhythm: Normal rate and regular rhythm.      Heart sounds: Normal heart sounds. No murmur heard.  Pulmonary:      Effort: Pulmonary effort is normal. No respiratory distress.      Breath sounds: Normal breath sounds. No stridor. No " wheezing, rhonchi or rales.      Comments: Hacking crackly cough throughout appointment  Abdominal:      General: Bowel sounds are normal.      Palpations: Abdomen is soft.      Tenderness: There is no abdominal tenderness.   Genitourinary:     Comments: Deferred to gynecology  Musculoskeletal:         General: Normal range of motion.      Cervical back: Normal range of motion and neck supple.      Right lower leg: No edema.      Left lower leg: No edema.   Lymphadenopathy:      Cervical: No cervical adenopathy.   Skin:     General: Skin is warm and dry.      Coloration: Skin is not jaundiced or pale.   Neurological:      Mental Status: She is alert and oriented to person, place, and time.   Psychiatric:         Mood and Affect: Mood normal.         Behavior: Behavior normal.         Thought Content: Thought content normal.         Judgment: Judgment normal.        Assessment:       1. Encounter for routine adult health examination with abnormal findings    2. Hypothyroidism due to acquired atrophy of thyroid    3. Other hyperlipidemia    4. Screening mammogram for breast cancer    5. Class 3 severe obesity due to excess calories without serious comorbidity with body mass index (BMI) of 40.0 to 44.9 in adult        Plan:       Encounter for routine adult health examination with abnormal findings    Hypothyroidism due to acquired atrophy of thyroid  -     levothyroxine (SYNTHROID) 50 MCG tablet; Take 1 tablet (50 mcg total) by mouth before breakfast.  Dispense: 90 tablet; Refill: 3  -     TSH; Future; Expected date: 11/29/2024  -     T4, FREE; Future; Expected date: 11/29/2024  -continue medication at current dose, recheck labs in 6 months for stability    Other hyperlipidemia   -no statin recommended, diet changes, high-fiber intake, regular exercise and weight loss recommended; recheck next year    Screening mammogram for breast cancer  -     Mammo Digital Screening Bilat w/ Larry; Future; Expected date:  07/02/2024   -baseline mammogram due in July    Class 3 severe obesity due to excess calories without serious comorbidity with body mass index (BMI) of 40.0 to 44.9 in adult        Counseled on age and gender appropriate medical preventative services, including cancer screenings, immunizations, overall nutritional health, need for a consistent exercise regimen and an overall push towards maintaining a vigorous and active lifestyle.      Follow up in about 1 year (around 5/30/2025) for annual .        This note was created using Xum[employer] voice recognition software that occasionally misinterprets phrases or words.

## 2024-06-06 ENCOUNTER — LAB VISIT (OUTPATIENT)
Dept: LAB | Facility: OTHER | Age: 39
End: 2024-06-06
Attending: OBSTETRICS & GYNECOLOGY
Payer: COMMERCIAL

## 2024-06-06 ENCOUNTER — OFFICE VISIT (OUTPATIENT)
Dept: OBSTETRICS AND GYNECOLOGY | Facility: CLINIC | Age: 39
End: 2024-06-06
Payer: COMMERCIAL

## 2024-06-06 VITALS
WEIGHT: 225.06 LBS | HEIGHT: 61 IN | SYSTOLIC BLOOD PRESSURE: 114 MMHG | BODY MASS INDEX: 42.49 KG/M2 | DIASTOLIC BLOOD PRESSURE: 86 MMHG

## 2024-06-06 DIAGNOSIS — Z11.3 SCREEN FOR STD (SEXUALLY TRANSMITTED DISEASE): ICD-10-CM

## 2024-06-06 DIAGNOSIS — Z01.419 ENCOUNTER FOR ANNUAL ROUTINE GYNECOLOGICAL EXAMINATION: Primary | ICD-10-CM

## 2024-06-06 DIAGNOSIS — Z12.31 BREAST CANCER SCREENING BY MAMMOGRAM: ICD-10-CM

## 2024-06-06 DIAGNOSIS — N64.52 NIPPLE DISCHARGE: ICD-10-CM

## 2024-06-06 PROBLEM — C82.31: Status: ACTIVE | Noted: 2022-02-11

## 2024-06-06 PROBLEM — C82.90 FOLLICULAR LYMPHOMA: Status: ACTIVE | Noted: 2022-01-25

## 2024-06-06 LAB
HCV AB SERPL QL IA: NEGATIVE
HIV 1+2 AB+HIV1 P24 AG SERPL QL IA: NEGATIVE
TREPONEMA PALLIDUM IGG+IGM AB [PRESENCE] IN SERUM OR PLASMA BY IMMUNOASSAY: NONREACTIVE

## 2024-06-06 PROCEDURE — 99999 PR PBB SHADOW E&M-EST. PATIENT-LVL IV: CPT | Mod: PBBFAC,,, | Performed by: OBSTETRICS & GYNECOLOGY

## 2024-06-06 PROCEDURE — 86593 SYPHILIS TEST NON-TREP QUANT: CPT | Performed by: OBSTETRICS & GYNECOLOGY

## 2024-06-06 PROCEDURE — 1159F MED LIST DOCD IN RCRD: CPT | Mod: CPTII,S$GLB,, | Performed by: OBSTETRICS & GYNECOLOGY

## 2024-06-06 PROCEDURE — 87389 HIV-1 AG W/HIV-1&-2 AB AG IA: CPT | Performed by: OBSTETRICS & GYNECOLOGY

## 2024-06-06 PROCEDURE — 3008F BODY MASS INDEX DOCD: CPT | Mod: CPTII,S$GLB,, | Performed by: OBSTETRICS & GYNECOLOGY

## 2024-06-06 PROCEDURE — 87591 N.GONORRHOEAE DNA AMP PROB: CPT | Performed by: OBSTETRICS & GYNECOLOGY

## 2024-06-06 PROCEDURE — 3079F DIAST BP 80-89 MM HG: CPT | Mod: CPTII,S$GLB,, | Performed by: OBSTETRICS & GYNECOLOGY

## 2024-06-06 PROCEDURE — 99395 PREV VISIT EST AGE 18-39: CPT | Mod: S$GLB,,, | Performed by: OBSTETRICS & GYNECOLOGY

## 2024-06-06 PROCEDURE — 3044F HG A1C LEVEL LT 7.0%: CPT | Mod: CPTII,S$GLB,, | Performed by: OBSTETRICS & GYNECOLOGY

## 2024-06-06 PROCEDURE — 86803 HEPATITIS C AB TEST: CPT | Performed by: OBSTETRICS & GYNECOLOGY

## 2024-06-06 PROCEDURE — 36415 COLL VENOUS BLD VENIPUNCTURE: CPT | Performed by: OBSTETRICS & GYNECOLOGY

## 2024-06-06 PROCEDURE — 3074F SYST BP LT 130 MM HG: CPT | Mod: CPTII,S$GLB,, | Performed by: OBSTETRICS & GYNECOLOGY

## 2024-06-06 PROCEDURE — 1160F RVW MEDS BY RX/DR IN RCRD: CPT | Mod: CPTII,S$GLB,, | Performed by: OBSTETRICS & GYNECOLOGY

## 2024-06-06 NOTE — PROGRESS NOTES
Chief Complaint: Well Woman Exam     HPI:      Jerry Coronado is a 38 y.o.  who presents for annual exam. She is currently complaining of  irregular menses since chemotherapy.  Denies VMS, vaginal dryness.    Also notes of 4 months of green/yellow nipple discharge of left breast and left breast itching.     Ms. Coronado is not currently sexually active. She would like STD screening today. Patient does not have regular monthly menses. Patient's last menstrual period was 2024 (exact date). She is currently using no method for contraception.    Previous Pap:  no abnormalities (2021)  Previous Mammogram:   Results for orders placed during the hospital encounter of 22    Mammo Digital Screening Bilat w/ Mohit    Narrative  EXAMINATION:  MAMMO DIGITAL SCREENING BILAT WITH MOHIT    CLINICAL HISTORY:  Z12.31, Encounter for screening mammogram for malignant neoplasm of breast    TECHNIQUE:  CMS MANDATED QUALITY DATA - MAMMOGRAPHY - 225    [facility] utilizes a reminder system to ensure that patients receive reminder letters for appointments. This includes reminders for routine screening mammograms, diagnostic mammograms, or other breast imaging interventions as appropriate. This patient will be placed in the appropriate reminder system.    FINDINGS:  No prior studies for comparison.  The breasts are composed of scattered fibroglandular densities, with no suspicious calcifications or masses detected.  The interpretation was assisted by Computer Aided Detection with [employer] Search Image[employer]cker.    Impression  Negative mammogram.  Annual screening mammography is recommended beginning at age 40, per ACR guidelines, or sooner if clinically indicated.    BI-RADS CATEGORY 1: NEGATIVE.    Technologist: NANCY Donahue lifetime risk calculated at 16.13 %.    Women with a greater than 20% lifetime risk for breast cancer may benefit from supplemental screening including annual breast magnetic resonance  imaging (MRI), in addition to annual mammography.      Electronically signed by: Jim Madera MD  Date:    09/02/2022  Time:    15:04     Most Recent Dexa: not indicated  Colonoscopy: has not had    COVID vaccine: completed series  Gardasil:Completed     Patient Active Problem List   Diagnosis    Insomnia secondary to depression with anxiety    Low back pain    Acne    Situational stress    Breast hypertrophy    Hypothyroidism    Allergic rhinitis    Chronic infection of sinus    Hypertrophy of nasal turbinates    Deviated nasal septum    Eustachian tube dysfunction    Acquired atrophy of thyroid    Alcohol abuse    Alcohol use with alcohol-induced mood disorder    Atypical squamous cells of undetermined significance (ASCUS) on Papanicolaou smear of cervix    Carpal tunnel syndrome    Cervical intraepithelial neoplasia grade 2    Overdose    Jaw pain    Moderate recurrent major depression    Obstructive sleep apnea syndrome    Other depressive episodes    Personal history of other infectious and parasitic diseases    Postherpetic neuralgia    Shock    Supraventricular tachycardia    Tobacco user    Vaginal high risk human papillomavirus (HPV) DNA test positive    Low grade squamous intraepithelial dysplasia    Atypical glandular cells of undetermined significance (LEE) on cervical Pap smear    Cervical dysplasia    Abnormal uterine bleeding (AUB)    Arcuate uterus    Supraclavicular lymphadenopathy    Axillary lymphadenopathy    Follicular lymphoma    Encounter for removal of tunneled central venous catheter (CVC) with port    Decreased range of motion (ROM) of right knee    S/P arthroscopy of right knee    Decreased ROM of right knee    Decreased strength of lower extremity    Gait abnormality    Other hyperlipidemia    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       Past Medical History:   Diagnosis Date    Allergic rhinitis     Anxiety     Cancer 12/01/2021    B-cell folicular lymphoma    Depression      Hypothyroidism     SVT (supraventricular tachycardia) 2011       Past Surgical History:   Procedure Laterality Date    ABLATION OF DYSRHYTHMIC FOCUS  2016    x2    BIOPSY OF CERVICAL LYMPH NODE Left 11/29/2021    Procedure: BIOPSY, LYMPH NODE, CERVICAL;  Surgeon: Blanco Kerns MD;  Location: [facility] OR;  Service: General;  Laterality: Left;    CERVIX LESION DESTRUCTION  11/2021    CONIZATION OF CERVIX USING LOOP ELECTROSURGICAL EXCISION PROCEDURE (LEEP) N/A 07/21/2021    Procedure: LEEP CONIZATION, CERVIX;  Surgeon: Donna Castillo MD;  Location: [facility] OR;  Service: OB/GYN;  Laterality: N/A;    EXCISION OF LESION OF NOSE N/A 10/14/2019    Procedure: EXCISION, LESION, NOSE;  Surgeon: Umesh Morales MD;  Location: [facility] OR;  Service: ENT;  Laterality: N/A;    FUNCTIONAL ENDOSCOPIC SINUS SURGERY (FESS) N/A 10/14/2019    Procedure: FESS (FUNCTIONAL ENDOSCOPIC SINUS SURGERY);  Surgeon: Umesh Morales MD;  Location: [facility] OR;  Service: ENT;  Laterality: N/A;    HYSTEROSCOPY WITH DILATION AND CURETTAGE OF UTERUS N/A 07/21/2021    Procedure: HYSTEROSCOPY, WITH DILATION AND CURETTAGE OF UTERUS;  Surgeon: Donna Castillo MD;  Location: [facility] OR;  Service: OB/GYN;  Laterality: N/A;    KNEE ARTHROSCOPY W/ MENISCECTOMY Right 02/07/2024    Procedure: ARTHROSCOPY, KNEE, WITH MENISCECTOMY;  Surgeon: Matthew Elliott MD;  Location: [facility] OR;  Service: Orthopedics;  Laterality: Right;  partial medical meniscectomy and plica resection    MEDIPORT REMOVAL Right 06/20/2022    Procedure: REMOVAL, CATHETER, CENTRAL VENOUS, TUNNELED, WITH PORT;  Surgeon: Blanco Kerns MD;  Location: [facility] OR;  Service: General;  Laterality: Right;    NASAL SEPTOPLASTY N/A 10/14/2019    Procedure: SEPTOPLASTY, NOSE;  Surgeon: Umesh Morales MD;  Location: [facility] OR;  Service: ENT;  Laterality: N/A;    NASAL SINUS SURGERY  10/14/2019    OSTEOTOMY OF METATARSAL BONE Left 12/21/2022    Procedure: OSTEOTOMY, METATARSAL BONE;  Surgeon: Hola Escalante DPM;   "Location: [facility] OR;  Service: Podiatry;  Laterality: Left;  synthes janes notified  TW  , RELEASE ENTRAPPED NERVE 2ND INTERMETATARSAL SPACE    PILONIDAL CYST DRAINAGE      REVISION OF SCAR  2022    Procedure: REVISION, SCAR;  Surgeon: Blanco Kerns MD;  Location: [facility] OR;  Service: General;;    SINUS SURGERY  2023    SKIN TAG REMOVAL Left 2022    Procedure: REMOVAL, SKIN TAG;  Surgeon: Blanco Kerns MD;  Location: [facility] OR;  Service: General;  Laterality: Left;    TONSILLECTOMY      TYMPANOPLASTY N/A 10/14/2019    Procedure: TYMPANOPLASTY;  Surgeon: Umesh Morales MD;  Location: [facility] OR;  Service: ENT;  Laterality: N/A;    TYMPANOTOMY Left 10/14/2019    left       OB History          1    Para   0    Term   0            AB   1    Living             SAB        IAB   1    Ectopic        Multiple        Live Births               Obstetric Comments   Menarche age 12.   History of abnormal PAP smear: YES  History of sexually transmitted disease:  YES: HPV.                        ROS:     Review of Systems   Constitutional:  Negative for activity change, fatigue and unexpected weight change.   Respiratory:  Negative for shortness of breath.    Cardiovascular:  Negative for chest pain.   Gastrointestinal:  Negative for abdominal pain, blood in stool, constipation and diarrhea.   Endocrine: Negative for cold intolerance and heat intolerance.   Genitourinary:  Negative for bladder incontinence, dyspareunia, dysuria, frequency, hot flashes, vaginal bleeding and vaginal dryness.   Integumentary:  Positive for breast discharge. Negative for breast mass and breast tenderness.   Psychiatric/Behavioral:  Negative for dysphoric mood. The patient is not nervous/anxious.    Breast: Negative for mass and tenderness      Physical Exam:      PHYSICAL EXAM:  /86   Ht 5' 1" (1.549 m)   Wt 102.1 kg (225 lb 1.4 oz)   LMP 2024 (Exact Date)   BMI 42.53 kg/m²   Body mass index is " 42.53 kg/m².     APPEARANCE: Well nourished, well developed, in no acute distress.  PSYCH: Appropriate mood and affect.  SKIN: No acne or hirsutism  NECK: Neck symmetric without masses or thyromegaly  NODES: No inguinal, axillary, or supraclavicular lymph node enlargement  CHEST: Normal respiratory effort.  ABDOMEN: Soft.  No tenderness or masses.   BREASTS: Symmetrical, no skin changes or visible lesions.  No palpable masses or nipple discharge bilaterally.  PELVIC: Normal external genitalia without lesions.  Normal hair distribution.  Adequate perineal body, normal urethral meatus.  Vagina moist and well rugated without lesions or discharge.  Cervix pink, without lesions, discharge or tenderness.  No significant cystocele or rectocele.  Bimanual exam shows uterus to be normal size, regular, mobile and nontender.  Adnexa without masses or tenderness.    EXTREMITIES: No edema.    Assessment:     1. Encounter for annual routine gynecological examination        2. Breast cancer screening by mammogram        3. Screen for STD (sexually transmitted disease)  C. trachomatis/N. gonorrhoeae by AMP DNA [facility]; Cervicovaginal    Hepatitis C Antibody    HIV 1/2 Ag/Ab (4th Gen)    Treponema Pallidium Antibodies IgG, IgM      4. Nipple discharge  Mammo Digital Diagnostic Bilat with Larry    US Breast Bilateral Limited            Plan:     Clinical breast exam performed.  Pap UTD.  Mammogram and ultrasound ordered for nipple discharge and breast itching.  DEXA at 64.  Colonoscopy at 44 or as needed.  Contraception: declines.  Follow up in about 1 year (around 6/6/2025) for annual well woman exam or as needed.    Counseling:     Patient was counseled today on current ASCCP pap guidelines, the recommendation for yearly pelvic exams, healthy diet and exercise routines, breast self awareness and annual mammograms. She is to see her PCP for other health maintenance.       Use of the [employer] Patient Portal discussed and encouraged  during today's visit.         Donna Castillo MD  [facility]stetrics [facility]  06/06/2024

## 2024-06-08 ENCOUNTER — PATIENT MESSAGE (OUTPATIENT)
Dept: HEMATOLOGY/ONCOLOGY | Facility: CLINIC | Age: 39
End: 2024-06-08
Payer: COMMERCIAL

## 2024-06-09 DIAGNOSIS — E44.1 MILD MALNUTRITION: Primary | ICD-10-CM

## 2024-06-09 RX ORDER — DRONABINOL 2.5 MG/1
2.5 CAPSULE ORAL
Qty: 60 CAPSULE | Refills: 1 | Status: SHIPPED | OUTPATIENT
Start: 2024-06-09

## 2024-06-11 LAB
C TRACH DNA SPEC QL NAA+PROBE: NOT DETECTED
N GONORRHOEA DNA SPEC QL NAA+PROBE: NOT DETECTED

## 2024-06-12 ENCOUNTER — PATIENT MESSAGE (OUTPATIENT)
Dept: PODIATRY | Facility: CLINIC | Age: 39
End: 2024-06-12
Payer: COMMERCIAL

## 2024-06-12 DIAGNOSIS — M79.672 LEFT FOOT PAIN: Primary | ICD-10-CM

## 2024-06-14 RX ORDER — METHYLPREDNISOLONE 4 MG/1
TABLET ORAL
Qty: 21 EACH | Refills: 0 | Status: SHIPPED | OUTPATIENT
Start: 2024-06-14 | End: 2024-07-05

## 2024-07-19 ENCOUNTER — OFFICE VISIT (OUTPATIENT)
Dept: URGENT CARE | Facility: CLINIC | Age: 39
End: 2024-07-19
Payer: COMMERCIAL

## 2024-07-19 VITALS
WEIGHT: 225 LBS | BODY MASS INDEX: 42.48 KG/M2 | DIASTOLIC BLOOD PRESSURE: 86 MMHG | RESPIRATION RATE: 18 BRPM | OXYGEN SATURATION: 98 % | HEIGHT: 61 IN | HEART RATE: 96 BPM | SYSTOLIC BLOOD PRESSURE: 119 MMHG | TEMPERATURE: 99 F

## 2024-07-19 DIAGNOSIS — R52 GENERALIZED BODY ACHES: ICD-10-CM

## 2024-07-19 DIAGNOSIS — U07.1 COVID-19 VIRUS INFECTION: Primary | ICD-10-CM

## 2024-07-19 DIAGNOSIS — R05.1 ACUTE COUGH: ICD-10-CM

## 2024-07-19 DIAGNOSIS — R09.81 NASAL CONGESTION: ICD-10-CM

## 2024-07-19 LAB
CTP QC/QA: YES
CTP QC/QA: YES
FLUAV AG NPH QL: NEGATIVE
FLUBV AG NPH QL: NEGATIVE
SARS-COV-2 AG RESP QL IA.RAPID: POSITIVE

## 2024-07-19 PROCEDURE — 99204 OFFICE O/P NEW MOD 45 MIN: CPT | Mod: 25,S$GLB,,

## 2024-07-19 PROCEDURE — 87811 SARS-COV-2 COVID19 W/OPTIC: CPT | Mod: QW,S$GLB,,

## 2024-07-19 PROCEDURE — 87804 INFLUENZA ASSAY W/OPTIC: CPT | Mod: QW,,,

## 2024-07-19 RX ORDER — FLUTICASONE PROPIONATE 50 MCG
1 SPRAY, SUSPENSION (ML) NASAL DAILY
Qty: 15.8 ML | Refills: 0 | Status: SHIPPED | OUTPATIENT
Start: 2024-07-19

## 2024-07-19 RX ORDER — CETIRIZINE HYDROCHLORIDE 10 MG/1
10 TABLET ORAL DAILY
Qty: 30 TABLET | Refills: 0 | Status: SHIPPED | OUTPATIENT
Start: 2024-07-19

## 2024-07-19 NOTE — PROGRESS NOTES
"Subjective:      Patient ID: Jerry Coronado is a 39 y.o. female.    Vitals:  height is 5' 1" (1.549 m) and weight is 102.1 kg (225 lb). Her oral temperature is 98.5 °F (36.9 °C). Her blood pressure is 119/86 and her pulse is 96. Her respiration is 18 and oxygen saturation is 98%.     Chief Complaint: Nasal Congestion    Pt is present in office due to nasal congestion, scratchy throat, and fever x 2 days        Constitution: Positive for chills, fatigue and fever.   HENT:  Positive for congestion, postnasal drip and sore throat.    Neck: neck negative.   Cardiovascular: Negative.    Respiratory:  Positive for cough. Negative for sputum production, shortness of breath and wheezing.    Gastrointestinal: Negative.    Musculoskeletal: Negative.    Neurological:  Positive for headaches.   Psychiatric/Behavioral: Negative.        Objective:     Physical Exam   Constitutional: She is oriented to person, place, and time. She appears well-developed. She is cooperative.  Non-toxic appearance. She does not appear ill. No distress.   HENT:   Head: Normocephalic and atraumatic.   Ears:   Right Ear: Hearing and external ear normal.   Left Ear: Hearing and external ear normal.   Nose: Rhinorrhea and congestion present. No mucosal edema or nasal deformity. No epistaxis. Right sinus exhibits no maxillary sinus tenderness and no frontal sinus tenderness. Left sinus exhibits no maxillary sinus tenderness and no frontal sinus tenderness.   Mouth/Throat: Uvula is midline and mucous membranes are normal. Mucous membranes are moist. No trismus in the jaw. Normal dentition. No uvula swelling. Posterior oropharyngeal erythema present. No oropharyngeal exudate or posterior oropharyngeal edema.   Eyes: Conjunctivae and lids are normal. No scleral icterus.   Neck: Trachea normal and phonation normal. Neck supple. No edema present. No erythema present. No neck rigidity present.   Cardiovascular: Normal rate, regular rhythm and normal heart sounds. "   Pulmonary/Chest: Effort normal and breath sounds normal. No respiratory distress. She has no decreased breath sounds. She has no wheezes. She has no rhonchi. She has no rales.   Abdominal: Normal appearance.   Musculoskeletal: Normal range of motion.         General: No deformity. Normal range of motion.   Neurological: She is alert and oriented to person, place, and time. She displays weakness. She exhibits normal muscle tone.   Skin: Skin is warm, dry, intact, not diaphoretic and not pale.   Psychiatric: Her speech is normal and behavior is normal. Mood, judgment and thought content normal.   Nursing note and vitals reviewed.      Assessment:     1. COVID-19 virus infection    2. Nasal congestion    3. Acute cough    4. Generalized body aches        Plan:       COVID-19 virus infection    Nasal congestion  -     SARS Coronavirus 2 Antigen, POCT Manual Read  -     POCT Influenza A/B Rapid Antigen  -     pyrilamine-phenylephrine-DM 12.5-5-7.5 mg/5 mL Liqd; Take 10 mLs by mouth every 6 (six) hours as needed (cough).  Dispense: 350 mL; Refill: 0  -     cetirizine (ZYRTEC) 10 MG tablet; Take 1 tablet (10 mg total) by mouth once daily.  Dispense: 30 tablet; Refill: 0  -     fluticasone propionate (FLONASE) 50 mcg/actuation nasal spray; 1 spray (50 mcg total) by Each Nostril route once daily.  Dispense: 15.8 mL; Refill: 0    Acute cough  -     pyrilamine-phenylephrine-DM 12.5-5-7.5 mg/5 mL Liqd; Take 10 mLs by mouth every 6 (six) hours as needed (cough).  Dispense: 350 mL; Refill: 0    Generalized body aches      COVID: positive  FLU:neg    Discussed medication with patient who acknowledges understanding and is agreeable to POC. Follow up with primary care. Increase fluid intake. Red flags for ER discussed.

## 2024-07-19 NOTE — LETTER
July 19, 2024      [facility]  [address]  [facility] [address]  Phone: [phone]       Patient: Jerry Coronado   YOB: 1985  Date of Visit: 07/19/2024    To Whom It May Concern:    Latisha Coronado  was at [facility] on 07/19/2024. The patient may return to work on 07/23/2024 with no restrictions. If you have any questions or concerns, or if I can be of further assistance, please do not hesitate to contact me.    Sincerely,    Eladia Lozano NP

## 2024-08-06 ENCOUNTER — PATIENT MESSAGE (OUTPATIENT)
Dept: FAMILY MEDICINE | Facility: CLINIC | Age: 39
End: 2024-08-06
Payer: COMMERCIAL

## 2024-08-10 DIAGNOSIS — R09.81 NASAL CONGESTION: ICD-10-CM

## 2024-08-16 ENCOUNTER — PATIENT MESSAGE (OUTPATIENT)
Dept: FAMILY MEDICINE | Facility: CLINIC | Age: 39
End: 2024-08-16
Payer: COMMERCIAL

## 2024-08-16 ENCOUNTER — PATIENT MESSAGE (OUTPATIENT)
Dept: ORTHOPEDICS | Facility: CLINIC | Age: 39
End: 2024-08-16
Payer: COMMERCIAL

## 2024-08-29 ENCOUNTER — OFFICE VISIT (OUTPATIENT)
Dept: FAMILY MEDICINE | Facility: CLINIC | Age: 39
End: 2024-08-29
Payer: COMMERCIAL

## 2024-08-29 DIAGNOSIS — Z91.09 ENVIRONMENTAL ALLERGIES: ICD-10-CM

## 2024-08-29 DIAGNOSIS — F41.9 ANXIETY DISORDER, UNSPECIFIED TYPE: Primary | ICD-10-CM

## 2024-08-29 PROCEDURE — 99213 OFFICE O/P EST LOW 20 MIN: CPT | Mod: 95,,, | Performed by: NURSE PRACTITIONER

## 2024-08-29 PROCEDURE — 1159F MED LIST DOCD IN RCRD: CPT | Mod: CPTII,95,, | Performed by: NURSE PRACTITIONER

## 2024-08-29 PROCEDURE — 3044F HG A1C LEVEL LT 7.0%: CPT | Mod: CPTII,95,, | Performed by: NURSE PRACTITIONER

## 2024-08-29 PROCEDURE — 1160F RVW MEDS BY RX/DR IN RCRD: CPT | Mod: CPTII,95,, | Performed by: NURSE PRACTITIONER

## 2024-08-29 RX ORDER — BUSPIRONE HYDROCHLORIDE 15 MG/1
15 TABLET ORAL 2 TIMES DAILY
Qty: 60 TABLET | Refills: 9 | Status: SHIPPED | OUTPATIENT
Start: 2024-08-29

## 2024-08-29 RX ORDER — CETIRIZINE HYDROCHLORIDE 10 MG/1
10 TABLET ORAL NIGHTLY
Qty: 90 TABLET | Refills: 2 | Status: SHIPPED | OUTPATIENT
Start: 2024-08-29

## 2024-08-29 RX ORDER — CETIRIZINE HYDROCHLORIDE 10 MG/1
10 TABLET ORAL
Qty: 90 TABLET | Refills: 1 | OUTPATIENT
Start: 2024-08-29

## 2024-08-29 RX ORDER — BUSPIRONE HYDROCHLORIDE 15 MG/1
15 TABLET ORAL 2 TIMES DAILY
Status: CANCELLED | OUTPATIENT
Start: 2024-08-29

## 2024-08-29 NOTE — PROGRESS NOTES
Subjective:        The chief complaint leading to consultation is: med refill, anxiety   The patient location is:  Home  Visit type: Virtual visit with synchronous audio/video or audio only  This was a video visit in lieu of in-person visit due to the coronavirus emergency. Patient acknowledged and consented to the video visit encounter.     Jerry is here for medication refills of BuSpar today.  Patient has been prescribed BuSpar 15 mg b.i.d. previously for anxiety which was very helpful to her.  She had an old prescription with refills and has been taking for few months.  Patient states her anxiety is significantly reduced and she denies any medication side effects, would like to continue medication.  Does take Xanax p.r.n. from her oncologist but this is not a regular scheduled medication.  Denies depression or suicidal thoughts.    Also would like refills of Zyrtec today for her allergies.  She received a prescription from urgent Care which was much less expensive and patient would like to have this refilled.        Past Surgical History:   Procedure Laterality Date    ABLATION OF DYSRHYTHMIC FOCUS  2016    x2    BIOPSY OF CERVICAL LYMPH NODE Left 11/29/2021    Procedure: BIOPSY, LYMPH NODE, CERVICAL;  Surgeon: Blanco Kerns MD;  Location: [facility] OR;  Service: General;  Laterality: Left;    CERVIX LESION DESTRUCTION  11/2021    CONIZATION OF CERVIX USING LOOP ELECTROSURGICAL EXCISION PROCEDURE (LEEP) N/A 07/21/2021    Procedure: LEEP CONIZATION, CERVIX;  Surgeon: Donna Castillo MD;  Location: [facility] OR;  Service: OB/GYN;  Laterality: N/A;    EXCISION OF LESION OF NOSE N/A 10/14/2019    Procedure: EXCISION, LESION, NOSE;  Surgeon: Umesh Morales MD;  Location: [facility] OR;  Service: ENT;  Laterality: N/A;    FUNCTIONAL ENDOSCOPIC SINUS SURGERY (FESS) N/A 10/14/2019    Procedure: FESS (FUNCTIONAL ENDOSCOPIC SINUS SURGERY);  Surgeon: Umesh Morales MD;  Location: [facility] OR;  Service: ENT;  Laterality: N/A;     HYSTEROSCOPY WITH DILATION AND CURETTAGE OF UTERUS N/A 07/21/2021    Procedure: HYSTEROSCOPY, WITH DILATION AND CURETTAGE OF UTERUS;  Surgeon: Donna Castillo MD;  Location: [facility];  Service: OB/GYN;  Laterality: N/A;    KNEE ARTHROSCOPY W/ MENISCECTOMY Right 02/07/2024    Procedure: ARTHROSCOPY, KNEE, WITH MENISCECTOMY;  Surgeon: Matthew Elliott MD;  Location: [facility];  Service: Orthopedics;  Laterality: Right;  partial medical meniscectomy and plica resection    MEDIPORT REMOVAL Right 06/20/2022    Procedure: REMOVAL, CATHETER, CENTRAL VENOUS, TUNNELED, WITH PORT;  Surgeon: Blanco Kerns MD;  Location: [facility];  Service: General;  Laterality: Right;    NASAL SEPTOPLASTY N/A 10/14/2019    Procedure: SEPTOPLASTY, NOSE;  Surgeon: Umesh Morales MD;  Location: [facility];  Service: ENT;  Laterality: N/A;    NASAL SINUS SURGERY  10/14/2019    OSTEOTOMY OF METATARSAL BONE Left 12/21/2022    Procedure: OSTEOTOMY, METATARSAL BONE;  Surgeon: Hola Escalante DPM;  Location: [facility];  Service: Podiatry;  Laterality: Left;  ImmusanT notified 12/19 TW  , RELEASE ENTRAPPED NERVE 2ND INTERMETATARSAL SPACE    PILONIDAL CYST DRAINAGE      REVISION OF SCAR  06/20/2022    Procedure: REVISION, SCAR;  Surgeon: Blanco Kerns MD;  Location: [facility];  Service: General;;    SINUS SURGERY  11/30/2023    SKIN TAG REMOVAL Left 06/20/2022    Procedure: REMOVAL, SKIN TAG;  Surgeon: Blanco Kerns MD;  Location: [facility] OR;  Service: General;  Laterality: Left;    TONSILLECTOMY      TYMPANOPLASTY N/A 10/14/2019    Procedure: TYMPANOPLASTY;  Surgeon: Umesh Morales MD;  Location: [facility];  Service: ENT;  Laterality: N/A;    TYMPANOTOMY Left 10/14/2019    left     Past Medical History:   Diagnosis Date    Allergic rhinitis     Anxiety     Cancer 12/01/2021    B-cell folicular lymphoma    Depression     Hypothyroidism     SVT (supraventricular tachycardia) 2011     Family History   Problem Relation Name Age of Onset    Cataracts  Mother Nadira Hernandez     Glaucoma Mother Nadira Hernandez     Hypertension Mother Nadira Hernandez     Heart disease Father Dirk hernandez 59        CABG    Heart failure Father Dirk hernandez     Breast cancer Maternal Aunt  55    Colon cancer Neg Hx      Ovarian cancer Neg Hx      Macular degeneration Neg Hx      Retinal detachment Neg Hx          Social History:   Marital Status: Single  Alcohol History:  reports current alcohol use of about 5.0 standard drinks of alcohol per week.  Tobacco History:  reports that she quit smoking about 15 years ago. Her smoking use included cigarettes. She started smoking about 24 years ago. She has a 4.5 pack-year smoking history. She has never used smokeless tobacco.  Drug History:  reports no history of drug use.    Review of patient's allergies indicates:  No Known Allergies    Current Outpatient Medications   Medication Sig Dispense Refill    albuterol (VENTOLIN HFA) 90 mcg/actuation inhaler Inhale 2 puffs into the lungs every 6 (six) hours as needed for Wheezing. Rescue 18 g 0    busPIRone (BUSPAR) 15 MG tablet Take 1 tablet (15 mg total) by mouth 2 (two) times daily. 60 tablet 9    cetirizine (ZYRTEC) 10 MG tablet Take 1 tablet (10 mg total) by mouth every evening. 90 tablet 2    fluticasone propionate (FLONASE) 50 mcg/actuation nasal spray SPRAY 2 SPRAYS BY EACH NOSTRIL ROUTE ONCE DAILY. 48 mL 1    fluticasone propionate (FLONASE) 50 mcg/actuation nasal spray 1 spray (50 mcg total) by Each Nostril route once daily. 15.8 mL 0    gabapentin (NEURONTIN) 600 MG tablet Take 600 mg by mouth 3 (three) times daily as needed.      ibuprofen (ADVIL,MOTRIN) 800 MG tablet Take 1 tablet (800 mg total) by mouth every 8 (eight) hours as needed for Pain. 60 tablet 0    levothyroxine (SYNTHROID) 50 MCG tablet Take 1 tablet (50 mcg total) by mouth before breakfast. 90 tablet 3    multivitamin capsule Take 1 capsule by mouth.      promethazine-dextromethorphan (PROMETHAZINE-DM) 6.25-15 mg/5 mL Syrp  Take 5 mLs by mouth nightly as needed (cough). 118 mL 0     No current facility-administered medications for this visit.       Review of Systems   Constitutional:  Negative for activity change, chills and fever.   HENT:  Negative for congestion, hearing loss, rhinorrhea, sinus pain, sore throat and trouble swallowing.    Eyes:  Negative for discharge, redness and visual disturbance.   Respiratory:  Negative for chest tightness and wheezing.    Cardiovascular:  Negative for chest pain and palpitations.   Gastrointestinal:  Negative for abdominal pain, constipation, diarrhea and vomiting.   Genitourinary:  Negative for difficulty urinating and hematuria.   Allergic/Immunologic: Positive for environmental allergies.   Neurological:  Negative for headaches.   Hematological:  Negative for adenopathy.   Psychiatric/Behavioral:  Negative for dysphoric mood, sleep disturbance and suicidal ideas. The patient is nervous/anxious.          Objective:        Physical Exam:   Physical Exam  Constitutional:       General: She is not in acute distress.     Appearance: Normal appearance.   Pulmonary:      Effort: Pulmonary effort is normal.   Skin:     Coloration: Skin is not jaundiced or pale.   Neurological:      Mental Status: She is alert and oriented to person, place, and time.   Psychiatric:         Mood and Affect: Mood normal.         Behavior: Behavior normal.              Assessment:       1. Anxiety disorder, unspecified type    2. Environmental allergies      Plan:   Anxiety disorder, unspecified type  -     busPIRone (BUSPAR) 15 MG tablet; Take 1 tablet (15 mg total) by mouth 2 (two) times daily.  Dispense: 60 tablet; Refill: 9  -she may continue taking BuSpar 15 mg b.i.d., contact clinic for any ineffectiveness or problems with the medication    Environmental allergies  -     cetirizine (ZYRTEC) 10 MG tablet; Take 1 tablet (10 mg total) by mouth every evening.  Dispense: 90 tablet; Refill: 2   -Zyrtec refilled for  allergies, well controlled    Follow up if symptoms worsen or fail to improve.    Total time spent with patient: 13 minutes     Each patient to whom he or she provides medical services by telemedicine is:  (1) informed of the relationship between the physician and patient and the respective role of any other health care provider with respect to management of the patient; and (2) notified that he or she may decline to receive medical services by telemedicine and may withdraw from such care at any time.

## 2024-09-09 ENCOUNTER — OFFICE VISIT (OUTPATIENT)
Dept: URGENT CARE | Facility: CLINIC | Age: 39
End: 2024-09-09
Payer: COMMERCIAL

## 2024-09-09 VITALS
DIASTOLIC BLOOD PRESSURE: 80 MMHG | SYSTOLIC BLOOD PRESSURE: 140 MMHG | HEIGHT: 61 IN | RESPIRATION RATE: 20 BRPM | HEART RATE: 90 BPM | OXYGEN SATURATION: 99 % | WEIGHT: 220 LBS | BODY MASS INDEX: 41.54 KG/M2 | TEMPERATURE: 99 F

## 2024-09-09 DIAGNOSIS — R05.9 COUGH, UNSPECIFIED TYPE: ICD-10-CM

## 2024-09-09 DIAGNOSIS — J01.40 SUBACUTE PANSINUSITIS: Primary | ICD-10-CM

## 2024-09-09 LAB
CTP QC/QA: YES
CTP QC/QA: YES
FLUAV AG NPH QL: NEGATIVE
FLUBV AG NPH QL: NEGATIVE
SARS-COV-2 AG RESP QL IA.RAPID: NEGATIVE

## 2024-09-09 PROCEDURE — 87804 INFLUENZA ASSAY W/OPTIC: CPT | Mod: QW,,,

## 2024-09-09 PROCEDURE — 99214 OFFICE O/P EST MOD 30 MIN: CPT | Mod: 25,S$GLB,,

## 2024-09-09 PROCEDURE — 87811 SARS-COV-2 COVID19 W/OPTIC: CPT | Mod: QW,S$GLB,,

## 2024-09-09 RX ORDER — AMOXICILLIN 875 MG/1
1 TABLET, FILM COATED ORAL 2 TIMES DAILY
COMMUNITY
Start: 2024-09-05

## 2024-09-09 RX ORDER — PROMETHAZINE HYDROCHLORIDE AND DEXTROMETHORPHAN HYDROBROMIDE 6.25; 15 MG/5ML; MG/5ML
5 SYRUP ORAL NIGHTLY PRN
Qty: 50 ML | Refills: 0 | Status: SHIPPED | OUTPATIENT
Start: 2024-09-09 | End: 2024-09-19

## 2024-09-09 RX ORDER — PREDNISONE 20 MG/1
20 TABLET ORAL 2 TIMES DAILY
Qty: 10 TABLET | Refills: 0 | Status: SHIPPED | OUTPATIENT
Start: 2024-09-09 | End: 2024-09-14

## 2024-09-09 RX ORDER — BUTALBITAL, ACETAMINOPHEN AND CAFFEINE 50; 325; 40 MG/1; MG/1; MG/1
1 CAPSULE ORAL EVERY 4 HOURS PRN
COMMUNITY
Start: 2024-09-05

## 2024-09-09 RX ORDER — BENZONATATE 100 MG/1
100 CAPSULE ORAL 3 TIMES DAILY PRN
Qty: 30 CAPSULE | Refills: 0 | Status: SHIPPED | OUTPATIENT
Start: 2024-09-09 | End: 2024-09-19

## 2024-09-09 RX ORDER — ALBUTEROL SULFATE 90 UG/1
2 INHALANT RESPIRATORY (INHALATION) EVERY 6 HOURS PRN
Qty: 6.7 G | Refills: 0 | Status: SHIPPED | OUTPATIENT
Start: 2024-09-09 | End: 2024-10-09

## 2024-09-09 RX ORDER — NAPROXEN 500 MG/1
1 TABLET ORAL 2 TIMES DAILY WITH MEALS
COMMUNITY
Start: 2024-09-05

## 2024-09-09 NOTE — PROGRESS NOTES
"Subjective:      Patient ID: Jerry Coronado is a 39 y.o. female.    Vitals:  height is 5' 1" (1.549 m) and weight is 99.8 kg (220 lb). Her oral temperature is 98.8 °F (37.1 °C). Her blood pressure is 140/80 (abnormal) and her pulse is 90. Her respiration is 20 and oxygen saturation is 99%.     Chief Complaint: Cough    Productive cough 2 days.  Denies history of allergies.  Previous smoking history.  OTC medications not effective.  Recently diagnosed with strep pharyngitis, and sinusitis.  Denies shortness or breath.  Patient was also respiratory therapist with ill contacts.  She denies fevers.  No shortness a breath.  No wheezing.    Cough  This is a new problem. The current episode started in the past 7 days. The problem has been unchanged. The cough is Productive of sputum. Associated symptoms include nasal congestion and postnasal drip. Pertinent negatives include no fever or shortness of breath. Nothing aggravates the symptoms. Risk factors for lung disease include occupational exposure. Treatments tried: Antibiotics. The treatment provided no relief.       Constitution: Negative for fever.   HENT:  Positive for postnasal drip.    Neck: neck negative.   Cardiovascular: Negative.    Eyes: Negative.    Respiratory:  Positive for cough. Negative for shortness of breath.    Gastrointestinal: Negative.    Endocrine: negative.   Genitourinary: Negative.    Musculoskeletal: Negative.    Skin: Negative.    Allergic/Immunologic: Positive for immunizations up-to-date. Negative for immunocompromised state.   Neurological: Negative.    Hematologic/Lymphatic: Negative.    Psychiatric/Behavioral: Negative.        Objective:     Physical Exam   Constitutional: She is oriented to person, place, and time. She is cooperative.   HENT:   Head: Normocephalic and atraumatic.   Ears:   Right Ear: External ear normal.   Left Ear: External ear normal.   Nose: Nose normal.   Mouth/Throat: Uvula is midline, oropharynx is clear and moist " and mucous membranes are normal. Mucous membranes are moist. Oropharynx is clear.   Eyes: Conjunctivae and lids are normal. Pupils are equal, round, and reactive to light. Extraocular movement intact   Neck: Trachea normal and phonation normal. Neck supple.   Cardiovascular: Normal rate, regular rhythm, normal heart sounds and normal pulses.   Pulmonary/Chest: Effort normal and breath sounds normal.   Abdominal: Normal appearance.   Musculoskeletal: Normal range of motion.         General: Normal range of motion.   Lymphadenopathy:     She has no cervical adenopathy.   Neurological: no focal deficit. She is alert, oriented to person, place, and time and at baseline. She has normal motor skills, normal sensation and intact cranial nerves (2-12). Gait and coordination normal. GCS eye subscore is 4. GCS verbal subscore is 5. GCS motor subscore is 6.   Skin: Skin is warm and dry. Capillary refill takes 2 to 3 seconds.   Psychiatric: She experiences Normal attention and Normal perception. Her speech is normal and behavior is normal. Mood, judgment and thought content normal.   Nursing note and vitals reviewed.      Assessment:     1. Subacute pansinusitis    2. Cough, unspecified type        Plan:       Subacute pansinusitis  -     benzonatate (TESSALON) 100 MG capsule; Take 1 capsule (100 mg total) by mouth 3 (three) times daily as needed for Cough.  Dispense: 30 capsule; Refill: 0  -     predniSONE (DELTASONE) 20 MG tablet; Take 1 tablet (20 mg total) by mouth 2 (two) times daily. for 5 days  Dispense: 10 tablet; Refill: 0  -     promethazine-dextromethorphan (PROMETHAZINE-DM) 6.25-15 mg/5 mL Syrp; Take 5 mLs by mouth nightly as needed (night time cough).  Dispense: 50 mL; Refill: 0  -     albuterol (PROVENTIL HFA) 90 mcg/actuation inhaler; Inhale 2 puffs into the lungs every 6 (six) hours as needed for Wheezing. Rescue  Dispense: 6.7 g; Refill: 0    Cough, unspecified type  -     SARS Coronavirus 2 Antigen, POCT  Manual Read  -     POCT Influenza A/B Rapid Antigen      Lungs clear to auscultation.  Viral testing negative.  Acute onset.  Recently seen emergency room for strep pharyngitis.  Incidental finding of sinusitis on CT.  She is on Amoxil currently.  Also is afebrile.  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

## 2024-09-21 DIAGNOSIS — J32.9 CHRONIC SINUSITIS, UNSPECIFIED LOCATION: ICD-10-CM

## 2024-09-22 RX ORDER — FLUTICASONE PROPIONATE 50 MCG
2 SPRAY, SUSPENSION (ML) NASAL
Qty: 48 ML | Refills: 1 | Status: SHIPPED | OUTPATIENT
Start: 2024-09-22

## 2024-10-08 ENCOUNTER — HOSPITAL ENCOUNTER (OUTPATIENT)
Dept: RADIOLOGY | Facility: HOSPITAL | Age: 39
Discharge: HOME | End: 2024-10-08
Attending: OBSTETRICS & GYNECOLOGY
Payer: COMMERCIAL

## 2024-10-08 DIAGNOSIS — N64.52 NIPPLE DISCHARGE: ICD-10-CM

## 2024-10-08 PROCEDURE — 77062 BREAST TOMOSYNTHESIS BI: CPT | Mod: TC,PO

## 2024-10-08 PROCEDURE — 76642 ULTRASOUND BREAST LIMITED: CPT | Mod: TC,PO,LT

## 2024-10-08 PROCEDURE — 77066 DX MAMMO INCL CAD BI: CPT | Mod: 26,,, | Performed by: RADIOLOGY

## 2024-10-08 PROCEDURE — 77062 BREAST TOMOSYNTHESIS BI: CPT | Mod: 26,,, | Performed by: RADIOLOGY

## 2024-10-08 PROCEDURE — 76642 ULTRASOUND BREAST LIMITED: CPT | Mod: 26,LT,, | Performed by: RADIOLOGY

## 2024-10-18 ENCOUNTER — PATIENT MESSAGE (OUTPATIENT)
Dept: FAMILY MEDICINE | Facility: CLINIC | Age: 39
End: 2024-10-18
Payer: COMMERCIAL

## 2024-10-31 DIAGNOSIS — R09.81 NASAL CONGESTION: ICD-10-CM

## 2024-11-06 ENCOUNTER — OFFICE VISIT (OUTPATIENT)
Dept: URGENT CARE | Facility: CLINIC | Age: 39
End: 2024-11-06
Payer: COMMERCIAL

## 2024-11-06 VITALS
OXYGEN SATURATION: 97 % | WEIGHT: 220 LBS | SYSTOLIC BLOOD PRESSURE: 119 MMHG | TEMPERATURE: 98 F | BODY MASS INDEX: 41.54 KG/M2 | HEART RATE: 84 BPM | DIASTOLIC BLOOD PRESSURE: 81 MMHG | RESPIRATION RATE: 18 BRPM | HEIGHT: 61 IN

## 2024-11-06 DIAGNOSIS — J02.9 SORE THROAT: ICD-10-CM

## 2024-11-06 DIAGNOSIS — Z20.822 COVID-19 VIRUS NOT DETECTED: ICD-10-CM

## 2024-11-06 DIAGNOSIS — J06.9 VIRAL URI WITH COUGH: Primary | ICD-10-CM

## 2024-11-06 LAB
CTP QC/QA: YES
FLUAV AG NPH QL: NEGATIVE
FLUBV AG NPH QL: NEGATIVE
S PYO RRNA THROAT QL PROBE: NEGATIVE
SARS-COV-2 AG RESP QL IA.RAPID: NEGATIVE

## 2024-11-06 PROCEDURE — 87880 STREP A ASSAY W/OPTIC: CPT | Mod: QW,,, | Performed by: NURSE PRACTITIONER

## 2024-11-06 PROCEDURE — 87811 SARS-COV-2 COVID19 W/OPTIC: CPT | Mod: QW,S$GLB,, | Performed by: NURSE PRACTITIONER

## 2024-11-06 PROCEDURE — 99214 OFFICE O/P EST MOD 30 MIN: CPT | Mod: S$GLB,,, | Performed by: NURSE PRACTITIONER

## 2024-11-06 PROCEDURE — 87804 INFLUENZA ASSAY W/OPTIC: CPT | Mod: QW,,, | Performed by: NURSE PRACTITIONER

## 2024-11-06 RX ORDER — CHLOPHEDIANOL HYDROCHLORIDE, PYRILAMINE MALEATE, PSEUDOEPHEDRINE HYDROCHLORIDE 12.5; 12.5; 3 MG/5ML; MG/5ML; MG/5ML
10 LIQUID ORAL EVERY 6 HOURS PRN
Qty: 180 ML | Refills: 0 | Status: SHIPPED | OUTPATIENT
Start: 2024-11-06

## 2024-11-06 RX ORDER — GUAIFENESIN 200 MG/1
400 TABLET ORAL EVERY 4 HOURS PRN
Qty: 30 TABLET | Refills: 0 | Status: SHIPPED | OUTPATIENT
Start: 2024-11-06 | End: 2024-11-13

## 2024-11-06 RX ORDER — AZELASTINE 1 MG/ML
1 SPRAY, METERED NASAL 2 TIMES DAILY PRN
Qty: 30 ML | Refills: 0 | Status: SHIPPED | OUTPATIENT
Start: 2024-11-06 | End: 2025-11-06

## 2024-11-06 NOTE — PROGRESS NOTES
"Subjective:      Patient ID: Jerry Coronado is a 39 y.o. female.    Vitals:  height is 5' 1" (1.549 m) and weight is 99.8 kg (220 lb). Her temperature is 98.1 °F (36.7 °C). Her blood pressure is 119/81 and her pulse is 84. Her respiration is 18 and oxygen saturation is 97%.     Chief Complaint: Sore Throat    39-year-old female seen today for sore throat, headaches, and sinus congestion.  Symptoms began 2 days ago and seemed to be gradually worsening.  She denies any fever    Sore Throat   This is a new problem. The current episode started in the past 7 days (x 2 days). The problem has been gradually worsening. Associated symptoms include congestion, coughing and headaches. Pertinent negatives include no ear pain (Pressure), shortness of breath, trouble swallowing (Painful) or vomiting.       Constitution: Negative for chills and fever.   HENT:  Positive for congestion and sore throat. Negative for ear pain (Pressure) and trouble swallowing (Painful).    Neck: Negative for painful lymph nodes.   Cardiovascular:  Negative for chest pain, palpitations and sob on exertion.   Respiratory:  Positive for cough. Negative for sputum production and shortness of breath.    Gastrointestinal:  Negative for nausea and vomiting.   Musculoskeletal:  Positive for muscle ache.   Skin:  Negative for rash.   Neurological:  Positive for headaches. Negative for dizziness, light-headedness, passing out, disorientation and altered mental status.   Hematologic/Lymphatic: Negative for swollen lymph nodes.   Psychiatric/Behavioral:  Negative for altered mental status, disorientation and confusion.       Objective:     Physical Exam   Constitutional: She is oriented to person, place, and time. She appears well-developed. She is cooperative.  Non-toxic appearance. She does not appear ill. No distress.   HENT:   Head: Normocephalic and atraumatic.   Ears:   Right Ear: Hearing and external ear normal.   Left Ear: Hearing and external ear normal. "   Nose: Rhinorrhea and congestion present. No mucosal edema or nasal deformity. No epistaxis. Right sinus exhibits no maxillary sinus tenderness and no frontal sinus tenderness. Left sinus exhibits no maxillary sinus tenderness and no frontal sinus tenderness.   Mouth/Throat: Uvula is midline, oropharynx is clear and moist and mucous membranes are normal. Mucous membranes are moist. No trismus in the jaw. Normal dentition. No uvula swelling. No oropharyngeal exudate, posterior oropharyngeal edema or posterior oropharyngeal erythema.   Eyes: Conjunctivae and lids are normal. No scleral icterus.   Neck: Trachea normal and phonation normal. Neck supple. No edema present. No erythema present. No neck rigidity present.   Cardiovascular: Normal rate, regular rhythm, normal heart sounds and normal pulses.   Pulmonary/Chest: Effort normal and breath sounds normal. No stridor. No respiratory distress. She has no decreased breath sounds.   Abdominal: Normal appearance.   Musculoskeletal: Normal range of motion.         General: No deformity. Normal range of motion.   Lymphadenopathy:     She has no cervical adenopathy.   Neurological: no focal deficit. She is alert and oriented to person, place, and time. She exhibits normal muscle tone. Coordination normal.   Skin: Skin is warm, dry, intact, not diaphoretic and not pale. Capillary refill takes 2 to 3 seconds.   Psychiatric: Her speech is normal and behavior is normal. Judgment and thought content normal.   Nursing note and vitals reviewed.      Assessment:     1. Viral URI with cough    2. Sore throat    3. COVID-19 virus not detected        Plan:       Viral URI with cough  -     azelastine (ASTELIN) 137 mcg (0.1 %) nasal spray; 1 spray (137 mcg total) by Nasal route 2 (two) times daily as needed for Rhinitis.  Dispense: 30 mL; Refill: 0  -     benzocaine-menthoL 6-10 mg lozenge; Take 1 lozenge by mouth every 2 (two) hours as needed (Sore Throat).  Dispense: 18 tablet;  Refill: 0  -     guaiFENesin 200 mg tablet; Take 2 tablets (400 mg total) by mouth every 4 (four) hours as needed for Congestion.  Dispense: 30 tablet; Refill: 0  -     pyrilamine-pseudoeph-chlophed (NINJACOF-D) 12.5-30-12.5 mg/5 mL Liqd; Take 10 mLs by mouth every 6 (six) hours as needed (cough/congestion).  Dispense: 180 mL; Refill: 0    Sore throat  -     POCT Influenza A/B Rapid Antigen  -     POCT rapid strep A  -     SARS Coronavirus 2 Antigen, POCT Manual Read  -     benzocaine-menthoL 6-10 mg lozenge; Take 1 lozenge by mouth every 2 (two) hours as needed (Sore Throat).  Dispense: 18 tablet; Refill: 0    COVID-19 virus not detected      Flu negative  Strep negative    The test results and physical exam findings were discussed with the patient and all questions answered. We discussed symptom monitoring, conservative care methods, medication use, and follow up orders. he verbalized understanding and agreement with the plan of care.

## 2024-11-06 NOTE — PATIENT INSTRUCTIONS
Increase clear fluid intake  Stop all current over the counter cough, cold, flu medicine  Tylenol/motrin otc for fever or pain  Use Astelin nasal spray and Ninja Cof D for sinus congestion and pressure.  Take Mucinex   as needed for chest congestion and coughing. Take mucinex with a full glass of water at each dose  Rest until better  Add a humidifier to your room at bedtime for respiratory comfort.  Saltwater gargles 4 x daily and benzocaine anesthetic throat lozenges for sore throat. May also add honey based cough syrup  Follow up with PCP  Go immediately to the nearest emergency room for shortness of breath, chest pain,  or other emergent concern.  Return to clinic for new, worse, or unresolving symptoms

## 2024-11-06 NOTE — LETTER
November 6, 2024      [facility]  [address]  [facility] [address]  Phone: [phone]       Patient: Jerry Coronado   YOB: 1985  Date of Visit: 11/06/2024    To Whom It May Concern:    Latisha Coronado  was at [facility] on 11/06/2024. The patient may return to work/school on 11/09/2024 with no restrictions. If you have any questions or concerns, or if I can be of further assistance, please do not hesitate to contact me.    Sincerely,    Arie Reich Jr., FNP-C

## 2024-11-12 ENCOUNTER — OFFICE VISIT (OUTPATIENT)
Dept: FAMILY MEDICINE | Facility: CLINIC | Age: 39
End: 2024-11-12
Payer: COMMERCIAL

## 2024-11-12 VITALS
SYSTOLIC BLOOD PRESSURE: 120 MMHG | BODY MASS INDEX: 44.05 KG/M2 | WEIGHT: 233.31 LBS | OXYGEN SATURATION: 96 % | HEIGHT: 61 IN | TEMPERATURE: 98 F | HEART RATE: 89 BPM | RESPIRATION RATE: 18 BRPM | DIASTOLIC BLOOD PRESSURE: 82 MMHG

## 2024-11-12 DIAGNOSIS — J32.9 RECURRENT SINUS INFECTIONS: Primary | ICD-10-CM

## 2024-11-12 DIAGNOSIS — J20.9 ACUTE BRONCHITIS, UNSPECIFIED ORGANISM: ICD-10-CM

## 2024-11-12 DIAGNOSIS — J01.00 ACUTE NON-RECURRENT MAXILLARY SINUSITIS: ICD-10-CM

## 2024-11-12 PROCEDURE — 1160F RVW MEDS BY RX/DR IN RCRD: CPT | Mod: CPTII,S$GLB,, | Performed by: NURSE PRACTITIONER

## 2024-11-12 PROCEDURE — 1159F MED LIST DOCD IN RCRD: CPT | Mod: CPTII,S$GLB,, | Performed by: NURSE PRACTITIONER

## 2024-11-12 PROCEDURE — 3044F HG A1C LEVEL LT 7.0%: CPT | Mod: CPTII,S$GLB,, | Performed by: NURSE PRACTITIONER

## 2024-11-12 PROCEDURE — 3008F BODY MASS INDEX DOCD: CPT | Mod: CPTII,S$GLB,, | Performed by: NURSE PRACTITIONER

## 2024-11-12 PROCEDURE — 3074F SYST BP LT 130 MM HG: CPT | Mod: CPTII,S$GLB,, | Performed by: NURSE PRACTITIONER

## 2024-11-12 PROCEDURE — 99213 OFFICE O/P EST LOW 20 MIN: CPT | Mod: S$GLB,,, | Performed by: NURSE PRACTITIONER

## 2024-11-12 PROCEDURE — 3079F DIAST BP 80-89 MM HG: CPT | Mod: CPTII,S$GLB,, | Performed by: NURSE PRACTITIONER

## 2024-11-12 PROCEDURE — 99999 PR PBB SHADOW E&M-EST. PATIENT-LVL V: CPT | Mod: PBBFAC,,, | Performed by: NURSE PRACTITIONER

## 2024-11-12 RX ORDER — DOXYCYCLINE HYCLATE 100 MG
100 TABLET ORAL 2 TIMES DAILY
Qty: 20 TABLET | Refills: 0 | Status: SHIPPED | OUTPATIENT
Start: 2024-11-12 | End: 2024-11-22

## 2024-11-12 NOTE — PROGRESS NOTES
SUBJECTIVE:      Patient ID: Jerry Hernandez is a 39 y.o. female.    Chief Complaint: URI (X 8 days), Nasal Congestion, and Otalgia    Established patient here for her sinus infection symptoms. States she has had sinus pressure, congestion, post nasal drip, cough, and left ear discomfort for the past week. States she initially went to urgent care and was diagnosed with viral upper respiratory infection. States symptoms have not improved and some have worsened. Reports fever on day 2 but not since then. Requests ENT referral for recurrent symptoms and infections.    Otalgia   There is pain in the left ear. This is a recurrent problem. The current episode started in the past 7 days. The problem occurs constantly. The problem has been gradually worsening. There has been no fever. The fever has been present for 1 to 2 days. The pain is at a severity of 4/10. The pain is moderate. Associated symptoms include coughing, headaches, hearing loss, rhinorrhea and a sore throat. Pertinent negatives include no abdominal pain, diarrhea, drainage, ear discharge, neck pain, rash or vomiting. She has tried NSAIDs, acetaminophen and heat packs for the symptoms. The treatment provided mild relief. Her past medical history is significant for a chronic ear infection and a tympanostomy tube. There is no history of hearing loss.       Family History   Problem Relation Name Age of Onset    Cataracts Mother Nadira Hernandez     Glaucoma Mother Nadira Hernandez     Hypertension Mother Nadira eHrnandez     Heart disease Father Dirk hernandez 59        CABG    Heart failure Father Dirk hernandez     Breast cancer Maternal Aunt  55    Colon cancer Neg Hx      Ovarian cancer Neg Hx      Macular degeneration Neg Hx      Retinal detachment Neg Hx        Social History     Socioeconomic History    Marital status: Single   Occupational History    Occupation: [employer]     Comment: [employer]   Tobacco Use    Smoking status: Former     Current packs/day: 0.00  "    Average packs/day: 0.5 packs/day for 9.0 years (4.5 ttl pk-yrs)     Types: Cigarettes     Start date: 7/2/2000     Quit date: 7/2/2009     Years since quitting: 15.3    Smokeless tobacco: Never   Substance and Sexual Activity    Alcohol use: Yes     Alcohol/week: 5.0 standard drinks of alcohol     Types: 5 Shots of liquor per week     Comment: "occasionally"    Drug use: No    Sexual activity: Not Currently     Partners: Male     Birth control/protection: OCP   Social History Narrative    The patient does not exercise regularly ().  Rates diet as fair.  She is not satisfied with weight.         Social Drivers of Health     Financial Resource Strain: Medium Risk (4/23/2024)    Overall Financial Resource Strain (CARDIA)     Difficulty of Paying Living Expenses: Somewhat hard   Food Insecurity: Food Insecurity Present (4/23/2024)    Hunger Vital Sign     Worried About Running Out of Food in the Last Year: Sometimes true     Ran Out of Food in the Last Year: Sometimes true   Transportation Needs: No Transportation Needs (4/23/2024)    PRAPARE - Transportation     Lack of Transportation (Medical): No     Lack of Transportation (Non-Medical): No   Physical Activity: Sufficiently Active (4/23/2024)    Exercise Vital Sign     Days of Exercise per Week: 6 days     Minutes of Exercise per Session: 120 min   Stress: Stress Concern Present (4/23/2024)    [employer] - Occupational Stress Questionnaire     Feeling of Stress : Very much   Housing Stability: High Risk (4/23/2024)    Housing Stability Vital Sign     Unable to Pay for Housing in the Last Year: Yes     Current Outpatient Medications   Medication Sig Dispense Refill    albuterol (PROVENTIL HFA) 90 mcg/actuation inhaler Inhale 2 puffs into the lungs every 6 (six) hours as needed for Wheezing. Rescue 6.7 g 0    azelastine (ASTELIN) 137 mcg (0.1 %) nasal spray 1 spray (137 mcg total) by Nasal route 2 (two) times daily as needed for " Rhinitis. 30 mL 0    benzocaine-menthoL 6-10 mg lozenge Take 1 lozenge by mouth every 2 (two) hours as needed (Sore Throat). 18 tablet 0    busPIRone (BUSPAR) 15 MG tablet Take 1 tablet (15 mg total) by mouth 2 (two) times daily. 60 tablet 9    butalbital-acetaminophen-caff -40 mg -40 mg Cap Take 1 capsule by mouth every 4 (four) hours as needed.      cetirizine (ZYRTEC) 10 MG tablet Take 1 tablet (10 mg total) by mouth every evening. 90 tablet 2    fluticasone propionate (FLONASE) 50 mcg/actuation nasal spray 1 spray (50 mcg total) by Each Nostril route once daily. 15.8 mL 0    gabapentin (NEURONTIN) 600 MG tablet Take 600 mg by mouth 3 (three) times daily as needed.      guaiFENesin 200 mg tablet Take 2 tablets (400 mg total) by mouth every 4 (four) hours as needed for Congestion. 30 tablet 0    ibuprofen (ADVIL,MOTRIN) 800 MG tablet Take 1 tablet (800 mg total) by mouth every 8 (eight) hours as needed for Pain. 60 tablet 0    levothyroxine (SYNTHROID) 50 MCG tablet Take 1 tablet (50 mcg total) by mouth before breakfast. 90 tablet 3    multivitamin capsule Take 1 capsule by mouth.      naproxen (EC NAPROSYN) 500 MG EC tablet Take 1 tablet by mouth 2 (two) times daily with meals.      doxycycline (VIBRA-TABS) 100 MG tablet Take 1 tablet (100 mg total) by mouth 2 (two) times daily. for 10 days 20 tablet 0     No current facility-administered medications for this visit.     Review of patient's allergies indicates:  No Known Allergies   Past Medical History:   Diagnosis Date    Allergic rhinitis     Anxiety     Cancer 12/01/2021    B-cell folicular lymphoma    Depression     Hypothyroidism     SVT (supraventricular tachycardia) 2011     Past Surgical History:   Procedure Laterality Date    ABLATION OF DYSRHYTHMIC FOCUS  2016    x2    BIOPSY OF CERVICAL LYMPH NODE Left 11/29/2021    Procedure: BIOPSY, LYMPH NODE, CERVICAL;  Surgeon: Blanco Kerns MD;  Location: [facility];  Service: General;  Laterality:  Left;    CERVIX LESION DESTRUCTION  11/2021    CONIZATION OF CERVIX USING LOOP ELECTROSURGICAL EXCISION PROCEDURE (LEEP) N/A 07/21/2021    Procedure: LEEP CONIZATION, CERVIX;  Surgeon: Donna Castillo MD;  Location: [facility] OR;  Service: OB/GYN;  Laterality: N/A;    EXCISION OF LESION OF NOSE N/A 10/14/2019    Procedure: EXCISION, LESION, NOSE;  Surgeon: Umesh Morales MD;  Location: [facility] OR;  Service: ENT;  Laterality: N/A;    FUNCTIONAL ENDOSCOPIC SINUS SURGERY (FESS) N/A 10/14/2019    Procedure: FESS (FUNCTIONAL ENDOSCOPIC SINUS SURGERY);  Surgeon: Umesh Morales MD;  Location: [facility] OR;  Service: ENT;  Laterality: N/A;    HYSTEROSCOPY WITH DILATION AND CURETTAGE OF UTERUS N/A 07/21/2021    Procedure: HYSTEROSCOPY, WITH DILATION AND CURETTAGE OF UTERUS;  Surgeon: Donna Castillo MD;  Location: [facility] OR;  Service: OB/GYN;  Laterality: N/A;    KNEE ARTHROSCOPY W/ MENISCECTOMY Right 02/07/2024    Procedure: ARTHROSCOPY, KNEE, WITH MENISCECTOMY;  Surgeon: Matthew Elliott MD;  Location: [facility] OR;  Service: Orthopedics;  Laterality: Right;  partial medical meniscectomy and plica resection    MEDIPORT REMOVAL Right 06/20/2022    Procedure: REMOVAL, CATHETER, CENTRAL VENOUS, TUNNELED, WITH PORT;  Surgeon: Blanco Kerns MD;  Location: [facility] OR;  Service: General;  Laterality: Right;    NASAL SEPTOPLASTY N/A 10/14/2019    Procedure: SEPTOPLASTY, NOSE;  Surgeon: Umesh Morales MD;  Location: [facility] OR;  Service: ENT;  Laterality: N/A;    NASAL SINUS SURGERY  10/14/2019    OSTEOTOMY OF METATARSAL BONE Left 12/21/2022    Procedure: OSTEOTOMY, METATARSAL BONE;  Surgeon: Hola Escalante DPM;  Location: [facility] OR;  Service: Podiatry;  Laterality: Left;  denys marrero notified 12/19 TW  , RELEASE ENTRAPPED NERVE 2ND INTERMETATARSAL SPACE    PILONIDAL CYST DRAINAGE      REVISION OF SCAR  06/20/2022    Procedure: REVISION, SCAR;  Surgeon: Blanco Kerns MD;  Location: [facility] OR;  Service: General;;    SINUS SURGERY  11/30/2023  "   SKIN TAG REMOVAL Left 06/20/2022    Procedure: REMOVAL, SKIN TAG;  Surgeon: Blanco Kerns MD;  Location: [facility] OR;  Service: General;  Laterality: Left;    TONSILLECTOMY      TYMPANOPLASTY N/A 10/14/2019    Procedure: TYMPANOPLASTY;  Surgeon: Umesh Morales MD;  Location: [facility] OR;  Service: ENT;  Laterality: N/A;    TYMPANOTOMY Left 10/14/2019    left       Review of Systems   Constitutional:  Negative for chills, fever and unexpected weight change.   HENT:  Positive for congestion, ear pain, hearing loss, postnasal drip, rhinorrhea, sinus pressure and sore throat. Negative for ear discharge, trouble swallowing and voice change.    Respiratory:  Positive for cough. Negative for chest tightness, shortness of breath and wheezing.    Cardiovascular:  Negative for chest pain and palpitations.   Gastrointestinal:  Negative for abdominal pain, diarrhea, nausea and vomiting.   Musculoskeletal:  Negative for neck pain.   Skin:  Negative for rash.   Neurological:  Positive for headaches. Negative for dizziness and weakness.   Hematological:  Negative for adenopathy.      OBJECTIVE:      Vitals:    11/12/24 0806   BP: 120/82   BP Location: Right arm   Patient Position: Sitting   Pulse: 89   Resp: 18   Temp: 98.3 °F (36.8 °C)   TempSrc: Oral   SpO2: 96%   Weight: 105.8 kg (233 lb 4.8 oz)   Height: 5' 1" (1.549 m)     Physical Exam  Vitals and nursing note reviewed.   Constitutional:       General: She is not in acute distress.     Appearance: Normal appearance. She is obese. She is not toxic-appearing or diaphoretic.   HENT:      Head: Normocephalic.      Right Ear: Ear canal and external ear normal. No drainage. A middle ear effusion is present. Tympanic membrane is not erythematous.      Left Ear: Ear canal and external ear normal. No drainage. A middle ear effusion is present. Tympanic membrane is not erythematous.      Nose: Mucosal edema and congestion present. No rhinorrhea.      Left Turbinates: Swollen.      " Right Sinus: Maxillary sinus tenderness present. No frontal sinus tenderness.      Left Sinus: Maxillary sinus tenderness present. No frontal sinus tenderness.      Mouth/Throat:      Mouth: Mucous membranes are moist.      Pharynx: Oropharynx is clear. No oropharyngeal exudate or posterior oropharyngeal erythema.   Eyes:      General: No scleral icterus.     Extraocular Movements: Extraocular movements intact.      Conjunctiva/sclera: Conjunctivae normal.      Pupils: Pupils are equal, round, and reactive to light.   Cardiovascular:      Rate and Rhythm: Normal rate and regular rhythm.      Heart sounds: Normal heart sounds. No murmur heard.  Pulmonary:      Effort: Pulmonary effort is normal. No respiratory distress.      Breath sounds: Normal breath sounds. No wheezing, rhonchi or rales.   Musculoskeletal:         General: Normal range of motion.      Cervical back: Normal range of motion and neck supple. No tenderness.   Lymphadenopathy:      Cervical: No cervical adenopathy.   Skin:     General: Skin is warm and dry.      Coloration: Skin is not jaundiced or pale.      Findings: No rash.   Neurological:      Mental Status: She is alert and oriented to person, place, and time.   Psychiatric:         Mood and Affect: Mood normal.         Behavior: Behavior normal.         Thought Content: Thought content normal.         Judgment: Judgment normal.        Assessment:       1. Recurrent sinus infections    2. Acute non-recurrent maxillary sinusitis    3. Acute bronchitis, unspecified organism        Plan:       Recurrent sinus infections  -     Ambulatory referral/consult to ENT; Future; Expected date: 11/19/2024    Acute non-recurrent maxillary sinusitis  -     doxycycline (VIBRA-TABS) 100 MG tablet; Take 1 tablet (100 mg total) by mouth 2 (two) times daily. for 10 days  Dispense: 20 tablet; Refill: 0  -Antibiotics prescribed, take as prescribed with food until completed; advised sun precautions. Abx is not  pregnancy safe- avoid intercourse or use protection; Advised nasal saline rinses followed by nasal steroid twice daily for at least 2 weeks.  Symptomatic treatment with anti-tussives and NSAIDs advised.       Acute bronchitis, unspecified organism  -     doxycycline (VIBRA-TABS) 100 MG tablet; Take 1 tablet (100 mg total) by mouth 2 (two) times daily. for 10 days  Dispense: 20 tablet; Refill: 0   -Mucinex daily with water       Follow up if symptoms worsen or fail to improve.      11/12/2024 MAUREEN Cunha, FNP    This note was created using [employer] voice recognition software that occasionally misinterprets phrases or words.

## 2024-11-12 NOTE — PROGRESS NOTES
"    SUBJECTIVE:      Patient ID: Jerry Hernandez is a 39 y.o. female.    Chief Complaint: URI (X 8 days), Nasal Congestion, and Otalgia    HPI    Family History   Problem Relation Name Age of Onset    Cataracts Mother Nadira Hernandez     Glaucoma Mother Nadira Hernandez     Hypertension Mother Nadira Hernandez     Heart disease Father Dirk hernandez 59        CABG    Heart failure Father Dirk hernandez     Breast cancer Maternal Aunt  55    Colon cancer Neg Hx      Ovarian cancer Neg Hx      Macular degeneration Neg Hx      Retinal detachment Neg Hx        Social History     Socioeconomic History    Marital status: Single   Occupational History    Occupation: [employer]     Comment: [employer]   Tobacco Use    Smoking status: Former     Current packs/day: 0.00     Average packs/day: 0.5 packs/day for 9.0 years (4.5 ttl pk-yrs)     Types: Cigarettes     Start date: 7/2/2000     Quit date: 7/2/2009     Years since quitting: 15.3    Smokeless tobacco: Never   Substance and Sexual Activity    Alcohol use: Yes     Alcohol/week: 5.0 standard drinks of alcohol     Types: 5 Shots of liquor per week     Comment: "occasionally"    Drug use: No    Sexual activity: Not Currently     Partners: Male     Birth control/protection: OCP   Social History Narrative    The patient does not exercise regularly ().  Rates diet as fair.  She is not satisfied with weight.         Social Drivers of Health     Financial Resource Strain: Medium Risk (4/23/2024)    Overall Financial Resource Strain (CARDIA)     Difficulty of Paying Living Expenses: Somewhat hard   Food Insecurity: Food Insecurity Present (4/23/2024)    Hunger Vital Sign     Worried About Running Out of Food in the Last Year: Sometimes true     Ran Out of Food in the Last Year: Sometimes true   Transportation Needs: No Transportation Needs (4/23/2024)    PRAPARE - Transportation     Lack of Transportation (Medical): No     Lack of Transportation (Non-Medical): No   Physical Activity: " Sufficiently Active (4/23/2024)    Exercise Vital Sign     Days of Exercise per Week: 6 days     Minutes of Exercise per Session: 120 min   Stress: Stress Concern Present (4/23/2024)    [employer] [employer] - Occupational Stress Questionnaire     Feeling of Stress : Very much   Housing Stability: High Risk (4/23/2024)    Housing Stability Vital Sign     Unable to Pay for Housing in the Last Year: Yes     Current Outpatient Medications   Medication Sig Dispense Refill    albuterol (PROVENTIL HFA) 90 mcg/actuation inhaler Inhale 2 puffs into the lungs every 6 (six) hours as needed for Wheezing. Rescue 6.7 g 0    azelastine (ASTELIN) 137 mcg (0.1 %) nasal spray 1 spray (137 mcg total) by Nasal route 2 (two) times daily as needed for Rhinitis. 30 mL 0    benzocaine-menthoL 6-10 mg lozenge Take 1 lozenge by mouth every 2 (two) hours as needed (Sore Throat). 18 tablet 0    busPIRone (BUSPAR) 15 MG tablet Take 1 tablet (15 mg total) by mouth 2 (two) times daily. 60 tablet 9    butalbital-acetaminophen-caff -40 mg -40 mg Cap Take 1 capsule by mouth every 4 (four) hours as needed.      cetirizine (ZYRTEC) 10 MG tablet Take 1 tablet (10 mg total) by mouth every evening. 90 tablet 2    fluticasone propionate (FLONASE) 50 mcg/actuation nasal spray 1 spray (50 mcg total) by Each Nostril route once daily. 15.8 mL 0    gabapentin (NEURONTIN) 600 MG tablet Take 600 mg by mouth 3 (three) times daily as needed.      guaiFENesin 200 mg tablet Take 2 tablets (400 mg total) by mouth every 4 (four) hours as needed for Congestion. 30 tablet 0    ibuprofen (ADVIL,MOTRIN) 800 MG tablet Take 1 tablet (800 mg total) by mouth every 8 (eight) hours as needed for Pain. 60 tablet 0    levothyroxine (SYNTHROID) 50 MCG tablet Take 1 tablet (50 mcg total) by mouth before breakfast. 90 tablet 3    multivitamin capsule Take 1 capsule by mouth.      naproxen (EC NAPROSYN) 500 MG EC tablet Take 1 tablet by mouth 2 (two)  times daily with meals.       No current facility-administered medications for this visit.     Review of patient's allergies indicates:  No Known Allergies   Past Medical History:   Diagnosis Date    Allergic rhinitis     Anxiety     Cancer 12/01/2021    B-cell folicular lymphoma    Depression     Hypothyroidism     SVT (supraventricular tachycardia) 2011     Past Surgical History:   Procedure Laterality Date    ABLATION OF DYSRHYTHMIC FOCUS  2016    x2    BIOPSY OF CERVICAL LYMPH NODE Left 11/29/2021    Procedure: BIOPSY, LYMPH NODE, CERVICAL;  Surgeon: Blanco Kerns MD;  Location: [facility] OR;  Service: General;  Laterality: Left;    CERVIX LESION DESTRUCTION  11/2021    CONIZATION OF CERVIX USING LOOP ELECTROSURGICAL EXCISION PROCEDURE (LEEP) N/A 07/21/2021    Procedure: LEEP CONIZATION, CERVIX;  Surgeon: Donna Castillo MD;  Location: [facility] OR;  Service: OB/GYN;  Laterality: N/A;    EXCISION OF LESION OF NOSE N/A 10/14/2019    Procedure: EXCISION, LESION, NOSE;  Surgeon: Umesh Morales MD;  Location: [facility] OR;  Service: ENT;  Laterality: N/A;    FUNCTIONAL ENDOSCOPIC SINUS SURGERY (FESS) N/A 10/14/2019    Procedure: FESS (FUNCTIONAL ENDOSCOPIC SINUS SURGERY);  Surgeon: Umesh Morales MD;  Location: [facility] OR;  Service: ENT;  Laterality: N/A;    HYSTEROSCOPY WITH DILATION AND CURETTAGE OF UTERUS N/A 07/21/2021    Procedure: HYSTEROSCOPY, WITH DILATION AND CURETTAGE OF UTERUS;  Surgeon: Donna Castillo MD;  Location: [facility] OR;  Service: OB/GYN;  Laterality: N/A;    KNEE ARTHROSCOPY W/ MENISCECTOMY Right 02/07/2024    Procedure: ARTHROSCOPY, KNEE, WITH MENISCECTOMY;  Surgeon: Matthew Elliott MD;  Location: [facility] OR;  Service: Orthopedics;  Laterality: Right;  partial medical meniscectomy and plica resection    MEDIPORT REMOVAL Right 06/20/2022    Procedure: REMOVAL, CATHETER, CENTRAL VENOUS, TUNNELED, WITH PORT;  Surgeon: Blanco Kerns MD;  Location: [facility] OR;  Service: General;  Laterality: Right;    NASAL  "SEPTOPLASTY N/A 10/14/2019    Procedure: SEPTOPLASTY, NOSE;  Surgeon: Umesh Morales MD;  Location: [facility] OR;  Service: ENT;  Laterality: N/A;    NASAL SINUS SURGERY  10/14/2019    OSTEOTOMY OF METATARSAL BONE Left 12/21/2022    Procedure: OSTEOTOMY, METATARSAL BONE;  Surgeon: Hola Escalante DPM;  Location: [facility] OR;  Service: Podiatry;  Laterality: Left;  synthes janes notified 12/19 TW  , RELEASE ENTRAPPED NERVE 2ND INTERMETATARSAL SPACE    PILONIDAL CYST DRAINAGE      REVISION OF SCAR  06/20/2022    Procedure: REVISION, SCAR;  Surgeon: Blanco Kerns MD;  Location: [facility] OR;  Service: General;;    SINUS SURGERY  11/30/2023    SKIN TAG REMOVAL Left 06/20/2022    Procedure: REMOVAL, SKIN TAG;  Surgeon: Blanco Kerns MD;  Location: [facility] OR;  Service: General;  Laterality: Left;    TONSILLECTOMY      TYMPANOPLASTY N/A 10/14/2019    Procedure: TYMPANOPLASTY;  Surgeon: Umesh Morales MD;  Location: [facility] OR;  Service: ENT;  Laterality: N/A;    TYMPANOTOMY Left 10/14/2019    left       Review of Systems   OBJECTIVE:      Vitals:    11/12/24 0806   BP: 120/82   BP Location: Right arm   Patient Position: Sitting   Pulse: 89   Resp: 18   Temp: 98.3 °F (36.8 °C)   TempSrc: Oral   SpO2: 96%   Weight: 105.8 kg (233 lb 4.8 oz)   Height: 5' 1" (1.549 m)     Physical Exam   Assessment:       No diagnosis found.    Plan:       There are no diagnoses linked to this encounter.    No follow-ups on file.      11/12/2024 Anastacia Avlies, MAUREEN, FNP    This note was created using [employer] voice recognition software that occasionally misinterprets phrases or words.       "

## 2024-11-22 ENCOUNTER — PATIENT MESSAGE (OUTPATIENT)
Dept: FAMILY MEDICINE | Facility: CLINIC | Age: 39
End: 2024-11-22
Payer: COMMERCIAL

## 2024-11-22 DIAGNOSIS — R09.81 NASAL CONGESTION: ICD-10-CM

## 2024-11-22 RX ORDER — FLUTICASONE PROPIONATE 50 MCG
SPRAY, SUSPENSION (ML) NASAL
Qty: 24 ML | Refills: 1 | OUTPATIENT
Start: 2024-11-22

## 2024-11-22 RX ORDER — FLUTICASONE PROPIONATE 50 MCG
1 SPRAY, SUSPENSION (ML) NASAL DAILY
Qty: 15.8 ML | Refills: 2 | Status: SHIPPED | OUTPATIENT
Start: 2024-11-22

## 2024-11-22 NOTE — TELEPHONE ENCOUNTER
LOV 11-12-24  NOV 12-16-24  Patient is requesting refill on Flonase and would like to discuss Wegovy or Ozempic with you.  Please advise

## 2024-12-10 DIAGNOSIS — J32.4 CHRONIC PANSINUSITIS: Primary | ICD-10-CM

## 2024-12-12 ENCOUNTER — HOSPITAL ENCOUNTER (OUTPATIENT)
Dept: RADIOLOGY | Facility: HOSPITAL | Age: 39
Discharge: HOME | End: 2024-12-12
Attending: STUDENT IN AN ORGANIZED HEALTH CARE EDUCATION/TRAINING PROGRAM
Payer: COMMERCIAL

## 2024-12-12 DIAGNOSIS — J32.4 CHRONIC PANSINUSITIS: ICD-10-CM

## 2024-12-12 PROCEDURE — 70486 CT MAXILLOFACIAL W/O DYE: CPT | Mod: 26,,, | Performed by: RADIOLOGY

## 2024-12-12 PROCEDURE — 70486 CT MAXILLOFACIAL W/O DYE: CPT | Mod: TC

## 2024-12-20 ENCOUNTER — PATIENT MESSAGE (OUTPATIENT)
Dept: OBSTETRICS AND GYNECOLOGY | Facility: CLINIC | Age: 39
End: 2024-12-20
Payer: COMMERCIAL

## 2024-12-20 DIAGNOSIS — E89.40 OVARIAN FAILURE DUE TO CANCER THERAPY: Primary | ICD-10-CM

## 2024-12-21 ENCOUNTER — PATIENT MESSAGE (OUTPATIENT)
Dept: FAMILY MEDICINE | Facility: CLINIC | Age: 39
End: 2024-12-21
Payer: COMMERCIAL

## 2024-12-23 ENCOUNTER — TELEPHONE (OUTPATIENT)
Dept: OBSTETRICS AND GYNECOLOGY | Facility: CLINIC | Age: 39
End: 2024-12-23
Payer: COMMERCIAL

## 2024-12-31 ENCOUNTER — TELEPHONE (OUTPATIENT)
Dept: OBSTETRICS AND GYNECOLOGY | Facility: CLINIC | Age: 39
End: 2024-12-31
Payer: COMMERCIAL

## 2024-12-31 NOTE — TELEPHONE ENCOUNTER
Called to assist patient with scheduling labs. Patient states she is at another appt and will give us a call back. Phone number given. Patient expressed understanding.

## 2025-01-02 ENCOUNTER — PATIENT MESSAGE (OUTPATIENT)
Dept: HEMATOLOGY/ONCOLOGY | Facility: CLINIC | Age: 40
End: 2025-01-02
Payer: COMMERCIAL

## 2025-01-02 DIAGNOSIS — R53.83 OTHER FATIGUE: ICD-10-CM

## 2025-01-02 DIAGNOSIS — E61.1 IRON DEFICIENCY: ICD-10-CM

## 2025-01-02 DIAGNOSIS — C82.91 FOLLICULAR LYMPHOMA OF LYMPH NODES OF NECK, UNSPECIFIED FOLLICULAR LYMPHOMA TYPE: Primary | ICD-10-CM

## 2025-01-04 NOTE — PROGRESS NOTES
PATIENT: Jerry Corondao  MRN: [id number]  DATE: 1/8/2025    Diagnosis:   1. 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 Morbid obesity    3. Mood disorder due to a general medical condition    4. Iron deficiency    5. Immunodeficiency due to drugs    6. Hypogammaglobulinemia      Chief Complaint: Lymphoma    Oncologic History:      Oncologic History 1. Follicular lymphoma      Oncologic Treatment 1. Bendamustine/rituximab - 2022      Pathology 11/29/21:  Lymph node, left supraclavicular region, excision:  --Follicular lymphoma with high Ki-67 proliferation index, see comment  Comment: Concomitantly submitted flow cytometric analysis detects B-cell lymphoma of germinal center  origin expressing CD19, bright CD20, and CD10 with kappa light chain restriction. CD5 is negative in this  population.  Although the majority of the lymph node appears to represent as grade 2 follicular lymphoma, the Ki-67  proliferation index is much higher than expected for typical low-grade follicular lymphoma. Additionally there  is a very small area suggestive of grade 3A follicular lymphoma as well, representing less than 5% of the overall cellularity. This sampling leaves concern for a higher grade process elsewhere and correlation with  clinical and radiologic findings including PET scan findings is highly recommended with additional biopsy of  areas of high SUV if indicated.      Telemedicine visit:  The patient location is: home  The chief complaint leading to consultation is: follicular lymphoma    Visit type: audiovisual    Face to Face time with patient: 5 minutes  10 minutes of total time spent on the encounter, which includes face to face time and non-face to face time preparing to see the patient (eg, review of tests), Obtaining and/or reviewing separately obtained history, Documenting clinical information in the electronic or other health record, Independently interpreting results  (not separately reported) and communicating results to the patient/family/caregiver, or Care coordination (not separately reported).     Each patient to whom he or she provides medical services by telemedicine is:  (1) informed of the relationship between the physician and patient and the respective role of any other health care provider with respect to management of the patient; and (2) notified that he or she may decline to receive medical services by telemedicine and may withdraw from such care at any time.    Notes: see below      Subjective:    History of Present Illness: Ms. Coronado is a 39 y.o. female who presented in December 2021 for evaluation and management of B cell lymphoma. She was referred by Dr. Kerns.    - presenting symptom was enlarged supraclavicular lymph nodes  - she underwent excisional lymph node biopsy on 11/29/21. Pathology revealed a follicular lymphoma with high ki-67 proliferation index.  - she reports having a CT scan in [place] in early November with enlarged lymph nodes noted throughout.  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 This may make treatment decisions challenging logistically.  - she received cycle #1 of bendamustine/rituximab on 1/25/22 - 1/26/22 in [place], [place].  - she underwent PET/CT scan on 4/14/22.  - she completed 6 cycles of bendamustine/rituximab (last was on 6/16/22 - 6/17/22).  - she underwent port removal on 6/20/22.  - she underwent pet scan on 6/29/22.  - she underwent pet scan on 2/22/23.  - she underwent pet scan on 4/24/24    Interval history:  - she presents for a follow-up appointment for her follicular lymphoma  - today, she endorses fatigue, frquent sinus infections. She is scheduled for sinus surgery in February 2025.     - She denies shortness of breath, chest pain, vomiting, constipation.     Past medical, surgical, family, and social histories have been reviewed and updated below.    Past Medical  History:   Past Medical History:   Diagnosis Date    Allergic rhinitis     Anxiety     Cancer 12/01/2021    B-cell folicular lymphoma    Depression     Hypothyroidism     SVT (supraventricular tachycardia) 2011       Past Surgical History:   Past Surgical History:   Procedure Laterality Date    ABLATION OF DYSRHYTHMIC FOCUS  2016    x2    BIOPSY OF CERVICAL LYMPH NODE Left 11/29/2021    Procedure: BIOPSY, LYMPH NODE, CERVICAL;  Surgeon: Blanco Kerns MD;  Location: [facility] OR;  Service: General;  Laterality: Left;    CERVIX LESION DESTRUCTION  11/2021    CONIZATION OF CERVIX USING LOOP ELECTROSURGICAL EXCISION PROCEDURE (LEEP) N/A 07/21/2021    Procedure: LEEP CONIZATION, CERVIX;  Surgeon: Donna Castillo MD;  Location: [facility] OR;  Service: OB/GYN;  Laterality: N/A;    EXCISION OF LESION OF NOSE N/A 10/14/2019    Procedure: EXCISION, LESION, NOSE;  Surgeon: Umesh Morales MD;  Location: [facility] OR;  Service: ENT;  Laterality: N/A;    FUNCTIONAL ENDOSCOPIC SINUS SURGERY (FESS) N/A 10/14/2019    Procedure: FESS (FUNCTIONAL ENDOSCOPIC SINUS SURGERY);  Surgeon: Umesh Morales MD;  Location: [facility] OR;  Service: ENT;  Laterality: N/A;    HYSTEROSCOPY WITH DILATION AND CURETTAGE OF UTERUS N/A 07/21/2021    Procedure: HYSTEROSCOPY, WITH DILATION AND CURETTAGE OF UTERUS;  Surgeon: Donna Castillo MD;  Location: [facility];  Service: OB/GYN;  Laterality: N/A;    KNEE ARTHROSCOPY W/ MENISCECTOMY Right 02/07/2024    Procedure: ARTHROSCOPY, KNEE, WITH MENISCECTOMY;  Surgeon: Matthew Elliott MD;  Location: [facility] OR;  Service: Orthopedics;  Laterality: Right;  partial medical meniscectomy and plica resection    MEDIPORT REMOVAL Right 06/20/2022    Procedure: REMOVAL, CATHETER, CENTRAL VENOUS, TUNNELED, WITH PORT;  Surgeon: Blanco Kerns MD;  Location: [facility] OR;  Service: General;  Laterality: Right;    NASAL SEPTOPLASTY N/A 10/14/2019    Procedure: SEPTOPLASTY, NOSE;  Surgeon: Umesh Morales MD;  Location: [facility] OR;  Service: ENT;   Laterality: N/A;    NASAL SINUS SURGERY  10/14/2019    OSTEOTOMY OF METATARSAL BONE Left 12/21/2022    Procedure: OSTEOTOMY, METATARSAL BONE;  Surgeon: Hola Escalante DPM;  Location: [facility] OR;  Service: Podiatry;  Laterality: Left;  synthes janes notified 12/19 [place]  , RELEASE ENTRAPPED NERVE 2ND INTERMETATARSAL SPACE    PILONIDAL CYST DRAINAGE      REVISION OF SCAR  06/20/2022    Procedure: REVISION, SCAR;  Surgeon: Blanco Kerns MD;  Location: [facility] OR;  Service: General;;    SINUS SURGERY  11/30/2023    SKIN TAG REMOVAL Left 06/20/2022    Procedure: REMOVAL, SKIN TAG;  Surgeon: Blanco Kerns MD;  Location: [facility] OR;  Service: General;  Laterality: Left;    TONSILLECTOMY      TYMPANOPLASTY N/A 10/14/2019    Procedure: TYMPANOPLASTY;  Surgeon: Umesh Morales MD;  Location: [facility] OR;  Service: ENT;  Laterality: N/A;    TYMPANOTOMY Left 10/14/2019    left       Family History:   Family History   Problem Relation Name Age of Onset    Cataracts Mother Nadira Hernandez     Glaucoma Mother Nadira Hernandez     Hypertension Mother Nadira Hernandez     Heart disease Father Dirk hernandez 59        CABG    Heart failure Father Dirk hernandez     Breast cancer Maternal Aunt  55    Colon cancer Neg Hx      Ovarian cancer Neg Hx      Macular degeneration Neg Hx      Retinal detachment Neg Hx         Social History:  reports that she quit smoking about 15 years ago. Her smoking use included cigarettes. She started smoking about 24 years ago. She has a 4.5 pack-year smoking history. She has never used smokeless tobacco. She reports current alcohol use of about 5.0 standard drinks of alcohol per week. She reports that she does not use drugs.    Allergies:  Review of patient's allergies indicates:  No Known Allergies    Medications:  Current Outpatient Medications   Medication Sig Dispense Refill    albuterol (PROVENTIL HFA) 90 mcg/actuation inhaler Inhale 2 puffs into the lungs every 6 (six) hours as needed for Wheezing. Rescue 6.7 g  0    azelastine (ASTELIN) 137 mcg (0.1 %) nasal spray 1 spray (137 mcg total) by Nasal route 2 (two) times daily as needed for Rhinitis. 30 mL 0    benzocaine-menthoL 6-10 mg lozenge Take 1 lozenge by mouth every 2 (two) hours as needed (Sore Throat). 18 tablet 0    busPIRone (BUSPAR) 15 MG tablet Take 1 tablet (15 mg total) by mouth 2 (two) times daily. 60 tablet 9    butalbital-acetaminophen-caff -40 mg -40 mg Cap Take 1 capsule by mouth every 4 (four) hours as needed.      cetirizine (ZYRTEC) 10 MG tablet Take 1 tablet (10 mg total) by mouth every evening. 90 tablet 2    fluticasone propionate (FLONASE) 50 mcg/actuation nasal spray 1 spray (50 mcg total) by Each Nostril route once daily. 15.8 mL 2    gabapentin (NEURONTIN) 600 MG tablet Take 600 mg by mouth 3 (three) times daily as needed.      ibuprofen (ADVIL,MOTRIN) 800 MG tablet Take 1 tablet (800 mg total) by mouth every 8 (eight) hours as needed for Pain. 60 tablet 0    levothyroxine (SYNTHROID) 50 MCG tablet Take 1 tablet (50 mcg total) by mouth before breakfast. 90 tablet 3    multivitamin capsule Take 1 capsule by mouth.      naproxen (EC NAPROSYN) 500 MG EC tablet Take 1 tablet by mouth 2 (two) times daily with meals.       No current facility-administered medications for this visit.       Review of Systems   Constitutional:  Positive for fatigue. Negative for appetite change and unexpected weight change.   HENT:  Negative for mouth sores and sore throat.         Frequent sinus infections   Eyes:  Negative for visual disturbance.   Respiratory:  Negative for cough and shortness of breath.    Cardiovascular:  Negative for chest pain.   Gastrointestinal:  Negative for abdominal pain, constipation, diarrhea and vomiting.   Genitourinary:  Negative for dysuria and frequency.   Musculoskeletal:  Negative for back pain.   Skin:  Negative for rash.   Neurological:  Negative for headaches.   Hematological:  Negative for adenopathy.    Psychiatric/Behavioral:  The patient is nervous/anxious.        ECOG Performance Status:   ECOG SCORE 1       Objective:      Vitals:   There were no vitals filed for this visit.  BMI: There is no height or weight on file to calculate BMI.  Deferred due to telemedicine visit.    Physical Exam  Deferred due to telemedicine visit.    Laboratory Data:  Labs have been reviewed.    Lab Results   Component Value Date    WBC 9.39 01/07/2025    WBC 9.39 01/07/2025    HGB 14.1 01/07/2025    HGB 14.1 01/07/2025    HCT 40.9 01/07/2025    HCT 40.9 01/07/2025    MCV 84 01/07/2025    MCV 84 01/07/2025     01/07/2025     01/07/2025       Lab Results   Component Value Date    IRON 106 01/07/2025    TRANSFERRIN 329 01/07/2025    TIBC 461 (H) 01/07/2025    FESATURATED 23 01/07/2025      Lab Results   Component Value Date    FERRITIN 33.7 01/07/2025         Imaging:     pet scan (4/24/24): I have personally reviewed the images  No PET-CT evidence of FDG avid malignancy.     Previously seen hypermetabolic activity associated with the endometrial cavity and the right ovary has resolved.    CTA chest (9/3/23):  IMPRESSION: No CT evidence of pulmonary emboli  No acute pulmonary process    pet scan (2/22/23): I have personally reviewed the images  No evidence of FDG avid lymphoproliferative disease.      pet scan (6/29/22): I have personally reviewed the images  No evidence of FDG avid lymphoproliferative disease.    PET/CT scan (4/14/22): I have personally reviewed the images  Differences in instrumentation and technique preclude semiquantitative comparison between the current PET/CT study and the prior.     Background:  - Liver 4.2 SUV max.  - Spleen 3.5 SUV max.  - Spleen 11.9 x 4.8 cm (AP x TR)  - Blood pool 3.3 SUV max     No FDG avid lymphadenopathy or mass. The largest cervical node is a 5 x 5 mm supraclavicular node with SUV max 1.0 (image 86). The largest left axillary node measures 18 x 7 mm with SUV max 0.8  (image 103).     Additional findings:  -Right sided IJ medical infusion port with tip at the level of the SVC.  -Relative diffuse low-attenuation liver in keeping with steatosis.  -3.6 cm simple fluid attenuation on FDG avid cyst in the right ovary favored to represent a physiologic cyst in this age group. The left ovary and uterus are within normal limits.     IMPRESSION:  No evidence of FDG avid malignancy.    PET/CT scan (12/17/21): I have personally reviewed the images    IN THE HEAD AND NECK:     Multiple left-sided cervical lymph nodes, left and right supraclavicular lymph nodes and left axillary lymph nodes.  The index lesions, as follow:     Hypermetabolic lymph node at the left level Va region, measures 1.2 cm (axial series 2, image 68) with SUV max of 14.0.     Hypermetabolic left supraclavicular lymph node, measures 1.9 cm (axial series 2, image 82) with SUV max of 15.0.     Hypermetabolic left axillary lymph node, measures 3.5 cm (axial series 2, image 116) with SUV max of 12.0.     Hypermetabolic right supraclavicular lymph node, measures 1.4 cm (axial series 2, image 79) with SUV max of 13.0.     IN THE CHEST:     Focal radiotracer uptake in the left hilum with no corresponding lesion on CT images (axial fused image 116) with SUV max of 4.4.  No abnormal lesions throughout the pulmonary parenchyma.     IN THE ABDOMEN AND PELVIS:     There is a subcentimeter hypermetabolic lesion in the subcutaneous tissue of the right back at the level of L4, measures 0.9 cm (axial series 2, image 198) with SUV max of 6.7.  There is physiologic tracer distribution within the abdominal organs and excretion into the genitourinary system.     The spleen has normal uptake and is normal at 11.0 cm in craniocaudal dimension.     IN THE BONES:     There are no hypermetabolic lesions worrisome for malignancy.     In the extremities, there are no hypermetabolic lesions worrisome for malignancy.     Impression:    "  Hypermetabolic cervical, left axillary, and left hilar lymph nodes consistent with biopsy-proven follicular lymphoma (performed on 11/29/2021), as above.     Hypermetabolic subcutaneous tissue of the right nodule at the level of L4, concerning for additional lymphomatous deposit as described above.      Assessment:       1. 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 Morbid obesity    3. Mood disorder due to a general medical condition    4. Iron deficiency    5. Immunodeficiency due to drugs    6. Hypogammaglobulinemia             Plan:     1. Follicular lymphoma  - I have reviewed her chart  - supraclavicular lymph node biopsy (11/29/21) revealed follicular lymphoma. Importantly, in the pathology report is the following: "Although the majority of the lymph node appears to represent as grade 2 follicular lymphoma, the Ki-67  proliferation index is much higher than expected for typical low-grade follicular lymphoma. Additionally there  is a very small area suggestive of grade 3A follicular lymphoma as well, representing less than 5% of the  overall cellularity."  - she reports having a CT scan in [place] in early November with enlarged lymph nodes noted throughout.  - PET/CT scan (12/17/21) revealed "hypermetabolic cervical, left axillary, and left hilar lymph nodes" as well as "hypermetabolic subcutaneous tissue of the right nodule at the level of L4, concerning for additional lymphomatous deposit."  - she received cycle #1 of bendamustine/rituximab on 1/25/22 - 1/26/22 in [place], [place].  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 No evidence of hypermetabolic tumor  - The risks and benefits of chemotherapy were discussed, written information was given, and informed consent was obtained.  - she completed 6 cycles of bendamustine/rituximab (last was on 6/16/22 - 6/17/22).  - she underwent " port removal on 6/20/22.  - pet scan (6/29/22) revealed no evidence of hypermetabolic tumor.  - pet scan (2/22/23) revealed no evidence of hypermetabolic tumor.  - CTA chest (9/3/23): no evidence of recurrence.  - pet scan (4/24/24):   No PET-CT evidence of FDG avid malignancy.   - Labs have been reviewed. Mild leukocytosis noted, likely reactive.  - clinically, she is doing well with clinical symptoms to suggest recurrence of lymphoma.  - return to clinic in 6 months.    2. Iron deficiency  - Labs have been reviewed. Total iron binding capacity is elevated, suggesting iron deficiency  - will arrange for ferric carboxymaltose x 2 doses    3. Immunodeficiency / hypogammaglobulinemia  - Labs have been reviewed. IgG is decreased  - given her frequent sinus infections, I will arrange for immune globulin infusion    3. Morbid obesity  - no weight since telemedicine visit.  - continue to monitor    4. Mood disorder due to medical condition  - moderate symptoms. Continue xanax as needed.     5. Insomnia  - moderate symptoms. Continue mirtazapine    6. Advance Care Planning     Power of   After our discussion (at previous visit), the patient decided to complete a HCPOA and appointed her  brother Velasquez Coronado ([phone]) and dad Dirk Coronado ([phone]) .        - return to clinic in 6 months.    Ki Nova M.D.  Hematology/Oncology  [facility] - [address], [address]  [place] [address]  Phone: [phone]  Fax: [phone]

## 2025-01-06 ENCOUNTER — LAB VISIT (OUTPATIENT)
Dept: LAB | Facility: HOSPITAL | Age: 40
End: 2025-01-06
Attending: INTERNAL MEDICINE
Payer: COMMERCIAL

## 2025-01-06 DIAGNOSIS — R53.83 OTHER FATIGUE: ICD-10-CM

## 2025-01-06 DIAGNOSIS — C82.91 FOLLICULAR LYMPHOMA OF LYMPH NODES OF NECK, UNSPECIFIED FOLLICULAR LYMPHOMA TYPE: ICD-10-CM

## 2025-01-06 DIAGNOSIS — E61.1 IRON DEFICIENCY: ICD-10-CM

## 2025-01-06 PROCEDURE — 80053 COMPREHEN METABOLIC PANEL: CPT | Performed by: INTERNAL MEDICINE

## 2025-01-06 PROCEDURE — 84443 ASSAY THYROID STIM HORMONE: CPT | Performed by: INTERNAL MEDICINE

## 2025-01-06 PROCEDURE — 82728 ASSAY OF FERRITIN: CPT | Performed by: INTERNAL MEDICINE

## 2025-01-06 PROCEDURE — 84550 ASSAY OF BLOOD/URIC ACID: CPT | Performed by: INTERNAL MEDICINE

## 2025-01-06 PROCEDURE — 85025 COMPLETE CBC W/AUTO DIFF WBC: CPT | Performed by: INTERNAL MEDICINE

## 2025-01-06 PROCEDURE — 83615 LACTATE (LD) (LDH) ENZYME: CPT | Performed by: INTERNAL MEDICINE

## 2025-01-06 PROCEDURE — 82784 ASSAY IGA/IGD/IGG/IGM EACH: CPT | Mod: 91 | Performed by: INTERNAL MEDICINE

## 2025-01-06 PROCEDURE — 84466 ASSAY OF TRANSFERRIN: CPT | Performed by: INTERNAL MEDICINE

## 2025-01-06 PROCEDURE — 36415 COLL VENOUS BLD VENIPUNCTURE: CPT | Performed by: INTERNAL MEDICINE

## 2025-01-07 LAB
ALBUMIN SERPL BCP-MCNC: 4.4 G/DL (ref 3.5–5.2)
ALP SERPL-CCNC: 74 U/L (ref 55–135)
ALT SERPL W/O P-5'-P-CCNC: 19 U/L (ref 10–44)
ANION GAP SERPL CALC-SCNC: 8 MMOL/L (ref 8–16)
AST SERPL-CCNC: 13 U/L (ref 10–40)
BASOPHILS # BLD AUTO: 0.11 K/UL (ref 0–0.2)
BASOPHILS # BLD AUTO: 0.11 K/UL (ref 0–0.2)
BASOPHILS NFR BLD: 1.2 % (ref 0–1.9)
BASOPHILS NFR BLD: 1.2 % (ref 0–1.9)
BILIRUB SERPL-MCNC: 0.3 MG/DL (ref 0.1–1)
BUN SERPL-MCNC: 30 MG/DL (ref 6–20)
CALCIUM SERPL-MCNC: 8.8 MG/DL (ref 8.7–10.5)
CHLORIDE SERPL-SCNC: 107 MMOL/L (ref 95–110)
CO2 SERPL-SCNC: 23 MMOL/L (ref 23–29)
CREAT SERPL-MCNC: 1.1 MG/DL (ref 0.5–1.4)
DIFFERENTIAL METHOD BLD: ABNORMAL
DIFFERENTIAL METHOD BLD: ABNORMAL
EOSINOPHIL # BLD AUTO: 0.1 K/UL (ref 0–0.5)
EOSINOPHIL # BLD AUTO: 0.1 K/UL (ref 0–0.5)
EOSINOPHIL NFR BLD: 1.5 % (ref 0–8)
EOSINOPHIL NFR BLD: 1.5 % (ref 0–8)
ERYTHROCYTE [DISTWIDTH] IN BLOOD BY AUTOMATED COUNT: 13.2 % (ref 11.5–14.5)
ERYTHROCYTE [DISTWIDTH] IN BLOOD BY AUTOMATED COUNT: 13.2 % (ref 11.5–14.5)
EST. GFR  (NO RACE VARIABLE): >60 ML/MIN/1.73 M^2
FERRITIN SERPL-MCNC: 33.7 NG/ML (ref 20–300)
GLUCOSE SERPL-MCNC: 114 MG/DL (ref 70–110)
HCT VFR BLD AUTO: 40.9 % (ref 37–48.5)
HCT VFR BLD AUTO: 40.9 % (ref 37–48.5)
HGB BLD-MCNC: 14.1 G/DL (ref 12–16)
HGB BLD-MCNC: 14.1 G/DL (ref 12–16)
IMM GRANULOCYTES # BLD AUTO: 0.03 K/UL (ref 0–0.04)
IMM GRANULOCYTES # BLD AUTO: 0.03 K/UL (ref 0–0.04)
IMM GRANULOCYTES NFR BLD AUTO: 0.3 % (ref 0–0.5)
IMM GRANULOCYTES NFR BLD AUTO: 0.3 % (ref 0–0.5)
IRON SERPL-MCNC: 106 UG/DL (ref 30–160)
LDH SERPL L TO P-CCNC: 146 U/L (ref 110–260)
LYMPHOCYTES # BLD AUTO: 2 K/UL (ref 1–4.8)
LYMPHOCYTES # BLD AUTO: 2 K/UL (ref 1–4.8)
LYMPHOCYTES NFR BLD: 20.9 % (ref 18–48)
LYMPHOCYTES NFR BLD: 20.9 % (ref 18–48)
MCH RBC QN AUTO: 28.8 PG (ref 27–31)
MCH RBC QN AUTO: 28.8 PG (ref 27–31)
MCHC RBC AUTO-ENTMCNC: 34.5 G/DL (ref 32–36)
MCHC RBC AUTO-ENTMCNC: 34.5 G/DL (ref 32–36)
MCV RBC AUTO: 84 FL (ref 82–98)
MCV RBC AUTO: 84 FL (ref 82–98)
MONOCYTES # BLD AUTO: 0.7 K/UL (ref 0.3–1)
MONOCYTES # BLD AUTO: 0.7 K/UL (ref 0.3–1)
MONOCYTES NFR BLD: 7.8 % (ref 4–15)
MONOCYTES NFR BLD: 7.8 % (ref 4–15)
NEUTROPHILS # BLD AUTO: 6.4 K/UL (ref 1.8–7.7)
NEUTROPHILS # BLD AUTO: 6.4 K/UL (ref 1.8–7.7)
NEUTROPHILS NFR BLD: 68.3 % (ref 38–73)
NEUTROPHILS NFR BLD: 68.3 % (ref 38–73)
NRBC BLD-RTO: 0 /100 WBC
NRBC BLD-RTO: 0 /100 WBC
PLATELET # BLD AUTO: 271 K/UL (ref 150–450)
PLATELET # BLD AUTO: 271 K/UL (ref 150–450)
PMV BLD AUTO: 9 FL (ref 9.2–12.9)
PMV BLD AUTO: 9 FL (ref 9.2–12.9)
POTASSIUM SERPL-SCNC: 4 MMOL/L (ref 3.5–5.1)
PROT SERPL-MCNC: 6.5 G/DL (ref 6–8.4)
RBC # BLD AUTO: 4.89 M/UL (ref 4–5.4)
RBC # BLD AUTO: 4.89 M/UL (ref 4–5.4)
SATURATED IRON: 23 % (ref 20–50)
SODIUM SERPL-SCNC: 138 MMOL/L (ref 136–145)
TOTAL IRON BINDING CAPACITY: 461 UG/DL (ref 250–450)
TRANSFERRIN SERPL-MCNC: 329 MG/DL (ref 200–375)
TSH SERPL DL<=0.005 MIU/L-ACNC: 2.12 UIU/ML (ref 0.34–5.6)
URATE SERPL-MCNC: 5.9 MG/DL (ref 2.4–5.7)
WBC # BLD AUTO: 9.39 K/UL (ref 3.9–12.7)
WBC # BLD AUTO: 9.39 K/UL (ref 3.9–12.7)

## 2025-01-08 ENCOUNTER — OFFICE VISIT (OUTPATIENT)
Dept: HEMATOLOGY/ONCOLOGY | Facility: CLINIC | Age: 40
End: 2025-01-08
Payer: COMMERCIAL

## 2025-01-08 DIAGNOSIS — C82.91 FOLLICULAR LYMPHOMA OF LYMPH NODES OF NECK, UNSPECIFIED FOLLICULAR LYMPHOMA TYPE: Primary | ICD-10-CM

## 2025-01-08 DIAGNOSIS — E61.1 IRON DEFICIENCY: ICD-10-CM

## 2025-01-08 DIAGNOSIS — D84.821 IMMUNODEFICIENCY DUE TO DRUGS: ICD-10-CM

## 2025-01-08 DIAGNOSIS — D80.1 HYPOGAMMAGLOBULINEMIA: ICD-10-CM

## 2025-01-08 DIAGNOSIS — Z79.899 IMMUNODEFICIENCY DUE TO DRUGS: ICD-10-CM

## 2025-01-08 DIAGNOSIS — F06.30 MOOD DISORDER DUE TO A GENERAL MEDICAL CONDITION: ICD-10-CM

## 2025-01-08 DIAGNOSIS — E66.01 MORBID OBESITY: ICD-10-CM

## 2025-01-08 LAB
IGA SERPL-MCNC: 23 MG/DL (ref 87–352)
IGG SERPL-MCNC: 333 MG/DL (ref 586–1602)
IGM SERPL-MCNC: 30 MG/DL (ref 26–217)

## 2025-01-08 RX ORDER — HEPARIN 100 UNIT/ML
500 SYRINGE INTRAVENOUS
OUTPATIENT
Start: 2025-01-21

## 2025-01-08 RX ORDER — DIPHENHYDRAMINE HYDROCHLORIDE 50 MG/ML
25 INJECTION INTRAMUSCULAR; INTRAVENOUS
OUTPATIENT
Start: 2025-01-14

## 2025-01-08 RX ORDER — HEPARIN 100 UNIT/ML
500 SYRINGE INTRAVENOUS
OUTPATIENT
Start: 2025-01-14

## 2025-01-08 RX ORDER — SODIUM CHLORIDE 0.9 % (FLUSH) 0.9 %
10 SYRINGE (ML) INJECTION
OUTPATIENT
Start: 2025-01-21

## 2025-01-08 RX ORDER — DIPHENHYDRAMINE HYDROCHLORIDE 50 MG/ML
50 INJECTION INTRAMUSCULAR; INTRAVENOUS ONCE AS NEEDED
OUTPATIENT
Start: 2025-01-21

## 2025-01-08 RX ORDER — ACETAMINOPHEN 325 MG/1
650 TABLET ORAL
OUTPATIENT
Start: 2025-01-14

## 2025-01-08 RX ORDER — EPINEPHRINE 0.3 MG/.3ML
0.3 INJECTION SUBCUTANEOUS ONCE AS NEEDED
OUTPATIENT
Start: 2025-01-14

## 2025-01-08 RX ORDER — SODIUM CHLORIDE 0.9 % (FLUSH) 0.9 %
10 SYRINGE (ML) INJECTION
OUTPATIENT
Start: 2025-01-14

## 2025-01-08 RX ORDER — DIPHENHYDRAMINE HYDROCHLORIDE 50 MG/ML
50 INJECTION INTRAMUSCULAR; INTRAVENOUS ONCE AS NEEDED
OUTPATIENT
Start: 2025-01-14

## 2025-01-08 RX ORDER — FAMOTIDINE 10 MG/ML
20 INJECTION INTRAVENOUS
OUTPATIENT
Start: 2025-01-14

## 2025-01-08 RX ORDER — EPINEPHRINE 0.3 MG/.3ML
0.3 INJECTION SUBCUTANEOUS ONCE AS NEEDED
OUTPATIENT
Start: 2025-01-21

## 2025-01-08 NOTE — Clinical Note
1. Schedule immune globulin and ferric carboxymaltose at [facility] once approved. 2. Schedule cbc, cmp, uric acid, ldh, quantitative immunoglobulins, ferritin, iron/tibc in 6 months. 3. Schedule virtual visit after labs in 6 months.  Thanks!

## 2025-01-10 ENCOUNTER — PATIENT MESSAGE (OUTPATIENT)
Dept: HEMATOLOGY/ONCOLOGY | Facility: CLINIC | Age: 40
End: 2025-01-10
Payer: COMMERCIAL

## 2025-01-10 NOTE — PROGRESS NOTES
Patient ID: Jerry Coronado is a 39 y.o. White female    Subjective  Chief Complaint: patient presents for medical weight loss management.    Contraindications to GLP-1 receptor agonist therapy:   Denies personal or family history of MTC and personal history of MEN2     Pregnancy Status:   - Pt denies current pregnancy, breastfeeding, or plans to become pregnant.  - Pt denies current use of oral hormonal contraception.     Co-morbidities: ROBERTO CARLOS    History of weight loss therapy:  Pt denies previous weight management medication use.     Weight loss history:  Starting weight: 232 lbs - pt reported (1/13/25)   11/25/2024   Recent Readings    Weight (lbs) 235 lb    BMI 44.4 BMI      Objective  Lab Results   Component Value Date     01/07/2025     04/24/2024     12/09/2023     Lab Results   Component Value Date    K 4.0 01/07/2025    K 3.5 04/24/2024    K 3.8 12/09/2023     Lab Results   Component Value Date     01/07/2025     04/24/2024     (H) 12/09/2023     Lab Results   Component Value Date    CO2 23 01/07/2025    CO2 26 04/24/2024    CO2 23 12/09/2023     Lab Results   Component Value Date    BUN 30 (H) 01/07/2025    BUN 23 (H) 04/24/2024    BUN 25 (H) 12/09/2023     Lab Results   Component Value Date     (H) 01/07/2025     (H) 04/24/2024    GLU 91 12/09/2023     Lab Results   Component Value Date    CALCIUM 8.8 01/07/2025    CALCIUM 9.6 04/24/2024    CALCIUM 8.5 (L) 12/09/2023     Lab Results   Component Value Date    PROT 6.5 01/07/2025    PROT 6.8 04/24/2024    PROT 6.8 12/09/2023     Lab Results   Component Value Date    ALBUMIN 4.4 01/07/2025    ALBUMIN 4.6 04/24/2024    ALBUMIN 4.5 12/09/2023     Lab Results   Component Value Date    BILITOT 0.3 01/07/2025    BILITOT 0.4 04/24/2024    BILITOT 0.2 12/09/2023     Lab Results   Component Value Date    AST 13 01/07/2025    AST 21 04/24/2024    AST 16 12/09/2023     Lab Results   Component Value Date    ALT 19  01/07/2025    ALT 43 04/24/2024    ALT 28 12/09/2023     Lab Results   Component Value Date    ANIONGAP 8 01/07/2025    ANIONGAP 11 04/24/2024    ANIONGAP 9 12/09/2023     Lab Results   Component Value Date    CREATININE 1.1 01/07/2025    CREATININE 0.8 04/24/2024    CREATININE 1.4 12/09/2023     Lab Results   Component Value Date    EGFRNORACEVR >60.0 01/07/2025    EGFRNORACEVR >60.0 04/24/2024    EGFRNORACEVR 49.1 (A) 12/09/2023     Assessment/Plan  -Pt qualifies for GLP-1 RA therapy based on BMI greater than or equal to 30 kg/m2  - Pt to have surgical procedure on 2/7, will start therapy after this procedure  - Initiate Zepbound 2.5 mg SQ weekly x 4 weeks  - Then increase to Zepbound 5 mg SQ weekly  - RTC in 3 months for follow-up evaluation     Patient consented to pharmacist management via collaborative practice.

## 2025-01-13 ENCOUNTER — OFFICE VISIT (OUTPATIENT)
Dept: INTERNAL MEDICINE | Facility: CLINIC | Age: 40
End: 2025-01-13
Payer: COMMERCIAL

## 2025-01-13 ENCOUNTER — PATIENT MESSAGE (OUTPATIENT)
Dept: OBSTETRICS AND GYNECOLOGY | Facility: CLINIC | Age: 40
End: 2025-01-13
Payer: COMMERCIAL

## 2025-01-13 ENCOUNTER — PATIENT MESSAGE (OUTPATIENT)
Dept: INTERNAL MEDICINE | Facility: CLINIC | Age: 40
End: 2025-01-13

## 2025-01-13 DIAGNOSIS — E66.01 OBESITY, CLASS III, BMI 40-49.9 (MORBID OBESITY): Primary | ICD-10-CM

## 2025-01-13 PROCEDURE — 99499 UNLISTED E&M SERVICE: CPT | Mod: 95,,,

## 2025-01-13 RX ORDER — TIRZEPATIDE 5 MG/.5ML
5 INJECTION, SOLUTION SUBCUTANEOUS
Qty: 2 ML | Refills: 2 | Status: ACTIVE | OUTPATIENT
Start: 2025-01-13

## 2025-01-13 RX ORDER — TIRZEPATIDE 2.5 MG/.5ML
2.5 INJECTION, SOLUTION SUBCUTANEOUS
Qty: 2 ML | Refills: 0 | Status: ACTIVE | OUTPATIENT
Start: 2025-01-13

## 2025-01-14 DIAGNOSIS — Z11.3 SCREEN FOR STD (SEXUALLY TRANSMITTED DISEASE): Primary | ICD-10-CM

## 2025-01-15 ENCOUNTER — TELEPHONE (OUTPATIENT)
Dept: OBSTETRICS AND GYNECOLOGY | Facility: CLINIC | Age: 40
End: 2025-01-15
Payer: COMMERCIAL

## 2025-01-16 PROBLEM — E66.01 MORBID OBESITY: Status: ACTIVE | Noted: 2025-01-16

## 2025-01-17 ENCOUNTER — LAB VISIT (OUTPATIENT)
Dept: LAB | Facility: HOSPITAL | Age: 40
End: 2025-01-17
Attending: OBSTETRICS & GYNECOLOGY
Payer: COMMERCIAL

## 2025-01-17 ENCOUNTER — INFUSION (OUTPATIENT)
Dept: INFUSION THERAPY | Facility: HOSPITAL | Age: 40
End: 2025-01-17
Attending: INTERNAL MEDICINE
Payer: COMMERCIAL

## 2025-01-17 VITALS
BODY MASS INDEX: 44.59 KG/M2 | DIASTOLIC BLOOD PRESSURE: 74 MMHG | SYSTOLIC BLOOD PRESSURE: 114 MMHG | WEIGHT: 236 LBS | HEART RATE: 93 BPM | OXYGEN SATURATION: 91 % | TEMPERATURE: 98 F

## 2025-01-17 DIAGNOSIS — E61.1 IRON DEFICIENCY: Primary | ICD-10-CM

## 2025-01-17 DIAGNOSIS — Z11.3 SCREEN FOR STD (SEXUALLY TRANSMITTED DISEASE): ICD-10-CM

## 2025-01-17 LAB
HIV 1+2 AB+HIV1 P24 AG SERPL QL IA: NEGATIVE
TREPONEMA PALLIDUM IGG+IGM AB [PRESENCE] IN SERUM OR PLASMA BY IMMUNOASSAY: NONREACTIVE

## 2025-01-17 PROCEDURE — 63600175 PHARM REV CODE 636 W HCPCS: Mod: JZ,TB | Performed by: INTERNAL MEDICINE

## 2025-01-17 PROCEDURE — 36415 COLL VENOUS BLD VENIPUNCTURE: CPT | Performed by: OBSTETRICS & GYNECOLOGY

## 2025-01-17 PROCEDURE — 87389 HIV-1 AG W/HIV-1&-2 AB AG IA: CPT | Performed by: OBSTETRICS & GYNECOLOGY

## 2025-01-17 PROCEDURE — 96365 THER/PROPH/DIAG IV INF INIT: CPT

## 2025-01-17 PROCEDURE — 25000003 PHARM REV CODE 250: Performed by: INTERNAL MEDICINE

## 2025-01-17 RX ORDER — SODIUM CHLORIDE 0.9 % (FLUSH) 0.9 %
10 SYRINGE (ML) INJECTION
Status: DISCONTINUED | OUTPATIENT
Start: 2025-01-17 | End: 2025-01-17 | Stop reason: HOSPADM

## 2025-01-17 RX ORDER — EPINEPHRINE 0.3 MG/.3ML
0.3 INJECTION SUBCUTANEOUS ONCE AS NEEDED
Status: DISCONTINUED | OUTPATIENT
Start: 2025-01-17 | End: 2025-01-17 | Stop reason: HOSPADM

## 2025-01-17 RX ORDER — DIPHENHYDRAMINE HYDROCHLORIDE 50 MG/ML
50 INJECTION INTRAMUSCULAR; INTRAVENOUS ONCE AS NEEDED
Status: DISCONTINUED | OUTPATIENT
Start: 2025-01-17 | End: 2025-01-17 | Stop reason: HOSPADM

## 2025-01-17 RX ORDER — HEPARIN 100 UNIT/ML
500 SYRINGE INTRAVENOUS
Status: DISCONTINUED | OUTPATIENT
Start: 2025-01-17 | End: 2025-01-17 | Stop reason: HOSPADM

## 2025-01-17 RX ADMIN — FERRIC CARBOXYMALTOSE INJECTION 750 MG: 50 INJECTION, SOLUTION INTRAVENOUS at 08:01

## 2025-01-17 NOTE — PLAN OF CARE
Problem: Adult Inpatient Plan of Care  Goal: Plan of Care Review  1/17/2025 0844 by Rebecca Flores RN  Outcome: Met  1/17/2025 0844 by Rebecca Flores RN  Outcome: Progressing  Goal: Patient-Specific Goal (Individualized)  1/17/2025 0844 by Rebecca Flores RN  Outcome: Met  1/17/2025 0844 by Rebecca Flores RN  Outcome: Progressing  Goal: Absence of Hospital-Acquired Illness or Injury  1/17/2025 0844 by Rebecca Flores RN  Outcome: Met  1/17/2025 0844 by Rebecca Flores RN  Outcome: Progressing  Goal: Optimal Comfort and Wellbeing  1/17/2025 0844 by Rebecca Flores RN  Outcome: Met  1/17/2025 0844 by Rebecca Flores RN  Outcome: Progressing  Goal: Readiness for Transition of Care  1/17/2025 0844 by Rebecca Flores RN  Outcome: Met  1/17/2025 0844 by Rebecca Flores RN  Outcome: Progressing

## 2025-01-18 ENCOUNTER — PATIENT MESSAGE (OUTPATIENT)
Dept: OBSTETRICS AND GYNECOLOGY | Facility: CLINIC | Age: 40
End: 2025-01-18
Payer: COMMERCIAL

## 2025-01-20 ENCOUNTER — PATIENT MESSAGE (OUTPATIENT)
Dept: HEMATOLOGY/ONCOLOGY | Facility: CLINIC | Age: 40
End: 2025-01-20
Payer: COMMERCIAL

## 2025-01-22 DIAGNOSIS — M54.2 NECK PAIN: ICD-10-CM

## 2025-01-22 DIAGNOSIS — C82.31 GRADE 3A FOLLICULAR LYMPHOMA OF LYMPH NODES OF NECK: ICD-10-CM

## 2025-01-22 DIAGNOSIS — C82.91 FOLLICULAR LYMPHOMA OF LYMPH NODES OF NECK, UNSPECIFIED FOLLICULAR LYMPHOMA TYPE: Primary | ICD-10-CM

## 2025-01-23 ENCOUNTER — INFUSION (OUTPATIENT)
Dept: INFUSION THERAPY | Facility: HOSPITAL | Age: 40
End: 2025-01-23
Attending: INTERNAL MEDICINE
Payer: COMMERCIAL

## 2025-01-23 ENCOUNTER — PATIENT MESSAGE (OUTPATIENT)
Dept: ORTHOPEDICS | Facility: CLINIC | Age: 40
End: 2025-01-23
Payer: COMMERCIAL

## 2025-01-23 VITALS
RESPIRATION RATE: 16 BRPM | HEIGHT: 61 IN | HEART RATE: 90 BPM | TEMPERATURE: 98 F | BODY MASS INDEX: 44.55 KG/M2 | DIASTOLIC BLOOD PRESSURE: 65 MMHG | SYSTOLIC BLOOD PRESSURE: 119 MMHG | WEIGHT: 235.94 LBS | OXYGEN SATURATION: 98 %

## 2025-01-23 DIAGNOSIS — M17.11 PRIMARY OSTEOARTHRITIS OF RIGHT KNEE: Primary | ICD-10-CM

## 2025-01-23 DIAGNOSIS — E61.1 IRON DEFICIENCY: Primary | ICD-10-CM

## 2025-01-23 PROCEDURE — 25000003 PHARM REV CODE 250: Performed by: INTERNAL MEDICINE

## 2025-01-23 PROCEDURE — A4216 STERILE WATER/SALINE, 10 ML: HCPCS | Performed by: INTERNAL MEDICINE

## 2025-01-23 PROCEDURE — 63600175 PHARM REV CODE 636 W HCPCS: Mod: JZ,TB | Performed by: INTERNAL MEDICINE

## 2025-01-23 PROCEDURE — 96365 THER/PROPH/DIAG IV INF INIT: CPT

## 2025-01-23 RX ORDER — EPINEPHRINE 0.3 MG/.3ML
0.3 INJECTION SUBCUTANEOUS ONCE AS NEEDED
Status: DISCONTINUED | OUTPATIENT
Start: 2025-01-23 | End: 2025-01-23 | Stop reason: HOSPADM

## 2025-01-23 RX ORDER — HEPARIN 100 UNIT/ML
500 SYRINGE INTRAVENOUS
Status: DISCONTINUED | OUTPATIENT
Start: 2025-01-23 | End: 2025-01-23 | Stop reason: HOSPADM

## 2025-01-23 RX ORDER — SODIUM CHLORIDE 0.9 % (FLUSH) 0.9 %
10 SYRINGE (ML) INJECTION
Status: DISCONTINUED | OUTPATIENT
Start: 2025-01-23 | End: 2025-01-23 | Stop reason: HOSPADM

## 2025-01-23 RX ORDER — DIPHENHYDRAMINE HYDROCHLORIDE 50 MG/ML
50 INJECTION INTRAMUSCULAR; INTRAVENOUS ONCE AS NEEDED
Status: DISCONTINUED | OUTPATIENT
Start: 2025-01-23 | End: 2025-01-23 | Stop reason: HOSPADM

## 2025-01-23 RX ADMIN — Medication 10 ML: at 01:01

## 2025-01-23 RX ADMIN — FERRIC CARBOXYMALTOSE INJECTION 750 MG: 50 INJECTION, SOLUTION INTRAVENOUS at 01:01

## 2025-01-23 RX ADMIN — Medication 10 ML: at 02:01
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
## 2025-01-23 NOTE — PLAN OF CARE
Problem: Adult Inpatient Plan of Care  Goal: Optimal Comfort and Wellbeing  Outcome: Progressing  Intervention: Monitor Pain and Promote Comfort  Flowsheets (Taken 1/23/2025 1341)  Pain Management Interventions:   quiet environment facilitated   relaxation techniques promoted   warm blanket provided  Intervention: Provide Person-Centered Care  Flowsheets (Taken 1/23/2025 1341)  Trust Relationship/Rapport:   care explained   choices provided   emotional support provided   empathic listening provided   questions answered   questions encouraged   reassurance provided   thoughts/feelings acknowledged

## 2025-01-28 ENCOUNTER — OFFICE VISIT (OUTPATIENT)
Dept: ORTHOPEDICS | Facility: CLINIC | Age: 40
End: 2025-01-28
Payer: COMMERCIAL

## 2025-01-28 ENCOUNTER — HOSPITAL ENCOUNTER (OUTPATIENT)
Dept: RADIOLOGY | Facility: HOSPITAL | Age: 40
Discharge: HOME | End: 2025-01-28
Attending: ORTHOPAEDIC SURGERY
Payer: COMMERCIAL

## 2025-01-28 VITALS — HEIGHT: 61 IN | BODY MASS INDEX: 44.53 KG/M2 | WEIGHT: 235.88 LBS

## 2025-01-28 DIAGNOSIS — M17.11 PRIMARY OSTEOARTHRITIS OF RIGHT KNEE: Primary | ICD-10-CM

## 2025-01-28 DIAGNOSIS — F06.30 MOOD DISORDER DUE TO A GENERAL MEDICAL CONDITION: Primary | ICD-10-CM

## 2025-01-28 PROCEDURE — 1160F RVW MEDS BY RX/DR IN RCRD: CPT | Mod: CPTII,S$GLB,, | Performed by: ORTHOPAEDIC SURGERY

## 2025-01-28 PROCEDURE — 73562 X-RAY EXAM OF KNEE 3: CPT | Mod: 26,RT,, | Performed by: RADIOLOGY

## 2025-01-28 PROCEDURE — 99999 PR PBB SHADOW E&M-EST. PATIENT-LVL IV: CPT | Mod: PBBFAC,,, | Performed by: ORTHOPAEDIC SURGERY

## 2025-01-28 PROCEDURE — 73562 X-RAY EXAM OF KNEE 3: CPT | Mod: TC,PN,RT

## 2025-01-28 PROCEDURE — 20610 DRAIN/INJ JOINT/BURSA W/O US: CPT | Mod: RT,S$GLB,, | Performed by: ORTHOPAEDIC SURGERY

## 2025-01-28 PROCEDURE — 3008F BODY MASS INDEX DOCD: CPT | Mod: CPTII,S$GLB,, | Performed by: ORTHOPAEDIC SURGERY

## 2025-01-28 PROCEDURE — 99213 OFFICE O/P EST LOW 20 MIN: CPT | Mod: 25,S$GLB,, | Performed by: ORTHOPAEDIC SURGERY

## 2025-01-28 PROCEDURE — 1159F MED LIST DOCD IN RCRD: CPT | Mod: CPTII,S$GLB,, | Performed by: ORTHOPAEDIC SURGERY

## 2025-01-28 RX ORDER — TRIAMCINOLONE ACETONIDE 40 MG/ML
40 INJECTION, SUSPENSION INTRA-ARTICULAR; INTRAMUSCULAR
Status: DISCONTINUED | OUTPATIENT
Start: 2025-01-28 | End: 2025-01-28 | Stop reason: HOSPADM

## 2025-01-28 RX ORDER — ALPRAZOLAM 1 MG/1
2 TABLET ORAL 2 TIMES DAILY PRN
Qty: 120 TABLET | Refills: 0 | Status: SHIPPED | OUTPATIENT
Start: 2025-01-28 | End: 2025-02-27

## 2025-01-28 RX ORDER — BENZONATATE 100 MG/1
1 CAPSULE ORAL 3 TIMES DAILY PRN
COMMUNITY

## 2025-01-28 RX ORDER — CODEINE PHOSPHATE AND GUAIFENESIN 10; 100 MG/5ML; MG/5ML
SOLUTION ORAL
COMMUNITY

## 2025-01-28 RX ORDER — PROMETHAZINE HYDROCHLORIDE AND DEXTROMETHORPHAN HYDROBROMIDE 6.25; 15 MG/5ML; MG/5ML
SYRUP ORAL
COMMUNITY

## 2025-01-28 RX ADMIN — TRIAMCINOLONE ACETONIDE 40 MG: 40 INJECTION, SUSPENSION INTRA-ARTICULAR; INTRAMUSCULAR at 12:01

## 2025-01-28 NOTE — PROCEDURES
Large Joint Aspiration/Injection: R knee    Date/Time: 1/28/2025 12:00 PM    Performed by: Matthew Elliott MD  Authorized by: Matthew Elliott MD    Consent Done?:  Yes (Verbal)  Indications:  Pain and arthritis  Site marked: the procedure site was marked    Timeout: prior to procedure the correct patient, procedure, and site was verified    Prep: patient was prepped and draped in usual sterile fashion      Local anesthesia used?: Yes    Local anesthetic:  Lidocaine 1% without epinephrine    Details:  Needle Size:  25 G  Ultrasonic Guidance for needle placement?: No    Approach:  Anterolateral  Location:  Knee  Site:  R knee  Medications:  40 mg triamcinolone acetonide 40 mg/mL  Patient tolerance:  Patient tolerated the procedure well with no immediate complications

## 2025-01-28 NOTE — PROGRESS NOTES
[facility]    Subjective:     Chief Complaint:   Chief Complaint   Patient presents with    Right Knee - Pain     Patient is here for a f/up  on Right knee injection 6.18.24, states her pain has gotten worse since last visit. Had popping as well as occasional feeling of wanting to give way. Injection did offer relief        Past Medical History:   Diagnosis Date    Allergic rhinitis     Anxiety     Cancer 12/01/2021    B-cell folicular lymphoma    Depression     Hypothyroidism     SVT (supraventricular tachycardia) 2011       Past Surgical History:   Procedure Laterality Date    ABLATION OF DYSRHYTHMIC FOCUS  2016    x2    BIOPSY OF CERVICAL LYMPH NODE Left 11/29/2021    Procedure: BIOPSY, LYMPH NODE, CERVICAL;  Surgeon: Blanco Kerns MD;  Location: [facility] OR;  Service: General;  Laterality: Left;    CERVIX LESION DESTRUCTION  11/2021    CONIZATION OF CERVIX USING LOOP ELECTROSURGICAL EXCISION PROCEDURE (LEEP) N/A 07/21/2021    Procedure: LEEP CONIZATION, CERVIX;  Surgeon: Donna Castillo MD;  Location: [facility] OR;  Service: OB/GYN;  Laterality: N/A;    EXCISION OF LESION OF NOSE N/A 10/14/2019    Procedure: EXCISION, LESION, NOSE;  Surgeon: Umesh Morales MD;  Location: [facility] OR;  Service: ENT;  Laterality: N/A;    FUNCTIONAL ENDOSCOPIC SINUS SURGERY (FESS) N/A 10/14/2019    Procedure: FESS (FUNCTIONAL ENDOSCOPIC SINUS SURGERY);  Surgeon: Umesh Morales MD;  Location: [facility] OR;  Service: ENT;  Laterality: N/A;    HYSTEROSCOPY WITH DILATION AND CURETTAGE OF UTERUS N/A 07/21/2021    Procedure: HYSTEROSCOPY, WITH DILATION AND CURETTAGE OF UTERUS;  Surgeon: Donna Castillo MD;  Location: [facility] OR;  Service: OB/GYN;  Laterality: N/A;    KNEE ARTHROSCOPY W/ MENISCECTOMY Right 02/07/2024    Procedure: ARTHROSCOPY, KNEE, WITH MENISCECTOMY;  Surgeon: Matthew Elliott MD;  Location: [facility] OR;  Service: Orthopedics;  Laterality: Right;  partial medical meniscectomy and plica resection    MEDIPORT REMOVAL Right  06/20/2022    Procedure: REMOVAL, CATHETER, CENTRAL VENOUS, TUNNELED, WITH PORT;  Surgeon: Blanco Kerns MD;  Location: [facility] OR;  Service: General;  Laterality: Right;    NASAL SEPTOPLASTY N/A 10/14/2019    Procedure: SEPTOPLASTY, NOSE;  Surgeon: Umesh Morales MD;  Location: [facility] OR;  Service: ENT;  Laterality: N/A;    NASAL SINUS SURGERY  10/14/2019    OSTEOTOMY OF METATARSAL BONE Left 12/21/2022    Procedure: OSTEOTOMY, METATARSAL BONE;  Surgeon: Hola Escalante DPM;  Location: [facility] OR;  Service: Podiatry;  Laterality: Left;  synthes jnaes notified 12/19 TW  , RELEASE ENTRAPPED NERVE 2ND INTERMETATARSAL SPACE    PILONIDAL CYST DRAINAGE      REVISION OF SCAR  06/20/2022    Procedure: REVISION, SCAR;  Surgeon: Blanco Kerns MD;  Location: [facility] OR;  Service: General;;    SINUS SURGERY  11/30/2023    SKIN TAG REMOVAL Left 06/20/2022    Procedure: REMOVAL, SKIN TAG;  Surgeon: Blanco Kerns MD;  Location: [facility] OR;  Service: General;  Laterality: Left;    TONSILLECTOMY      TYMPANOPLASTY N/A 10/14/2019    Procedure: TYMPANOPLASTY;  Surgeon: Umesh Morales MD;  Location: [facility] OR;  Service: ENT;  Laterality: N/A;    TYMPANOTOMY Left 10/14/2019    left       Current Outpatient Medications   Medication Sig    azelastine (ASTELIN) 137 mcg (0.1 %) nasal spray 1 spray (137 mcg total) by Nasal route 2 (two) times daily as needed for Rhinitis.    benzocaine-menthoL 6-10 mg lozenge Take 1 lozenge by mouth every 2 (two) hours as needed (Sore Throat).    benzonatate (TESSALON) 100 MG capsule Take 1 capsule by mouth 3 times daily as needed.    busPIRone (BUSPAR) 15 MG tablet Take 1 tablet (15 mg total) by mouth 2 (two) times daily.    butalbital-acetaminophen-caff -40 mg -40 mg Cap Take 1 capsule by mouth every 4 (four) hours as needed.    cetirizine (ZYRTEC) 10 MG tablet Take 1 tablet (10 mg total) by mouth every evening.    fluticasone propionate (FLONASE) 50 mcg/actuation nasal spray 1 spray (50 mcg  total) by Each Nostril route once daily.    gabapentin (NEURONTIN) 600 MG tablet Take 600 mg by mouth 3 (three) times daily as needed.    guaiFENesin-codeine 100-10 mg/5 ml (TUSSI-ORGANIDIN NR)  mg/5 mL syrup TAKE 10ML BY MOUTH EVERY 4-6 HOURS AS NEEDED FOR COUGH    ibuprofen (ADVIL,MOTRIN) 800 MG tablet Take 1 tablet (800 mg total) by mouth every 8 (eight) hours as needed for Pain.    levothyroxine (SYNTHROID) 50 MCG tablet Take 1 tablet (50 mcg total) by mouth before breakfast.    multivitamin capsule Take 1 capsule by mouth.    naproxen (EC NAPROSYN) 500 MG EC tablet Take 1 tablet by mouth 2 (two) times daily with meals.    promethazine-dextromethorphan (PROMETHAZINE-DM) 6.25-15 mg/5 mL Syrp TAKE 5 MLS BY MOUTH NIGHTLY AS NEEDED (NIGHT TIME COUGH).    pyrilamine-phenylephrine-DM 12.5-5-7.5 mg/5 mL Liqd TAKE 10 ML BY MOUTH EVERY 6 HOURS AS NEEDED FOR COUGH    tirzepatide, weight loss, (ZEPBOUND) 2.5 mg/0.5 mL PnIj Inject 2.5 mg into the skin every 7 days.    tirzepatide, weight loss, (ZEPBOUND) 5 mg/0.5 mL PnIj Inject 5 mg into the skin every 7 days.    albuterol (PROVENTIL HFA) 90 mcg/actuation inhaler Inhale 2 puffs into the lungs every 6 (six) hours as needed for Wheezing. Rescue     No current facility-administered medications for this visit.       Review of patient's allergies indicates:  No Known Allergies    Family History   Problem Relation Name Age of Onset    Cataracts Mother Nadira Hernandez     Glaucoma Mother Nadira Hernandez     Hypertension Mother Nadira Hernandez     Heart disease Father Dirk hernandez 59        CABG    Heart failure Father Dirk hernandez     Breast cancer Maternal Aunt  55    Colon cancer Neg Hx      Ovarian cancer Neg Hx      Macular degeneration Neg Hx      Retinal detachment Neg Hx         Social History     Socioeconomic History    Marital status: Single   Occupational History    Occupation: [employer]     Comment: [employer]   Tobacco Use    Smoking status: Former     Current packs/day:  "0.00     Average packs/day: 0.5 packs/day for 9.0 years (4.5 ttl pk-yrs)     Types: Cigarettes     Start date: 7/2/2000     Quit date: 7/2/2009     Years since quitting: 15.5    Smokeless tobacco: Never   Substance and Sexual Activity    Alcohol use: Yes     Alcohol/week: 5.0 standard drinks of alcohol     Types: 5 Shots of liquor per week     Comment: "occasionally"    Drug use: No    Sexual activity: Not Currently     Partners: Male     Birth control/protection: OCP   Social History Narrative    The patient does not exercise regularly ().  Rates diet as fair.  She is not satisfied with weight.         Social Drivers of Health     Financial Resource Strain: Medium Risk (4/23/2024)    Overall Financial Resource Strain (CARDIA)     Difficulty of Paying Living Expenses: Somewhat hard   Food Insecurity: Food Insecurity Present (4/23/2024)    Hunger Vital Sign     Worried About Running Out of Food in the Last Year: Sometimes true     Ran Out of Food in the Last Year: Sometimes true   Transportation Needs: No Transportation Needs (4/23/2024)    PRAPARE - Transportation     Lack of Transportation (Medical): No     Lack of Transportation (Non-Medical): No   Physical Activity: Sufficiently Active (4/23/2024)    Exercise Vital Sign     Days of Exercise per Week: 6 days     Minutes of Exercise per Session: 120 min   Stress: Stress Concern Present (4/23/2024)    [employer] [place] [employer] - Occupational Stress Questionnaire     Feeling of Stress : Very much   Housing Stability: High Risk (4/23/2024)    Housing Stability Vital Sign     Unable to Pay for Housing in the Last Year: Yes       History of present illness:  Patient comes in today for follow-up for her right knee.  She was last here about 6 months ago and had her viscosupplementation.  It was beneficial for a period of time.  She is interested in repeat corticosteroid injection and viscosupplementation.  She has known patellofemoral arthrosis.  We are " trying to delay the inevitable total knee arthroplasty that she will likely need at some point in the future in her lifetime.    Review of Systems:    Constitution: Negative for chills, fever, and sweats.  Negative for unexplained weight loss.    HENT:  Negative for headaches and blurry vision.    Cardiovascular:Negative for chest pain or irregular heart beat. Negative for hypertension.    Respiratory:  Negative for cough and shortness of breath.    Gastrointestinal: Negative for abdominal pain, heartburn, melena, nausea, and vomitting.    Genitourinary:  Negative bladder incontinence and dysuria.    Musculoskeletal:  See HPI for details.     Neurological: Negative for numbness.    Psychiatric/Behavioral: Negative for depression.  The patient is not nervous/anxious.      Endocrine: Negative for polyuria    Hematologic/Lymphatic: Negative for bleeding problem.  Does not bruise/bleed easily.    Skin: Negative for poor would healing and rash    Objective:      Physical Examination:    Vital Signs:  There were no vitals filed for this visit.    Body mass index is 44.57 kg/m².    This a well-developed, well nourished patient in no acute distress.  They are alert and oriented and cooperative to examination.        Right knee exam: Skin to right knee clean dry and intact.  No erythema or ecchymosis.  No signs or symptoms of infection.  She is neurovascularly intact throughout the right lower extremity.  Positive patellofemoral crepitus with range of motioning.  She can weightbear as tolerated right lower extremity.  Nonantalgic gait.  Right knee range of motion 0-120 degrees.  The knee is stable to varus and valgus stresses.      Pertinent New Results:    XRAY Report / Interpretation:   Three views taken of the right knee today:  AP, lateral, and sunrise views.  She has degenerative changes in the patellofemoral compartment.  No acute fractures or dislocations seen.    Assessment/Plan:      .  Right knee patellofemoral  "arthrosis.      I injected the right knee today via an anterolateral approach with 40 mg of Kenalog and lidocaine.  She tolerated this well.  We will work towards getting viscosupplementation authorization.  We will see her back in 2-3 weeks to administer.    Alberto Scott, Physician Assistant, served in the capacity as a "scribe" for this patient encounter.  A "face-to-face" encounter occurred with Dr. Matthew Elliott on this date.  The treatment plan and medical decision-making is outlined above. Patient was seen and examined with a chaperone.       This note was created using Dragon voice recognition software that occasionally misinterpreted phrases or words.          "

## 2025-02-05 ENCOUNTER — INFUSION (OUTPATIENT)
Dept: INFUSION THERAPY | Facility: HOSPITAL | Age: 40
End: 2025-02-05
Attending: INTERNAL MEDICINE
Payer: COMMERCIAL

## 2025-02-05 VITALS
TEMPERATURE: 98 F | SYSTOLIC BLOOD PRESSURE: 125 MMHG | DIASTOLIC BLOOD PRESSURE: 86 MMHG | HEIGHT: 61 IN | BODY MASS INDEX: 44.75 KG/M2 | RESPIRATION RATE: 18 BRPM | OXYGEN SATURATION: 96 % | HEART RATE: 88 BPM | WEIGHT: 237 LBS

## 2025-02-05 DIAGNOSIS — Z79.899 IMMUNODEFICIENCY DUE TO DRUGS: Primary | ICD-10-CM

## 2025-02-05 DIAGNOSIS — D84.821 IMMUNODEFICIENCY DUE TO DRUGS: Primary | ICD-10-CM

## 2025-02-05 PROCEDURE — 25000003 PHARM REV CODE 250: Performed by: INTERNAL MEDICINE

## 2025-02-05 PROCEDURE — 96367 TX/PROPH/DG ADDL SEQ IV INF: CPT

## 2025-02-05 PROCEDURE — 96365 THER/PROPH/DIAG IV INF INIT: CPT

## 2025-02-05 PROCEDURE — 96375 TX/PRO/DX INJ NEW DRUG ADDON: CPT

## 2025-02-05 PROCEDURE — 63600175 PHARM REV CODE 636 W HCPCS: Mod: JZ,TB | Performed by: INTERNAL MEDICINE

## 2025-02-05 RX ORDER — FAMOTIDINE 10 MG/ML
20 INJECTION INTRAVENOUS
OUTPATIENT
Start: 2025-03-05

## 2025-02-05 RX ORDER — SODIUM CHLORIDE 0.9 % (FLUSH) 0.9 %
10 SYRINGE (ML) INJECTION
Status: DISCONTINUED | OUTPATIENT
Start: 2025-02-05 | End: 2025-02-05 | Stop reason: HOSPADM

## 2025-02-05 RX ORDER — DIPHENHYDRAMINE HYDROCHLORIDE 50 MG/ML
25 INJECTION INTRAMUSCULAR; INTRAVENOUS
OUTPATIENT
Start: 2025-03-05

## 2025-02-05 RX ORDER — ACETAMINOPHEN 325 MG/1
650 TABLET ORAL
Status: COMPLETED | OUTPATIENT
Start: 2025-02-05 | End: 2025-02-05

## 2025-02-05 RX ORDER — FAMOTIDINE 10 MG/ML
20 INJECTION INTRAVENOUS
Status: COMPLETED | OUTPATIENT
Start: 2025-02-05 | End: 2025-02-05

## 2025-02-05 RX ORDER — SODIUM CHLORIDE 0.9 % (FLUSH) 0.9 %
10 SYRINGE (ML) INJECTION
OUTPATIENT
Start: 2025-03-05

## 2025-02-05 RX ORDER — HEPARIN 100 UNIT/ML
500 SYRINGE INTRAVENOUS
OUTPATIENT
Start: 2025-03-05

## 2025-02-05 RX ORDER — ACETAMINOPHEN 325 MG/1
650 TABLET ORAL
OUTPATIENT
Start: 2025-03-05

## 2025-02-05 RX ADMIN — SODIUM CHLORIDE 25 MG: 9 INJECTION, SOLUTION INTRAVENOUS at 08:02

## 2025-02-05 RX ADMIN — SODIUM CHLORIDE: 9 INJECTION, SOLUTION INTRAVENOUS at 08:02

## 2025-02-05 RX ADMIN — ACETAMINOPHEN 650 MG: 325 TABLET ORAL at 08:02

## 2025-02-05 RX ADMIN — HUMAN IMMUNOGLOBULIN G 10 G: 10 LIQUID INTRAVENOUS at 09:02

## 2025-02-05 RX ADMIN — FAMOTIDINE 20 MG: 10 INJECTION INTRAVENOUS at 08:02

## 2025-02-05 NOTE — PLAN OF CARE
Problem: Fatigue  Goal: Improved Activity Tolerance  Intervention: Promote Improved Energy  Flowsheets (Taken 2/5/2025 9538)  Fatigue Management: fatigue-related activity identified  Activity Management: Ambulated -L4

## 2025-02-06 ENCOUNTER — ANESTHESIA EVENT (OUTPATIENT)
Dept: SURGERY | Facility: HOSPITAL | Age: 40
End: 2025-02-06
Payer: COMMERCIAL

## 2025-02-07 ENCOUNTER — HOSPITAL ENCOUNTER (OUTPATIENT)
Facility: HOSPITAL | Age: 40
Discharge: HOME | End: 2025-02-07
Attending: STUDENT IN AN ORGANIZED HEALTH CARE EDUCATION/TRAINING PROGRAM | Admitting: STUDENT IN AN ORGANIZED HEALTH CARE EDUCATION/TRAINING PROGRAM
Payer: COMMERCIAL

## 2025-02-07 ENCOUNTER — ANESTHESIA (OUTPATIENT)
Dept: SURGERY | Facility: HOSPITAL | Age: 40
End: 2025-02-07
Payer: COMMERCIAL

## 2025-02-07 VITALS
RESPIRATION RATE: 20 BRPM | WEIGHT: 235.88 LBS | HEART RATE: 90 BPM | SYSTOLIC BLOOD PRESSURE: 160 MMHG | DIASTOLIC BLOOD PRESSURE: 94 MMHG | HEIGHT: 61 IN | TEMPERATURE: 98 F | BODY MASS INDEX: 44.53 KG/M2 | OXYGEN SATURATION: 96 %

## 2025-02-07 DIAGNOSIS — J32.4 CHRONIC PANSINUSITIS: Primary | ICD-10-CM

## 2025-02-07 LAB
B-HCG UR QL: NEGATIVE
CTP QC/QA: YES

## 2025-02-07 PROCEDURE — 37000008 HC ANESTHESIA 1ST 15 MINUTES: Performed by: STUDENT IN AN ORGANIZED HEALTH CARE EDUCATION/TRAINING PROGRAM

## 2025-02-07 PROCEDURE — 63600175 PHARM REV CODE 636 W HCPCS: Performed by: STUDENT IN AN ORGANIZED HEALTH CARE EDUCATION/TRAINING PROGRAM

## 2025-02-07 PROCEDURE — 63600175 PHARM REV CODE 636 W HCPCS: Performed by: NURSE ANESTHETIST, CERTIFIED REGISTERED

## 2025-02-07 PROCEDURE — 25000003 PHARM REV CODE 250: Performed by: ANESTHESIOLOGY

## 2025-02-07 PROCEDURE — 63600175 PHARM REV CODE 636 W HCPCS: Performed by: ANESTHESIOLOGY

## 2025-02-07 PROCEDURE — 25000003 PHARM REV CODE 250: Performed by: STUDENT IN AN ORGANIZED HEALTH CARE EDUCATION/TRAINING PROGRAM

## 2025-02-07 PROCEDURE — 25000003 PHARM REV CODE 250: Performed by: NURSE ANESTHETIST, CERTIFIED REGISTERED

## 2025-02-07 PROCEDURE — 81025 URINE PREGNANCY TEST: CPT | Performed by: STUDENT IN AN ORGANIZED HEALTH CARE EDUCATION/TRAINING PROGRAM

## 2025-02-07 PROCEDURE — 71000039 HC RECOVERY, EACH ADD'L HOUR: Performed by: STUDENT IN AN ORGANIZED HEALTH CARE EDUCATION/TRAINING PROGRAM

## 2025-02-07 PROCEDURE — 27201423 OPTIME MED/SURG SUP & DEVICES STERILE SUPPLY: Performed by: STUDENT IN AN ORGANIZED HEALTH CARE EDUCATION/TRAINING PROGRAM

## 2025-02-07 PROCEDURE — 37000009 HC ANESTHESIA EA ADD 15 MINS: Performed by: STUDENT IN AN ORGANIZED HEALTH CARE EDUCATION/TRAINING PROGRAM

## 2025-02-07 PROCEDURE — 36000710: Performed by: STUDENT IN AN ORGANIZED HEALTH CARE EDUCATION/TRAINING PROGRAM

## 2025-02-07 PROCEDURE — 36000711: Performed by: STUDENT IN AN ORGANIZED HEALTH CARE EDUCATION/TRAINING PROGRAM

## 2025-02-07 PROCEDURE — 71000033 HC RECOVERY, INTIAL HOUR: Performed by: STUDENT IN AN ORGANIZED HEALTH CARE EDUCATION/TRAINING PROGRAM

## 2025-02-07 RX ORDER — DEXAMETHASONE SODIUM PHOSPHATE 4 MG/ML
12 INJECTION, SOLUTION INTRA-ARTICULAR; INTRALESIONAL; INTRAMUSCULAR; INTRAVENOUS; SOFT TISSUE
Status: DISCONTINUED | OUTPATIENT
Start: 2025-02-07 | End: 2025-02-07 | Stop reason: HOSPADM

## 2025-02-07 RX ORDER — SUCCINYLCHOLINE CHLORIDE 20 MG/ML
INJECTION INTRAMUSCULAR; INTRAVENOUS
Status: DISCONTINUED | OUTPATIENT
Start: 2025-02-07 | End: 2025-02-07

## 2025-02-07 RX ORDER — SODIUM CHLORIDE, SODIUM LACTATE, POTASSIUM CHLORIDE, CALCIUM CHLORIDE 600; 310; 30; 20 MG/100ML; MG/100ML; MG/100ML; MG/100ML
125 INJECTION, SOLUTION INTRAVENOUS CONTINUOUS
Status: DISCONTINUED | OUTPATIENT
Start: 2025-02-07 | End: 2025-02-07 | Stop reason: HOSPADM

## 2025-02-07 RX ORDER — CEFAZOLIN SODIUM 1 G/3ML
2 INJECTION, POWDER, FOR SOLUTION INTRAMUSCULAR; INTRAVENOUS ONCE
Status: COMPLETED | OUTPATIENT
Start: 2025-02-07 | End: 2025-02-07

## 2025-02-07 RX ORDER — FENTANYL CITRATE 50 UG/ML
INJECTION, SOLUTION INTRAMUSCULAR; INTRAVENOUS
Status: DISCONTINUED | OUTPATIENT
Start: 2025-02-07 | End: 2025-02-07

## 2025-02-07 RX ORDER — ROCURONIUM BROMIDE 10 MG/ML
INJECTION, SOLUTION INTRAVENOUS
Status: DISCONTINUED | OUTPATIENT
Start: 2025-02-07 | End: 2025-02-07

## 2025-02-07 RX ORDER — OXYMETAZOLINE HCL 0.05 %
2 SPRAY, NON-AEROSOL (ML) NASAL ONCE
Status: COMPLETED | OUTPATIENT
Start: 2025-02-07 | End: 2025-02-07

## 2025-02-07 RX ORDER — ACETAMINOPHEN 10 MG/ML
INJECTION, SOLUTION INTRAVENOUS
Status: DISCONTINUED | OUTPATIENT
Start: 2025-02-07 | End: 2025-02-07

## 2025-02-07 RX ORDER — EPINEPHRINE 1 MG/ML
INJECTION, SOLUTION, CONCENTRATE INTRAVENOUS
Status: DISCONTINUED | OUTPATIENT
Start: 2025-02-07 | End: 2025-02-07 | Stop reason: HOSPADM

## 2025-02-07 RX ORDER — PROPOFOL 10 MG/ML
VIAL (ML) INTRAVENOUS
Status: DISCONTINUED | OUTPATIENT
Start: 2025-02-07 | End: 2025-02-07

## 2025-02-07 RX ORDER — FENTANYL CITRATE 50 UG/ML
25 INJECTION, SOLUTION INTRAMUSCULAR; INTRAVENOUS EVERY 5 MIN PRN
Status: COMPLETED | OUTPATIENT
Start: 2025-02-07 | End: 2025-02-07

## 2025-02-07 RX ORDER — DEXAMETHASONE SODIUM PHOSPHATE 4 MG/ML
INJECTION, SOLUTION INTRA-ARTICULAR; INTRALESIONAL; INTRAMUSCULAR; INTRAVENOUS; SOFT TISSUE
Status: DISCONTINUED | OUTPATIENT
Start: 2025-02-07 | End: 2025-02-07

## 2025-02-07 RX ORDER — LIDOCAINE HYDROCHLORIDE 10 MG/ML
1 INJECTION, SOLUTION EPIDURAL; INFILTRATION; INTRACAUDAL; PERINEURAL ONCE
Status: DISCONTINUED | OUTPATIENT
Start: 2025-02-07 | End: 2025-02-07 | Stop reason: HOSPADM

## 2025-02-07 RX ORDER — TRANEXAMIC ACID 100 MG/ML
INJECTION, SOLUTION INTRAVENOUS
Status: DISCONTINUED | OUTPATIENT
Start: 2025-02-07 | End: 2025-02-07

## 2025-02-07 RX ORDER — ONDANSETRON HYDROCHLORIDE 2 MG/ML
INJECTION, SOLUTION INTRAMUSCULAR; INTRAVENOUS
Status: DISCONTINUED | OUTPATIENT
Start: 2025-02-07 | End: 2025-02-07

## 2025-02-07 RX ORDER — SCOPOLAMINE 1 MG/3D
1 PATCH, EXTENDED RELEASE TRANSDERMAL
Status: DISCONTINUED | OUTPATIENT
Start: 2025-02-07 | End: 2025-02-07 | Stop reason: HOSPADM

## 2025-02-07 RX ORDER — SODIUM CHLORIDE, SODIUM LACTATE, POTASSIUM CHLORIDE, CALCIUM CHLORIDE 600; 310; 30; 20 MG/100ML; MG/100ML; MG/100ML; MG/100ML
INJECTION, SOLUTION INTRAVENOUS CONTINUOUS
Status: DISCONTINUED | OUTPATIENT
Start: 2025-02-07 | End: 2025-02-07 | Stop reason: HOSPADM

## 2025-02-07 RX ORDER — ONDANSETRON HYDROCHLORIDE 2 MG/ML
4 INJECTION, SOLUTION INTRAVENOUS ONCE AS NEEDED
Status: DISCONTINUED | OUTPATIENT
Start: 2025-02-07 | End: 2025-02-07 | Stop reason: HOSPADM

## 2025-02-07 RX ORDER — LIDOCAINE HYDROCHLORIDE 20 MG/ML
INJECTION INTRAVENOUS
Status: DISCONTINUED | OUTPATIENT
Start: 2025-02-07 | End: 2025-02-07

## 2025-02-07 RX ORDER — LIDOCAINE HYDROCHLORIDE AND EPINEPHRINE 10; 10 UG/ML; MG/ML
INJECTION, SOLUTION INFILTRATION; PERINEURAL
Status: DISCONTINUED | OUTPATIENT
Start: 2025-02-07 | End: 2025-02-07 | Stop reason: HOSPADM

## 2025-02-07 RX ORDER — OXYCODONE HYDROCHLORIDE 5 MG/1
5 TABLET ORAL ONCE AS NEEDED
Status: COMPLETED | OUTPATIENT
Start: 2025-02-07 | End: 2025-02-07

## 2025-02-07 RX ADMIN — FENTANYL CITRATE 25 MCG: 50 INJECTION INTRAMUSCULAR; INTRAVENOUS at 09:02

## 2025-02-07 RX ADMIN — ACETAMINOPHEN 1000 MG: 10 INJECTION INTRAVENOUS at 07:02

## 2025-02-07 RX ADMIN — Medication 2 SPRAY: at 07:02

## 2025-02-07 RX ADMIN — ROCURONIUM BROMIDE 5 MG: 10 INJECTION, SOLUTION INTRAVENOUS at 07:02

## 2025-02-07 RX ADMIN — PROPOFOL 20 MG: 10 INJECTION, EMULSION INTRAVENOUS at 07:02

## 2025-02-07 RX ADMIN — LIDOCAINE HYDROCHLORIDE 100 MG: 20 INJECTION, SOLUTION INTRAVENOUS at 07:02

## 2025-02-07 RX ADMIN — PROPOFOL 150 MG: 10 INJECTION, EMULSION INTRAVENOUS at 07:02

## 2025-02-07 RX ADMIN — FENTANYL CITRATE 100 MCG: 50 INJECTION, SOLUTION INTRAMUSCULAR; INTRAVENOUS at 07:02

## 2025-02-07 RX ADMIN — ONDANSETRON 4 MG: 2 INJECTION INTRAMUSCULAR; INTRAVENOUS at 07:02

## 2025-02-07 RX ADMIN — ONDANSETRON 4 MG: 2 INJECTION INTRAMUSCULAR; INTRAVENOUS at 08:02

## 2025-02-07 RX ADMIN — SCOPOLAMINE 1 PATCH: 1.5 PATCH, EXTENDED RELEASE TRANSDERMAL at 07:02

## 2025-02-07 RX ADMIN — SUCCINYLCHOLINE CHLORIDE 150 MG: 20 INJECTION, SOLUTION INTRAMUSCULAR; INTRAVENOUS; PARENTERAL at 07:02

## 2025-02-07 RX ADMIN — FENTANYL CITRATE 25 MCG: 50 INJECTION INTRAMUSCULAR; INTRAVENOUS at 10:02

## 2025-02-07 RX ADMIN — CEFAZOLIN 2 G: 1 INJECTION, POWDER, FOR SOLUTION INTRAVENOUS at 07:02

## 2025-02-07 RX ADMIN — TRANEXAMIC ACID 1000 MG: 100 INJECTION, SOLUTION INTRAVENOUS at 07:02

## 2025-02-07 RX ADMIN — OXYCODONE HYDROCHLORIDE 5 MG: 5 TABLET ORAL at 09:02

## 2025-02-07 RX ADMIN — SODIUM CHLORIDE, POTASSIUM CHLORIDE, SODIUM LACTATE AND CALCIUM CHLORIDE: 600; 310; 30; 20 INJECTION, SOLUTION INTRAVENOUS at 07:02

## 2025-02-07 RX ADMIN — DEXAMETHASONE SODIUM PHOSPHATE 12 MG: 4 INJECTION, SOLUTION INTRAMUSCULAR; INTRAVENOUS at 07:02

## 2025-02-07 NOTE — PLAN OF CARE
1053- Stable, states ready to go home, jelani po fluids,pain tolerable, to car per wc with RN to friend

## 2025-02-07 NOTE — ANESTHESIA POSTPROCEDURE EVALUATION
Anesthesia Post Evaluation    Patient: Jerry Coronado    Procedure(s) Performed: Procedure(s) (LRB):  FESS, USING COMPUTER-ASSISTED NAVIGATION, WITH NASAL TURBINATE REDUCTION (N/A)  ENDOSCOPY, NOSE OR PARANASAL SINUS, WITH MAXILLARY SINUS TISSUE REMOVAL (Bilateral)  ETHMOIDECTOMY, PARTIAL, ENDOSCOPIC (Bilateral)  ETHMOIDECTOMY, TOTAL, ENDOSCOPIC (Bilateral)  SEPTOPLASTY, NOSE (N/A)  EXCISION, NASAL TURBINATE (Bilateral)  SURGICAL FRACTURE, NASAL TURBINATE (Bilateral)    Final Anesthesia Type: general      Patient location during evaluation: PACU  Patient participation: Yes- Able to Participate  Level of consciousness: awake and alert and oriented  Post-procedure vital signs: reviewed and stable  Pain management: adequate  Airway patency: patent    PONV status at discharge: No PONV  Anesthetic complications: no      Cardiovascular status: blood pressure returned to baseline and stable  Respiratory status: unassisted and spontaneous ventilation  Hydration status: euvolemic  Follow-up not needed.              Vitals Value Taken Time   /105 02/07/25 0929   Temp   02/07/25 0931   Pulse 91 02/07/25 0930   Resp 20 02/07/25 0915   SpO2 98 % 02/07/25 0930   Vitals shown include unfiled device data.      No case tracking events are documented in the log.      Pain/Grisel Score: Grisel Score: 8 (2/7/2025  9:07 AM)

## 2025-02-07 NOTE — ANESTHESIA PREPROCEDURE EVALUATION
02/07/2025  Jerry Coronado is a 39 y.o., female.      Pre-op Assessment    I have reviewed the Patient Summary Reports.     I have reviewed the Nursing Notes. I have reviewed the NPO Status.   I have reviewed the Medications.     Review of Systems  Anesthesia Hx:  No problems with previous Anesthesia  Difficult Intubation due to narrow mouth opening              Social:  Former Smoker       Hematology/Oncology:                        --  Cancer in past history (follicular lymphoma):              chemotherapy       EENT/Dental:   TMJ issues - limited mouth opening          Cardiovascular:         Dysrhythmias (SVT - ablated 2013 and none since)                                      Pulmonary:        Sleep Apnea                Renal/:  Renal/ Normal                 Neurological:    Neuromuscular Disease,                                   Endocrine:   Hypothyroidism        Morbid Obesity / BMI > 40  Psych:  Psychiatric History anxiety depression              Physical Exam  General: Well nourished, Cooperative, Alert and Oriented    Airway:  Mallampati: II   Mouth Opening: Normal  TM Distance: Normal  Neck ROM: Normal ROM    Anesthesia Plan  Type of Anesthesia, risks & benefits discussed:    Anesthesia Type: Gen ETT, Gen Supraglottic Airway, Gen Natural Airway, MAC  Intra-op Monitoring Plan: Standard ASA Monitors  Post Op Pain Control Plan: multimodal analgesia  Induction:  IV  Airway Plan: Direct, Video and Fiberoptic, Post-Induction  Informed Consent: Informed consent signed with the Patient and all parties understand the risks and agree with anesthesia plan.  All questions answered.   ASA Score: 3    Ready For Surgery From Anesthesia Perspective.   .

## 2025-02-07 NOTE — ANESTHESIA PROCEDURE NOTES
Intubation    Date/Time: 2/7/2025 7:22 AM    Performed by: Aristeo Granado CRNA  Authorized by: Arie Harrington MD    Intubation:     Induction:  Intravenous    Intubated:  Postinduction    Mask Ventilation:  Easy mask    Attempts:  1    Attempted By:  CRNA    Method of Intubation:  Video laryngoscopy    Blade:  Wheatley 3    Laryngeal View Grade: Grade IIA - cords partially seen      Difficult Airway Encountered?: No      Complications:  None    Airway Device:  Oral endotracheal tube    Airway Device Size:  7.0    Style/Cuff Inflation:  Cuffed    Tube secured:  23    Secured at:  The lips    Placement Verified By:  Capnometry    Complicating Factors:  Small mouth, short neck, obesity, poor neck/head extension and anterior larynx    Findings Post-Intubation:  BS equal bilateral  Notes:      Unremarkable Wheatley intubation, pt would be difficult if not impossible DL

## 2025-02-07 NOTE — TRANSFER OF CARE
"Anesthesia Transfer of Care Note    Patient: Jerry Coronado    Procedure(s) Performed: Procedure(s) (LRB):  FESS, USING COMPUTER-ASSISTED NAVIGATION, WITH NASAL TURBINATE REDUCTION (N/A)  ENDOSCOPY, NOSE OR PARANASAL SINUS, WITH MAXILLARY SINUS TISSUE REMOVAL (Bilateral)  ETHMOIDECTOMY, PARTIAL, ENDOSCOPIC (Bilateral)  ETHMOIDECTOMY, TOTAL, ENDOSCOPIC (Bilateral)  SEPTOPLASTY, NOSE (N/A)  EXCISION, NASAL TURBINATE (Bilateral)  SURGICAL FRACTURE, NASAL TURBINATE (Bilateral)    Patient location: PACU    Anesthesia Type: general    Transport from OR: Transported from OR on 2-3 L/min O2 by NC with adequate spontaneous ventilation    Post pain: adequate analgesia    Post assessment: no apparent anesthetic complications    Post vital signs: stable    Level of consciousness: awake and alert    Nausea/Vomiting: no nausea/vomiting    Complications: none    Transfer of care protocol was followed      Last vitals: Visit Vitals  BP (!) 131/90 (BP Location: Right arm, Patient Position: Sitting)   Pulse 84   Temp 36.8 °C (98.2 °F) (Skin)   Resp 16   Ht 5' 1" (1.549 m)   Wt 107 kg (235 lb 14.3 oz)   SpO2 98%   Breastfeeding No   BMI 44.57 kg/m²     "

## 2025-02-07 NOTE — DISCHARGE INSTRUCTIONS
Postoperative Sinus and Nasal Surgery Instructions  Sleep with your head slightly elevated for 2-3 days.    No heavy lifting or straining for 7 days.    Do not blow your nose or sniff forcefully.    Sneeze with your mouth open if possible.    It is OK to wipe your nose gently  .  It is normal to have mild bloody drainage for the first 24 to 48 hours.  If the bleeding worsens or persists, sit up and spray your nose with Afrin or generic oxymetazoline, 2-3 sprays in each side.  If the bleeding still does not stop, call your physician.    Starting the morning after surgery, unless you are instructed otherwise, wash your nose out with salt water twice a day per the instructions on the NASAL SALINE INSTRUCTION sheet. Continue this until you are told it is okay to stop. THIS IS VERY IMPORTANT FOR PROPER HEALING.    Use your nasal steroid spray, if prescribed, twice a day after irrigating with salt water starting the day after surgery.    Take your antibiotic or oral steroid pills if prescribed.    Take pain medicine as needed. If the pain is mild, you may use Tylenol or generic acetamenophin. None til noon - you got in surgery at 8 am.  Your RX pain pill Oxycodone was give n at 930 ..   Avoid aspirin or other anti-inflammatory medications  .  If you have any trouble with your vision or bruising or swelling around the eyes, call your physician.    You will have either absorbable or removable packing in your nose. If it is removable, you will be given an appointment in the first day or two after surgery to have it removed. For Questions or Emergency Care: Call the office at [phone]. You may need to speak with the doctor on-call. [address] [place]  [address] [place] [address] Phone: [phone]  Fax: [phone]    Scopalamine Patch Instructions    You have a small patch that was placed behind your ear to prevent nausea  Remove it tomorrow being careful not to touch the medicine on it with your  fingers  Wash hands after removing

## 2025-02-11 NOTE — OP NOTE
SINONASAL OPERATIVE NOTE:    Patient: Jerry Coronado  : 83  Surgical facility: [facility]    Date of Surgery: 25    Attending:  Oli Westbrook MD    Anesthesia: General endotracheal anesthesia    Preoperative Diagnosis(es):  Chronic pansinusitis  Nasal obstruction  Nasal septal deviation  Bilateral inferior turbinate hypertrophy  Chronic allergic rhinitis  Bilateral lance bullosa    Postoperative Diagnosis(es):  same    Procedure(s):  54173 -bilateral excision inferior turbinate, partial or complete, any method  58653 - Septoplasty  84512 - Use of image guidance for sinus navigation  51567 - Bilateral lance bullosa resection  69506 - Bilateral maxillary antrostomy with removal of tissue   51541 - Bilateral total ethmoidectomy    Indications: This is a 39yoF with a history of the above. Physical exam revealed nasal obstruction bilaterally with nasal septal deviation, large inferior turbinates, lance bullosa bilaterally. Imaging results revealed pansinusitis in the maxillary and ethmoid sinuses despite prior surgery with some cells not opened. Recommendations were made for sinus surgery. The risks, benefits, and alternatives to sinus surgery were discussed with the patient who wished to proceed.    Anesthetic and Positioning: The patient was taken to the operating room and placed in a  supine position where general endotracheal anesthesia was established without difficulty. The  patient was placed in a beach chair position and the table was turned 90 degrees. The eyes  were taped shut laterally so that the medial eye could be viewed as needed and the patient was  adequately padded.    Image Guidance: A preoperative image guidance compatible scan was obtained due to the  indication of revision sinus surgery and abnormal anatomy, and was used to plan the surgery. The system was registered  and verified with an acceptable degree of accuracy. The system was used for navigation  throughout the  case.    Nasal Cavity Preparation:  Decongestion: Topical Afrin, topical epinephrine,   Injection: 1% lidocaine with 1: 100,000 epinephrine into the septum and turbinates    Nasal Endoscopy: The nasopharynx, inferior nasal cavity, middle meatus, olfactory region,  and sphenoethmoidal recess were examined bilaterally with nasal endoscopy. The remainder  of the case was performed via endoscopes working off a video monitor.    Findings:  Right:  Significant septal deviation diffusely posteriorly, large inferior turbinate, maxillary sinus not open, ethmoid sinus partially open,  lance bullosa present     Left:  Septum deviated over to the right posterior but left anterior low, inferior turbinate enlarged, large middle turbinate lance bullosa present, maxillary sinus closed, ethmoid sinus opened partially     Description of Procedure:  Left maxillary sinusotomy -the opening into the maxillary sinus was located with image guidance right angle suction and ball probe. uncinate process was removed followed by widely opening of the natural os with multiple through-cutting instruments and microdebrider.  Mucous and some tissue was removed from inside the sinus with microdebrider and suction  Right maxillary sinusotomy-the opening into the maxillary sinus was located with image guidance right angle suction and ball probe. uncinate process was removed followed by widely opening of the natural os with multiple through-cutting instruments and microdebrider.  Mucous and some tissue was removed from inside the sinus with microdebrider and suction    Total ethmoidectomy right-the anterior ethmoids were  partially opened and some remaining cells were entered through the bulla region with a J curette and thru cutting instruments and image guided suction followed by use of microdebrider and image guided instruments to remove the cells.  Dissection was moved back through the attachment of the middle turbinate into the posterior  ethmoids and care was taken not to injure the lamina papyracea over the skull base.  Multiple ethmoid cells had thick mucus and polypoid edema present  Total ethmoidectomy left- the anterior ethmoids were  partially opened and some remaining cells were entered through the bulla region with a J curette and thru cutting instruments and image guided suction followed by use of microdebrider and image guided instruments to remove the cells.  Dissection was moved back through the attachment of the middle turbinate into the posterior ethmoids and care was taken not to injure the lamina papyracea over the skull base.  Multiple ethmoid cells had thick mucus and polypoid edema present    Other Non-Sinus Procedures:    Septoplasty-the septum had a spur of bone and septal cartilage to the right side posteriorly.  This was able to be removed with a an incision over the deviated area then lifting of the mucosa carefully with a julianna. Multiple instruments including thru cut and julianna were used to removed the portion of septum then then them mucosa flap laid down. This was also done on the low lying deviation left anterior septum.    Bilateral inferior turbinate reduction submucosal-  Right - a freer was used to first infracture the inferior turbinate followed by [place]-Lee forceps and straight thru-cutting forceps were used to excise portions of the head of the inferior turbinate. Bleeding was stopped with epinephrine and pressure. A Boies elevator was then used to outfracture the inferior turbinate.  The nasopharynx was able to be visualized bilaterally at the completion of the turbinate reduction.    Left - a freer was used to first infracture the inferior turbinate followed by [place]-Lee forceps and straight thru-cutting forceps were used to excise portions of the head of the inferior turbinate. Bleeding was stopped with epinephrine and pressure . A Boies elevator was then used to outfracture the inferior  turbinate.  The nasopharynx was able to be visualized bilaterally at the completion of the turbinate reduction.      Left lance bullosa resection- on left side of the sinuses the middle turbinate was large and creating nasal obstruction and difficulty accessing the ethmoid and maxillary sinus cavities.  It was carefully removed with endoscopic sinus scissors leaving  small stump was left up near the frontal recess and a stump inferiorly.  This was removed with a straight graspers.    Right lance bullosa resection- on right side of the sinuses the middle turbinate was large and creating nasal obstruction and difficulty accessing the ethmoid and maxillary sinus cavities.  It was carefully removed with endoscopic sinus scissors leaving  small stump was left up near the frontal recess and a stump inferiorly.  This was removed with a straight graspers.      Hemostasis Method During Case:  Afrin pledgets, pledgets soaked with epinephrine 1-1000     Stents Placed:  none  Packing Placed:  Nasopore into bilateral ethmoid and maxillary cavity    Procedure Termination: Counts correct. Eyes checked for bruising.  Stomach suctioned with orogastric tube. Face cleaned. Patient extubated and taken to the recovery room in stable condition.    Complications: None    EBL: 50 cc    Specimen(s):  Sinus contents    Disposition:  Surgery related medications: Tylenol, augmentin, saline, Flonase, Percocet, zofran  Follow-up clinic visit: 1 week  Non-absorbable material to be removed at clinic visit: none  Condition: Stable    Photos: None

## 2025-02-14 ENCOUNTER — PATIENT MESSAGE (OUTPATIENT)
Dept: HEMATOLOGY/ONCOLOGY | Facility: CLINIC | Age: 40
End: 2025-02-14
Payer: COMMERCIAL

## 2025-02-14 ENCOUNTER — HOSPITAL ENCOUNTER (OUTPATIENT)
Dept: RADIOLOGY | Facility: HOSPITAL | Age: 40
Discharge: HOME | End: 2025-02-14
Attending: INTERNAL MEDICINE
Payer: COMMERCIAL

## 2025-02-14 VITALS — WEIGHT: 235 LBS | BODY MASS INDEX: 44.37 KG/M2 | HEIGHT: 61 IN

## 2025-02-14 DIAGNOSIS — M54.2 NECK PAIN: ICD-10-CM

## 2025-02-14 DIAGNOSIS — C82.31 GRADE 3A FOLLICULAR LYMPHOMA OF LYMPH NODES OF NECK: ICD-10-CM

## 2025-02-14 DIAGNOSIS — C82.91 FOLLICULAR LYMPHOMA OF LYMPH NODES OF NECK, UNSPECIFIED FOLLICULAR LYMPHOMA TYPE: ICD-10-CM

## 2025-02-14 PROCEDURE — 78816 PET IMAGE W/CT FULL BODY: CPT | Mod: TC,PO

## 2025-02-14 PROCEDURE — 78816 PET IMAGE W/CT FULL BODY: CPT | Mod: 26,PS,, | Performed by: RADIOLOGY

## 2025-02-14 PROCEDURE — A9552 F18 FDG: HCPCS | Mod: PO | Performed by: INTERNAL MEDICINE

## 2025-02-14 RX ORDER — FLUDEOXYGLUCOSE F18 500 MCI/ML
13 INJECTION INTRAVENOUS
Status: COMPLETED | OUTPATIENT
Start: 2025-02-14 | End: 2025-02-14

## 2025-02-14 RX ADMIN — FLUDEOXYGLUCOSE F-18 13 MILLICURIE: 500 INJECTION INTRAVENOUS at 01:02

## 2025-02-17 DIAGNOSIS — E04.1 RIGHT THYROID NODULE: Primary | ICD-10-CM

## 2025-02-17 LAB — POCT GLUCOSE: 95 MG/DL (ref 70–110)

## 2025-02-18 ENCOUNTER — OFFICE VISIT (OUTPATIENT)
Dept: ORTHOPEDICS | Facility: CLINIC | Age: 40
End: 2025-02-18
Payer: COMMERCIAL

## 2025-02-18 VITALS — HEIGHT: 61 IN | BODY MASS INDEX: 44.75 KG/M2 | WEIGHT: 237 LBS

## 2025-02-18 DIAGNOSIS — M17.11 PRIMARY OSTEOARTHRITIS OF RIGHT KNEE: Primary | ICD-10-CM

## 2025-02-18 PROCEDURE — 99999 PR PBB SHADOW E&M-EST. PATIENT-LVL IV: CPT | Mod: PBBFAC,,, | Performed by: ORTHOPAEDIC SURGERY

## 2025-02-18 RX ORDER — ONDANSETRON 4 MG/1
4 TABLET, ORALLY DISINTEGRATING ORAL EVERY 8 HOURS PRN
COMMUNITY
Start: 2025-02-07

## 2025-02-18 RX ORDER — OXYCODONE AND ACETAMINOPHEN 5; 325 MG/1; MG/1
1 TABLET ORAL EVERY 8 HOURS PRN
COMMUNITY
Start: 2025-02-11

## 2025-02-18 NOTE — PROGRESS NOTES
[facility]    Subjective:     Chief Complaint:   Chief Complaint   Patient presents with    Right Knee - Pain     Here for right Synvisc One injection today,        Past Medical History:   Diagnosis Date    Allergic rhinitis     Anxiety     Cancer 12/01/2021    B-cell folicular lymphoma    Depression     Difficult intubation     tmj    Hypothyroidism     Sleep apnea     hasnt received cpap    SVT (supraventricular tachycardia) 2011    ablations 2013 no problem since       Past Surgical History:   Procedure Laterality Date    ABLATION OF DYSRHYTHMIC FOCUS  2016    x2    BIOPSY OF CERVICAL LYMPH NODE Left 11/29/2021    Procedure: BIOPSY, LYMPH NODE, CERVICAL;  Surgeon: Blanco Kerns MD;  Location: [facility] OR;  Service: General;  Laterality: Left;    CERVIX LESION DESTRUCTION  11/2021    CONIZATION OF CERVIX USING LOOP ELECTROSURGICAL EXCISION PROCEDURE (LEEP) N/A 07/21/2021    Procedure: LEEP CONIZATION, CERVIX;  Surgeon: Donna Castillo MD;  Location: [facility] OR;  Service: OB/GYN;  Laterality: N/A;    ENDOSCOPY, NOSE OR PARANASAL SINUS, WITH MAXILLARY SINUS TISSUE REMOVAL Bilateral 2/7/2025    Procedure: ENDOSCOPY, NOSE OR PARANASAL SINUS, WITH MAXILLARY SINUS TISSUE REMOVAL;  Surgeon: Oli Westbrook MD;  Location: [facility] OR;  Service: ENT;  Laterality: Bilateral;    ETHMOIDECTOMY, PARTIAL, ENDOSCOPIC Bilateral 2/7/2025    Procedure: ETHMOIDECTOMY, PARTIAL, ENDOSCOPIC;  Surgeon: Oli Westbrook MD;  Location: [facility] OR;  Service: ENT;  Laterality: Bilateral;    ETHMOIDECTOMY, TOTAL, ENDOSCOPIC Bilateral 2/7/2025    Procedure: ETHMOIDECTOMY, TOTAL, ENDOSCOPIC;  Surgeon: Oli Westbrook MD;  Location: [facility] OR;  Service: ENT;  Laterality: Bilateral;    EXCISION OF LESION OF NOSE N/A 10/14/2019    Procedure: EXCISION, LESION, NOSE;  Surgeon: Umesh Morales MD;  Location: [facility] OR;  Service: ENT;  Laterality: N/A;    FESS, USING COMPUTER-ASSISTED NAVIGATION, WITH NASAL TURBINATE REDUCTION N/A  2/7/2025    Procedure: FESS, USING COMPUTER-ASSISTED NAVIGATION, WITH NASAL TURBINATE REDUCTION;  Surgeon: Oli Westbrook MD;  Location: [facility] OR;  Service: ENT;  Laterality: N/A;    FUNCTIONAL ENDOSCOPIC SINUS SURGERY (FESS) N/A 10/14/2019    Procedure: FESS (FUNCTIONAL ENDOSCOPIC SINUS SURGERY);  Surgeon: Umesh Morales MD;  Location: [facility] OR;  Service: ENT;  Laterality: N/A;    HYSTEROSCOPY WITH DILATION AND CURETTAGE OF UTERUS N/A 07/21/2021    Procedure: HYSTEROSCOPY, WITH DILATION AND CURETTAGE OF UTERUS;  Surgeon: Donna Castillo MD;  Location: [facility] OR;  Service: OB/GYN;  Laterality: N/A;    KNEE ARTHROSCOPY W/ MENISCECTOMY Right 02/07/2024    Procedure: ARTHROSCOPY, KNEE, WITH MENISCECTOMY;  Surgeon: Matthew Elliott MD;  Location: [facility] OR;  Service: Orthopedics;  Laterality: Right;  partial medical meniscectomy and plica resection    MEDIPORT REMOVAL Right 06/20/2022    Procedure: REMOVAL, CATHETER, CENTRAL VENOUS, TUNNELED, WITH PORT;  Surgeon: Blanco Kerns MD;  Location: [facility] OR;  Service: General;  Laterality: Right;    NASAL SEPTOPLASTY N/A 10/14/2019    Procedure: SEPTOPLASTY, NOSE;  Surgeon: Umesh Morales MD;  Location: [facility] OR;  Service: ENT;  Laterality: N/A;    NASAL SEPTOPLASTY N/A 2/7/2025    Procedure: SEPTOPLASTY, NOSE;  Surgeon: Oli Westbrook MD;  Location: [facility] OR;  Service: ENT;  Laterality: N/A;    NASAL SINUS SURGERY  10/14/2019    OSTEOTOMY OF METATARSAL BONE Left 12/21/2022    Procedure: OSTEOTOMY, METATARSAL BONE;  Surgeon: Hola Escalante DPM;  Location: [facility] OR;  Service: Podiatry;  Laterality: Left;  denys marrero notified 12/19 TW  , RELEASE ENTRAPPED NERVE 2ND INTERMETATARSAL SPACE    PILONIDAL CYST DRAINAGE      REVISION OF SCAR  06/20/2022    Procedure: REVISION, SCAR;  Surgeon: Blanco Kerns MD;  Location: [facility] OR;  Service: General;;    SINUS SURGERY  11/30/2023    SKIN TAG REMOVAL Left 06/20/2022    Procedure: REMOVAL, SKIN TAG;  Surgeon: Blanco SANTIAGO  MD Luis F;  Location: [facility] OR;  Service: General;  Laterality: Left;    SURGICAL FRACTURE OF NASAL TURBINATES Bilateral 2/7/2025    Procedure: SURGICAL FRACTURE, NASAL TURBINATE;  Surgeon: Oli Westbrook MD;  Location: [facility] OR;  Service: ENT;  Laterality: Bilateral;    SURGICAL REMOVAL OF NASAL TURBINATE Bilateral 2/7/2025    Procedure: EXCISION, NASAL TURBINATE;  Surgeon: Oli Westbrook MD;  Location: [facility] OR;  Service: ENT;  Laterality: Bilateral;    TONSILLECTOMY      TYMPANOPLASTY N/A 10/14/2019    Procedure: TYMPANOPLASTY;  Surgeon: Umesh Morales MD;  Location: [facility] OR;  Service: ENT;  Laterality: N/A;    TYMPANOTOMY Left 10/14/2019    left       Current Outpatient Medications   Medication Sig    albuterol (PROVENTIL HFA) 90 mcg/actuation inhaler Inhale 2 puffs into the lungs every 6 (six) hours as needed for Wheezing. Rescue    ALPRAZolam (XANAX) 1 MG tablet Take 2 tablets (2 mg total) by mouth 2 (two) times daily as needed for Anxiety.    azelastine (ASTELIN) 137 mcg (0.1 %) nasal spray 1 spray (137 mcg total) by Nasal route 2 (two) times daily as needed for Rhinitis.    benzonatate (TESSALON) 100 MG capsule Take 1 capsule by mouth 3 times daily as needed.    busPIRone (BUSPAR) 15 MG tablet Take 1 tablet (15 mg total) by mouth 2 (two) times daily.    cetirizine (ZYRTEC) 10 MG tablet Take 1 tablet (10 mg total) by mouth every evening.    fluticasone propionate (FLONASE) 50 mcg/actuation nasal spray 1 spray (50 mcg total) by Each Nostril route once daily.    gabapentin (NEURONTIN) 600 MG tablet Take 600 mg by mouth 3 (three) times daily as needed.    guaiFENesin-codeine 100-10 mg/5 ml (TUSSI-ORGANIDIN NR)  mg/5 mL syrup TAKE 10ML BY MOUTH EVERY 4-6 HOURS AS NEEDED FOR COUGH    ibuprofen (ADVIL,MOTRIN) 800 MG tablet Take 1 tablet (800 mg total) by mouth every 8 (eight) hours as needed for Pain.    levothyroxine (SYNTHROID) 50 MCG tablet Take 1 tablet (50 mcg total) by mouth before  breakfast.    multivitamin capsule Take 1 capsule by mouth.    naproxen (EC NAPROSYN) 500 MG EC tablet Take 1 tablet by mouth 2 (two) times daily with meals.    ondansetron (ZOFRAN-ODT) 4 MG TbDL 4 mg every 8 (eight) hours as needed.    oxyCODONE-acetaminophen (PERCOCET) 5-325 mg per tablet Take 1 tablet by mouth every 8 (eight) hours as needed.    promethazine-dextromethorphan (PROMETHAZINE-DM) 6.25-15 mg/5 mL Syrp TAKE 5 MLS BY MOUTH NIGHTLY AS NEEDED (NIGHT TIME COUGH).    pyrilamine-phenylephrine-DM 12.5-5-7.5 mg/5 mL Liqd TAKE 10 ML BY MOUTH EVERY 6 HOURS AS NEEDED FOR COUGH    tirzepatide, weight loss, (ZEPBOUND) 2.5 mg/0.5 mL PnIj Inject 2.5 mg into the skin every 7 days.    tirzepatide, weight loss, (ZEPBOUND) 5 mg/0.5 mL PnIj Inject 5 mg into the skin every 7 days.    benzocaine-menthoL 6-10 mg lozenge Take 1 lozenge by mouth every 2 (two) hours as needed (Sore Throat). (Patient not taking: Reported on 2/18/2025)     No current facility-administered medications for this visit.       Review of patient's allergies indicates:  No Known Allergies    Family History   Problem Relation Name Age of Onset    Cataracts Mother Nadira Hernandez     Glaucoma Mother Nadira Hernandez     Hypertension Mother Nadira Hernandez     Heart disease Father Dirk hernandez 59        CABG    Heart failure Father Dirk hernandez     Breast cancer Maternal Aunt  55    Colon cancer Neg Hx      Ovarian cancer Neg Hx      Macular degeneration Neg Hx      Retinal detachment Neg Hx         Social History[1]    History of present illness:  39-year-old female, returns to clinic today for the right knee.  She has a history of arthroscopy with known patellofemoral joint arthritis and meniscal debridement.  Intact weight-bearing surfaces however per the op report.  We have been injecting her periodically which steroids and viscosupplementation.  She returns to clinic today for viscosupplementation injection.      Review of Systems:    Constitution: Negative  for chills, fever, and sweats.  Negative for unexplained weight loss.    HENT:  Negative for headaches and blurry vision.    Cardiovascular:Negative for chest pain or irregular heart beat. Negative for hypertension.    Respiratory:  Negative for cough and shortness of breath.    Gastrointestinal: Negative for abdominal pain, heartburn, melena, nausea, and vomitting.    Genitourinary:  Negative bladder incontinence and dysuria.    Musculoskeletal:  See HPI for details.     Neurological: Negative for numbness.    Psychiatric/Behavioral: Negative for depression.  The patient is not nervous/anxious.      Endocrine: Negative for polyuria    Hematologic/Lymphatic: Negative for bleeding problem.  Does not bruise/bleed easily.    Skin: Negative for poor would healing and rash    Objective:      Physical Examination:    Vital Signs:  There were no vitals filed for this visit.    Body mass index is 44.78 kg/m².    This a well-developed, well nourished patient in no acute distress.  They are alert and oriented and cooperative to examination.        Examination of the right knee, no effusion, skin is dry and intact, no erythema or ecchymosis, no signs symptoms of infection.  Range motion 0-120 degrees.  Stable anterior-posterior varus and valgus stress.  Homans signs negative, straight leg raise is negative.  Distally neurovascularly intact.    Pertinent New Results:    XRAY Report / Interpretation:       Assessment/Plan:      Patellofemoral arthritis right knee history of arthroscopy right knee with patellofemoral joint arthritis.  We are going to do some therapy, some quad strengthening, some diet and weight loss.  We are going to check him back in 3 months.  Ultimately she will be a candidate for patellofemoral joint replacement.  We would want to get an updated MRI just to ensure there is no new areas of full-thickness cartilage loss in the weight-bearing surfaces.    Receiving a viscosupplementation injection is a simple,  "in-office procedure.     After your injection, keep the following in mind:    In the first 48 hours after a viscosupplementation injection...    -You should be able to resume your normal day-to-day activities    -You should avoid activities that put excessive strain on your knee such as jogging, lifting or prolonged standing    -If you have any mild pain or swelling at the injection site, place an ice pack on your knee for 10 minutes or as recommended by your doctor    In the months after an injection...    -Everyone responds differently, but in a medical study, many patients experienced pain relief starting one month after their injection.    Viscosupplementation can provide up to six months of knee pain relief    Viscosupplementation can be repeated safely. In a medical study involving 160 patients, 77 received a second injection of Synvisc-One.    When your osteoarthritis knee pain returns, schedule a follow up appointment    Waqas Elkins, Physician Assistant, served in the capacity as a "scribe" for this patient encounter.  A "face-to-face" encounter occurred with Dr. Matthew Elliott on this date.  The treatment plan and medical decision-making is outlined above. Patient was seen and examined with a chaperone.       This note was created using Dragon voice recognition software that occasionally misinterpreted phrases or words.               [1]   Social History  Socioeconomic History    Marital status: Single   Occupational History    Occupation: [employer]     Comment: [employer]   Tobacco Use    Smoking status: Former     Current packs/day: 0.00     Average packs/day: 0.5 packs/day for 9.0 years (4.5 ttl pk-yrs)     Types: Cigarettes     Start date: 7/2/2000     Quit date: 7/2/2009     Years since quitting: 15.6    Smokeless tobacco: Never   Substance and Sexual Activity    Alcohol use: Yes     Alcohol/week: 5.0 standard drinks of alcohol     Types: 5 Shots of liquor per week     Comment: "occasionally"    Drug " use: No    Sexual activity: Not Currently     Partners: Male     Birth control/protection: OCP   Social History Narrative    The patient does not exercise regularly ().  Rates diet as fair.  She is not satisfied with weight.         Social Drivers of Health     Financial Resource Strain: Medium Risk (4/23/2024)    Overall Financial Resource Strain (CARDIA)     Difficulty of Paying Living Expenses: Somewhat hard   Food Insecurity: Food Insecurity Present (4/23/2024)    Hunger Vital Sign     Worried About Running Out of Food in the Last Year: Sometimes true     Ran Out of Food in the Last Year: Sometimes true   Transportation Needs: No Transportation Needs (4/23/2024)    PRAPARE - Transportation     Lack of Transportation (Medical): No     Lack of Transportation (Non-Medical): No   Physical Activity: Sufficiently Active (4/23/2024)    Exercise Vital Sign     Days of Exercise per Week: 6 days     Minutes of Exercise per Session: 120 min   Stress: Stress Concern Present (4/23/2024)    [employer] - Occupational Stress Questionnaire     Feeling of Stress : Very much   Housing Stability: High Risk (4/23/2024)    Housing Stability Vital Sign     Unable to Pay for Housing in the Last Year: Yes

## 2025-02-18 NOTE — PROCEDURES
Large Joint Aspiration/Injection: R knee    Date/Time: 2/18/2025 8:00 AM    Performed by: Matthew Elliott MD  Authorized by: Matthew Elliott MD    Consent Done?:  Yes (Verbal)  Indications:  Arthritis and pain  Site marked: the procedure site was marked    Timeout: prior to procedure the correct patient, procedure, and site was verified    Prep: patient was prepped and draped in usual sterile fashion      Local anesthesia used?: Yes    Local anesthetic:  Lidocaine 1% without epinephrine    Details:  Needle Size:  22 G  Ultrasonic Guidance for needle placement?: No    Location:  Knee  Site:  R knee  Medications:  48 mg hylan g-f 20 48 mg/6 mL  Patient tolerance:  Patient tolerated the procedure well with no immediate complications

## 2025-02-20 ENCOUNTER — PATIENT MESSAGE (OUTPATIENT)
Dept: HEMATOLOGY/ONCOLOGY | Facility: CLINIC | Age: 40
End: 2025-02-20
Payer: COMMERCIAL

## 2025-02-20 DIAGNOSIS — E04.1 RIGHT THYROID NODULE: Primary | ICD-10-CM

## 2025-03-03 ENCOUNTER — PATIENT MESSAGE (OUTPATIENT)
Dept: FAMILY MEDICINE | Facility: CLINIC | Age: 40
End: 2025-03-03
Payer: COMMERCIAL

## 2025-03-05 ENCOUNTER — INFUSION (OUTPATIENT)
Dept: INFUSION THERAPY | Facility: HOSPITAL | Age: 40
End: 2025-03-05
Attending: INTERNAL MEDICINE
Payer: COMMERCIAL

## 2025-03-05 VITALS
WEIGHT: 238.63 LBS | BODY MASS INDEX: 45.05 KG/M2 | RESPIRATION RATE: 16 BRPM | SYSTOLIC BLOOD PRESSURE: 114 MMHG | HEIGHT: 61 IN | TEMPERATURE: 97 F | OXYGEN SATURATION: 98 % | DIASTOLIC BLOOD PRESSURE: 76 MMHG | HEART RATE: 76 BPM

## 2025-03-05 DIAGNOSIS — Z79.899 IMMUNODEFICIENCY DUE TO DRUGS: Primary | ICD-10-CM

## 2025-03-05 DIAGNOSIS — D84.821 IMMUNODEFICIENCY DUE TO DRUGS: Primary | ICD-10-CM

## 2025-03-05 PROCEDURE — 96367 TX/PROPH/DG ADDL SEQ IV INF: CPT

## 2025-03-05 PROCEDURE — 96365 THER/PROPH/DIAG IV INF INIT: CPT

## 2025-03-05 PROCEDURE — 25000003 PHARM REV CODE 250: Performed by: INTERNAL MEDICINE

## 2025-03-05 PROCEDURE — 96375 TX/PRO/DX INJ NEW DRUG ADDON: CPT

## 2025-03-05 PROCEDURE — 63600175 PHARM REV CODE 636 W HCPCS: Mod: JZ,TB | Performed by: INTERNAL MEDICINE

## 2025-03-05 PROCEDURE — A4216 STERILE WATER/SALINE, 10 ML: HCPCS | Performed by: INTERNAL MEDICINE

## 2025-03-05 RX ORDER — ACETAMINOPHEN 325 MG/1
650 TABLET ORAL
Status: COMPLETED | OUTPATIENT
Start: 2025-03-05 | End: 2025-03-05

## 2025-03-05 RX ORDER — FAMOTIDINE 10 MG/ML
20 INJECTION INTRAVENOUS
Status: COMPLETED | OUTPATIENT
Start: 2025-03-05 | End: 2025-03-05

## 2025-03-05 RX ORDER — ACETAMINOPHEN 325 MG/1
650 TABLET ORAL
Status: CANCELLED | OUTPATIENT
Start: 2025-04-02

## 2025-03-05 RX ORDER — DIPHENHYDRAMINE HYDROCHLORIDE 50 MG/ML
25 INJECTION INTRAMUSCULAR; INTRAVENOUS
Status: CANCELLED | OUTPATIENT
Start: 2025-04-02

## 2025-03-05 RX ORDER — HEPARIN 100 UNIT/ML
500 SYRINGE INTRAVENOUS
OUTPATIENT
Start: 2025-04-02

## 2025-03-05 RX ORDER — FAMOTIDINE 10 MG/ML
20 INJECTION INTRAVENOUS
Status: CANCELLED | OUTPATIENT
Start: 2025-04-02

## 2025-03-05 RX ORDER — SODIUM CHLORIDE 0.9 % (FLUSH) 0.9 %
10 SYRINGE (ML) INJECTION
Status: CANCELLED | OUTPATIENT
Start: 2025-04-02

## 2025-03-05 RX ORDER — SODIUM CHLORIDE 0.9 % (FLUSH) 0.9 %
10 SYRINGE (ML) INJECTION
Status: DISCONTINUED | OUTPATIENT
Start: 2025-03-05 | End: 2025-03-05 | Stop reason: HOSPADM

## 2025-03-05 RX ADMIN — ACETAMINOPHEN 650 MG: 325 TABLET ORAL at 08:03

## 2025-03-05 RX ADMIN — SODIUM CHLORIDE 25 MG: 9 INJECTION, SOLUTION INTRAVENOUS at 08:03

## 2025-03-05 RX ADMIN — SODIUM CHLORIDE: 9 INJECTION, SOLUTION INTRAVENOUS at 08:03

## 2025-03-05 RX ADMIN — SODIUM CHLORIDE, PRESERVATIVE FREE 10 ML: 5 INJECTION INTRAVENOUS at 10:03

## 2025-03-05 RX ADMIN — HUMAN IMMUNOGLOBULIN G 10 G: 10 LIQUID INTRAVENOUS at 08:03

## 2025-03-05 RX ADMIN — FAMOTIDINE 20 MG: 10 INJECTION INTRAVENOUS at 08:03

## 2025-03-05 NOTE — PLAN OF CARE
Problem: Fatigue  Goal: Improved Activity Tolerance  Outcome: Progressing  Intervention: Promote Improved Energy  Flowsheets (Taken 3/5/2025 2876)  Fatigue Management: frequent rest breaks encouraged  Sleep/Rest Enhancement: regular sleep/rest pattern promoted  Activity Management: Ambulated -L4  Environmental Support: calm environment promoted

## 2025-03-11 ENCOUNTER — PATIENT MESSAGE (OUTPATIENT)
Dept: ORTHOPEDICS | Facility: CLINIC | Age: 40
End: 2025-03-11
Payer: COMMERCIAL

## 2025-03-11 DIAGNOSIS — M17.11 PRIMARY OSTEOARTHRITIS OF RIGHT KNEE: Primary | ICD-10-CM

## 2025-03-11 RX ORDER — TRAMADOL HYDROCHLORIDE 50 MG/1
50 TABLET ORAL EVERY 6 HOURS PRN
Qty: 28 TABLET | Refills: 0 | Status: CANCELLED | OUTPATIENT
Start: 2025-03-11 | End: 2025-03-18

## 2025-03-11 RX ORDER — HYDROCODONE BITARTRATE AND ACETAMINOPHEN 5; 325 MG/1; MG/1
1 TABLET ORAL EVERY 8 HOURS PRN
Qty: 21 TABLET | Refills: 0 | Status: SHIPPED | OUTPATIENT
Start: 2025-03-11 | End: 2025-03-18

## 2025-03-11 NOTE — TELEPHONE ENCOUNTER
Synvisc given to right knee 2/18/25. Also the note mentions therapy and MRI. Neither were ordered. Did you want me to place orders? Also does not have a follow up appointment scheduled. See note below from 2/18/25:    Patellofemoral arthritis right knee history of arthroscopy right knee with patellofemoral joint arthritis. We are going to do some therapy, some quad strengthening, some diet and weight loss. We are going to check him back in 3 months. Ultimately she will be a candidate for patellofemoral joint replacement. We would want to get an updated MRI just to ensure there is no new areas of full-thickness cartilage loss in the weight-bearing surfaces.

## 2025-03-11 NOTE — TELEPHONE ENCOUNTER
"Waqas, please advise on the "ankle swollen like a golf ball". Also is asking for something stronger than tramadol and mentioning notes from Dr Escalante. Last notes are from 2024?    Orders pended for tramadol and therapy.   "

## 2025-03-11 NOTE — TELEPHONE ENCOUNTER
I have no clear reason why her foot and ankle are swollen.  She has had chronic foot and ankle pain followed by podiatry.  I do not believe this would be related to the recent injection we administered in her knee.    I discontinue the tramadol, I did prescribe her a prescription of hydrocodone, this will not be a long term chronic pain medication treatment.    As previously recommended, she is a candidate for patellofemoral joint replacement based on her last arthroscopy however I updated MRI to ensure no weight-bearing surfaces changes would be needed prior to proceeding with discussions of joint replacement surgery.  If she continues to have symptoms, she can follow up on a Tuesday Thursday with Dr. Elliott to discuss patellofemoral joint replacement.

## 2025-03-14 ENCOUNTER — TELEPHONE (OUTPATIENT)
Dept: ORTHOPEDICS | Facility: CLINIC | Age: 40
End: 2025-03-14
Payer: COMMERCIAL

## 2025-03-14 NOTE — TELEPHONE ENCOUNTER
----- Message from Donna sent at 3/14/2025  8:08 AM CDT -----  Regarding: NP PHYSICAL THERAPY HARVEY  Good Morning,We have received your request to get the above mentioned patient scheduled for physical therapy following her surgery.We have been unsuccessful in reaching the patient and wanted to make you aware. We will continue to reach out to the patient. If your office speaks with the patient can you please ask that they call [phone] for scheduling. Thank You,Donna Live

## 2025-03-15 ENCOUNTER — PATIENT MESSAGE (OUTPATIENT)
Dept: HEMATOLOGY/ONCOLOGY | Facility: CLINIC | Age: 40
End: 2025-03-15
Payer: COMMERCIAL

## 2025-03-15 DIAGNOSIS — Z81.8 FAMILY HISTORY OF BIPOLAR DISORDER: ICD-10-CM

## 2025-03-15 DIAGNOSIS — F22 DELUSIONAL THOUGHTS: Primary | ICD-10-CM

## 2025-03-17 ENCOUNTER — TELEPHONE (OUTPATIENT)
Dept: PSYCHIATRY | Facility: CLINIC | Age: 40
End: 2025-03-17
Payer: COMMERCIAL

## 2025-03-19 ENCOUNTER — TELEPHONE (OUTPATIENT)
Dept: ORTHOPEDICS | Facility: CLINIC | Age: 40
End: 2025-03-19
Payer: COMMERCIAL

## 2025-03-19 NOTE — TELEPHONE ENCOUNTER
"Patient sent portal message to us on 3/15/25 as we already sent her a message stating PT was trying to get ahold of her and she said:  "Im going out of town for about two weeks so if you could hold the phone on calling me every day I will schedule when I get back in town. Thank you. Much appreciated."  "

## 2025-03-19 NOTE — TELEPHONE ENCOUNTER
----- Message from Donna sent at 3/19/2025  9:09 AM CDT -----  Regarding: NP PHYSICAL THERAPY HARVEY  Good Morning,We have received your request to get the above mentioned patient scheduled for physical therapy following his/her surgery.We have been unsuccessful in reaching the patient and wanted to make you aware. If your office speaks with the patient can you please ask that they call [phone] for scheduling. Thank You,Donna Live

## 2025-04-04 ENCOUNTER — PATIENT MESSAGE (OUTPATIENT)
Dept: INTERNAL MEDICINE | Facility: CLINIC | Age: 40
End: 2025-04-04
Payer: COMMERCIAL

## 2025-04-04 ENCOUNTER — HOSPITAL ENCOUNTER (OUTPATIENT)
Dept: RADIOLOGY | Facility: HOSPITAL | Age: 40
Discharge: HOME | End: 2025-04-04
Attending: INTERNAL MEDICINE
Payer: COMMERCIAL

## 2025-04-04 DIAGNOSIS — E04.1 RIGHT THYROID NODULE: ICD-10-CM

## 2025-04-04 PROCEDURE — 27200940 US FINE NEEDLE ASPIRATION THYROID, FIRST LESION

## 2025-04-04 PROCEDURE — 76536 US EXAM OF HEAD AND NECK: CPT | Mod: TC

## 2025-04-04 PROCEDURE — 76536 US EXAM OF HEAD AND NECK: CPT | Mod: 26,,, | Performed by: RADIOLOGY

## 2025-04-04 PROCEDURE — 10005 FNA BX W/US GDN 1ST LES: CPT | Mod: ,,, | Performed by: RADIOLOGY

## 2025-04-14 ENCOUNTER — TELEPHONE (OUTPATIENT)
Dept: PSYCHIATRY | Facility: CLINIC | Age: 40
End: 2025-04-14
Payer: COMMERCIAL

## 2025-04-16 ENCOUNTER — TELEPHONE (OUTPATIENT)
Dept: PSYCHIATRY | Facility: CLINIC | Age: 40
End: 2025-04-16
Payer: COMMERCIAL

## 2025-04-25 NOTE — PROGRESS NOTES
Patient ID: Jerry Coronado is a 39 y.o. White female    Subjective  Chief Complaint: patient presents for medical weight loss management.    Co-morbidities: ROBERTO CARLOS    HPI: Patient [facility]ep[facility] with [facility] in February 2025 and is currently managed on Zepbound 5 mg.     Tolerance to current therapy:  Denies nausea, vomiting, diarrhea, constipation, abdominal pain    Weight loss history:  Starting weight:    2/12/2025   Recent Readings    Weight (lbs) 237 lb    BMI 44.78 BMI    Current weight:    4/23/2025   Recent Readings    Weight (lbs) 224.5 lb    BMI 42.41 BMI    % weight loss since GLP-1 initiation: 5.3 %    Objective  Lab Results   Component Value Date     01/07/2025     09/05/2024     04/24/2024     Lab Results   Component Value Date    K 4.0 01/07/2025    K 3.8 09/05/2024    K 3.5 04/24/2024     Lab Results   Component Value Date     01/07/2025     04/24/2024     (H) 12/09/2023     Lab Results   Component Value Date    CO2 23 01/07/2025    CO2 22 09/05/2024    CO2 26 04/24/2024     Lab Results   Component Value Date    BUN 30 (H) 01/07/2025    BUN 15.0 09/05/2024    BUN 23 (H) 04/24/2024     Lab Results   Component Value Date     (H) 01/07/2025     (H) 04/24/2024    GLU 91 12/09/2023     Lab Results   Component Value Date    CALCIUM 8.8 01/07/2025    CALCIUM 8.2 (L) 09/05/2024    CALCIUM 9.6 04/24/2024     Lab Results   Component Value Date    PROT 6.5 01/07/2025    PROT 6.8 04/24/2024    PROT 6.8 12/09/2023     Lab Results   Component Value Date    ALBUMIN 4.4 01/07/2025    ALBUMIN 3.7 09/05/2024    ALBUMIN 4.6 04/24/2024     Lab Results   Component Value Date    BILITOT 0.3 01/07/2025    BILITOT 0.2 09/05/2024    BILITOT 0.4 04/24/2024     Lab Results   Component Value Date    AST 13 01/07/2025    AST 10 (L) 09/05/2024    AST 21 04/24/2024     Lab Results   Component Value Date    ALT 19 01/07/2025    ALT 32 09/05/2024    ALT 43  04/24/2024     Lab Results   Component Value Date    ANIONGAP 8 01/07/2025    ANIONGAP 11 04/24/2024    ANIONGAP 9 12/09/2023     Lab Results   Component Value Date    CREATININE 1.1 01/07/2025    CREATININE 0.82 09/05/2024    CREATININE 0.8 04/24/2024     Lab Results   Component Value Date    EGFRNORACEVR >60.0 01/07/2025    EGFRNORACEVR 93 09/05/2024    EGFRNORACEVR >60.0 04/24/2024     Assessment/Plan  - Increase to Zepbound 7.5 mg SQ weekly  - RTC in 3 months for follow-up evaluation     Patient consented to pharmacist management via collaborative practice.

## 2025-04-28 ENCOUNTER — PATIENT MESSAGE (OUTPATIENT)
Dept: INTERNAL MEDICINE | Facility: CLINIC | Age: 40
End: 2025-04-28

## 2025-04-28 ENCOUNTER — PATIENT MESSAGE (OUTPATIENT)
Dept: ADMINISTRATIVE | Facility: OTHER | Age: 40
End: 2025-04-28
Payer: COMMERCIAL

## 2025-04-28 ENCOUNTER — OFFICE VISIT (OUTPATIENT)
Dept: INTERNAL MEDICINE | Facility: CLINIC | Age: 40
End: 2025-04-28
Payer: COMMERCIAL

## 2025-04-28 DIAGNOSIS — E66.01 OBESITY, CLASS III, BMI 40-49.9 (MORBID OBESITY): Primary | ICD-10-CM

## 2025-04-28 PROCEDURE — 99499 UNLISTED E&M SERVICE: CPT | Mod: 95,,,

## 2025-04-28 RX ORDER — TIRZEPATIDE 7.5 MG/.5ML
7.5 INJECTION, SOLUTION SUBCUTANEOUS
Qty: 2 ML | Refills: 2 | Status: ACTIVE | OUTPATIENT
Start: 2025-04-28

## 2025-05-01 ENCOUNTER — TELEPHONE (OUTPATIENT)
Dept: URGENT CARE | Facility: CLINIC | Age: 40
End: 2025-05-01
Payer: COMMERCIAL

## 2025-06-02 ENCOUNTER — PATIENT MESSAGE (OUTPATIENT)
Dept: ADMINISTRATIVE | Facility: OTHER | Age: 40
End: 2025-06-02
Payer: COMMERCIAL

## 2025-06-02 ENCOUNTER — OFFICE VISIT (OUTPATIENT)
Dept: FAMILY MEDICINE | Facility: CLINIC | Age: 40
End: 2025-06-02
Payer: COMMERCIAL

## 2025-06-02 ENCOUNTER — TELEPHONE (OUTPATIENT)
Dept: FAMILY MEDICINE | Facility: CLINIC | Age: 40
End: 2025-06-02

## 2025-06-02 VITALS
WEIGHT: 221.13 LBS | SYSTOLIC BLOOD PRESSURE: 122 MMHG | HEIGHT: 61 IN | OXYGEN SATURATION: 98 % | TEMPERATURE: 98 F | BODY MASS INDEX: 41.75 KG/M2 | DIASTOLIC BLOOD PRESSURE: 78 MMHG | RESPIRATION RATE: 16 BRPM | HEART RATE: 92 BPM

## 2025-06-02 DIAGNOSIS — E04.1 THYROID NODULE: ICD-10-CM

## 2025-06-02 DIAGNOSIS — T63.461A WASP STING, ACCIDENTAL OR UNINTENTIONAL, INITIAL ENCOUNTER: ICD-10-CM

## 2025-06-02 DIAGNOSIS — F51.05 INSOMNIA DUE TO OTHER MENTAL DISORDER: ICD-10-CM

## 2025-06-02 DIAGNOSIS — B00.1 COLD SORE: ICD-10-CM

## 2025-06-02 DIAGNOSIS — E03.4 HYPOTHYROIDISM DUE TO ACQUIRED ATROPHY OF THYROID: ICD-10-CM

## 2025-06-02 DIAGNOSIS — F41.9 ANXIETY DISORDER, UNSPECIFIED TYPE: ICD-10-CM

## 2025-06-02 DIAGNOSIS — E78.49 OTHER HYPERLIPIDEMIA: ICD-10-CM

## 2025-06-02 DIAGNOSIS — Z00.01 ENCOUNTER FOR ROUTINE ADULT HEALTH EXAMINATION WITH ABNORMAL FINDINGS: Primary | ICD-10-CM

## 2025-06-02 DIAGNOSIS — F99 INSOMNIA DUE TO OTHER MENTAL DISORDER: ICD-10-CM

## 2025-06-02 PROCEDURE — 3078F DIAST BP <80 MM HG: CPT | Mod: CPTII,S$GLB,, | Performed by: NURSE PRACTITIONER

## 2025-06-02 PROCEDURE — 99999 PR PBB SHADOW E&M-EST. PATIENT-LVL V: CPT | Mod: PBBFAC,,, | Performed by: NURSE PRACTITIONER

## 2025-06-02 PROCEDURE — 99396 PREV VISIT EST AGE 40-64: CPT | Mod: 25,S$GLB,, | Performed by: NURSE PRACTITIONER

## 2025-06-02 PROCEDURE — 1160F RVW MEDS BY RX/DR IN RCRD: CPT | Mod: CPTII,S$GLB,, | Performed by: NURSE PRACTITIONER

## 2025-06-02 PROCEDURE — 3074F SYST BP LT 130 MM HG: CPT | Mod: CPTII,S$GLB,, | Performed by: NURSE PRACTITIONER

## 2025-06-02 PROCEDURE — 1159F MED LIST DOCD IN RCRD: CPT | Mod: CPTII,S$GLB,, | Performed by: NURSE PRACTITIONER

## 2025-06-02 PROCEDURE — 3008F BODY MASS INDEX DOCD: CPT | Mod: CPTII,S$GLB,, | Performed by: NURSE PRACTITIONER

## 2025-06-02 RX ORDER — FAMOTIDINE 20 MG/1
20 TABLET, FILM COATED ORAL 2 TIMES DAILY
Qty: 28 TABLET | Refills: 0 | Status: SHIPPED | OUTPATIENT
Start: 2025-06-02 | End: 2025-06-16

## 2025-06-02 RX ORDER — TRAZODONE HYDROCHLORIDE 50 MG/1
50 TABLET ORAL NIGHTLY PRN
Qty: 30 TABLET | Refills: 3 | Status: SHIPPED | OUTPATIENT
Start: 2025-06-02

## 2025-06-02 RX ORDER — TRIAMCINOLONE ACETONIDE 1 MG/G
CREAM TOPICAL 2 TIMES DAILY
Qty: 30 G | Refills: 0 | Status: SHIPPED | OUTPATIENT
Start: 2025-06-02

## 2025-06-02 RX ORDER — VALACYCLOVIR HYDROCHLORIDE 1 G/1
2000 TABLET, FILM COATED ORAL EVERY 12 HOURS
Qty: 30 TABLET | Refills: 0 | Status: SHIPPED | OUTPATIENT
Start: 2025-06-02 | End: 2025-06-03

## 2025-06-03 ENCOUNTER — PATIENT MESSAGE (OUTPATIENT)
Dept: FAMILY MEDICINE | Facility: CLINIC | Age: 40
End: 2025-06-03
Payer: COMMERCIAL

## 2025-06-03 DIAGNOSIS — M62.838 MUSCLE SPASM: Primary | ICD-10-CM

## 2025-06-03 RX ORDER — METHOCARBAMOL 750 MG/1
750 TABLET, FILM COATED ORAL EVERY 8 HOURS PRN
Qty: 30 TABLET | Refills: 0 | Status: SHIPPED | OUTPATIENT
Start: 2025-06-03 | End: 2025-06-13

## 2025-06-08 DIAGNOSIS — F41.9 ANXIETY DISORDER, UNSPECIFIED TYPE: ICD-10-CM

## 2025-06-09 RX ORDER — BUSPIRONE HYDROCHLORIDE 15 MG/1
15 TABLET ORAL 2 TIMES DAILY
Qty: 180 TABLET | Refills: 3 | Status: SHIPPED | OUTPATIENT
Start: 2025-06-09

## 2025-06-12 ENCOUNTER — OFFICE VISIT (OUTPATIENT)
Dept: ORTHOPEDICS | Facility: CLINIC | Age: 40
End: 2025-06-12
Payer: COMMERCIAL

## 2025-06-12 VITALS — HEIGHT: 61 IN | BODY MASS INDEX: 41.75 KG/M2 | WEIGHT: 221.13 LBS

## 2025-06-12 DIAGNOSIS — M17.11 PRIMARY OSTEOARTHRITIS OF RIGHT KNEE: Primary | ICD-10-CM

## 2025-06-12 DIAGNOSIS — Z98.890 H/O ARTHROSCOPY OF RIGHT KNEE: ICD-10-CM

## 2025-06-12 PROCEDURE — 99999 PR PBB SHADOW E&M-EST. PATIENT-LVL IV: CPT | Mod: PBBFAC,,, | Performed by: ORTHOPAEDIC SURGERY

## 2025-06-12 RX ORDER — TRIAMCINOLONE ACETONIDE 40 MG/ML
40 INJECTION, SUSPENSION INTRA-ARTICULAR; INTRAMUSCULAR
Status: DISCONTINUED | OUTPATIENT
Start: 2025-06-12 | End: 2025-06-12 | Stop reason: HOSPADM

## 2025-06-12 RX ADMIN — TRIAMCINOLONE ACETONIDE 40 MG: 40 INJECTION, SUSPENSION INTRA-ARTICULAR; INTRAMUSCULAR at 11:06

## 2025-06-12 NOTE — PROGRESS NOTES
[facility]    Subjective:     Chief Complaint:   Chief Complaint   Patient presents with    Right Knee - Pain     R knee pain inj 2.18.25. Pain has returned x 1 month. Endorses crepitus. Locks up, gives way. Relief with stretching and popping the knee. Would like repeat injection today.        Past Medical History:   Diagnosis Date    Allergic rhinitis     Anxiety     Cancer 12/01/2021    B-cell folicular lymphoma    Depression     Difficult intubation     tmj    Hypothyroidism     Sleep apnea     hasnt received cpap    SVT (supraventricular tachycardia) 2011    ablations 2013 no problem since       Past Surgical History:   Procedure Laterality Date    ABLATION OF DYSRHYTHMIC FOCUS  2016    x2    BIOPSY OF CERVICAL LYMPH NODE Left 11/29/2021    Procedure: BIOPSY, LYMPH NODE, CERVICAL;  Surgeon: Blanco Kerns MD;  Location: [facility] OR;  Service: General;  Laterality: Left;    CERVIX LESION DESTRUCTION  11/2021    CONIZATION OF CERVIX USING LOOP ELECTROSURGICAL EXCISION PROCEDURE (LEEP) N/A 07/21/2021    Procedure: LEEP CONIZATION, CERVIX;  Surgeon: Donna Castillo MD;  Location: [facility] OR;  Service: OB/GYN;  Laterality: N/A;    ENDOSCOPY, NOSE OR PARANASAL SINUS, WITH MAXILLARY SINUS TISSUE REMOVAL Bilateral 2/7/2025    Procedure: ENDOSCOPY, NOSE OR PARANASAL SINUS, WITH MAXILLARY SINUS TISSUE REMOVAL;  Surgeon: Oli Westbrook MD;  Location: [facility] OR;  Service: ENT;  Laterality: Bilateral;    ETHMOIDECTOMY, PARTIAL, ENDOSCOPIC Bilateral 2/7/2025    Procedure: ETHMOIDECTOMY, PARTIAL, ENDOSCOPIC;  Surgeon: Oli Westbrook MD;  Location: [facility] OR;  Service: ENT;  Laterality: Bilateral;    ETHMOIDECTOMY, TOTAL, ENDOSCOPIC Bilateral 2/7/2025    Procedure: ETHMOIDECTOMY, TOTAL, ENDOSCOPIC;  Surgeon: Oli Westbrook MD;  Location: [facility] OR;  Service: ENT;  Laterality: Bilateral;    EXCISION OF LESION OF NOSE N/A 10/14/2019    Procedure: EXCISION, LESION, NOSE;  Surgeon: Umesh Morales MD;   Location: [facility] OR;  Service: ENT;  Laterality: N/A;    FESS, USING COMPUTER-ASSISTED NAVIGATION, WITH NASAL TURBINATE REDUCTION N/A 2/7/2025    Procedure: FESS, USING COMPUTER-ASSISTED NAVIGATION, WITH NASAL TURBINATE REDUCTION;  Surgeon: Oli Wsetbrook MD;  Location: [facility] OR;  Service: ENT;  Laterality: N/A;    FUNCTIONAL ENDOSCOPIC SINUS SURGERY (FESS) N/A 10/14/2019    Procedure: FESS (FUNCTIONAL ENDOSCOPIC SINUS SURGERY);  Surgeon: Umesh Morales MD;  Location: [facility] OR;  Service: ENT;  Laterality: N/A;    HYSTEROSCOPY WITH DILATION AND CURETTAGE OF UTERUS N/A 07/21/2021    Procedure: HYSTEROSCOPY, WITH DILATION AND CURETTAGE OF UTERUS;  Surgeon: Donna Castillo MD;  Location: [facility];  Service: OB/GYN;  Laterality: N/A;    KNEE ARTHROSCOPY W/ MENISCECTOMY Right 02/07/2024    Procedure: ARTHROSCOPY, KNEE, WITH MENISCECTOMY;  Surgeon: Matthew Elliott MD;  Location: [facility] OR;  Service: Orthopedics;  Laterality: Right;  partial medical meniscectomy and plica resection    MEDIPORT REMOVAL Right 06/20/2022    Procedure: REMOVAL, CATHETER, CENTRAL VENOUS, TUNNELED, WITH PORT;  Surgeon: Blanco Kerns MD;  Location: [facility] OR;  Service: General;  Laterality: Right;    NASAL SEPTOPLASTY N/A 10/14/2019    Procedure: SEPTOPLASTY, NOSE;  Surgeon: Umesh Morales MD;  Location: [facility] OR;  Service: ENT;  Laterality: N/A;    NASAL SEPTOPLASTY N/A 2/7/2025    Procedure: SEPTOPLASTY, NOSE;  Surgeon: Oli Westbrook MD;  Location: [facility] OR;  Service: ENT;  Laterality: N/A;    NASAL SINUS SURGERY  10/14/2019    OSTEOTOMY OF METATARSAL BONE Left 12/21/2022    Procedure: OSTEOTOMY, METATARSAL BONE;  Surgeon: Hola Escalante DPM;  Location: [facility];  Service: Podiatry;  Laterality: Left;  synthes janes notified 12/19 TW  , RELEASE ENTRAPPED NERVE 2ND INTERMETATARSAL SPACE    PILONIDAL CYST DRAINAGE      REVISION OF SCAR  06/20/2022    Procedure: REVISION, SCAR;  Surgeon: Blanco Kerns MD;  Location: [facility] OR;   Service: General;;    SINUS SURGERY  11/30/2023    SKIN TAG REMOVAL Left 06/20/2022    Procedure: REMOVAL, SKIN TAG;  Surgeon: Blanco Kerns MD;  Location: [facility] OR;  Service: General;  Laterality: Left;    SURGICAL FRACTURE OF NASAL TURBINATES Bilateral 2/7/2025    Procedure: SURGICAL FRACTURE, NASAL TURBINATE;  Surgeon: Oli Westbrook MD;  Location: [facility] OR;  Service: ENT;  Laterality: Bilateral;    SURGICAL REMOVAL OF NASAL TURBINATE Bilateral 2/7/2025    Procedure: EXCISION, NASAL TURBINATE;  Surgeon: Oli Westbrook MD;  Location: [facility] OR;  Service: ENT;  Laterality: Bilateral;    TONSILLECTOMY      TYMPANOPLASTY N/A 10/14/2019    Procedure: TYMPANOPLASTY;  Surgeon: Umesh Morales MD;  [facility];  Service: ENT;  Laterality: N/A;    TYMPANOTOMY Left 10/14/2019    left       Current Outpatient Medications   Medication Sig    azelastine (ASTELIN) 137 mcg (0.1 %) nasal spray 1 spray (137 mcg total) by Nasal route 2 (two) times daily as needed for Rhinitis.    benzonatate (TESSALON) 100 MG capsule Take 1 capsule by mouth 3 times daily as needed.    busPIRone (BUSPAR) 15 MG tablet TAKE 1 TABLET BY MOUTH 2 TIMES DAILY.    cetirizine (ZYRTEC) 10 MG tablet Take 1 tablet (10 mg total) by mouth every evening.    famotidine (PEPCID) 20 MG tablet Take 1 tablet (20 mg total) by mouth 2 (two) times daily. for 14 days    fluticasone propionate (FLONASE) 50 mcg/actuation nasal spray 1 spray (50 mcg total) by Each Nostril route once daily.    gabapentin (NEURONTIN) 600 MG tablet Take 600 mg by mouth 3 (three) times daily as needed.    ibuprofen (ADVIL,MOTRIN) 800 MG tablet Take 1 tablet (800 mg total) by mouth every 8 (eight) hours as needed for Pain.    levothyroxine (SYNTHROID) 50 MCG tablet Take 1 tablet (50 mcg total) by mouth before breakfast.    methocarbamoL (ROBAXIN) 750 MG Tab Take 1 tablet (750 mg total) by mouth every 8 (eight) hours as needed (spasms).    multivitamin capsule Take 1 capsule by  mouth.    naproxen (EC NAPROSYN) 500 MG EC tablet Take 1 tablet by mouth 2 (two) times daily with meals.    ondansetron (ZOFRAN-ODT) 4 MG TbDL 4 mg every 8 (eight) hours as needed.    tirzepatide, weight loss, (ZEPBOUND) 7.5 mg/0.5 mL PnIj Inject 7.5 mg into the skin every 7 days.    traZODone (DESYREL) 50 MG tablet Take 1 tablet (50 mg total) by mouth nightly as needed for Insomnia.    triamcinolone acetonide 0.1% (KENALOG) 0.1 % cream Apply topically 2 (two) times daily.    albuterol (PROVENTIL HFA) 90 mcg/actuation inhaler Inhale 2 puffs into the lungs every 6 (six) hours as needed for Wheezing. Rescue    ALPRAZolam (XANAX) 1 MG tablet Take 2 tablets (2 mg total) by mouth 2 (two) times daily as needed for Anxiety.    valACYclovir (VALTREX) 1000 MG tablet Take 2 tablets (2,000 mg total) by mouth every 12 (twelve) hours. for 1 day     No current facility-administered medications for this visit.       Review of patient's allergies indicates:  No Known Allergies    Family History   Problem Relation Name Age of Onset    Cataracts Mother Nadira Hernandez     Glaucoma Mother Nadira Hernandez     Hypertension Mother Nadira Hernandez     Heart disease Father Dirk hernandez 59        CABG    Heart failure Father Dirk hernandez     Breast cancer Maternal Aunt  55    Colon cancer Neg Hx      Ovarian cancer Neg Hx      Macular degeneration Neg Hx      Retinal detachment Neg Hx         Social History[1]    History of present illness:  Patient returns today for her right knee.  She continues complain of right knee pain and anterior crepitus.  She has known patellofemoral arthrosis      Review of Systems:    Constitution: Negative for chills, fever, and sweats.  Negative for unexplained weight loss.    HENT:  Negative for headaches and blurry vision.    Cardiovascular:Negative for chest pain or irregular heart beat. Negative for hypertension.    Respiratory:  Negative for cough and shortness of breath.    Gastrointestinal: Negative for  abdominal pain, heartburn, melena, nausea, and vomitting.    Genitourinary:  Negative bladder incontinence and dysuria.    Musculoskeletal:  See HPI for details.     Neurological: Negative for numbness.    Psychiatric/Behavioral: Negative for depression.  The patient is not nervous/anxious.      Endocrine: Negative for polyuria    Hematologic/Lymphatic: Negative for bleeding problem.  Does not bruise/bleed easily.    Skin: Negative for poor would healing and rash    Objective:      Physical Examination:    Vital Signs:  There were no vitals filed for this visit.    Body mass index is 41.78 kg/m².    This a well-developed, well nourished patient in no acute distress.  They are alert and oriented and cooperative to examination.        Patient is a +1 effusion she has anterior crepitus her knee is stable to varus valgus stresses.  Pertinent New Results:    XRAY Report / Interpretation:       Assessment/Plan:      Patellofemoral arthrosis.  I injected with steroid and local.  We will schedule her for viscosupplementation when she can get that.  Ultimately she will need a patellofemoral replacement.  We spoke about weight loss which she has already undertaken and it is doing very well with.  She will continue her quad strengthening.  Follow-up for the [place]      This note was created using Dragon voice recognition software that occasionally misinterpreted phrases or words.               [1]   Social History  Socioeconomic History    Marital status: Single   Occupational History    Occupation: [email or web address]     Comment: [employer]   Tobacco Use    Smoking status: Former     Current packs/day: 0.00     Average packs/day: 0.5 packs/day for 9.0 years (4.5 ttl pk-yrs)     Types: Cigarettes     Start date: 7/2/2000     Quit date: 7/2/2009     Years since quitting: 15.9    Smokeless tobacco: Never   Substance and Sexual Activity    Alcohol use: Yes     Alcohol/week: 5.0 standard drinks of alcohol     Types: 5 Shots of liquor per week  "    Comment: "occasionally"    Drug use: No    Sexual activity: Not Currently     Partners: Male     Birth control/protection: OCP   Social History Narrative    The patient does not exercise regularly ().  Rates diet as fair.  She is not satisfied with weight.         Social Drivers of Health     Financial Resource Strain: Medium Risk (4/23/2024)    Overall Financial Resource Strain (CARDIA)     Difficulty of Paying Living Expenses: Somewhat hard   Food Insecurity: Food Insecurity Present (4/23/2024)    Hunger Vital Sign     Worried About Running Out of Food in the Last Year: Sometimes true     Ran Out of Food in the Last Year: Sometimes true   Transportation Needs: No Transportation Needs (4/23/2024)    PRAPARE - Transportation     Lack of Transportation (Medical): No     Lack of Transportation (Non-Medical): No   Physical Activity: Sufficiently Active (4/23/2024)    Exercise Vital Sign     Days of Exercise per Week: 6 days     Minutes of Exercise per Session: 120 min   Stress: Stress Concern Present (4/23/2024)    [employer] - Occupational Stress Questionnaire     Feeling of Stress : Very much   Housing Stability: High Risk (4/23/2024)    Housing Stability Vital Sign     Unable to Pay for Housing in the Last Year: Yes     "

## 2025-06-12 NOTE — PROCEDURES
Large Joint Aspiration/Injection: R knee    Date/Time: 6/12/2025 11:30 AM    Performed by: Matthew Elliott MD  Authorized by: Matthew Elliott MD    Consent Done?:  Yes (Verbal)  Indications:  Pain  Site marked: the procedure site was marked    Timeout: prior to procedure the correct patient, procedure, and site was verified    Prep: patient was prepped and draped in usual sterile fashion      Local anesthesia used?: Yes    Local anesthetic:  Lidocaine 1% without epinephrine    Details:  Needle Size:  25 G  Ultrasonic Guidance for needle placement?: No    Location:  Knee  Site:  R knee  Medications:  40 mg triamcinolone acetonide 40 mg/mL  Patient tolerance:  Patient tolerated the procedure well with no immediate complications

## 2025-06-19 ENCOUNTER — TELEPHONE (OUTPATIENT)
Dept: ORTHOPEDICS | Facility: CLINIC | Age: 40
End: 2025-06-19
Payer: COMMERCIAL

## 2025-06-19 NOTE — TELEPHONE ENCOUNTER
Left voicemail informing patient that she is not due for her Synvisc one injection until 8.18.2025 as her insurance requires 6 months between injections, we can resubmit for it on 8.18.2025.

## 2025-06-20 ENCOUNTER — PATIENT MESSAGE (OUTPATIENT)
Dept: FAMILY MEDICINE | Facility: CLINIC | Age: 40
End: 2025-06-20
Payer: COMMERCIAL

## 2025-06-20 ENCOUNTER — PATIENT MESSAGE (OUTPATIENT)
Dept: HEMATOLOGY/ONCOLOGY | Facility: CLINIC | Age: 40
End: 2025-06-20
Payer: COMMERCIAL

## 2025-06-20 DIAGNOSIS — J06.9 UPPER RESPIRATORY TRACT INFECTION, UNSPECIFIED TYPE: Primary | ICD-10-CM

## 2025-06-20 RX ORDER — AMOXICILLIN AND CLAVULANATE POTASSIUM 500; 125 MG/1; MG/1
1 TABLET, FILM COATED ORAL 2 TIMES DAILY
Qty: 14 TABLET | Refills: 0 | Status: SHIPPED | OUTPATIENT
Start: 2025-06-20 | End: 2025-06-27

## 2025-06-21 DIAGNOSIS — E03.4 HYPOTHYROIDISM DUE TO ACQUIRED ATROPHY OF THYROID: ICD-10-CM

## 2025-06-23 RX ORDER — LEVOTHYROXINE SODIUM 50 UG/1
50 TABLET ORAL
Qty: 90 TABLET | Refills: 3 | Status: SHIPPED | OUTPATIENT
Start: 2025-06-23

## 2025-07-01 NOTE — PROGRESS NOTES
PATIENT: Jerry Coronado  MRN: [id number]  DATE: 7/8/2025    Diagnosis:   1. 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 Morbid obesity    3. Mood disorder due to a general medical condition    4. Iron deficiency    5. Immunodeficiency due to drugs    6. Hypogammaglobulinemia      Chief Complaint: Lymphoma    Oncologic History:      Oncologic History 1. Follicular lymphoma      Oncologic Treatment 1. Bendamustine/rituximab - 2022      Pathology 11/29/21:  Lymph node, left supraclavicular region, excision:  --Follicular lymphoma with high Ki-67 proliferation index, see comment  Comment: Concomitantly submitted flow cytometric analysis detects B-cell lymphoma of germinal center  origin expressing CD19, bright CD20, and CD10 with kappa light chain restriction. CD5 is negative in this  population.  Although the majority of the lymph node appears to represent as grade 2 follicular lymphoma, the Ki-67  proliferation index is much higher than expected for typical low-grade follicular lymphoma. Additionally there  is a very small area suggestive of grade 3A follicular lymphoma as well, representing less than 5% of the overall cellularity. This sampling leaves concern for a higher grade process elsewhere and correlation with  clinical and radiologic findings including PET scan findings is highly recommended with additional biopsy of  areas of high SUV if indicated.      Telemedicine visit:  The patient location is: home  The chief complaint leading to consultation is: follicular lymphoma    Visit type: audiovisual    Face to Face time with patient: 10 minutes  20 minutes of total time spent on the encounter, which includes face to face time and non-face to face time preparing to see the patient (eg, review of tests), Obtaining and/or reviewing separately obtained history, Documenting clinical information in the electronic or other health record, Independently interpreting results  (not separately reported) and communicating results to the patient/family/caregiver, or Care coordination (not separately reported).     Each patient to whom he or she provides medical services by telemedicine is:  (1) informed of the relationship between the physician and patient and the respective role of any other health care provider with respect to management of the patient; and (2) notified that he or she may decline to receive medical services by telemedicine and may withdraw from such care at any time.    Notes: see below      Subjective:    History of Present Illness: Ms. Coronado is a 39 y.o. female who presented in December 2021 for evaluation and management of B cell lymphoma. She was referred by Dr. Kerns.    - presenting symptom was enlarged supraclavicular lymph nodes  - she underwent excisional lymph node biopsy on 11/29/21. Pathology revealed a follicular lymphoma with high ki-67 proliferation index.  - she reports having a CT scan in [place] in early November with enlarged lymph nodes noted throughout.  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 This may make treatment decisions challenging logistically.  - she received cycle #1 of bendamustine/rituximab on 1/25/22 - 1/26/22 in [place], [place].  - she underwent PET/CT scan on 4/14/22.  - she completed 6 cycles of bendamustine/rituximab (last was on 6/16/22 - 6/17/22).  - she underwent port removal on 6/20/22.  - she underwent pet scan on 6/29/22.  - she underwent pet scan on 2/22/23.  - she underwent pet scan on 4/24/24    Interval history:  - she presents for a follow-up appointment for her follicular lymphoma  - she underwent pet scan on 2/14/25   - she endorses knee pain; she states she may need a knee replacement. She denies shortness of breath, chest pain, vomiting, constipation.   - she just came back from [facility].    Past medical, surgical, family, and social histories have been reviewed and  updated below.    Past Medical History:   Past Medical History:   Diagnosis Date    Allergic rhinitis     Anxiety     Cancer 12/01/2021    B-cell folicular lymphoma    Depression     Difficult intubation     tmj    Hypothyroidism     Sleep apnea     hasnt received cpap    SVT (supraventricular tachycardia) 2011    ablations 2013 no problem since       Past Surgical History:   Past Surgical History:   Procedure Laterality Date    ABLATION OF DYSRHYTHMIC FOCUS  2016    x2    BIOPSY OF CERVICAL LYMPH NODE Left 11/29/2021    Procedure: BIOPSY, LYMPH NODE, CERVICAL;  Surgeon: Blanco Kerns MD;  Location: [facility] OR;  Service: General;  Laterality: Left;    CERVIX LESION DESTRUCTION  11/2021    CONIZATION OF CERVIX USING LOOP ELECTROSURGICAL EXCISION PROCEDURE (LEEP) N/A 07/21/2021    Procedure: LEEP CONIZATION, CERVIX;  Surgeon: Donna Castillo MD;  Location: [facility] OR;  Service: OB/GYN;  Laterality: N/A;    ENDOSCOPY, NOSE OR PARANASAL SINUS, WITH MAXILLARY SINUS TISSUE REMOVAL Bilateral 2/7/2025    Procedure: ENDOSCOPY, NOSE OR PARANASAL SINUS, WITH MAXILLARY SINUS TISSUE REMOVAL;  Surgeon: Oli Westbrook MD;  Location: [facility] OR;  Service: ENT;  Laterality: Bilateral;    ETHMOIDECTOMY, PARTIAL, ENDOSCOPIC Bilateral 2/7/2025    Procedure: ETHMOIDECTOMY, PARTIAL, ENDOSCOPIC;  Surgeon: Oli Westbrook MD;  Location: [facility] OR;  Service: ENT;  Laterality: Bilateral;    ETHMOIDECTOMY, TOTAL, ENDOSCOPIC Bilateral 2/7/2025    Procedure: ETHMOIDECTOMY, TOTAL, ENDOSCOPIC;  Surgeon: Oli Westbrook MD;  Location: [facility] OR;  Service: ENT;  Laterality: Bilateral;    EXCISION OF LESION OF NOSE N/A 10/14/2019    Procedure: EXCISION, LESION, NOSE;  Surgeon: Umesh Morales MD;  Location: [facility] OR;  Service: ENT;  Laterality: N/A;    FESS, USING COMPUTER-ASSISTED NAVIGATION, WITH NASAL TURBINATE REDUCTION N/A 2/7/2025    Procedure: FESS, USING COMPUTER-ASSISTED NAVIGATION, WITH NASAL TURBINATE REDUCTION;  Surgeon:  Oli Westbrook MD;  Location: [facility] OR;  Service: ENT;  Laterality: N/A;    FUNCTIONAL ENDOSCOPIC SINUS SURGERY (FESS) N/A 10/14/2019    Procedure: FESS (FUNCTIONAL ENDOSCOPIC SINUS SURGERY);  Surgeon: Umesh Morales MD;  Location: [facility] OR;  Service: ENT;  Laterality: N/A;    HYSTEROSCOPY WITH DILATION AND CURETTAGE OF UTERUS N/A 07/21/2021    Procedure: HYSTEROSCOPY, WITH DILATION AND CURETTAGE OF UTERUS;  Surgeon: Donna Castillo MD;  Location: [facility] OR;  Service: OB/GYN;  Laterality: N/A;    KNEE ARTHROSCOPY W/ MENISCECTOMY Right 02/07/2024    Procedure: ARTHROSCOPY, KNEE, WITH MENISCECTOMY;  Surgeon: Matthew Elliott MD;  Location: [facility];  Service: Orthopedics;  Laterality: Right;  partial medical meniscectomy and plica resection    MEDIPORT REMOVAL Right 06/20/2022    Procedure: REMOVAL, CATHETER, CENTRAL VENOUS, TUNNELED, WITH PORT;  Surgeon: Blanco Kerns MD;  Location: [facility] OR;  Service: General;  Laterality: Right;    NASAL SEPTOPLASTY N/A 10/14/2019    Procedure: SEPTOPLASTY, NOSE;  Surgeon: Umesh Morales MD;  Location: [facility] OR;  Service: ENT;  Laterality: N/A;    NASAL SEPTOPLASTY N/A 2/7/2025    Procedure: SEPTOPLASTY, NOSE;  Surgeon: Oli Westbrook MD;  Location: [facility] OR;  Service: ENT;  Laterality: N/A;    NASAL SINUS SURGERY  10/14/2019    OSTEOTOMY OF METATARSAL BONE Left 12/21/2022    Procedure: OSTEOTOMY, METATARSAL BONE;  Surgeon: Hola Escalante DPM;  Location: [facility] OR;  Service: Podiatry;  Laterality: Left;  [place] janes notified 12/19 TW  , RELEASE ENTRAPPED NERVE 2ND INTERMETATARSAL SPACE    PILONIDAL CYST DRAINAGE      REVISION OF SCAR  06/20/2022    Procedure: REVISION, SCAR;  Surgeon: Blanco Kerns MD;  Location: [facility] OR;  Service: General;;    SINUS SURGERY  11/30/2023    SKIN TAG REMOVAL Left 06/20/2022    Procedure: REMOVAL, SKIN TAG;  Surgeon: Blanco Kerns MD;  Location: [facility] OR;  Service: General;  Laterality: Left;    SURGICAL FRACTURE OF NASAL  TURBINATES Bilateral 2/7/2025    Procedure: SURGICAL FRACTURE, NASAL TURBINATE;  Surgeon: Oli Westbrook MD;  Location: [facility] OR;  Service: ENT;  Laterality: Bilateral;    SURGICAL REMOVAL OF NASAL TURBINATE Bilateral 2/7/2025    Procedure: EXCISION, NASAL TURBINATE;  Surgeon: Oli Westbrook MD;  Location: [facility] OR;  Service: ENT;  Laterality: Bilateral;    TONSILLECTOMY      TYMPANOPLASTY N/A 10/14/2019    Procedure: TYMPANOPLASTY;  Surgeon: Umesh Morales MD;  Location: [facility] OR;  Service: ENT;  Laterality: N/A;    TYMPANOTOMY Left 10/14/2019    left       Family History:   Family History   Problem Relation Name Age of Onset    Cataracts Mother Nadira Hernandez     Glaucoma Mother Nadira Hernandez     Hypertension Mother Nadira Hernandez     Heart disease Father Dirk hernandez 59        CABG    Heart failure Father Dirk hernandez     Breast cancer Maternal Aunt  55    Colon cancer Neg Hx      Ovarian cancer Neg Hx      Macular degeneration Neg Hx      Retinal detachment Neg Hx         Social History:  reports that she quit smoking about 16 years ago. Her smoking use included cigarettes. She started smoking about 25 years ago. She has a 4.5 pack-year smoking history. She has never used smokeless tobacco. She reports current alcohol use of about 5.0 standard drinks of alcohol per week. She reports that she does not use drugs.    Allergies:  Review of patient's allergies indicates:  No Known Allergies    Medications:  Current Outpatient Medications   Medication Sig Dispense Refill    albuterol (PROVENTIL HFA) 90 mcg/actuation inhaler Inhale 2 puffs into the lungs every 6 (six) hours as needed for Wheezing. Rescue 6.7 g 0    ALPRAZolam (XANAX) 1 MG tablet Take 2 tablets (2 mg total) by mouth 2 (two) times daily as needed for Anxiety. 120 tablet 0    azelastine (ASTELIN) 137 mcg (0.1 %) nasal spray 1 spray (137 mcg total) by Nasal route 2 (two) times daily as needed for Rhinitis. 30 mL 0    benzonatate (TESSALON) 100 MG  capsule Take 1 capsule by mouth 3 times daily as needed.      busPIRone (BUSPAR) 15 MG tablet TAKE 1 TABLET BY MOUTH 2 TIMES DAILY. 180 tablet 3    cetirizine (ZYRTEC) 10 MG tablet Take 1 tablet (10 mg total) by mouth every evening. 90 tablet 2    famotidine (PEPCID) 20 MG tablet Take 1 tablet (20 mg total) by mouth 2 (two) times daily. for 14 days 28 tablet 0    fluticasone propionate (FLONASE) 50 mcg/actuation nasal spray 1 spray (50 mcg total) by Each Nostril route once daily. 15.8 mL 2    gabapentin (NEURONTIN) 600 MG tablet Take 600 mg by mouth 3 (three) times daily as needed.      ibuprofen (ADVIL,MOTRIN) 800 MG tablet Take 1 tablet (800 mg total) by mouth every 8 (eight) hours as needed for Pain. 60 tablet 0    levothyroxine (SYNTHROID) 50 MCG tablet TAKE 1 TABLET BY MOUTH BEFORE BREAKFAST. 90 tablet 3    multivitamin capsule Take 1 capsule by mouth.      naproxen (EC NAPROSYN) 500 MG EC tablet Take 1 tablet by mouth 2 (two) times daily with meals.      ondansetron (ZOFRAN-ODT) 4 MG TbDL 4 mg every 8 (eight) hours as needed.      tirzepatide, weight loss, (ZEPBOUND) 7.5 mg/0.5 mL PnIj Inject 7.5 mg into the skin every 7 days. 2 mL 2    traZODone (DESYREL) 50 MG tablet Take 1 tablet (50 mg total) by mouth nightly as needed for Insomnia. 30 tablet 3    triamcinolone acetonide 0.1% (KENALOG) 0.1 % cream Apply topically 2 (two) times daily. 30 g 0    valACYclovir (VALTREX) 1000 MG tablet Take 2 tablets (2,000 mg total) by mouth every 12 (twelve) hours. for 1 day 30 tablet 0     No current facility-administered medications for this visit.       Review of Systems   Constitutional:  Positive for fatigue. Negative for appetite change and unexpected weight change.   HENT:  Negative for mouth sores and sore throat.         Frequent sinus infections   Eyes:  Negative for visual disturbance.   Respiratory:  Negative for cough and shortness of breath.    Cardiovascular:  Negative for chest pain.   Gastrointestinal:   Negative for abdominal pain, constipation, diarrhea and vomiting.   Genitourinary:  Negative for dysuria and frequency.   Musculoskeletal:  Negative for back pain.   Skin:  Negative for rash.   Neurological:  Negative for headaches.   Hematological:  Negative for adenopathy.   Psychiatric/Behavioral:  The patient is nervous/anxious.        ECOG Performance Status:   ECOG SCORE 1       Objective:      Vitals:   There were no vitals filed for this visit.  BMI: There is no height or weight on file to calculate BMI.  Deferred due to telemedicine visit.    Physical Exam  Deferred due to telemedicine visit.    Laboratory Data:  Labs have been reviewed.    Lab Results   Component Value Date    WBC 8.66 07/07/2025    HGB 14.9 07/07/2025    HCT 44.0 07/07/2025    MCV 88 07/07/2025     07/07/2025       Lab Results   Component Value Date    IRON 75 07/07/2025    TRANSFERRIN 290 07/07/2025    TIBC 406 07/07/2025    LABIRON 18 (L) 07/07/2025    FESATURATED 23 01/07/2025      Lab Results   Component Value Date    FERRITIN 646.6 (H) 07/07/2025         Imaging:     pet scan (4/24/24): I have personally reviewed the images  No PET-CT evidence of FDG avid malignancy.     Previously seen hypermetabolic activity associated with the endometrial cavity and the right ovary has resolved.    CTA chest (9/3/23):  IMPRESSION: No CT evidence of pulmonary emboli  No acute pulmonary process    pet scan (2/22/23): I have personally reviewed the images  No evidence of FDG avid lymphoproliferative disease.      pet scan (6/29/22): I have personally reviewed the images  No evidence of FDG avid lymphoproliferative disease.    PET/CT scan (4/14/22): I have personally reviewed the images  Differences in instrumentation and technique preclude semiquantitative comparison between the current PET/CT study and the prior.     Background:  - Liver 4.2 SUV max.  - Spleen 3.5 SUV max.  - Spleen 11.9 x 4.8 cm (AP x TR)  - Blood pool 3.3 SUV max     No FDG  avid lymphadenopathy or mass. The largest cervical node is a 5 x 5 mm supraclavicular node with SUV max 1.0 (image 86). The largest left axillary node measures 18 x 7 mm with SUV max 0.8 (image 103).     Additional findings:  -Right sided IJ medical infusion port with tip at the level of the SVC.  -Relative diffuse low-attenuation liver in keeping with steatosis.  -3.6 cm simple fluid attenuation on FDG avid cyst in the right ovary favored to represent a physiologic cyst in this age group. The left ovary and uterus are within normal limits.     IMPRESSION:  No evidence of FDG avid malignancy.    PET/CT scan (12/17/21): I have personally reviewed the images    IN THE HEAD AND NECK:     Multiple left-sided cervical lymph nodes, left and right supraclavicular lymph nodes and left axillary lymph nodes.  The index lesions, as follow:     Hypermetabolic lymph node at the left level Va region, measures 1.2 cm (axial series 2, image 68) with SUV max of 14.0.     Hypermetabolic left supraclavicular lymph node, measures 1.9 cm (axial series 2, image 82) with SUV max of 15.0.     Hypermetabolic left axillary lymph node, measures 3.5 cm (axial series 2, image 116) with SUV max of 12.0.     Hypermetabolic right supraclavicular lymph node, measures 1.4 cm (axial series 2, image 79) with SUV max of 13.0.     IN THE CHEST:     Focal radiotracer uptake in the left hilum with no corresponding lesion on CT images (axial fused image 116) with SUV max of 4.4.  No abnormal lesions throughout the pulmonary parenchyma.     IN THE ABDOMEN AND PELVIS:     There is a subcentimeter hypermetabolic lesion in the subcutaneous tissue of the right back at the level of L4, measures 0.9 cm (axial series 2, image 198) with SUV max of 6.7.  There is physiologic tracer distribution within the abdominal organs and excretion into the genitourinary system.     The spleen has normal uptake and is normal at 11.0 cm in craniocaudal dimension.     IN THE  "BONES:     There are no hypermetabolic lesions worrisome for malignancy.     In the extremities, there are no hypermetabolic lesions worrisome for malignancy.     Impression:     Hypermetabolic cervical, left axillary, and left hilar lymph nodes consistent with biopsy-proven follicular lymphoma (performed on 11/29/2021), as above.     Hypermetabolic subcutaneous tissue of the right nodule at the level of L4, concerning for additional lymphomatous deposit as described above.      Assessment:       1. 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 Morbid obesity    3. Mood disorder due to a general medical condition    4. Iron deficiency    5. Immunodeficiency due to drugs    6. Hypogammaglobulinemia             Plan:     1. Follicular lymphoma  - I have reviewed her chart  - supraclavicular lymph node biopsy (11/29/21) revealed follicular lymphoma. Importantly, in the pathology report is the following: "Although the majority of the lymph node appears to represent as grade 2 follicular lymphoma, the Ki-67  proliferation index is much higher than expected for typical low-grade follicular lymphoma. Additionally there  is a very small area suggestive of grade 3A follicular lymphoma as well, representing less than 5% of the  overall cellularity."  - she reports having a CT scan in [place] in early November with enlarged lymph nodes noted throughout.  - PET/CT scan (12/17/21) revealed "hypermetabolic cervical, left axillary, and left hilar lymph nodes" as well as "hypermetabolic subcutaneous tissue of the right nodule at the level of L4, concerning for additional lymphomatous deposit."  - she received cycle #1 of bendamustine/rituximab on 1/25/22 - 1/26/22 in [place], [place].  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 No evidence of hypermetabolic tumor  - The risks and benefits of chemotherapy were " discussed, written information was given, and informed consent was obtained.  - she completed 6 cycles of bendamustine/rituximab (last was on 6/16/22 - 6/17/22).  - she underwent port removal on 6/20/22.  - pet scan (6/29/22) revealed no evidence of hypermetabolic tumor.  - pet scan (2/22/23) revealed no evidence of hypermetabolic tumor.  - CTA chest (9/3/23): no evidence of recurrence.  - pet scan (4/24/24): No PET-CT evidence of FDG avid malignancy.   - Labs have been reviewed.    - clinically, she is doing well without clinical symptoms to suggest recurrence of lymphoma.  - return to clinic in 6 months.    2. Iron deficiency  - Labs have been reviewed. Total iron binding capacity is elevated, suggesting iron deficiency  - she received ferric carboxymaltose x 2 doses  - Labs have been reviewed. Ferritin increased to 646 ng/mL    3. Immunodeficiency / hypogammaglobulinemia  - she had received immune globulin infusion prior to her sinus surgery.  - Labs have been reviewed. IgG is decreased   - arrange for immune globulin monthly x 3 months  - repeat labs in 3 months.    3. Morbid obesity  - no weight since telemedicine visit.  - continue to monitor    4. Mood disorder due to medical condition  - moderate symptoms. Continue xanax as needed.     5. Insomnia  - moderate symptoms. Continue mirtazapine    6. Advance Care Planning     Power of   After our discussion (at previous visit), the patient decided to complete a HCPOA and appointed her brother Velasquez Coronado ([phone]) and dad Dirk Cliff ([phone]).        - return to clinic in 6 months.    Ki Nova M.D.  Hematology/Oncology  [facility] - [address], [address]  [place] [address]  Phone: [phone]  Fax: [phone]

## 2025-07-06 ENCOUNTER — PATIENT MESSAGE (OUTPATIENT)
Dept: OBSTETRICS AND GYNECOLOGY | Facility: CLINIC | Age: 40
End: 2025-07-06
Payer: COMMERCIAL

## 2025-07-06 ENCOUNTER — PATIENT MESSAGE (OUTPATIENT)
Dept: FAMILY MEDICINE | Facility: CLINIC | Age: 40
End: 2025-07-06
Payer: COMMERCIAL

## 2025-07-07 ENCOUNTER — PATIENT MESSAGE (OUTPATIENT)
Dept: HEMATOLOGY/ONCOLOGY | Facility: CLINIC | Age: 40
End: 2025-07-07
Payer: COMMERCIAL

## 2025-07-07 ENCOUNTER — LAB VISIT (OUTPATIENT)
Dept: LAB | Facility: HOSPITAL | Age: 40
End: 2025-07-07
Attending: INTERNAL MEDICINE
Payer: COMMERCIAL

## 2025-07-07 DIAGNOSIS — E61.1 IRON DEFICIENCY: ICD-10-CM

## 2025-07-07 DIAGNOSIS — C82.91 FOLLICULAR LYMPHOMA OF LYMPH NODES OF NECK, UNSPECIFIED FOLLICULAR LYMPHOMA TYPE: ICD-10-CM

## 2025-07-07 LAB
ABSOLUTE EOSINOPHIL (SMH): 0.12 K/UL
ABSOLUTE MONOCYTE (SMH): 0.6 K/UL (ref 0.3–1)
ABSOLUTE NEUTROPHIL COUNT (SMH): 6.1 K/UL (ref 1.8–7.7)
ALBUMIN SERPL-MCNC: 4.8 G/DL (ref 3.5–5.2)
ALP SERPL-CCNC: 75 UNIT/L (ref 55–135)
ALT SERPL-CCNC: 88 UNIT/L (ref 10–44)
ANION GAP (SMH): 9 MMOL/L (ref 8–16)
AST SERPL-CCNC: 57 UNIT/L (ref 10–40)
BASOPHILS # BLD AUTO: 0.06 K/UL
BASOPHILS NFR BLD AUTO: 0.7 %
BILIRUB SERPL-MCNC: 0.7 MG/DL (ref 0.1–1)
BUN SERPL-MCNC: 17 MG/DL (ref 6–20)
CALCIUM SERPL-MCNC: 9.7 MG/DL (ref 8.7–10.5)
CHLORIDE SERPL-SCNC: 105 MMOL/L (ref 95–110)
CO2 SERPL-SCNC: 27 MMOL/L (ref 23–29)
CREAT SERPL-MCNC: 0.8 MG/DL (ref 0.5–1.4)
ERYTHROCYTE [DISTWIDTH] IN BLOOD BY AUTOMATED COUNT: 12.8 % (ref 11.5–14.5)
FERRITIN SERPL-MCNC: 646.6 NG/ML (ref 20–300)
GFR SERPLBLD CREATININE-BSD FMLA CKD-EPI: >60 ML/MIN/1.73/M2
GLUCOSE SERPL-MCNC: 86 MG/DL (ref 70–110)
HCT VFR BLD AUTO: 44 % (ref 37–48.5)
HGB BLD-MCNC: 14.9 GM/DL (ref 12–16)
IMM GRANULOCYTES # BLD AUTO: 0.05 K/UL (ref 0–0.04)
IMM GRANULOCYTES NFR BLD AUTO: 0.6 % (ref 0–0.5)
IRON SATN MFR SERPL: 18 % (ref 20–50)
IRON SERPL-MCNC: 75 UG/DL (ref 30–160)
LDH SERPL-CCNC: 193 U/L (ref 110–260)
LYMPHOCYTES # BLD AUTO: 1.69 K/UL (ref 1–4.8)
MCH RBC QN AUTO: 29.9 PG (ref 27–31)
MCHC RBC AUTO-ENTMCNC: 33.9 G/DL (ref 32–36)
MCV RBC AUTO: 88 FL (ref 82–98)
NUCLEATED RBC (/100WBC) (SMH): 0 /100 WBC
PLATELET # BLD AUTO: 240 K/UL (ref 150–450)
PMV BLD AUTO: 9.2 FL (ref 9.2–12.9)
POTASSIUM SERPL-SCNC: 3.6 MMOL/L (ref 3.5–5.1)
PROT SERPL-MCNC: 7.1 GM/DL (ref 6–8.4)
RBC # BLD AUTO: 4.99 M/UL (ref 4–5.4)
RELATIVE EOSINOPHIL (SMH): 1.4 % (ref 0–8)
RELATIVE LYMPHOCYTE (SMH): 19.5 % (ref 18–48)
RELATIVE MONOCYTE (SMH): 6.9 % (ref 4–15)
RELATIVE NEUTROPHIL (SMH): 70.9 % (ref 38–73)
SODIUM SERPL-SCNC: 141 MMOL/L (ref 136–145)
TIBC SERPL-MCNC: 406 UG/DL (ref 250–450)
TRANSFERRIN SERPL-MCNC: 290 MG/DL (ref 200–375)
URATE SERPL-MCNC: 5.3 MG/DL (ref 2.4–5.7)
WBC # BLD AUTO: 8.66 K/UL (ref 3.9–12.7)

## 2025-07-07 PROCEDURE — 82435 ASSAY OF BLOOD CHLORIDE: CPT

## 2025-07-07 PROCEDURE — 84550 ASSAY OF BLOOD/URIC ACID: CPT

## 2025-07-07 PROCEDURE — 82728 ASSAY OF FERRITIN: CPT

## 2025-07-07 PROCEDURE — 83615 LACTATE (LD) (LDH) ENZYME: CPT

## 2025-07-07 PROCEDURE — 85025 COMPLETE CBC W/AUTO DIFF WBC: CPT

## 2025-07-07 PROCEDURE — 82784 ASSAY IGA/IGD/IGG/IGM EACH: CPT

## 2025-07-07 PROCEDURE — 36415 COLL VENOUS BLD VENIPUNCTURE: CPT

## 2025-07-07 PROCEDURE — 84466 ASSAY OF TRANSFERRIN: CPT

## 2025-07-08 ENCOUNTER — OFFICE VISIT (OUTPATIENT)
Dept: HEMATOLOGY/ONCOLOGY | Facility: CLINIC | Age: 40
End: 2025-07-08
Payer: COMMERCIAL

## 2025-07-08 DIAGNOSIS — C82.91 FOLLICULAR LYMPHOMA OF LYMPH NODES OF NECK, UNSPECIFIED FOLLICULAR LYMPHOMA TYPE: Primary | ICD-10-CM

## 2025-07-08 DIAGNOSIS — D84.821 IMMUNODEFICIENCY DUE TO DRUGS: ICD-10-CM

## 2025-07-08 DIAGNOSIS — E66.01 MORBID OBESITY: ICD-10-CM

## 2025-07-08 DIAGNOSIS — D80.1 HYPOGAMMAGLOBULINEMIA: ICD-10-CM

## 2025-07-08 DIAGNOSIS — E61.1 IRON DEFICIENCY: ICD-10-CM

## 2025-07-08 DIAGNOSIS — F06.30 MOOD DISORDER DUE TO A GENERAL MEDICAL CONDITION: ICD-10-CM

## 2025-07-08 DIAGNOSIS — Z79.899 IMMUNODEFICIENCY DUE TO DRUGS: ICD-10-CM

## 2025-07-08 LAB
IGA SERPL-MCNC: 21 MG/DL (ref 87–352)
IGG SERPL-MCNC: 315 MG/DL (ref 586–1602)
IGM SERPL-MCNC: 40 MG/DL (ref 26–217)

## 2025-07-08 PROCEDURE — 1160F RVW MEDS BY RX/DR IN RCRD: CPT | Mod: CPTII,95,, | Performed by: INTERNAL MEDICINE

## 2025-07-08 PROCEDURE — 98006 SYNCH AUDIO-VIDEO EST MOD 30: CPT | Mod: 95,,, | Performed by: INTERNAL MEDICINE

## 2025-07-08 PROCEDURE — 1159F MED LIST DOCD IN RCRD: CPT | Mod: CPTII,95,, | Performed by: INTERNAL MEDICINE

## 2025-07-08 RX ORDER — SODIUM CHLORIDE 0.9 % (FLUSH) 0.9 %
10 SYRINGE (ML) INJECTION
OUTPATIENT
Start: 2025-07-15

## 2025-07-08 RX ORDER — ACETAMINOPHEN 500 MG
500 TABLET ORAL ONCE
OUTPATIENT
Start: 2025-07-15 | End: 2025-07-15

## 2025-07-08 RX ORDER — HEPARIN 100 UNIT/ML
500 SYRINGE INTRAVENOUS
OUTPATIENT
Start: 2025-07-15

## 2025-07-08 RX ORDER — EPINEPHRINE 0.3 MG/.3ML
0.3 INJECTION SUBCUTANEOUS ONCE AS NEEDED
OUTPATIENT
Start: 2025-07-15

## 2025-07-08 RX ORDER — DIPHENHYDRAMINE HYDROCHLORIDE 50 MG/ML
50 INJECTION, SOLUTION INTRAMUSCULAR; INTRAVENOUS ONCE AS NEEDED
OUTPATIENT
Start: 2025-07-15

## 2025-07-08 RX ORDER — ACETAMINOPHEN 325 MG/1
325 TABLET ORAL ONCE AS NEEDED
OUTPATIENT
Start: 2025-07-15

## 2025-07-08 RX ORDER — DIPHENHYDRAMINE HCL 25 MG
25 CAPSULE ORAL ONCE
OUTPATIENT
Start: 2025-07-15 | End: 2025-07-15

## 2025-07-08 RX ORDER — ONDANSETRON 4 MG/1
4 TABLET, ORALLY DISINTEGRATING ORAL ONCE AS NEEDED
OUTPATIENT
Start: 2025-07-15

## 2025-07-08 NOTE — Clinical Note
1. Schedule immune globulin (privigen) monthly at [facility] x 3 months 2. Schedule cbc, cmp, uric acid, ldh, ferritin, iron/tibc, quantitative immunoglobulins in 3 and 6 months 3. Schedule virtual visit a few days after labs in 6 months.  Thanks!

## 2025-07-13 DIAGNOSIS — Z91.09 ENVIRONMENTAL ALLERGIES: ICD-10-CM

## 2025-07-14 RX ORDER — CETIRIZINE HYDROCHLORIDE 10 MG/1
10 TABLET ORAL NIGHTLY
Qty: 90 TABLET | Refills: 2 | Status: SHIPPED | OUTPATIENT
Start: 2025-07-14

## 2025-07-15 DIAGNOSIS — E66.813 OBESITY, CLASS III, BMI 40-49.9 (MORBID OBESITY): ICD-10-CM

## 2025-07-15 RX ORDER — TIRZEPATIDE 7.5 MG/.5ML
7.5 INJECTION, SOLUTION SUBCUTANEOUS
Qty: 2 ML | Refills: 0 | Status: ACTIVE | OUTPATIENT
Start: 2025-07-15

## 2025-07-24 ENCOUNTER — INFUSION (OUTPATIENT)
Dept: INFUSION THERAPY | Facility: HOSPITAL | Age: 40
End: 2025-07-24
Attending: INTERNAL MEDICINE
Payer: COMMERCIAL

## 2025-07-24 VITALS
HEART RATE: 83 BPM | SYSTOLIC BLOOD PRESSURE: 114 MMHG | TEMPERATURE: 99 F | WEIGHT: 217.5 LBS | BODY MASS INDEX: 41.07 KG/M2 | HEIGHT: 61 IN | RESPIRATION RATE: 16 BRPM | DIASTOLIC BLOOD PRESSURE: 68 MMHG | OXYGEN SATURATION: 95 %

## 2025-07-24 DIAGNOSIS — D84.821 IMMUNODEFICIENCY DUE TO DRUGS: Primary | ICD-10-CM

## 2025-07-24 DIAGNOSIS — Z79.899 IMMUNODEFICIENCY DUE TO DRUGS: Primary | ICD-10-CM

## 2025-07-24 DIAGNOSIS — D80.1 HYPOGAMMAGLOBULINEMIA: ICD-10-CM

## 2025-07-24 PROCEDURE — 25000003 PHARM REV CODE 250: Performed by: INTERNAL MEDICINE

## 2025-07-24 PROCEDURE — 63600175 PHARM REV CODE 636 W HCPCS: Mod: JZ,TB | Performed by: INTERNAL MEDICINE

## 2025-07-24 PROCEDURE — 96365 THER/PROPH/DIAG IV INF INIT: CPT

## 2025-07-24 RX ORDER — HEPARIN 100 UNIT/ML
500 SYRINGE INTRAVENOUS
OUTPATIENT
Start: 2025-08-21

## 2025-07-24 RX ORDER — SODIUM CHLORIDE 0.9 % (FLUSH) 0.9 %
10 SYRINGE (ML) INJECTION
Status: DISCONTINUED | OUTPATIENT
Start: 2025-07-24 | End: 2025-07-24 | Stop reason: HOSPADM

## 2025-07-24 RX ORDER — DIPHENHYDRAMINE HYDROCHLORIDE 50 MG/ML
50 INJECTION, SOLUTION INTRAMUSCULAR; INTRAVENOUS ONCE AS NEEDED
OUTPATIENT
Start: 2025-08-21

## 2025-07-24 RX ORDER — EPINEPHRINE 0.3 MG/.3ML
0.3 INJECTION SUBCUTANEOUS ONCE AS NEEDED
Status: DISCONTINUED | OUTPATIENT
Start: 2025-07-24 | End: 2025-07-24 | Stop reason: HOSPADM

## 2025-07-24 RX ORDER — DIPHENHYDRAMINE HCL 25 MG
25 CAPSULE ORAL ONCE
Status: COMPLETED | OUTPATIENT
Start: 2025-07-24 | End: 2025-07-24

## 2025-07-24 RX ORDER — ACETAMINOPHEN 325 MG/1
325 TABLET ORAL ONCE AS NEEDED
Status: DISCONTINUED | OUTPATIENT
Start: 2025-07-24 | End: 2025-07-24 | Stop reason: HOSPADM

## 2025-07-24 RX ORDER — ACETAMINOPHEN 500 MG
500 TABLET ORAL ONCE
OUTPATIENT
Start: 2025-08-21 | End: 2025-08-21

## 2025-07-24 RX ORDER — ACETAMINOPHEN 325 MG/1
325 TABLET ORAL ONCE AS NEEDED
OUTPATIENT
Start: 2025-08-21

## 2025-07-24 RX ORDER — SODIUM CHLORIDE 0.9 % (FLUSH) 0.9 %
10 SYRINGE (ML) INJECTION
OUTPATIENT
Start: 2025-08-21

## 2025-07-24 RX ORDER — ONDANSETRON 4 MG/1
4 TABLET, ORALLY DISINTEGRATING ORAL ONCE AS NEEDED
Status: COMPLETED | OUTPATIENT
Start: 2025-07-24 | End: 2025-07-24

## 2025-07-24 RX ORDER — ACETAMINOPHEN 500 MG
500 TABLET ORAL ONCE
Status: DISCONTINUED | OUTPATIENT
Start: 2025-07-24 | End: 2025-07-24 | Stop reason: HOSPADM

## 2025-07-24 RX ORDER — ONDANSETRON 4 MG/1
4 TABLET, ORALLY DISINTEGRATING ORAL ONCE AS NEEDED
OUTPATIENT
Start: 2025-08-21

## 2025-07-24 RX ORDER — DIPHENHYDRAMINE HYDROCHLORIDE 50 MG/ML
50 INJECTION, SOLUTION INTRAMUSCULAR; INTRAVENOUS ONCE AS NEEDED
Status: DISCONTINUED | OUTPATIENT
Start: 2025-07-24 | End: 2025-07-24 | Stop reason: HOSPADM

## 2025-07-24 RX ORDER — EPINEPHRINE 0.3 MG/.3ML
0.3 INJECTION SUBCUTANEOUS ONCE AS NEEDED
OUTPATIENT
Start: 2025-08-21

## 2025-07-24 RX ORDER — DIPHENHYDRAMINE HCL 25 MG
25 CAPSULE ORAL ONCE
OUTPATIENT
Start: 2025-08-21 | End: 2025-08-21

## 2025-07-24 RX ADMIN — ONDANSETRON 4 MG: 4 TABLET, ORALLY DISINTEGRATING ORAL at 10:07

## 2025-07-24 RX ADMIN — SODIUM CHLORIDE: 9 INJECTION, SOLUTION INTRAVENOUS at 10:07

## 2025-07-24 RX ADMIN — DIPHENHYDRAMINE HYDROCHLORIDE 25 MG: 25 CAPSULE ORAL at 10:07

## 2025-07-24 RX ADMIN — HUMAN IMMUNOGLOBULIN G 10 G: 10 LIQUID INTRAVENOUS at 10:07

## 2025-07-31 ENCOUNTER — PATIENT MESSAGE (OUTPATIENT)
Dept: HEMATOLOGY/ONCOLOGY | Facility: CLINIC | Age: 40
End: 2025-07-31
Payer: COMMERCIAL

## 2025-07-31 ENCOUNTER — LAB VISIT (OUTPATIENT)
Dept: LAB | Facility: HOSPITAL | Age: 40
End: 2025-07-31
Attending: NURSE PRACTITIONER
Payer: COMMERCIAL

## 2025-07-31 DIAGNOSIS — E03.4 HYPOTHYROIDISM DUE TO ACQUIRED ATROPHY OF THYROID: ICD-10-CM

## 2025-07-31 DIAGNOSIS — Z11.3 SCREEN FOR STD (SEXUALLY TRANSMITTED DISEASE): ICD-10-CM

## 2025-07-31 DIAGNOSIS — E04.1 THYROID NODULE: ICD-10-CM

## 2025-07-31 DIAGNOSIS — E61.1 IRON DEFICIENCY: ICD-10-CM

## 2025-07-31 DIAGNOSIS — E78.49 OTHER HYPERLIPIDEMIA: ICD-10-CM

## 2025-07-31 DIAGNOSIS — Z00.01 ENCOUNTER FOR ROUTINE ADULT HEALTH EXAMINATION WITH ABNORMAL FINDINGS: ICD-10-CM

## 2025-07-31 LAB
ABSOLUTE EOSINOPHIL (SMH): 0.05 K/UL
ABSOLUTE MONOCYTE (SMH): 0.62 K/UL (ref 0.3–1)
ABSOLUTE NEUTROPHIL COUNT (SMH): 6 K/UL (ref 1.8–7.7)
ALBUMIN SERPL-MCNC: 4.8 G/DL (ref 3.5–5.2)
ALP SERPL-CCNC: 67 UNIT/L (ref 55–135)
ALT SERPL-CCNC: 22 UNIT/L (ref 10–44)
ANION GAP (SMH): 11 MMOL/L (ref 8–16)
AST SERPL-CCNC: 12 UNIT/L (ref 10–40)
BASOPHILS # BLD AUTO: 0.09 K/UL
BASOPHILS NFR BLD AUTO: 1.1 %
BILIRUB SERPL-MCNC: 0.6 MG/DL (ref 0.1–1)
BUN SERPL-MCNC: 17 MG/DL (ref 6–20)
CALCIUM SERPL-MCNC: 9.6 MG/DL (ref 8.7–10.5)
CHLORIDE SERPL-SCNC: 108 MMOL/L (ref 95–110)
CHOLEST SERPL-MCNC: 224 MG/DL (ref 120–199)
CHOLEST/HDLC SERPL: 4.1 {RATIO} (ref 2–5)
CO2 SERPL-SCNC: 22 MMOL/L (ref 23–29)
CREAT SERPL-MCNC: 0.8 MG/DL (ref 0.5–1.4)
ERYTHROCYTE [DISTWIDTH] IN BLOOD BY AUTOMATED COUNT: 12.2 % (ref 11.5–14.5)
GFR SERPLBLD CREATININE-BSD FMLA CKD-EPI: >60 ML/MIN/1.73/M2
GLUCOSE SERPL-MCNC: 96 MG/DL (ref 70–110)
HCT VFR BLD AUTO: 49 % (ref 37–48.5)
HCV AB SERPL QL IA: NORMAL
HDLC SERPL-MCNC: 54 MG/DL (ref 40–75)
HDLC SERPL: 24.1 % (ref 20–50)
HGB BLD-MCNC: 16.7 GM/DL (ref 12–16)
IMM GRANULOCYTES # BLD AUTO: 0.05 K/UL (ref 0–0.04)
IMM GRANULOCYTES NFR BLD AUTO: 0.6 % (ref 0–0.5)
LDLC SERPL CALC-MCNC: 143 MG/DL (ref 63–159)
LYMPHOCYTES # BLD AUTO: 1.63 K/UL (ref 1–4.8)
MCH RBC QN AUTO: 30.1 PG (ref 27–31)
MCHC RBC AUTO-ENTMCNC: 34.1 G/DL (ref 32–36)
MCV RBC AUTO: 88 FL (ref 82–98)
NONHDLC SERPL-MCNC: 170 MG/DL
NUCLEATED RBC (/100WBC) (SMH): 0 /100 WBC
PLATELET # BLD AUTO: 285 K/UL (ref 150–450)
PMV BLD AUTO: 9.3 FL (ref 9.2–12.9)
POTASSIUM SERPL-SCNC: 4 MMOL/L (ref 3.5–5.1)
PROT SERPL-MCNC: 7.2 GM/DL (ref 6–8.4)
RBC # BLD AUTO: 5.54 M/UL (ref 4–5.4)
RELATIVE EOSINOPHIL (SMH): 0.6 % (ref 0–8)
RELATIVE LYMPHOCYTE (SMH): 19.4 % (ref 18–48)
RELATIVE MONOCYTE (SMH): 7.4 % (ref 4–15)
RELATIVE NEUTROPHIL (SMH): 70.9 % (ref 38–73)
SODIUM SERPL-SCNC: 141 MMOL/L (ref 136–145)
T4 FREE SERPL-MCNC: 0.92 NG/DL (ref 0.71–1.51)
TRIGL SERPL-MCNC: 135 MG/DL (ref 30–150)
TSH SERPL-ACNC: 1.62 UIU/ML (ref 0.34–5.6)
WBC # BLD AUTO: 8.4 K/UL (ref 3.9–12.7)

## 2025-07-31 PROCEDURE — 36415 COLL VENOUS BLD VENIPUNCTURE: CPT

## 2025-07-31 PROCEDURE — 80053 COMPREHEN METABOLIC PANEL: CPT

## 2025-07-31 PROCEDURE — 85025 COMPLETE CBC W/AUTO DIFF WBC: CPT

## 2025-07-31 PROCEDURE — 86803 HEPATITIS C AB TEST: CPT

## 2025-07-31 PROCEDURE — 86376 MICROSOMAL ANTIBODY EACH: CPT

## 2025-07-31 PROCEDURE — 84443 ASSAY THYROID STIM HORMONE: CPT

## 2025-07-31 PROCEDURE — 84439 ASSAY OF FREE THYROXINE: CPT

## 2025-07-31 PROCEDURE — 80061 LIPID PANEL: CPT

## 2025-08-01 LAB
THYROGLOB AB SERPL-ACNC: 1.2 IU/ML (ref 0–0.9)
THYROPEROXIDASE AB SERPL-ACNC: 12 IU/ML (ref 0–34)

## 2025-08-03 DIAGNOSIS — M62.838 MUSCLE SPASM: ICD-10-CM

## 2025-08-04 RX ORDER — METHOCARBAMOL 750 MG/1
750 TABLET, FILM COATED ORAL EVERY 8 HOURS PRN
Qty: 30 TABLET | Refills: 0 | Status: SHIPPED | OUTPATIENT
Start: 2025-08-04 | End: 2025-08-14

## 2025-08-05 ENCOUNTER — PATIENT MESSAGE (OUTPATIENT)
Dept: OBSTETRICS AND GYNECOLOGY | Facility: CLINIC | Age: 40
End: 2025-08-05
Payer: COMMERCIAL

## 2025-08-05 ENCOUNTER — LAB VISIT (OUTPATIENT)
Dept: LAB | Facility: HOSPITAL | Age: 40
End: 2025-08-05
Attending: NURSE PRACTITIONER
Payer: COMMERCIAL

## 2025-08-05 DIAGNOSIS — Z11.3 SCREEN FOR STD (SEXUALLY TRANSMITTED DISEASE): ICD-10-CM

## 2025-08-05 DIAGNOSIS — E78.49 OTHER HYPERLIPIDEMIA: ICD-10-CM

## 2025-08-05 DIAGNOSIS — E61.1 IRON DEFICIENCY: ICD-10-CM

## 2025-08-05 DIAGNOSIS — E03.4 HYPOTHYROIDISM DUE TO ACQUIRED ATROPHY OF THYROID: ICD-10-CM

## 2025-08-05 DIAGNOSIS — Z00.01 ENCOUNTER FOR ROUTINE ADULT HEALTH EXAMINATION WITH ABNORMAL FINDINGS: ICD-10-CM

## 2025-08-05 DIAGNOSIS — E04.1 THYROID NODULE: ICD-10-CM

## 2025-08-05 PROCEDURE — 87491 CHLMYD TRACH DNA AMP PROBE: CPT

## 2025-08-07 LAB
C TRACH RRNA SPEC QL NAA+PROBE: NEGATIVE
N GONORRHOEA RRNA SPEC QL NAA+PROBE: NEGATIVE

## 2025-08-08 ENCOUNTER — TELEPHONE (OUTPATIENT)
Dept: FAMILY MEDICINE | Facility: CLINIC | Age: 40
End: 2025-08-08
Payer: COMMERCIAL

## 2025-08-08 NOTE — TELEPHONE ENCOUNTER
Spoke to patient to advise her that lab test was negative. Patient stated she verbally understood.

## 2025-08-08 NOTE — TELEPHONE ENCOUNTER
----- Message from Saman De Dios MD sent at 8/8/2025  8:18 AM CDT -----  Results Ok, notify patient.  ----- Message -----  From: Lab, Background User  Sent: 8/8/2025   7:47 AM CDT  To: MIO Meléndez

## 2025-08-13 DIAGNOSIS — E66.813 OBESITY, CLASS III, BMI 40-49.9 (MORBID OBESITY): ICD-10-CM

## 2025-08-13 RX ORDER — TIRZEPATIDE 7.5 MG/.5ML
7.5 INJECTION, SOLUTION SUBCUTANEOUS
Qty: 2 ML | Refills: 0 | OUTPATIENT
Start: 2025-08-13

## 2025-08-14 ENCOUNTER — PATIENT MESSAGE (OUTPATIENT)
Dept: INTERNAL MEDICINE | Facility: CLINIC | Age: 40
End: 2025-08-14
Payer: COMMERCIAL

## 2025-08-14 DIAGNOSIS — E66.812 OBESITY, CLASS II, BMI 35-39.9: Primary | ICD-10-CM

## 2025-08-14 RX ORDER — TIRZEPATIDE 7.5 MG/.5ML
7.5 INJECTION, SOLUTION SUBCUTANEOUS
Qty: 2 ML | Refills: 0 | Status: ACTIVE | OUTPATIENT
Start: 2025-08-14

## 2025-08-20 ENCOUNTER — LAB VISIT (OUTPATIENT)
Dept: LAB | Facility: HOSPITAL | Age: 40
End: 2025-08-20
Attending: OBSTETRICS & GYNECOLOGY
Payer: COMMERCIAL

## 2025-08-20 DIAGNOSIS — E89.40 OVARIAN FAILURE DUE TO CANCER THERAPY: ICD-10-CM

## 2025-08-20 PROCEDURE — 36415 COLL VENOUS BLD VENIPUNCTURE: CPT

## 2025-08-20 PROCEDURE — 82670 ASSAY OF TOTAL ESTRADIOL: CPT

## 2025-08-20 PROCEDURE — 83001 ASSAY OF GONADOTROPIN (FSH): CPT

## 2025-08-21 ENCOUNTER — OFFICE VISIT (OUTPATIENT)
Dept: OBSTETRICS AND GYNECOLOGY | Facility: CLINIC | Age: 40
End: 2025-08-21
Payer: COMMERCIAL

## 2025-08-21 VITALS
DIASTOLIC BLOOD PRESSURE: 90 MMHG | WEIGHT: 211.88 LBS | BODY MASS INDEX: 40 KG/M2 | HEIGHT: 61 IN | SYSTOLIC BLOOD PRESSURE: 132 MMHG

## 2025-08-21 DIAGNOSIS — B00.9 HERPES SIMPLEX: ICD-10-CM

## 2025-08-21 DIAGNOSIS — Z01.419 WELL WOMAN EXAM WITH ROUTINE GYNECOLOGICAL EXAM: Primary | ICD-10-CM

## 2025-08-21 DIAGNOSIS — T45.1X5A: ICD-10-CM

## 2025-08-21 DIAGNOSIS — E89.40: ICD-10-CM

## 2025-08-21 DIAGNOSIS — F33.2 MODERATELY SEVERE RECURRENT MAJOR DEPRESSION: ICD-10-CM

## 2025-08-21 DIAGNOSIS — N95.1 VASOMOTOR SYMPTOMS DUE TO MENOPAUSE: ICD-10-CM

## 2025-08-21 DIAGNOSIS — Z12.31 BREAST CANCER SCREENING BY MAMMOGRAM: ICD-10-CM

## 2025-08-21 LAB
ESTRADIOL SERPL-MCNC: 10.1 PG/ML
FSH SERPL-ACNC: 85.3 MIU/ML

## 2025-08-21 PROCEDURE — 99999 PR PBB SHADOW E&M-EST. PATIENT-LVL III: CPT | Mod: PBBFAC,,, | Performed by: OBSTETRICS & GYNECOLOGY

## 2025-08-21 PROCEDURE — 3080F DIAST BP >= 90 MM HG: CPT | Mod: CPTII,S$GLB,, | Performed by: OBSTETRICS & GYNECOLOGY

## 2025-08-21 PROCEDURE — 3008F BODY MASS INDEX DOCD: CPT | Mod: CPTII,S$GLB,, | Performed by: OBSTETRICS & GYNECOLOGY

## 2025-08-21 PROCEDURE — 88175 CYTOPATH C/V AUTO FLUID REDO: CPT | Mod: TC | Performed by: OBSTETRICS & GYNECOLOGY

## 2025-08-21 PROCEDURE — 1160F RVW MEDS BY RX/DR IN RCRD: CPT | Mod: CPTII,S$GLB,, | Performed by: OBSTETRICS & GYNECOLOGY

## 2025-08-21 PROCEDURE — 87624 HPV HI-RISK TYP POOLED RSLT: CPT | Performed by: OBSTETRICS & GYNECOLOGY

## 2025-08-21 PROCEDURE — 99396 PREV VISIT EST AGE 40-64: CPT | Mod: S$GLB,,, | Performed by: OBSTETRICS & GYNECOLOGY

## 2025-08-21 PROCEDURE — 1159F MED LIST DOCD IN RCRD: CPT | Mod: CPTII,S$GLB,, | Performed by: OBSTETRICS & GYNECOLOGY

## 2025-08-21 PROCEDURE — 3075F SYST BP GE 130 - 139MM HG: CPT | Mod: CPTII,S$GLB,, | Performed by: OBSTETRICS & GYNECOLOGY

## 2025-08-21 RX ORDER — VALACYCLOVIR HYDROCHLORIDE 500 MG/1
2000 TABLET, FILM COATED ORAL 2 TIMES DAILY
Qty: 8 TABLET | Refills: 5 | Status: SHIPPED | OUTPATIENT
Start: 2025-08-21 | End: 2025-08-22

## 2025-08-21 RX ORDER — FLUTICASONE PROPIONATE 93 UG/1
SPRAY, METERED NASAL
COMMUNITY
Start: 2025-08-13

## 2025-08-21 RX ORDER — ESTRADIOL 2 MG/1
2 TABLET ORAL DAILY
Qty: 30 TABLET | Refills: 11 | Status: SHIPPED | OUTPATIENT
Start: 2025-08-21 | End: 2026-08-21

## 2025-08-21 RX ORDER — PROGESTERONE 200 MG/1
200 CAPSULE ORAL NIGHTLY
Qty: 30 CAPSULE | Refills: 11 | Status: SHIPPED | OUTPATIENT
Start: 2025-08-21 | End: 2026-08-21

## 2025-08-22 ENCOUNTER — TELEPHONE (OUTPATIENT)
Dept: OBSTETRICS AND GYNECOLOGY | Facility: CLINIC | Age: 40
End: 2025-08-22
Payer: COMMERCIAL

## 2025-08-26 ENCOUNTER — TELEPHONE (OUTPATIENT)
Dept: ORTHOPEDICS | Facility: CLINIC | Age: 40
End: 2025-08-26
Payer: COMMERCIAL

## 2025-08-26 ENCOUNTER — TELEPHONE (OUTPATIENT)
Dept: PSYCHIATRY | Facility: CLINIC | Age: 40
End: 2025-08-26
Payer: COMMERCIAL

## 2025-08-26 ENCOUNTER — INFUSION (OUTPATIENT)
Dept: INFUSION THERAPY | Facility: HOSPITAL | Age: 40
End: 2025-08-26
Attending: INTERNAL MEDICINE
Payer: COMMERCIAL

## 2025-08-26 VITALS
TEMPERATURE: 98 F | WEIGHT: 212.31 LBS | DIASTOLIC BLOOD PRESSURE: 69 MMHG | SYSTOLIC BLOOD PRESSURE: 103 MMHG | BODY MASS INDEX: 40.08 KG/M2 | OXYGEN SATURATION: 95 % | RESPIRATION RATE: 15 BRPM | HEIGHT: 61 IN | HEART RATE: 96 BPM

## 2025-08-26 DIAGNOSIS — F06.30 MOOD DISORDER DUE TO A GENERAL MEDICAL CONDITION: Primary | ICD-10-CM

## 2025-08-26 DIAGNOSIS — D84.821 IMMUNODEFICIENCY DUE TO DRUGS: Primary | ICD-10-CM

## 2025-08-26 DIAGNOSIS — Z79.899 IMMUNODEFICIENCY DUE TO DRUGS: Primary | ICD-10-CM

## 2025-08-26 DIAGNOSIS — D80.1 HYPOGAMMAGLOBULINEMIA: ICD-10-CM

## 2025-08-26 DIAGNOSIS — M17.11 PRIMARY OSTEOARTHRITIS OF RIGHT KNEE: Primary | ICD-10-CM

## 2025-08-26 LAB
INSULIN SERPL-ACNC: NORMAL U[IU]/ML
LAB AP BETHESDA CATEGORY: NORMAL
LAB AP CLINICAL FINDINGS: NORMAL
LAB AP CONTRACEPTIVES: NORMAL
LAB AP OCHS PAP SPECIMEN ADEQUACY: NORMAL
LAB AP OHS PAP INTERPRETATION: NORMAL
LAB AP PAP DISCLAIMER COMMENTS: NORMAL
LAB AP PAP ESTROGEN REPLACEMENT THERAPY: NORMAL
LAB AP PAP PMP: NORMAL
LAB AP PAP PREVIOUS BX: NORMAL
LAB AP PAP PRIOR TREATMENT: NORMAL
LAB AP PERFORMING LOCATION(S): NORMAL

## 2025-08-26 PROCEDURE — 25000003 PHARM REV CODE 250: Performed by: INTERNAL MEDICINE

## 2025-08-26 PROCEDURE — 63600175 PHARM REV CODE 636 W HCPCS: Mod: JZ,TB | Performed by: INTERNAL MEDICINE

## 2025-08-26 PROCEDURE — 96365 THER/PROPH/DIAG IV INF INIT: CPT

## 2025-08-26 RX ORDER — EPINEPHRINE 0.3 MG/.3ML
0.3 INJECTION SUBCUTANEOUS ONCE AS NEEDED
Status: DISCONTINUED | OUTPATIENT
Start: 2025-08-26 | End: 2025-08-26 | Stop reason: HOSPADM

## 2025-08-26 RX ORDER — ACETAMINOPHEN 500 MG
500 TABLET ORAL ONCE
OUTPATIENT
Start: 2025-08-26 | End: 2025-08-26

## 2025-08-26 RX ORDER — ONDANSETRON 4 MG/1
4 TABLET, ORALLY DISINTEGRATING ORAL ONCE AS NEEDED
Status: COMPLETED | OUTPATIENT
Start: 2025-08-26 | End: 2025-08-26

## 2025-08-26 RX ORDER — ACETAMINOPHEN 500 MG
500 TABLET ORAL ONCE
Status: COMPLETED | OUTPATIENT
Start: 2025-08-26 | End: 2025-08-26

## 2025-08-26 RX ORDER — DIPHENHYDRAMINE HCL 25 MG
25 CAPSULE ORAL ONCE
OUTPATIENT
Start: 2025-08-26 | End: 2025-08-26

## 2025-08-26 RX ORDER — DIPHENHYDRAMINE HCL 25 MG
25 CAPSULE ORAL ONCE
Status: COMPLETED | OUTPATIENT
Start: 2025-08-26 | End: 2025-08-26

## 2025-08-26 RX ORDER — SODIUM CHLORIDE 0.9 % (FLUSH) 0.9 %
10 SYRINGE (ML) INJECTION
Status: DISCONTINUED | OUTPATIENT
Start: 2025-08-26 | End: 2025-08-26 | Stop reason: HOSPADM

## 2025-08-26 RX ORDER — HEPARIN 100 UNIT/ML
500 SYRINGE INTRAVENOUS
OUTPATIENT
Start: 2025-08-26

## 2025-08-26 RX ORDER — SODIUM CHLORIDE 0.9 % (FLUSH) 0.9 %
10 SYRINGE (ML) INJECTION
OUTPATIENT
Start: 2025-08-26

## 2025-08-26 RX ORDER — EPINEPHRINE 0.3 MG/.3ML
0.3 INJECTION SUBCUTANEOUS ONCE AS NEEDED
OUTPATIENT
Start: 2025-08-26 | End: 2037-01-21

## 2025-08-26 RX ORDER — ONDANSETRON 4 MG/1
4 TABLET, ORALLY DISINTEGRATING ORAL ONCE AS NEEDED
OUTPATIENT
Start: 2025-08-26 | End: 2037-01-21

## 2025-08-26 RX ORDER — ACETAMINOPHEN 325 MG/1
325 TABLET ORAL ONCE AS NEEDED
Status: DISCONTINUED | OUTPATIENT
Start: 2025-08-26 | End: 2025-08-26 | Stop reason: HOSPADM

## 2025-08-26 RX ORDER — DIPHENHYDRAMINE HYDROCHLORIDE 50 MG/ML
50 INJECTION, SOLUTION INTRAMUSCULAR; INTRAVENOUS ONCE AS NEEDED
Status: DISCONTINUED | OUTPATIENT
Start: 2025-08-26 | End: 2025-08-26 | Stop reason: HOSPADM

## 2025-08-26 RX ORDER — DIPHENHYDRAMINE HYDROCHLORIDE 50 MG/ML
50 INJECTION, SOLUTION INTRAMUSCULAR; INTRAVENOUS ONCE AS NEEDED
OUTPATIENT
Start: 2025-08-26 | End: 2037-01-21

## 2025-08-26 RX ORDER — ACETAMINOPHEN 325 MG/1
325 TABLET ORAL ONCE AS NEEDED
OUTPATIENT
Start: 2025-08-26 | End: 2037-01-21

## 2025-08-26 RX ADMIN — ACETAMINOPHEN 500 MG: 500 TABLET ORAL at 08:08

## 2025-08-26 RX ADMIN — ONDANSETRON 4 MG: 4 TABLET, ORALLY DISINTEGRATING ORAL at 08:08

## 2025-08-26 RX ADMIN — HUMAN IMMUNOGLOBULIN G 10 G: 10 LIQUID INTRAVENOUS at 08:08

## 2025-08-26 RX ADMIN — DIPHENHYDRAMINE HYDROCHLORIDE 25 MG: 25 CAPSULE ORAL at 08:08

## 2025-08-27 ENCOUNTER — TELEPHONE (OUTPATIENT)
Dept: PSYCHIATRY | Facility: CLINIC | Age: 40
End: 2025-08-27
Payer: COMMERCIAL

## 2025-08-27 LAB
HPV DNA, HIGH RISK TYPE 16, PCR (OHS): NEGATIVE
HPV DNA, HIGH RISK TYPE 18, PCR (OHS): NEGATIVE
HPV DNA, HIGH RISK TYPE OTHER, PCR (OHS): NEGATIVE

## 2025-08-29 ENCOUNTER — TELEPHONE (OUTPATIENT)
Dept: PSYCHIATRY | Facility: CLINIC | Age: 40
End: 2025-08-29
Payer: COMMERCIAL

## 2025-09-02 ENCOUNTER — TELEPHONE (OUTPATIENT)
Dept: PSYCHIATRY | Facility: CLINIC | Age: 40
End: 2025-09-02
Payer: COMMERCIAL

## 2025-09-02 ENCOUNTER — TELEPHONE (OUTPATIENT)
Dept: HEMATOLOGY/ONCOLOGY | Facility: CLINIC | Age: 40
End: 2025-09-02
Payer: COMMERCIAL

## 2025-09-03 ENCOUNTER — PATIENT MESSAGE (OUTPATIENT)
Dept: FAMILY MEDICINE | Facility: CLINIC | Age: 40
End: 2025-09-03
Payer: COMMERCIAL

## (undated) DEVICE — NDL HYPO REG 25G X 1 1/2

## (undated) DEVICE — SPONGE GAUZE 10S 4X4  442214

## (undated) DEVICE — SHEET THYROID W/ISO-BAC

## (undated) DEVICE — GLOVE 7.0 PROTEXIS PI BLUE

## (undated) DEVICE — GLOVE BIOGEL ECLIPSE SZ 7

## (undated) DEVICE — GLOVE BIOGEL PI   GOLD SIZE 8

## (undated) DEVICE — SEE MEDLINE ITEM 157116

## (undated) DEVICE — BANDAGE SOFTBAND 4 441506

## (undated) DEVICE — PACK BASIC

## (undated) DEVICE — ELECTRODE REM PLYHSV RETURN 9

## (undated) DEVICE — CLOSURE SKIN STERI STRIP 1/4X4

## (undated) DEVICE — NEEDLE SAFETY ECLIPSE 18G 1-1/2

## (undated) DEVICE — KIT ANTIFOG W/SPONG & FLUID

## (undated) DEVICE — SUT VICRYL 3-0 27 SH

## (undated) DEVICE — SPONGE PATTY SURGICAL .5X3IN

## (undated) DEVICE — TRACKER ENT INSTRUMENT

## (undated) DEVICE — SEE MEDLINE ITEM 157117

## (undated) DEVICE — DRESSING NASOPORE 8CM BIORSRBL

## (undated) DEVICE — DRESSING TEGADERM 2 3/8 X 2.75

## (undated) DEVICE — PAD BOVIE ADULT

## (undated) DEVICE — SPONGE LAP 18X18 PREWASHED

## (undated) DEVICE — TRAY SKIN PREP DRY

## (undated) DEVICE — SPONGE GZ WOVEN 12-PLY 4X4IN

## (undated) DEVICE — SYR 10CC LUER LOCK

## (undated) DEVICE — MANIFOLD 4 PORT

## (undated) DEVICE — GAUZE SPONGE 4X4 12PLY

## (undated) DEVICE — GLOVE BIOGEL SKINSENSE PI 8.5

## (undated) DEVICE — CAST PADDING WET/DRY

## (undated) DEVICE — SOLUTION PREP IODINE 4OZ

## (undated) DEVICE — COVER OVERHEAD SURG LT BLUE

## (undated) DEVICE — SYRINGE 10ML 302995

## (undated) DEVICE — SOLUTION SCRUB IODINE 4OZ

## (undated) DEVICE — SUTURE VICRYL 4-0 RB-1 27 J214H

## (undated) DEVICE — SEE MEDLINE ITEM 156955

## (undated) DEVICE — SPONGE DERMA 8PLY 2X2

## (undated) DEVICE — SYR 30CC LUER LOCK

## (undated) DEVICE — NDL HYPO 27G X 1 1/2

## (undated) DEVICE — BOVIE SUCTION

## (undated) DEVICE — SUT PDSII 4-0 PS-2 CLEAR MO

## (undated) DEVICE — BLADE SCALPEL #15 371115

## (undated) DEVICE — ADHESIVE DERMABOND ADVANCED

## (undated) DEVICE — TOWEL OR DISP STRL BLUE 4/PK

## (undated) DEVICE — SOLUTION IRRI NS BOTTLE 1000ML R5200-01

## (undated) DEVICE — SOL NACL IRR 3000ML

## (undated) DEVICE — TUBING SUCTION STR .25INX6FT

## (undated) DEVICE — SYR B-D DISP CONTROL 10CC100/C

## (undated) DEVICE — SUT PLAIN 4-0 SC-1 18IN

## (undated) DEVICE — ELECTRODE NEEDLE 1IN

## (undated) DEVICE — Device

## (undated) DEVICE — DRESSING TRANS 4X4 3/4

## (undated) DEVICE — APPLICATOR CHLORAPREP ORN 26ML

## (undated) DEVICE — CUFF TOURNIQUET 18DUAL PRT 5921-218-235

## (undated) DEVICE — PACK ARTHROSCOPY SMHS009-07

## (undated) DEVICE — SUPPORT ULNA NERVE PROTECTOR

## (undated) DEVICE — BANDAGE ADH SOFT FLEX 1X3

## (undated) DEVICE — SUT MCRYL PLUS 4-0 PS2 27IN

## (undated) DEVICE — APPLIER LIGACLIP SM 9.38IN

## (undated) DEVICE — BANDAGE ACE STERILE 4 REB3114

## (undated) DEVICE — GLOVE SURGICAL LATEX SZ 8

## (undated) DEVICE — SUT 5/0 18IN PLAIN FAST AB

## (undated) DEVICE — SYS LABEL CORRECT MED

## (undated) DEVICE — BLADE INFERIOR TURBINATE 2MM

## (undated) DEVICE — SOL NACL IRR 1000ML BTL

## (undated) DEVICE — TRAY LOWER EXTREMITY  SMHS029-05

## (undated) DEVICE — SUTURE VICRYL 3-0 RB-1 27 J215H

## (undated) DEVICE — SUT 2-0 12-18IN SILK

## (undated) DEVICE — SUT SILK 3-0 SH DETACH 30IN

## (undated) DEVICE — SUT 2-0 VICRYL / CT-1

## (undated) DEVICE — BLADE SMALL BONE OSCILLATING KM33-212

## (undated) DEVICE — SOL IRR 0.9% NACL 500ML PB

## (undated) DEVICE — SUT CTD VICRYL CT-1 UND BR

## (undated) DEVICE — COLLECTOR SPECIMAN ARTHRO

## (undated) DEVICE — ELECTRODE REM POLYHESIVE II

## (undated) DEVICE — BANDAGE ESMARK 4X9 55514

## (undated) DEVICE — GLOVE BIOGEL PIMICRO INDIC 8.5

## (undated) DEVICE — WALKER ANKLE MEDIUM

## (undated) DEVICE — GAUZE SPONGE PEANUT STRL

## (undated) DEVICE — PACK HEAD & NECK

## (undated) DEVICE — TOURNIQUET SB QC DP 34X4IN

## (undated) DEVICE — BLADE SURG #15 CARBON STEEL

## (undated) DEVICE — SET Y-TYPE TUR IRRIGATION 96IN

## (undated) DEVICE — CUTTER MENISCUS AGGRESSIVE 4.0

## (undated) DEVICE — TRACKER PATIENT NON INVASIVE